# Patient Record
Sex: MALE | Race: WHITE | NOT HISPANIC OR LATINO | Employment: OTHER | URBAN - METROPOLITAN AREA
[De-identification: names, ages, dates, MRNs, and addresses within clinical notes are randomized per-mention and may not be internally consistent; named-entity substitution may affect disease eponyms.]

---

## 2019-09-11 ENCOUNTER — TRANSFERRED RECORDS (OUTPATIENT)
Dept: HEALTH INFORMATION MANAGEMENT | Facility: CLINIC | Age: 64
End: 2019-09-11

## 2019-09-17 ENCOUNTER — OFFICE VISIT (OUTPATIENT)
Dept: FAMILY MEDICINE | Facility: CLINIC | Age: 64
End: 2019-09-17
Payer: COMMERCIAL

## 2019-09-17 VITALS
WEIGHT: 219.4 LBS | TEMPERATURE: 97.8 F | RESPIRATION RATE: 14 BRPM | OXYGEN SATURATION: 96 % | HEIGHT: 73 IN | DIASTOLIC BLOOD PRESSURE: 98 MMHG | SYSTOLIC BLOOD PRESSURE: 144 MMHG | BODY MASS INDEX: 29.08 KG/M2 | HEART RATE: 70 BPM

## 2019-09-17 DIAGNOSIS — R25.1 TREMOR OF LEFT HAND: ICD-10-CM

## 2019-09-17 DIAGNOSIS — E78.2 MIXED HYPERLIPIDEMIA: ICD-10-CM

## 2019-09-17 DIAGNOSIS — Z00.00 ENCOUNTER FOR MEDICARE ANNUAL WELLNESS EXAM: Primary | ICD-10-CM

## 2019-09-17 DIAGNOSIS — Z28.21 REFUSED PNEUMOCOCCAL VACCINATION: ICD-10-CM

## 2019-09-17 DIAGNOSIS — I10 UNCONTROLLED HYPERTENSION: ICD-10-CM

## 2019-09-17 DIAGNOSIS — M10.00 IDIOPATHIC GOUT, UNSPECIFIED CHRONICITY, UNSPECIFIED SITE: ICD-10-CM

## 2019-09-17 DIAGNOSIS — Z12.5 SCREENING FOR PROSTATE CANCER: ICD-10-CM

## 2019-09-17 DIAGNOSIS — Z28.21 REFUSED INFLUENZA VACCINE: ICD-10-CM

## 2019-09-17 PROBLEM — M19.90 INFLAMMATORY ARTHRITIS: Status: ACTIVE | Noted: 2019-09-17

## 2019-09-17 LAB
ANION GAP SERPL CALCULATED.3IONS-SCNC: 4 MMOL/L (ref 3–14)
BUN SERPL-MCNC: 22 MG/DL (ref 7–30)
CALCIUM SERPL-MCNC: 9.6 MG/DL (ref 8.5–10.1)
CHLORIDE SERPL-SCNC: 106 MMOL/L (ref 94–109)
CHOLEST SERPL-MCNC: 209 MG/DL
CO2 SERPL-SCNC: 31 MMOL/L (ref 20–32)
CREAT SERPL-MCNC: 1.08 MG/DL (ref 0.66–1.25)
ERYTHROCYTE [DISTWIDTH] IN BLOOD BY AUTOMATED COUNT: 12.3 % (ref 10–15)
GFR SERPL CREATININE-BSD FRML MDRD: 71 ML/MIN/{1.73_M2}
GLUCOSE SERPL-MCNC: 117 MG/DL (ref 70–99)
HCT VFR BLD AUTO: 46.1 % (ref 40–53)
HDLC SERPL-MCNC: 71 MG/DL
HGB BLD-MCNC: 15.8 G/DL (ref 13.3–17.7)
LDLC SERPL CALC-MCNC: 119 MG/DL
MCH RBC QN AUTO: 34.3 PG (ref 26.5–33)
MCHC RBC AUTO-ENTMCNC: 34.3 G/DL (ref 31.5–36.5)
MCV RBC AUTO: 100 FL (ref 78–100)
NONHDLC SERPL-MCNC: 138 MG/DL
PLATELET # BLD AUTO: 134 10E9/L (ref 150–450)
POTASSIUM SERPL-SCNC: 3.8 MMOL/L (ref 3.4–5.3)
PSA SERPL-ACNC: 6.57 UG/L (ref 0–4)
RBC # BLD AUTO: 4.61 10E12/L (ref 4.4–5.9)
SODIUM SERPL-SCNC: 141 MMOL/L (ref 133–144)
TRIGL SERPL-MCNC: 93 MG/DL
URATE SERPL-MCNC: 6.7 MG/DL (ref 3.5–7.2)
WBC # BLD AUTO: 6 10E9/L (ref 4–11)

## 2019-09-17 PROCEDURE — 99214 OFFICE O/P EST MOD 30 MIN: CPT | Mod: 25 | Performed by: FAMILY MEDICINE

## 2019-09-17 PROCEDURE — G0103 PSA SCREENING: HCPCS | Performed by: FAMILY MEDICINE

## 2019-09-17 PROCEDURE — 36415 COLL VENOUS BLD VENIPUNCTURE: CPT | Performed by: FAMILY MEDICINE

## 2019-09-17 PROCEDURE — 99387 INIT PM E/M NEW PAT 65+ YRS: CPT | Performed by: FAMILY MEDICINE

## 2019-09-17 PROCEDURE — 85027 COMPLETE CBC AUTOMATED: CPT | Performed by: FAMILY MEDICINE

## 2019-09-17 PROCEDURE — 80061 LIPID PANEL: CPT | Performed by: FAMILY MEDICINE

## 2019-09-17 PROCEDURE — 80048 BASIC METABOLIC PNL TOTAL CA: CPT | Performed by: FAMILY MEDICINE

## 2019-09-17 PROCEDURE — 84550 ASSAY OF BLOOD/URIC ACID: CPT | Performed by: FAMILY MEDICINE

## 2019-09-17 RX ORDER — LISINOPRIL AND HYDROCHLOROTHIAZIDE 12.5; 2 MG/1; MG/1
2 TABLET ORAL
COMMUNITY
Start: 2019-01-08 | End: 2020-01-15

## 2019-09-17 RX ORDER — ALLOPURINOL 100 MG/1
TABLET ORAL
Status: ON HOLD | COMMUNITY
Start: 2019-01-14 | End: 2021-04-30

## 2019-09-17 ASSESSMENT — ACTIVITIES OF DAILY LIVING (ADL): CURRENT_FUNCTION: NO ASSISTANCE NEEDED

## 2019-09-17 ASSESSMENT — MIFFLIN-ST. JEOR: SCORE: 1834.07

## 2019-09-17 NOTE — PATIENT INSTRUCTIONS
You declined adding a second medication to control your blood pressure.  Uncontrolled high blood pressure increases your risk for heart attacks, strokes and other complications.  Be more consistent with low salt diet.  Follow up in 2 weeks for blood pressure recheck.    Measure blood pressure at home when you are resting.    Tremor on left hand appears to be essential tremors. But other conditions are possible too.  Propranolol  can be used to control this but you deferred starting.  Possible neurology consult and brain imaging in the near future as well.    Be consistent with low trans fat and saturated fat diet.  Eat food rich in omega-3-fatty acids as you tolerate. (salmon, olive oil)  Eat 5 cups of vegetables, fruits and whole grains per day.  Limit starchy food (white rice, white bread, white pasta, white potatoes) to less than a cup per meal.  Minimize sweets, junk food and fastfood. Limit soda beverages to one serving per day; best to avoid it altogether though.  Exercise: moderate intensity sustained for at least 30 mins per episode, goal of 150 mins per week at least  Combine cardiovascular and resistance exercises.  These exercise recommendations are in addition to your daily activity at work or home.  Work on losing weight if you are above your goal body mass index.    You refused to get a flu and pneumonia vaccines today.  This increases your risk for complications if you do contract the virus or bacteria.  If you change your decision, do get the vaccine as soon as you can.      Patient Education   Personalized Prevention Plan  You are due for the preventive services outlined below.  Your care team is available to assist you in scheduling these services.  If you have already completed any of these items, please share that information with your care team to update in your medical record.  Health Maintenance Due   Topic Date Due     Hepatitis C Screening  1955     Discuss Advance Care Planning   1955     Colonscopy  03/21/1964     HIV Screening  03/21/1969     Diptheria Tetanus Pertussis (DTAP/TDAP/TD) Vaccine (1 - Tdap) 03/21/1979     Zoster (Shingles) Vaccine (1 of 2) 03/21/2004     Cholesterol Lab  12/10/2008     PHQ-2  01/01/2019     Annual Wellness Visit  03/21/2019     FALL RISK ASSESSMENT  03/21/2019     Pneumococcal Vaccine (1 of 2 - PCV13) 03/21/2019     AORTIC ANEURYSM SCREENING (SYSTEM ASSIGNED)  03/21/2019     Flu Vaccine (1) 09/01/2019     Preventive Health Recommendations  See your health care provider every year to    Review health changes.     Discuss preventive care.      Review your medicines if your doctor has prescribed any.    Talk with your health care provider about whether you should have a test to screen for prostate cancer (PSA).    Every 3 years, have a diabetes test (fasting glucose). If you are at risk for diabetes, you should have this test more often.    Every 5 years, have a cholesterol test. Have this test more often if you are at risk for high cholesterol or heart disease.     Every 10 years, have a colonoscopy. Or, have a yearly FIT test (stool test). These exams will check for colon cancer.    Talk to with your health care provider about screening for Abdominal Aortic Aneurysm if you have a family history of AAA or have a history of smoking.    Shots:     Get a flu shot each year.     Get a tetanus shot every 10 years.     Talk to your doctor about your pneumonia vaccines. There are now two you should receive - Pneumovax (PPSV 23) and Prevnar (PCV 13).    Talk to your pharmacist about a shingles vaccine.     Talk to your doctor about the hepatitis B vaccine.    Nutrition:     Eat at least 5 servings of fruits and vegetables each day.     Eat whole-grain bread, whole-wheat pasta and brown rice instead of white grains and rice.     Get adequate Calcium and Vitamin D.     Lifestyle    Exercise for at least 150 minutes a week (30 minutes a day, 5 days a week). This will  help you control your weight and prevent disease.     Limit alcohol to one drink per day.     No smoking.     Wear sunscreen to prevent skin cancer.     See your dentist every six months for an exam and cleaning.     See your eye doctor every 1 to 2 years to screen for conditions such as glaucoma, macular degeneration and cataracts.    Personalized Prevention Plan  You are due for the preventive services outlined below.  Your care team is available to assist you in scheduling these services.  If you have already completed any of these items, please share that information with your care team to update in your medical record.  Health Maintenance Due   Topic Date Due     Hepatitis C Screening  1955     Discuss Advance Care Planning  1955     Colonscopy  03/21/1964     HIV Screening  03/21/1969     Diptheria Tetanus Pertussis (DTAP/TDAP/TD) Vaccine (1 - Tdap) 03/21/1979     Zoster (Shingles) Vaccine (1 of 2) 03/21/2004     Cholesterol Lab  12/10/2008     PHQ-2  01/01/2019     Annual Wellness Visit  03/21/2019     FALL RISK ASSESSMENT  03/21/2019     Pneumococcal Vaccine (1 of 2 - PCV13) 03/21/2019     AORTIC ANEURYSM SCREENING (SYSTEM ASSIGNED)  03/21/2019     Flu Vaccine (1) 09/01/2019       Understanding USDA MyPlate  The USDA (U.S. Department of Agriculture) has guidelines to help you make healthy food choices. These are called MyPlate. MyPlate shows the food groups that make up healthy meals using the image of a place setting. Before you eat, think about the healthiest choices for what to put onto your plate or into your cup or bowl. To learn more about building a healthy plate, visit www.choosemyplate.gov.    The food groups    Fruits. Any fruit or 100% fruit juice counts as part of the Fruit Group. Fruits may be fresh, canned, frozen, or dried, and may be whole, cut-up, or pureed. Make half your plate fruits and vegetables.    Vegetables. Any vegetable or 100% vegetable juice counts as a member of the  Vegetable Group. Vegetables may be fresh, frozen, canned, or dried. They can be served raw or cooked and may be whole, cut-up, or mashed. Make half your plate fruits and vegetables.    Grains. All foods made from grains are part of the Grains Group. These include wheat, rice, oats, cornmeal, and barley such as bread, pasta, oatmeal, cereal, tortillas, and grits. Grains should be no more than a quarter of your plate. At least half of your grains should be whole grains.    Protein. This group includes meat, poultry, seafood, beans and peas, eggs, processed soy products (like tofu), nuts (including nut butters), and seeds. Make protein choices no more than a quarter of your plate. Meat and poultry choices should be lean or low fat.    Dairy. All fluid milk products and foods made from milk that contain calcium, like yogurt and cheese, are part of the Dairy Group. (Foods that have little calcium, such as cream, butter, and cream cheese, are not part of the group.) Most dairy choices should be low-fat or fat-free.    Oils. These are fats that are liquid at room temperature. They include canola, corn, olive, soybean, and sunflower oil. Foods that are mainly oil include mayonnaise, certain salad dressings, and soft margarines. You should have only 5 to 7 teaspoons of oils a day. You probably already get this much from the food you eat.  Date Last Reviewed: 8/1/2017 2000-2018 [employer]. [address] [place] [address]. All rights reserved. This information is not intended as a substitute for professional medical care. Always follow your healthcare professional's instructions.           Patient Education     Low-Salt Diet  This diet removes foods that are high in salt. It also limits the amount of salt you use when cooking. It is most often used for people with high blood pressure, edema (fluid retention), and kidney, liver, or heart disease.  Table salt contains the mineral sodium. Your body  needs sodium to work normally. But too much sodium can make your health problems worse. Your healthcare provider is recommending a low-salt (also called low-sodium) diet for you. Your total daily allowance of salt is 1,500 to 2,300 milligrams (mg). It is less than 1 teaspoon of table salt. This means you can have only about 500 to 700 mg of sodium at each meal. People with certain health problems should limit salt intake to the lower end of the recommended range.    When you cook, don t add much salt. If you can cook without using salt, even better. Don t add salt to your food at the table.  When shopping, read food labels. Salt is often called sodium on the label. Choose foods that are salt-free, low salt, or very low salt. Note that foods with reduced salt may not lower your salt intake enough.    Beans, potatoes, and pasta  Ok: Dry beans, split peas, lentils, potatoes, rice, macaroni, pasta, spaghetti without added salt  Avoid: Potato chips, tortilla chips, and similar products  Breads and cereals  Ok: Low-sodium breads, rolls, cereals, and cakes; low-salt crackers, matzo crackers  Avoid: Salted crackers, pretzels, popcorn, [place] toast, pancakes, muffins  Dairy  Ok: Milk, chocolate milk, hot chocolate mix, low-salt cheeses, and yogurt  Avoid: Processed cheese and cheese spreads; Roquefort, Camembert, and cottage cheese; buttermilk, instant breakfast drink  Desserts  Ok: Ice cream, frozen yogurt, juice bars, gelatin, cookies and pies, sugar, honey, jelly, hard candy  Avoid: Most pies, cakes and cookies prepared or processed with salt; instant pudding  Drinks  Ok: Tea, coffee, fizzy (carbonated) drinks, juices  Avoid: Flavored coffees, electrolyte replacement drinks, sports drinks  Meats  Ok: All fresh meat, fish, poultry, low-salt tuna, eggs, egg substitute  Avoid: Smoked, pickled, brine-cured, or salted meats and fish. This includes smalls, chipped beef, corned beef, hot dogs, deli meats, ham, kosher meats, salt  pork, sausage, canned tuna, salted codfish, smoked salmon, herring, sardines, or anchovies.  Seasonings and spices  Ok: Most seasonings are okay. Good substitutes for salt include: fresh herb blends, hot sauce, lemon, garlic, castillo, vinegar, dry mustard, parsley, cilantro, horseradish, tomato paste, regular margarine, mayonnaise, unsalted butter, cream cheese, vegetable oil, cream, low-salt salad dressing and gravy.  Avoid: Regular ketchup, relishes, pickles, soy sauce, teriyaki sauce, Worcestershire sauce, BBQ sauce, tartar sauce, meat tenderizer, chili sauce, regular gravy, regular salad dressing, salted butter  Soups  Ok: Low-salt soups and broths made with allowed foods  Avoid: Bouillon cubes, soups with smoked or salted meats, regular soup and broth  Vegetables  Ok: Most vegetables are okay; also low-salt tomato and vegetable juices  Avoid: Sauerkraut and other brine-soaked vegetables; pickles and other pickled vegetables; tomato juice, olives  Date Last Reviewed: 8/1/2016 2000-2018 [employer]. [address] [place] [address]. All rights reserved. This information is not intended as a substitute for professional medical care. Always follow your healthcare professional's instructions.           Patient Education     Essential Tremor (ET)  What is essential tremor?  Essential tremor (ET) is a neurological disorder that causes your hands, head, trunk, voice, or legs to shake rhythmically. It is often confused with Parkinson disease.  ET is the most common trembling disorder that people have. Everyone has some ET, but the movements usually cannot be seen or felt. When tremors are noticeable, the condition is classified as ET.  ET is most common among people older than age 65, but it can affect people at any age.  What causes ET?  ET can occur in different people for different reasons:    Familial essential tremor. In most people, the condition seems to be passed down from a parent to a  child. If your parent has ET, there is a 50% chance that you or your children will inherit the gene responsible for the condition.    Essential tremor related to another disorder. Sometimes, a tremor is a symptom of another neurological disorder, such as Parkinson disease or dystonia. Sometimes, ET is mistaken for these other diseases when they are not present. A healthcare provider s careful diagnosis is extremely important.  The cause of ET isn t known. However, one theory suggests that your cerebellum and other parts of your brain are not communicating correctly. The cerebellum is a part of the brain that controls muscle coordination.  What are the symptoms of ET?  If you have ET, you will have shaking and trembling at different times and in different situations, but some characteristics are common to all. Here is what you might typically experience:    Tremors occur when you move and are less noticeable when you rest.    Certain medicines, caffeine, or stress can make your tremors worse.    Tremor may improve with ingestion of a small amount of alcohol (such as wine)    Tremors get worse as you age.    Tremors don t affect both sides of your body in the same way.  Here are different signs of essential tremor:    Tremors that are most obvious in your hands    Difficulty doing tasks with your hands, such as writing or using tools    Shaking or quivering sound in your voice    Uncontrollable head-nodding    In rare instances, tremors in your legs or feet  How is ET diagnosed?  Your rapid, uncontrollable trembling, as well as questions about your medical and family history, can help your healthcare provider determine if you have familial ET. He or she will probably need to rule out other conditions that could cause shaking or trembling. For example, tremors could be symptoms of diseases, such as hyperthyroidism. Your healthcare provider might test you for those, as well.  In some cases, the tremors might be related  to other factors. To find out for certain, your healthcare provider may have you try to:    Abstain from heavy alcohol use; if you re an alcoholic, trembling is a common symptom.    Cut out cigarette smoking.    Avoid caffeine.    Avoid certain medicines  How is ET treated?   Propanolol and primidone are 2 medicines often prescribed to treat ET. Propanolol blocks the stimulating action of neurotransmitters to calm your trembling. Primidone is a common antiseizure medicine that also controls the actions of neurotransmitters.  Gabapentin and topiramate are 2 other antiseizure medicines that are sometimes prescribed. In some cases, tranquilizers like alprazolam or clonazepam might be suggested.  For ET in your hands, botulinum toxin (Botox) injections have shown some promise in easing the trembling. They work by weakening the surrounding muscles around your hands. For severe tremors, a stimulating device (deep brain stimulator) surgically implanted in your brain may help.  Can ET be prevented?   The specific cause of ET is not known, so scientists are not sure how the condition can be prevented.  Living with ET  ET is usually not dangerous, but it can certainly be frustrating if you have to deal with it. Certain factors can make tremors worse, so the following steps may help to decrease tremors:    Avoid alcohol, cigarettes, and caffeine    Avoid stressful situations as much as possible    Use relaxation techniques, such as yoga, deep-breathing exercises, or biofeedback    Check with your healthcare provider to see if any medicines you re taking could be making your tremors worse.  Talk with your healthcare provider about other options, such as surgery, if ET starts to affect your quality of life.  When should I call my healthcare provider?  If you have been diagnosed with ET, talk with your healthcare provider about when you might need to call. He or she will likely advise you to call if your tremors become worse, or  if you develop new neurologic symptoms, such as numbness or weakness.  Key points about essential tremors    ET is a neurological disorder that causes your hands, head, trunk, voice, or legs to shake rhythmically. The cause is not known, but it is often passed down from a parent to a child.    ET is sometimes confused with other types of tremor, so getting the right diagnosis is important.    Tremors tend to be worse during movement than when at rest. The tremors are usually not dangerous, but they can get worse over time.    Avoiding things that might make tremors worse, such as stress, caffeine, and certain medicines, may be helpful.    Medicines can also help control or limit tremors in some people. Severe tremors can sometimes be treated with surgery.    Next steps  Tips to help you get the most from a visit to your healthcare provider:    Know the reason for your visit and what you want to happen.    Before your visit, write down questions you want answered.    Bring someone with you to help you ask questions and remember what your provider tells you.    At the visit, write down the name of a new diagnosis, and any new medicines, treatments, or tests. Also write down any new instructions your provider gives you.    Know why a new medicine or treatment is prescribed, and how it will help you. Also know what the side effects are.    Ask if your condition can be treated in other ways.    Know why a test or procedure is recommended and what the results could mean.    Know what to expect if you do not take the medicine or have the test or procedure.    If you have a follow-up appointment, write down the date, time, and purpose for that visit.    Know how you can contact your provider if you have questions.      [id number] [employer]. [address] [place] [address], [place] [address]. All rights reserved. This information is not intended as a substitute for professional medical care. Always follow your  healthcare professional's instructions.

## 2019-09-17 NOTE — PROGRESS NOTES
"SUBJECTIVE:   Jonathon Santos is a 64 year old male who presents for Preventive Visit.  Are you in the first 12 months of your Medicare coverage?  Yes, pt had eyes checked at eye clinic 6 months ago.    Healthy Habits:     In general, how would you rate your overall health?  Good    Frequency of exercise:: farms, active.    Duration of exercise:: daily.    Do you usually eat at least 4 servings of fruit and vegetables a day, include whole grains    & fiber and avoid regularly eating high fat or \"junk\" foods?  No (2 servings)    Taking medications regularly:  Yes    Barriers to taking medications:  None    Medication side effects:  Other (pt gets congested in the mornings, wondering about allopurinol causing this)    Ability to successfully perform activities of daily living:  No assistance needed    Home Safety:  No safety concerns identified    Hearing Impairment:  No hearing concerns    In the past 6 months, have you been bothered by leaking of urine?  No    In general, how would you rate your overall mental or emotional health?  Good      PHQ-2 Total Score: 0    Additional concerns today:  No    Patient was advised that concern below is separate from preventive exam and may be billed as a separate encounter.  He verbalized understanding and would like to address today.    1) tremor on left upper extremity, worsens if he picks up a cup or when he writes. He is left handed. Present for few years, more frequent now. Waxes and wanes. Patient said his father and grandfather have tremors but patient cannot remember what type of conditions they had. No previous eval or treatment per patient. Patient denies change in gait, difficulty walking  or other neuro symptoms    2) patient aske dfor labs for rheum for his gout: CBC, uric acid and creatinine. Patient takes allopurinol.  Patient reports adequate control.     3) HTN - found to be moderately high today. Denies chest pain, dyspnea, HA, BOV, dizziness or urinary changes. " On Prinzide currently.    Do you feel safe in your environment? Yes    Do you have a Health Care Directive? No: Advance care planning reviewed with patient; information given to patient to review.  Pt had hearing test done in [place] just recently.  Colonoscopy sent to [facility], found in care everywhere.  Fall risk  Fallen 2 or more times in the past year?: No  Any fall with injury in the past year?: No    Cognitive Screening   1) Repeat 3 items (Leader, Season, Table)    2) Clock draw: NORMAL  3) 3 item recall: Recalls 3 objects  Results: 3 items recalled: COGNITIVE IMPAIRMENT LESS LIKELY    Mini-CogTM Copyright S [employer]ac[employer]. Licensed by the author for use in [facility]; reprinted with permission (soob@.[facility]). All rights reserved.      Do you have sleep apnea, excessive snoring or daytime drowsiness?: no    Reviewed and updated as needed this visit by clinical staff  Tobacco  Allergies  Meds  Med Hx  Surg Hx  Fam Hx  Soc Hx        Reviewed and updated as needed this visit by Provider        Social History     Tobacco Use     Smoking status: Never Smoker     Smokeless tobacco: Former User   Substance Use Topics     Alcohol use: Yes     Comment: 1-2 beers daily     If you drink alcohol do you typically have >3 drinks per day or >7 drinks per week? No    Current providers sharing in care for this patient include:   Patient Care Team:  Balbir Oliveira MD as PCP - General (Family Practice)    The following health maintenance items are reviewed in Epic and correct as of today:  Health Maintenance   Topic Date Due     HEPATITIS C SCREENING  1955     HIV SCREENING  03/21/1969     DTAP/TDAP/TD IMMUNIZATION (1 - Tdap) 03/21/1979     ZOSTER IMMUNIZATION (1 of 2) 03/21/2004     LIPID  12/10/2008     MEDICARE ANNUAL WELLNESS VISIT  03/21/2019     FALL RISK ASSESSMENT  03/21/2019     PNEUMOCOCCAL IMMUNIZATION 65+ LOW/MEDIUM RISK (1 of 2 - PCV13) 03/21/2019     AORTIC ANEURYSM SCREENING  "(SYSTEM ASSIGNED)  03/21/2019     INFLUENZA VACCINE (1) 09/01/2019     ADVANCE CARE PLANNING  09/17/2024     COLONOSCOPY  12/06/2026     PHQ-2  Completed     IPV IMMUNIZATION  Aged Out     MENINGITIS IMMUNIZATION  Aged Out     Patient Active Problem List   Diagnosis     Refused influenza vaccine     Refused pneumococcal vaccination     Gout     Inflammatory arthritis     Renal insufficiency syndrome     History reviewed. No pertinent surgical history.    Social History     Tobacco Use     Smoking status: Never Smoker     Smokeless tobacco: Former User   Substance Use Topics     Alcohol use: Yes     Comment: 1-2 beers daily     Family History   Problem Relation Age of Onset     Hypertension Mother      Hypertension Father      Hypertension Maternal Grandmother      Hypertension Maternal Grandfather      Hypertension Brother      Hypertension Sister      Diabetes Cousin          Current Outpatient Medications   Medication Sig Dispense Refill     allopurinol (ZYLOPRIM) 100 MG tablet TAKE 2 TABLETS BY MOUTH ONCE DAILY       lisinopril-hydrochlorothiazide (PRINZIDE/ZESTORETIC) 20-12.5 MG tablet Take 2 tablets by mouth       No Known Allergies  Pneumonia Vaccine: patient declines    Review of Systems  Constitutional, HEENT, cardiovascular, pulmonary, GI, , musculoskeletal, neuro, skin, endocrine and psych systems are negative, except as otherwise noted.    Patient denies BM or urinary changes.  No fam hx of colon cancer..  No fam hx of prostate cancer.    OBJECTIVE:   BP (!) 144/98   Pulse 70   Temp 97.8  F (36.6  C) (Tympanic)   Resp 14   Ht 1.854 m (6' 1\")   Wt 99.5 kg (219 lb 6.4 oz)   SpO2 96%   BMI 28.95 kg/m   Estimated body mass index is 28.95 kg/m  as calculated from the following:    Height as of this encounter: 1.854 m (6' 1\").    Weight as of this encounter: 99.5 kg (219 lb 6.4 oz).  Physical Exam  GENERAL APPEARANCE: overweight, alert and no distress  EYES: pink conj, no icterus, PERRL, EOMI  HENT: " "ear canals and TM's normal, nose and mouth without ulcers or lesions, oropharynx clear and oral mucous membranes moist  NECK: no adenopathy, no asymmetry, masses, or scars and thyroid normal to palpation  RESP: lungs clear to auscultation - no rales, rhonchi or wheezes  CV: regular rates and rhythm, normal S1 S2, no S3 or S4, no murmur, click or rub, no peripheral edema and peripheral pulses strong  ABDOMEN: soft, nontender, no hepatosplenomegaly, no masses and bowel sounds normal  RECTAL: deferred  MS: no musculoskeletal defects are noted and gait is age appropriate without ataxia  SKIN: no suspicious lesions or rashes  NEURO: Normal strength and tone, sensory exam grossly normal, mentation intact and speech normal; coarse left upper extremity tremor that worsens with intention.    Diagnostic Test Results:  none     ASSESSMENT / PLAN:   Jonathon was seen today for physical.    Diagnoses and all orders for this visit:    Encounter for Medicare annual wellness exam  Patient was advised on recommended screening and preventive health recommendations.  He verbalized understanding and agreed to the plans below.    Uncontrolled hypertension  -     Basic metabolic panel  Patient was advised BP uncontrolled today.  Reviewed meds with patient. Already maxed dose of Prinzide  Patient declined to add medication in spite of being advised on complications of high BP.  Patient said his BP \"is okay\" measured at the store - when asked when and what measurements he has, he could not say when last measured and what range.  Reinforced sodium restriction.  Exercise recommendations reviewed with patient.  Patient was strongly advised to recheck BP in 2 weeks in clinic. If still >140/90, start another med (propranolol, amlodipine).    Tremor of left hand  Patient was advised that grossly the tremor is consistent with essential tremor.  However, other etiologies can be considered: Parkinson's disease, cerebellar disease, MS, midbrain vascular " disease among others.  Patient deferred starting propranolol (primarily for htn), that can be used to treat ET.  Patient deferred brain imaging at this time.  Consider neuro consult. Patient defers as well.    Mixed hyperlipidemia  -     Lipid panel reflex to direct LDL Fasting  Reinforced heart healthy lifestyle.    Idiopathic gout, unspecified chronicity, unspecified site  -     Uric acid  -     CBC with platelets  At end of visit, patient asked that labs be ordered per his rheumatologist request.    Screening for prostate cancer  -     Prostate spec antigen screen    Refused influenza vaccine  Offered vaccine.  Discussed benefits of getting it and risk of not getting it. Patient declined.    Refused pneumococcal vaccination  Offered vaccine.  Discussed benefits of getting it and risk of not getting it. Patient declined.    In addition to preventive health visit, spent another 25 minutes in counseling and coordination of care as above.    End of Life Planning:  Patient currently has an advanced directive: No.  I have verified the patient's ablity to prepare an advanced directive/make health care decisions.  Literature was provided to assist patient in preparing an advanced directive.    COUNSELING:  Reviewed preventive health counseling, as reflected in patient instructions       Regular exercise       Healthy diet/nutrition       Vision screening       Hearing screening       Dental care       Bladder control       Fall risk prevention       Immunizations    Declined: Influenza and Pneumococcal due to Conscientious objector               Alcohol Use       Safe sex practices/STD prevention       Colon cancer screening       Prostate cancer screening       Osteoporosis Prevention/Bone Health       The 10-year ASCVD risk score (Cindy WOOTEN Jr., et al., 2013) is: 15.2%    Values used to calculate the score:      Age: 64 years      Sex: Male      Is Non- : No      Diabetic: No      Tobacco smoker:  "No      Systolic Blood Pressure: 144 mmHg      Is BP treated: Yes      HDL Cholesterol: 71 mg/dL      Total Cholesterol: 209 mg/dL    Estimated body mass index is 28.95 kg/m  as calculated from the following:    Height as of this encounter: 1.854 m (6' 1\").    Weight as of this encounter: 99.5 kg (219 lb 6.4 oz).    Weight management plan: Discussed healthy diet and exercise guidelines     reports that he has never smoked. He has quit using smokeless tobacco.      Appropriate preventive services were discussed with this patient, including applicable screening as appropriate for cardiovascular disease, diabetes, osteopenia/osteoporosis, and glaucoma.  As appropriate for age/gender, discussed screening for colorectal cancer, prostate cancer, breast cancer, and cervical cancer. Checklist reviewing preventive services available has been given to the patient.    Reviewed patients plan of care and provided an AVS. The Basic Care Plan (routine screening as documented in Health Maintenance) and Complex Care Plan (for patients with higher acuity and needing more deliberate coordination of services) for Jonathon meets the Care Plan requirement. This Care Plan has been established and reviewed with the Patient.    Counseling Resources:  ATP IV Guidelines  Pooled Cohorts Equation Calculator  Breast Cancer Risk Calculator  FRAX Risk Assessment  ICSI Preventive Guidelines  Dietary Guidelines for Americans, 2010  [employer] MyPlate  ASA Prophylaxis  Lung CA Screening    Balbir Oliveira MD  [facility]    Identified Health Risks:  "

## 2019-09-19 DIAGNOSIS — E78.2 MIXED HYPERLIPIDEMIA: Primary | ICD-10-CM

## 2019-09-19 DIAGNOSIS — R73.01 IMPAIRED FASTING GLUCOSE: ICD-10-CM

## 2019-09-24 PROBLEM — I10 UNCONTROLLED HYPERTENSION: Status: ACTIVE | Noted: 2019-09-24

## 2019-09-24 PROBLEM — E78.2 MIXED HYPERLIPIDEMIA: Status: ACTIVE | Noted: 2019-09-24

## 2019-09-29 ENCOUNTER — HEALTH MAINTENANCE LETTER (OUTPATIENT)
Age: 64
End: 2019-09-29

## 2019-10-04 ENCOUNTER — MYC MEDICAL ADVICE (OUTPATIENT)
Dept: FAMILY MEDICINE | Facility: CLINIC | Age: 64
End: 2019-10-04

## 2019-10-04 NOTE — TELEPHONE ENCOUNTER
Patient had questions about PSA level. ADvised of PSA level and providers recommendations, Also provider resources on PSA test from Epic references.    2620-6040 [employer]. [address] [place] [address], [place] [address]. All rights reserved. This information is not intended as a substitute for professional medical care. Always follow your healthcare professional's instructions.  .

## 2019-10-09 ENCOUNTER — MYC MEDICAL ADVICE (OUTPATIENT)
Dept: FAMILY MEDICINE | Facility: CLINIC | Age: 64
End: 2019-10-09

## 2020-01-15 ENCOUNTER — TELEPHONE (OUTPATIENT)
Dept: FAMILY MEDICINE | Facility: CLINIC | Age: 65
End: 2020-01-15

## 2020-01-15 DIAGNOSIS — I10 UNCONTROLLED HYPERTENSION: Primary | ICD-10-CM

## 2020-01-15 NOTE — TELEPHONE ENCOUNTER
Requested Prescriptions   Pending Prescriptions Disp Refills     lisinopril-hydrochlorothiazide (PRINZIDE/ZESTORETIC) 20-12.5 MG tablet       Sig: Take 2 tablets by mouth       There is no refill protocol information for this order        Last Written Prescription Date:  09/17/19  Last Fill Quantity: ,  # refills:     Last office visit: 9/17/2019 with prescribing provider:  deepti Vazquez Office Visit:

## 2020-01-16 RX ORDER — LISINOPRIL AND HYDROCHLOROTHIAZIDE 12.5; 2 MG/1; MG/1
2 TABLET ORAL DAILY
Qty: 30 TABLET | Refills: 0 | Status: SHIPPED | OUTPATIENT
Start: 2020-01-16 | End: 2022-05-19

## 2020-01-16 NOTE — TELEPHONE ENCOUNTER
Routing refill request to provider for review/approval because:  Medication is reported/historical    Lela Ross RN

## 2020-01-16 NOTE — TELEPHONE ENCOUNTER
Refilled #30.  Please call patient to schedule repeat BP recheck with RN.  He  Was instructed to do so after last visit but he never scheduled.

## 2020-02-04 DIAGNOSIS — I10 UNCONTROLLED HYPERTENSION: ICD-10-CM

## 2020-02-04 NOTE — TELEPHONE ENCOUNTER
"Requested Prescriptions   Pending Prescriptions Disp Refills     lisinopril-hydrochlorothiazide (PRINZIDE/ZESTORETIC) 20-12.5 MG tablet [Pharmacy Med Name: LISINOPRIL-HCTZ 20-12.5MG TABLET] 30 tablet 0     Sig: TAKE 2 TABLETS BY MOUTH  DAILY       Diuretics (Including Combos) Protocol Failed - 2/4/2020  2:02 PM        Failed - Blood pressure under 140/90 in past 12 months     BP Readings from Last 3 Encounters:   09/17/19 (!) 144/98                 Passed - Recent (12 mo) or future (30 days) visit within the authorizing provider's specialty     Patient has had an office visit with the authorizing provider or a provider within the authorizing providers department within the previous 12 mos or has a future within next 30 days. See \"Patient Info\" tab in inbasket, or \"Choose Columns\" in Meds & Orders section of the refill encounter.              Passed - Medication is active on med list        Passed - Patient is age 18 or older        Passed - Normal serum creatinine on file in past 12 months     Recent Labs   Lab Test 09/17/19  1108   CR 1.08              Passed - Normal serum potassium on file in past 12 months     Recent Labs   Lab Test 09/17/19  1108   POTASSIUM 3.8                    Passed - Normal serum sodium on file in past 12 months     Recent Labs   Lab Test 09/17/19  1108                 Last Written Prescription Date:  1/16/20  Last Fill Quantity: 30,  # refills: 0   Last office visit: 9/17/2019 with prescribing provider:  Roxanne   Future Office Visit:      "

## 2020-02-06 RX ORDER — LISINOPRIL AND HYDROCHLOROTHIAZIDE 12.5; 2 MG/1; MG/1
2 TABLET ORAL DAILY
Qty: 30 TABLET | Refills: 0 | OUTPATIENT
Start: 2020-02-06

## 2020-02-06 NOTE — TELEPHONE ENCOUNTER
Pt has est care with Tam[facility] provider- 02-04-20 OV.  Request pharm forward to provider.  DARYL Odom RN

## 2020-10-13 ENCOUNTER — TELEPHONE (OUTPATIENT)
Dept: UROLOGY | Facility: CLINIC | Age: 65
End: 2020-10-13

## 2020-10-13 NOTE — TELEPHONE ENCOUNTER
Reason for Call:  Other     Detailed comments: Pt needs to be seen for high PSA - referred by Dr. Cristina at [facility]. (wants to be seen at the [place] location) - Please contact pt    Phone Number Patient can be reached at: Cell number on file:    Telephone Information:   Mobile [phone]       Best Time: Any    Can we leave a detailed message on this number? YES    Call taken on 10/13/2020 at 12:02 PM by Denise Behrendt

## 2020-10-14 NOTE — TELEPHONE ENCOUNTER
Pt calling to check status of getting an appt w/ Dr. Caban.     Please call    [place] CSS

## 2020-10-15 ENCOUNTER — VIRTUAL VISIT (OUTPATIENT)
Dept: UROLOGY | Facility: CLINIC | Age: 65
End: 2020-10-15
Payer: COMMERCIAL

## 2020-10-15 DIAGNOSIS — R97.20 ELEVATED PROSTATE SPECIFIC ANTIGEN (PSA): Primary | ICD-10-CM

## 2020-10-15 PROCEDURE — 99202 OFFICE O/P NEW SF 15 MIN: CPT | Mod: 95 | Performed by: UROLOGY

## 2020-10-15 NOTE — PROGRESS NOTES
"Jonathon Santos is a 65 year old male who is being evaluated via a billable telephone visit.      The patient has been notified of following:     \"This telephone visit will be conducted via a call between you and your physician/provider. We have found that certain health care needs can be provided without the need for a physical exam.  This service lets us provide the care you need with a short phone conversation.  If a prescription is necessary we can send it directly to your pharmacy.  If lab work is needed we can place an order for that and you can then stop by our lab to have the test done at a later time.    Telephone visits are billed at different rates depending on your insurance coverage. During this emergency period, for some insurers they may be billed the same as an in-person visit.  Please reach out to your insurance provider with any questions.    If during the course of the call the physician/provider feels a telephone visit is not appropriate, you will not be charged for this service.\"    Patient has given verbal consent for Telephone visit?  Yes    What phone number would you like to be contacted at? [phone]  rocael cee LPN    64 yo male with history of elevated PSA.  PSA 8.2 ng/mL 9/22/20, and 7.25 ng/mL on 2/4/20.  Has nocturia 3x/night, no push or strain, feels complete emptying, no incontinence.    PMhx: Htn, gout  PSHx: No abd surgery  MEDS: allopurinol, lisin/HCTZ  NKDA  FHx: father with prostate cancer at 55 yo treated with surgery  ROS: neg f/c/s, n/v    ASSESSMENT AND PLAN:   Over half of today's 25-minute visit was spent counseling the patient regarding his elevated PSA.  I counseled the patient that given his rising PSA, family history and relatively few voiding symptoms, I suggest moving forward with a prostate MRI.  We will then determine need for biopsy.  Patient is in agreement with the plan.  Phone call duration: 25 minutes        "

## 2020-11-17 ENCOUNTER — ANCILLARY PROCEDURE (OUTPATIENT)
Dept: MRI IMAGING | Facility: CLINIC | Age: 65
End: 2020-11-17
Attending: UROLOGY
Payer: COMMERCIAL

## 2020-11-17 DIAGNOSIS — R97.20 ELEVATED PROSTATE SPECIFIC ANTIGEN (PSA): ICD-10-CM

## 2020-11-17 PROCEDURE — A9585 GADOBUTROL INJECTION: HCPCS | Performed by: RADIOLOGY

## 2020-11-17 PROCEDURE — 72197 MRI PELVIS W/O & W/DYE: CPT | Performed by: RADIOLOGY

## 2020-11-17 PROCEDURE — 76377 3D RENDER W/INTRP POSTPROCES: CPT | Performed by: RADIOLOGY

## 2020-11-17 RX ORDER — GADOBUTROL 604.72 MG/ML
10 INJECTION INTRAVENOUS ONCE
Status: COMPLETED | OUTPATIENT
Start: 2020-11-17 | End: 2020-11-17

## 2020-11-17 RX ADMIN — GADOBUTROL 10 ML: 604.72 INJECTION INTRAVENOUS at 15:40

## 2020-11-17 NOTE — DISCHARGE INSTRUCTIONS
MRI Contrast Discharge Instructions    The IV contrast you received today will pass out of your body in your  urine. This will happen in the next 24 hours. You will not feel this process.  Your urine will not change color.    Drink at least 4 extra glasses of water or juice today (unless your doctor  has restricted your fluids). This reduces the stress on your kidneys.  You may take your regular medicines.    If you are on dialysis: It is best to have dialysis today.    If you have a reaction: Most reactions happen right away. If you have  any new symptoms after leaving the hospital (such as hives or swelling),  call your hospital at the correct number below. Or call your family doctor.  If you have breathing distress or wheezing, call 911.    Special instructions: ***    I have read and understand the above information.    Signature:______________________________________ Date:___________    Staff:__________________________________________ Date:___________     Time:__________    [facility] Departments:    ___[place]: [phone]  ___[facility]: [phone]  ___[place]: [phone] ___[facility]: [phone]  ___[facility]: [phone]  ___[facility]: [phone]  ___[phone]  ___[facility]: [phone]  ___[phone]

## 2020-11-20 ENCOUNTER — TELEPHONE (OUTPATIENT)
Dept: UROLOGY | Facility: CLINIC | Age: 65
End: 2020-11-20

## 2020-11-20 NOTE — TELEPHONE ENCOUNTER
[facility] Call Center    Phone Message    May a detailed message be left on voicemail: yes     Reason for Call: Requesting Results   Name/type of test: MRI  Date of test: 11/17/2020  Was test done at a location other than [facility] (Please fill in the location if not [facility])?: No      Action Taken: Message routed to:  [facility] URO    Travel Screening: Not Applicable

## 2020-11-23 NOTE — TELEPHONE ENCOUNTER
Per Dr Caban, patient has been notified he will be scheduled for a fusion biopsy.  rocael cee LPN

## 2020-11-30 ENCOUNTER — TELEPHONE (OUTPATIENT)
Dept: UROLOGY | Facility: CLINIC | Age: 65
End: 2020-11-30

## 2020-11-30 NOTE — TELEPHONE ENCOUNTER
Spoke to patient, explained procedure will be done at [address] [place] [address], [facility] and [facility].  rocael cee LPN

## 2020-11-30 NOTE — TELEPHONE ENCOUNTER
Reason for Call:  Other questios    Detailed comments: Pt wants to speak to Aniyah about his upcoming biopsy, where will it be and will he need a ? Please call    Phone Number Patient can be reached at: Home number on file [phone] (home)    Best Time: today    Can we leave a detailed message on this number? YES    Call taken on 11/30/2020 at 12:41 PM by Sarah Perez

## 2020-12-02 DIAGNOSIS — R97.20 ELEVATED PROSTATE SPECIFIC ANTIGEN (PSA): Primary | ICD-10-CM

## 2020-12-02 RX ORDER — CIPROFLOXACIN 500 MG/1
500 TABLET, FILM COATED ORAL 2 TIMES DAILY
Qty: 6 TABLET | Refills: 0 | Status: SHIPPED | OUTPATIENT
Start: 2020-12-02 | End: 2021-01-18

## 2020-12-07 ENCOUNTER — TELEPHONE (OUTPATIENT)
Dept: UROLOGY | Facility: CLINIC | Age: 65
End: 2020-12-07

## 2020-12-07 NOTE — TELEPHONE ENCOUNTER
[facility] Call Center    Phone Message    May a detailed message be left on voicemail: yes     Reason for Call: Other: Pt wondering if  will be covered by his insurance plan in 2021. Please call back to discuss.      Action Taken: Message routed to:  [facility] uro    Travel Screening: Not Applicable

## 2020-12-08 ENCOUNTER — PRE VISIT (OUTPATIENT)
Dept: UROLOGY | Facility: CLINIC | Age: 65
End: 2020-12-08

## 2020-12-08 DIAGNOSIS — R97.20 ELEVATED PROSTATE SPECIFIC ANTIGEN (PSA): Primary | ICD-10-CM

## 2020-12-08 RX ORDER — CIPROFLOXACIN 500 MG/1
500 TABLET, FILM COATED ORAL 2 TIMES DAILY
Qty: 6 TABLET | Refills: 0 | Status: SHIPPED | OUTPATIENT
Start: 2020-12-08 | End: 2021-01-18

## 2020-12-08 NOTE — TELEPHONE ENCOUNTER
Visit Type : MRI follow up     Records/Orders: MRI in [facility]     Pt Contacted: no    At Rooming: video

## 2020-12-14 ENCOUNTER — PRE VISIT (OUTPATIENT)
Dept: UROLOGY | Facility: CLINIC | Age: 65
End: 2020-12-14

## 2020-12-14 ENCOUNTER — MYC MEDICAL ADVICE (OUTPATIENT)
Dept: UROLOGY | Facility: CLINIC | Age: 65
End: 2020-12-14

## 2020-12-21 ENCOUNTER — OFFICE VISIT (OUTPATIENT)
Dept: UROLOGY | Facility: CLINIC | Age: 65
End: 2020-12-21
Payer: COMMERCIAL

## 2020-12-21 VITALS
DIASTOLIC BLOOD PRESSURE: 101 MMHG | WEIGHT: 215 LBS | HEIGHT: 73 IN | BODY MASS INDEX: 28.49 KG/M2 | SYSTOLIC BLOOD PRESSURE: 144 MMHG | HEART RATE: 85 BPM

## 2020-12-21 DIAGNOSIS — R97.20 ELEVATED PROSTATE SPECIFIC ANTIGEN (PSA): Primary | ICD-10-CM

## 2020-12-21 PROCEDURE — 55700 PR US GUIDE FOR NEEDLE PLACEMENT: CPT | Performed by: UROLOGY

## 2020-12-21 PROCEDURE — 76872 US TRANSRECTAL: CPT | Performed by: UROLOGY

## 2020-12-21 PROCEDURE — 88305 TISSUE EXAM BY PATHOLOGIST: CPT | Performed by: PATHOLOGY

## 2020-12-21 PROCEDURE — 76999 ECHO EXAMINATION PROCEDURE: CPT | Performed by: UROLOGY

## 2020-12-21 RX ORDER — LIDOCAINE HYDROCHLORIDE 10 MG/ML
30 INJECTION, SOLUTION EPIDURAL; INFILTRATION; INTRACAUDAL; PERINEURAL ONCE
Status: COMPLETED | OUTPATIENT
Start: 2020-12-21 | End: 2020-12-21

## 2020-12-21 RX ORDER — GENTAMICIN 40 MG/ML
80 INJECTION, SOLUTION INTRAMUSCULAR; INTRAVENOUS ONCE
Status: COMPLETED | OUTPATIENT
Start: 2020-12-21 | End: 2020-12-21

## 2020-12-21 RX ADMIN — GENTAMICIN 80 MG: 40 INJECTION, SOLUTION INTRAMUSCULAR; INTRAVENOUS at 10:14

## 2020-12-21 RX ADMIN — LIDOCAINE HYDROCHLORIDE 30 ML: 10 INJECTION, SOLUTION EPIDURAL; INFILTRATION; INTRACAUDAL; PERINEURAL at 10:14

## 2020-12-21 ASSESSMENT — MIFFLIN-ST. JEOR: SCORE: 1809.11

## 2020-12-21 ASSESSMENT — PAIN SCALES - GENERAL: PAINLEVEL: NO PAIN (0)

## 2020-12-21 NOTE — PROGRESS NOTES
Reason for visit: MRI ultrasound fusion prostate biopsy    Indications: Mr. Santos is a 65-year-old gentleman followed in clinic for elevated PSA. He presents today for  MRI/ultrasound fusion prostate biopsy.    Procedure: After informed consent was obtained and after confirming the patient has been off aspirin or aspirin like products for a week, took his antibiotics and perform his enema, the patient was placed left side down on the procedure table. Digital rectal exam was performed revealing a normal feeling prostate. The ultrasound probe was lubricated and placed into the patient's rectum without difficulty. Inspection of the prostate revealed a few calcifications, but no obvious hypoechoic regions.   Next 5 ccs of lidocaine were injected at the junction of the prostate and the seminal vesicles bilaterally.  Measurement of the prostate were then obtained revealing a volume of 33 ccs.  We then performed an ultrasound sweep of the prostate. These post-processed images were then utilized by the UroNav software to create a 3-D prostate volume.  These images were then overlaid on the patient's MRI from  11/17/20  and MRI/ ultrasound fusion was performed. We then obtained 2 cores from target #1, a lesion in the right mid PZ 10 oclock target #2, a lesion in the left apex 12:30 oclock. We then obtained another 12 cores in the standard sextant distribution with an additional core each from the left and right transition zones for a total of 18 cores obtained. The patient tolerated the procedure without difficulty.    The patient was counseled he could expect to see blood in his urine, semen, and stool for the next several days and should contact us should he develop any fevers chills or sweats. We'll see the patient back in a week to go over the results of his biopsy.

## 2020-12-21 NOTE — LETTER
12/21/2020       RE: Jonathon Santos  [address] [address] [place] [id number]     Dear Colleague,    Thank you for referring your patient, Jonathon Santos, to the [facility] UROLOGY CLINIC [place] at [facility]. Please see a copy of my visit note below.    Reason for visit: MRI ultrasound fusion prostate biopsy    Indications: Mr. Santos is a 65-year-old gentleman followed in clinic for elevated PSA. He presents today for  MRI/ultrasound fusion prostate biopsy.    Procedure: After informed consent was obtained and after confirming the patient has been off aspirin or aspirin like products for a week, took his antibiotics and perform his enema, the patient was placed left side down on the procedure table. Digital rectal exam was performed revealing a normal feeling prostate. The ultrasound probe was lubricated and placed into the patient's rectum without difficulty. Inspection of the prostate revealed a few calcifications, but no obvious hypoechoic regions.   Next 5 ccs of lidocaine were injected at the junction of the prostate and the seminal vesicles bilaterally.  Measurement of the prostate were then obtained revealing a volume of 33 ccs.  We then performed an ultrasound sweep of the prostate. These post-processed images were then utilized by the UroNav software to create a 3-D prostate volume.  These images were then overlaid on the patient's MRI from  11/17/20  and MRI/ ultrasound fusion was performed. We then obtained 2 cores from target #1, a lesion in the right mid PZ 10 oclock target #2, a lesion in the left apex 12:30 oclock. We then obtained another 12 cores in the standard sextant distribution with an additional core each from the left and right transition zones for a total of 18 cores obtained. The patient tolerated the procedure without difficulty.    The patient was counseled he could expect to see blood in his urine, semen, and stool for the next several  days and should contact us should he develop any fevers chills or sweats. We'll see the patient back in a week to go over the results of his biopsy.       Ferdinand Caban MD

## 2020-12-21 NOTE — NURSING NOTE
"No chief complaint on file.      Blood pressure (!) 144/101, pulse 85, height 1.854 m (6' 1\"), weight 97.5 kg (215 lb). Body mass index is 28.37 kg/m .    Patient Active Problem List   Diagnosis     Refused influenza vaccine     Refused pneumococcal vaccination     Gout     Inflammatory arthritis     Renal insufficiency syndrome     Mixed hyperlipidemia     Uncontrolled hypertension       No Known Allergies    Current Outpatient Medications   Medication Sig Dispense Refill     allopurinol (ZYLOPRIM) 100 MG tablet TAKE 2 TABLETS BY MOUTH ONCE DAILY       ciprofloxacin (CIPRO) 500 MG tablet Take 1 tablet (500 mg) by mouth 2 times daily 6 tablet 0     ciprofloxacin (CIPRO) 500 MG tablet Take 1 tablet (500 mg) by mouth 2 times daily 6 tablet 0     lisinopril-hydrochlorothiazide (PRINZIDE/ZESTORETIC) 20-12.5 MG tablet Take 2 tablets by mouth daily 30 tablet 0       Social History     Tobacco Use     Smoking status: Never Smoker     Smokeless tobacco: Former User   Substance Use Topics     Alcohol use: Yes     Comment: 1-2 beers daily     Drug use: Never       Invasive Procedure Safety Checklist:    Procedure: MRI fusion TRUS prostate biopsy    Action: Complete sections and checkboxes as appropriate.    Pre-procedure:  1. Patient ID Verified with 2 identifiers ([place] and  or MRN) : YES    2. Procedure and site verified with patient/designee (when able) : YES    3. Accurate consent documentation in medical record : YES    4. H&P (or appropriate assessment) documented in medical record : N/A  H&P must be up to 30 days prior to procedure an updated within 24 hours of                 Procedure as applicable.     5. Relevant diagnostic and radiology test results appropriately labeled and displayed as applicable : YES    6. Blood products, implants, devices, and/or special equipment available for the procedure as applicable : YES    7. Procedure site(s) marked with provider initials [Exclusions: none] : NO    8. Marking not " required. Reason : Yes  Procedure does not require site marking    Time Out:     Time-Out performed immediately prior to starting procedure, including verbal and active participation of all team members addressing: YES    1. Correct patient identity.  2. Confirmed that the correct side and site are marked.  3. An accurate procedure to be done.  4. Agreement on the procedure to be done.  5. Correct patient position.  6. Relevant images and results are properly labeled and appropriately displayed.  7. The need to administer antibiotics or fluids for irrigation purposes during the procedure as applicable.  8. Safety precautions based on patient history or medication use.    During Procedure: Verification of correct person, site, and procedure occurs any time the responsibility for care of the patient is transferred to another member of the care team.    The following medication was given:     MEDICATION:  Gentamicin  ROUTE: IM  SITE: Ventrogluteal - Right  DOSE: 80 mg  LOT #: [id number]  : [employer]  EXPIRATION DATE: 11/21  NDC#: 72106-409-54   Was there drug waste? No    Prior to injection, verified patient identity using patient's name and date of birth.  Due to injection administration, patient instructed to remain in clinic for 15 minutes  afterwards, and to report any adverse reaction to me immediately.    Drug Amount Wasted:  None.  Vial/Syringe: Single dose vial    The following medication was given:     MEDICATION:  Lidocaine without epinephrine 1%  ROUTE: administered by physician - prostate nerve block   SITE: administered by physician - prostate nerve block    DOSE: 10 mL  LOT #: [id number]  : [employer]  EXPIRATION DATE: 03/24  NDC#: 52486-488-38   Was there drug waste? Yes  Amount of drug waste (mL): 20 mL.  Reason for waste:  Single use vial    Prior to injection, verified patient identity using patient's name and date of birth.  Due to injection administration, patient  instructed to remain in clinic for 15 minutes  afterwards, and to report any adverse reaction to me immediately.    Drug Amount Wasted:  Yes: 20 mL (10 mg/ml )  Vial/Syringe: Single dose vial    KAMILA Denise  12/21/2020  8:27 AM

## 2020-12-22 LAB — COPATH REPORT: NORMAL

## 2021-01-04 ENCOUNTER — VIRTUAL VISIT (OUTPATIENT)
Dept: UROLOGY | Facility: CLINIC | Age: 66
End: 2021-01-04
Payer: COMMERCIAL

## 2021-01-04 DIAGNOSIS — C61 PROSTATE CANCER (H): Primary | ICD-10-CM

## 2021-01-04 PROCEDURE — 99215 OFFICE O/P EST HI 40 MIN: CPT | Mod: 95 | Performed by: UROLOGY

## 2021-01-04 NOTE — PROGRESS NOTES
Left VM x2 for patient to call clinic back or join visit through My Chart.    Jonathon Santos is a 65 year old male who is being evaluated via a billable video visit.      How would you like to obtain your AVS? MyChart  If the video visit is dropped, the invitation should be resent by:   Will anyone else be joining your video visit? No      Video Start Time:6:05pm      Subjective     Jonathon Santos is a 65 year old male who presents to clinic today for the following health issues: prostate biopsy results    HPI   66 yo male with elevated PSA who had a prostate biopsy on 12/21/20.  No problems after the biopsy.    OBJECTIVE: Pathology from 12/21/20 shows Gl 4+5=9 disease.  The patient's MRI from 11/17/20 shows concern for enlarged lymph nodes raising concern for mets    ASSESSMENT AND PLAN:   Over half of today's 50-minute visit was spent counseling the patient regarding his new diagnosis of prostate cancer. I couseled the patient that given his [place] score of 4+5=9, he has high risk disease and there is concern for lymph node involvement based on his MRI.  We will need to complete a staging evaluation with th CT and bone scan.  Depending on what this shows we will determine what treatment options might be.  They likely will include multimodal therapy with some component of systemic treatment. We did briefly discuss surgery and radiation and risks of incontinence, ED and irritative voiding and rectal symptoms.  The patient states he is leaning away from surgery if he can help it.  We will obtain a staging evaluation and also have the patient follow up with radiation oncology and medical oncology as well as myself.  The patient is in agreement with the plan.                         Video-Visit Details    Type of service:  Video Visit    Video End Time:6:59pm    Originating Location (pt. Location): Home    Distant Location (provider location):  [facility] UROLOGY CLINIC [place]     Platform used for Video  Visit: Tara

## 2021-01-04 NOTE — LETTER
1/4/2021       RE: Jonathon Santos  [address] [address] [place] [id number]     Dear Colleague,    Thank you for referring your patient, Jonathon Santos, to the [facility] [place] [facility] Please see a copy of my visit note below.    Left VM x2 for patient to call clinic back or join visit through My Chart.    Jonathon Santos is a 65 year old male who is being evaluated via a billable video visit.      How would you like to obtain your AVS? MyChart  If the video visit is dropped, the invitation should be resent by:   Will anyone else be joining your video visit? No      Video Start Time:6:05pm      Subjective     Jonathon Santos is a 65 year old male who presents to clinic today for the following health issues: prostate biopsy results    HPI   64 yo male with elevated PSA who had a prostate biopsy on 12/21/20.  No problems after the biopsy.    OBJECTIVE: Pathology from 12/21/20 shows Gl 4+5=9 disease.  The patient's MRI from 11/17/20 shows concern for enlarged lymph nodes raising concern for mets    ASSESSMENT AND PLAN:   Over half of today's 50-minute visit was spent counseling the patient regarding his new diagnosis of prostate cancer. I couseled the patient that given his [place] score of 4+5=9, he has high risk disease and there is concern for lymph node involvement based on his MRI.  We will need to complete a staging evaluation with th CT and bone scan.  Depending on what this shows we will determine what treatment options might be.  They likely will include multimodal therapy with some component of systemic treatment. We did briefly discuss surgery and radiation and risks of incontinence, ED and irritative voiding and rectal symptoms.  The patient states he is leaning away from surgery if he can help it.  We will obtain a staging evaluation and also have the patient follow up with radiation oncology and medical oncology as well as myself.  The  patient is in agreement with the plan.    Video-Visit Details    Type of service:  Video Visit    Video End Time:6:59pm    Originating Location (pt. Location): Home    Distant Location (provider location):  [facility] UROLOGY [facility]     Platform used for Video Visit: Svbtle      Again, thank you for allowing me to participate in the care of your patient.      Sincerely,    Ferdinand Caban MD

## 2021-01-05 NOTE — PATIENT INSTRUCTIONS
Schedule a CT and bone scan and then follow up with radiation oncology and medical oncology and then after this all follow up with Dr. Caban.    It was a pleasure meeting with you today.  Thank you for allowing me and my team the privilege of caring for you today.  YOU are the reason we are here, and I truly hope we provided you with the excellent service you deserve.  Please let us know if there is anything else we can do for you so that we can be sure you are leaving completely satisfied with your care experience.

## 2021-01-07 ENCOUNTER — TELEPHONE (OUTPATIENT)
Dept: ONCOLOGY | Facility: CLINIC | Age: 66
End: 2021-01-07

## 2021-01-12 ENCOUNTER — HOSPITAL ENCOUNTER (OUTPATIENT)
Dept: CT IMAGING | Facility: CLINIC | Age: 66
Discharge: HOME | End: 2021-01-12
Attending: UROLOGY | Admitting: UROLOGY
Payer: COMMERCIAL

## 2021-01-12 ENCOUNTER — HOSPITAL ENCOUNTER (OUTPATIENT)
Dept: NUCLEAR MEDICINE | Facility: CLINIC | Age: 66
Setting detail: NUCLEAR MEDICINE
End: 2021-01-12
Attending: UROLOGY
Payer: COMMERCIAL

## 2021-01-12 DIAGNOSIS — C61 PROSTATE CANCER (H): ICD-10-CM

## 2021-01-12 PROCEDURE — A9561 TC99M OXIDRONATE: HCPCS | Performed by: UROLOGY

## 2021-01-12 PROCEDURE — 74177 CT ABD & PELVIS W/CONTRAST: CPT

## 2021-01-12 PROCEDURE — 250N000009 HC RX 250: Performed by: RADIOLOGY

## 2021-01-12 PROCEDURE — 343N000001 HC RX 343: Performed by: UROLOGY

## 2021-01-12 PROCEDURE — 250N000011 HC RX IP 250 OP 636: Performed by: RADIOLOGY

## 2021-01-12 PROCEDURE — 78306 BONE IMAGING WHOLE BODY: CPT

## 2021-01-12 RX ORDER — IOPAMIDOL 755 MG/ML
100 INJECTION, SOLUTION INTRAVASCULAR ONCE
Status: COMPLETED | OUTPATIENT
Start: 2021-01-12 | End: 2021-01-12

## 2021-01-12 RX ADMIN — IOPAMIDOL 100 ML: 755 INJECTION, SOLUTION INTRAVENOUS at 08:21

## 2021-01-12 RX ADMIN — SODIUM CHLORIDE 66 ML: 9 INJECTION, SOLUTION INTRAVENOUS at 08:21

## 2021-01-12 RX ADMIN — Medication 24.6 MILLICURIE: at 07:54

## 2021-01-12 NOTE — TELEPHONE ENCOUNTER
ONCOLOGY INTAKE: Records Information      APPT INFORMATION:  Referring provider:  Ferdinand Caban MD  Referring provider s clinic:  [facility]  Reason for visit/diagnosis:  Prostate cancer  Has patient been notified of appointment date and time?: No    RECORDS INFORMATION:  Were the records received with the referral (via [employer])? No    Has patient been seen for any external appt for this diagnosis? N/a    If yes, where? No    Has patient had any imaging or procedures outside of [place] for this condition? No      If Yes, where? N/a    ADDITIONAL INFORMATION:  None

## 2021-01-14 ENCOUNTER — HEALTH MAINTENANCE LETTER (OUTPATIENT)
Age: 66
End: 2021-01-14

## 2021-01-18 ENCOUNTER — PRE VISIT (OUTPATIENT)
Dept: ONCOLOGY | Facility: CLINIC | Age: 66
End: 2021-01-18

## 2021-01-18 ENCOUNTER — VIRTUAL VISIT (OUTPATIENT)
Dept: ONCOLOGY | Facility: CLINIC | Age: 66
End: 2021-01-18
Attending: UROLOGY
Payer: COMMERCIAL

## 2021-01-18 ENCOUNTER — TELEPHONE (OUTPATIENT)
Dept: PHARMACY | Facility: CLINIC | Age: 66
End: 2021-01-18

## 2021-01-18 DIAGNOSIS — C77.9 REGIONAL LYMPH NODE METASTASIS PRESENT (H): ICD-10-CM

## 2021-01-18 DIAGNOSIS — C61 PROSTATE CANCER (H): Primary | ICD-10-CM

## 2021-01-18 PROCEDURE — 99204 OFFICE O/P NEW MOD 45 MIN: CPT | Mod: 95 | Performed by: INTERNAL MEDICINE

## 2021-01-18 NOTE — LETTER
"    1/18/2021         RE: David Santos  [address] [facility] [address]        Dear Colleague,    Thank you for referring your patient, David Santos, to the [facility] CANCER [facility]INIC. Please see a copy of my visit note below.    david is a 65 year old who is being evaluated via a billable video visit.      How would you like to obtain your AVS? MyChart  If the video visit is dropped, the invitation should be resent by: Text to cell phone: [phone]  Will anyone else be joining your video visit? No      Vitals - Patient Reported  Systolic (Patient Reported): (!) 140  Diastolic (Patient Reported): (!) 90  Weight (Patient Reported): 99.8 kg (220 lb)  Height (Patient Reported): 185.4 cm (6' 1\")  BMI (Based on Pt Reported Ht/Wt): 29.03  Pain Score: No Pain (0)    Shabnam Man  Pt was at home        [facility] Medical Oncology New Patient Consultation Note       Date of visit: January 18, 2021          Ml:    HPI:  64 yo male with elevated PSA who had a prostate biopsy on 12/21/20.   9/17/19 PSA =  6.57     OBJECTIVE: Pathology from 12/21/20 shows Gl 4+5=9 disease.  The patient's MRI from 11/17/20 shows concern for enlarged lymph nodes raising concern for mets    PMH:  HTN      MEDS  Allopurinol  Lisinopril - HCTZ    ROS  10 point ROS unremarkable.      PHYSICAL EXAM  Video visit  Patient appeared well. A +0  Aniceric   Acyanotic  Psych appropriate.       LABS AND IMAGES  11/17/20  Prostate MRI  IMPRESSION:  1. Based on the most suspicious abnormality, this exam is  characterized as PIRADS 5 - Clinically significant cancer is highly  likely to be present.  The most suspicious abnormality is located at  the right mid gland peripheral zone at 9-10:00 position and there is  short segment capsular abutment with low likelihood of minimal  extraprostatic extension.   1a. Additional PIRADS 4 lesion in the left apex peripheral and  transitional zone.  2. Bilateral suspicious obturator " lymph nodes. Additionally, there are  indeterminate external iliac lymph nodes bilaterally.        1/12/21  CT   IMPRESSION:   1.  Single borderline enlarged left retroperitoneal lymph node at the  aortic bifurcation. Several additional small retroperitoneal lymph  nodes are present. Follow-up is recommended.  2.  Diffuse hepatic steatosis.      Bone scan                                                         IMPRESSION:  1. Scattered nonspecific spinal increased activity which may be  degenerative.  2.  No other convincing evidence of osseus metastasis.    IMPRESSION AND PLAN  Locally advanced prostate cancer with high grade [place] 9. No significant comorbidities.     Plan:     1. Two years of ADT plus Abiraterone ---> RT to the nodes and prostate, based on the Stampede study     2. Lupron in [place] and arrange monitoring at that site.     3. Discussed side effects of the    A. HTN   B. LFT abnormalities   C. Low K+ requiring monitoring   D. Edema   E. Hot flashes   F. Fatigue    45 minutes reviewing history, discussing approach and discussing potential adverse events and  Monitoring plan    SIGNED    Andrea Cisneros MD  Professor of Medicine

## 2021-01-18 NOTE — PATIENT INSTRUCTIONS
Locally advanced prostate cancer with high grade [place] 9    Plan     1. Two years of ADT plus Abiraterone ---> RT to the nodes and prostate, based on the Stampede study     2. Lupron in [place] and arrange monitoring at that site.     3. Discussed side effects of the    A. HTN   B. LFT abnormalities   C. Low K+ requiring monitoring   D. Edema   E. Hot flashes   F. Fatigue

## 2021-01-18 NOTE — TELEPHONE ENCOUNTER
PA Initiation    Medication: Abiraterone 250mg PA Pending  Insurance Company: [employer] Part D - Phone [phone] Fax [phone]  Pharmacy Filling the Rx: [facility] - [place], [place] - [facility] [address]  Filling Pharmacy Phone:    Filling Pharmacy Fax:    Start Date: 1/18/2021    Levy Villatoro CPhT  [facility]  Oncology Pharmacy [email or web address]  [phone] (phone  [phone] (fax

## 2021-01-18 NOTE — PROGRESS NOTES
"david is a 65 year old who is being evaluated via a billable video visit.      How would you like to obtain your AVS? MyChart  If the video visit is dropped, the invitation should be resent by: Text to cell phone: [phone]  Will anyone else be joining your video visit? No      Vitals - Patient Reported  Systolic (Patient Reported): (!) 140  Diastolic (Patient Reported): (!) 90  Weight (Patient Reported): 99.8 kg (220 lb)  Height (Patient Reported): 185.4 cm (6' 1\")  BMI (Based on Pt Reported Ht/Wt): 29.03  Pain Score: No Pain (0)    Shabnam Man  Pt was at home        [facility] Medical Oncology New Patient Consultation Note       Date of visit: January 18, 2021          Ml:    HPI:  64 yo male with elevated PSA who had a prostate biopsy on 12/21/20.   9/17/19 PSA =  6.57     OBJECTIVE: Pathology from 12/21/20 shows Gl 4+5=9 disease.  The patient's MRI from 11/17/20 shows concern for enlarged lymph nodes raising concern for mets    PMH:  HTN      MEDS  Allopurinol  Lisinopril - HCTZ    ROS  10 point ROS unremarkable.      PHYSICAL EXAM  Video visit  Patient appeared well. A +0  Aniceric   Acyanotic  Psych appropriate.       LABS AND IMAGES  11/17/20  Prostate MRI  IMPRESSION:  1. Based on the most suspicious abnormality, this exam is  characterized as PIRADS 5 - Clinically significant cancer is highly  likely to be present.  The most suspicious abnormality is located at  the right mid gland peripheral zone at 9-10:00 position and there is  short segment capsular abutment with low likelihood of minimal  extraprostatic extension.   1a. Additional PIRADS 4 lesion in the left apex peripheral and  transitional zone.  2. Bilateral suspicious obturator lymph nodes. Additionally, there are  indeterminate external iliac lymph nodes bilaterally.        1/12/21  CT   IMPRESSION:   1.  Single borderline enlarged left retroperitoneal lymph node at the  aortic bifurcation. Several additional small " retroperitoneal lymph  nodes are present. Follow-up is recommended.  2.  Diffuse hepatic steatosis.      Bone scan                                                         IMPRESSION:  1. Scattered nonspecific spinal increased activity which may be  degenerative.  2.  No other convincing evidence of osseus metastasis.    IMPRESSION AND PLAN  Locally advanced prostate cancer with high grade [place] 9. No significant comorbidities.     Plan:     1. Two years of ADT plus Abiraterone ---> RT to the nodes and prostate, based on the Stampede study     2. Lupron in [place] and arrange monitoring at that site.     3. Discussed side effects of the    A. HTN   B. LFT abnormalities   C. Low K+ requiring monitoring   D. Edema   E. Hot flashes   F. Fatigue    45 minutes reviewing history, discussing approach and discussing potential adverse events and  Monitoring plan    SIGNED    Andrea Cisneros MD  Professor of Medicine

## 2021-01-20 ENCOUNTER — TELEPHONE (OUTPATIENT)
Dept: RADIATION ONCOLOGY | Facility: CLINIC | Age: 66
End: 2021-01-20

## 2021-01-20 DIAGNOSIS — C61 PROSTATE CANCER (H): Primary | ICD-10-CM

## 2021-01-20 DIAGNOSIS — C77.9 REGIONAL LYMPH NODE METASTASIS PRESENT (H): ICD-10-CM

## 2021-01-20 NOTE — TELEPHONE ENCOUNTER
Attempt made by author to contact this patient by phone number listed in chart to schedule a visit with Radiation Oncology at the request of Dr Ferdinand Caban.      Outcome:       Author spoke with patient. Patient would like to be treated at [facility] Radiation Oncology department in [place].  Author will inform [facility] Onc of referral and ask they help facilitate setting up a consultation.

## 2021-01-22 ENCOUNTER — PATIENT OUTREACH (OUTPATIENT)
Dept: ONCOLOGY | Facility: CLINIC | Age: 66
End: 2021-01-22

## 2021-01-22 DIAGNOSIS — C61 PROSTATE CANCER (H): Primary | ICD-10-CM

## 2021-01-22 DIAGNOSIS — Z79.899 ENCOUNTER FOR LONG-TERM (CURRENT) USE OF MEDICATIONS: ICD-10-CM

## 2021-01-22 NOTE — PROGRESS NOTES
TC to pt to review plans for ADT and zytiga. Pt stated he would like to continue care with Dr. Cisneros and get labs/lupron in [place] as this is closer to his home.
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
## 2021-01-28 ENCOUNTER — INFUSION THERAPY VISIT (OUTPATIENT)
Dept: INFUSION THERAPY | Facility: CLINIC | Age: 66
End: 2021-01-28
Attending: INTERNAL MEDICINE
Payer: COMMERCIAL

## 2021-01-28 ENCOUNTER — MYC MEDICAL ADVICE (OUTPATIENT)
Dept: ONCOLOGY | Facility: CLINIC | Age: 66
End: 2021-01-28

## 2021-01-28 VITALS — TEMPERATURE: 98.5 F | DIASTOLIC BLOOD PRESSURE: 105 MMHG | HEART RATE: 84 BPM | SYSTOLIC BLOOD PRESSURE: 159 MMHG

## 2021-01-28 DIAGNOSIS — C61 PROSTATE CANCER (H): Primary | ICD-10-CM

## 2021-01-28 DIAGNOSIS — C61 PROSTATE CANCER (H): ICD-10-CM

## 2021-01-28 DIAGNOSIS — Z79.899 ENCOUNTER FOR LONG-TERM (CURRENT) USE OF MEDICATIONS: ICD-10-CM

## 2021-01-28 DIAGNOSIS — C77.9 REGIONAL LYMPH NODE METASTASIS PRESENT (H): ICD-10-CM

## 2021-01-28 LAB
ALBUMIN SERPL-MCNC: 3.9 G/DL (ref 3.4–5)
ALP SERPL-CCNC: 49 U/L (ref 40–150)
ALT SERPL W P-5'-P-CCNC: 41 U/L (ref 0–70)
ANION GAP SERPL CALCULATED.3IONS-SCNC: 6 MMOL/L (ref 3–14)
AST SERPL W P-5'-P-CCNC: 28 U/L (ref 0–45)
BASOPHILS # BLD AUTO: 0 10E9/L (ref 0–0.2)
BASOPHILS NFR BLD AUTO: 0.4 %
BILIRUB SERPL-MCNC: 1.1 MG/DL (ref 0.2–1.3)
BUN SERPL-MCNC: 19 MG/DL (ref 7–30)
CALCIUM SERPL-MCNC: 9.8 MG/DL (ref 8.5–10.1)
CHLORIDE SERPL-SCNC: 104 MMOL/L (ref 94–109)
CO2 SERPL-SCNC: 27 MMOL/L (ref 20–32)
CREAT SERPL-MCNC: 1.1 MG/DL (ref 0.66–1.25)
DIFFERENTIAL METHOD BLD: ABNORMAL
EOSINOPHIL # BLD AUTO: 0.3 10E9/L (ref 0–0.7)
EOSINOPHIL NFR BLD AUTO: 3.4 %
ERYTHROCYTE [DISTWIDTH] IN BLOOD BY AUTOMATED COUNT: 12.1 % (ref 10–15)
GFR SERPL CREATININE-BSD FRML MDRD: 69 ML/MIN/{1.73_M2}
GLUCOSE SERPL-MCNC: 102 MG/DL (ref 70–99)
HCT VFR BLD AUTO: 45.8 % (ref 40–53)
HGB BLD-MCNC: 15.8 G/DL (ref 13.3–17.7)
LYMPHOCYTES # BLD AUTO: 2.5 10E9/L (ref 0.8–5.3)
LYMPHOCYTES NFR BLD AUTO: 33.1 %
MCH RBC QN AUTO: 33.8 PG (ref 26.5–33)
MCHC RBC AUTO-ENTMCNC: 34.5 G/DL (ref 31.5–36.5)
MCV RBC AUTO: 98 FL (ref 78–100)
MONOCYTES # BLD AUTO: 0.7 10E9/L (ref 0–1.3)
MONOCYTES NFR BLD AUTO: 9.7 %
NEUTROPHILS # BLD AUTO: 4 10E9/L (ref 1.6–8.3)
NEUTROPHILS NFR BLD AUTO: 53.4 %
PLATELET # BLD AUTO: 119 10E9/L (ref 150–450)
POTASSIUM SERPL-SCNC: 3.4 MMOL/L (ref 3.4–5.3)
PROT SERPL-MCNC: 7.6 G/DL (ref 6.8–8.8)
PSA SERPL-MCNC: 8.18 UG/L (ref 0–4)
RBC # BLD AUTO: 4.68 10E12/L (ref 4.4–5.9)
SODIUM SERPL-SCNC: 137 MMOL/L (ref 133–144)
WBC # BLD AUTO: 7.4 10E9/L (ref 4–11)

## 2021-01-28 PROCEDURE — 80053 COMPREHEN METABOLIC PANEL: CPT | Performed by: INTERNAL MEDICINE

## 2021-01-28 PROCEDURE — 84153 ASSAY OF PSA TOTAL: CPT | Performed by: INTERNAL MEDICINE

## 2021-01-28 PROCEDURE — 250N000011 HC RX IP 250 OP 636: Performed by: INTERNAL MEDICINE

## 2021-01-28 PROCEDURE — 36415 COLL VENOUS BLD VENIPUNCTURE: CPT | Performed by: INTERNAL MEDICINE

## 2021-01-28 PROCEDURE — 96402 CHEMO HORMON ANTINEOPL SQ/IM: CPT

## 2021-01-28 PROCEDURE — 85025 COMPLETE CBC W/AUTO DIFF WBC: CPT | Performed by: INTERNAL MEDICINE

## 2021-01-28 RX ADMIN — LEUPROLIDE ACETATE 22.5 MG: KIT at 15:38

## 2021-01-28 NOTE — PROGRESS NOTES
Infusion Nursing Note:  Jonathon Santos presents today for Lupron    Patient seen by provider today: No   present during visit today: Not Applicable.    Note: Went over Lupron [place] injection with pt as well as provided pt with informational handout.      Intravenous Access:  No Intravenous access/labs at this visit.    Treatment Conditions:  Not Applicable.      Post Infusion Assessment:  Patient tolerated Lupron injection IM to the left side of the gluteus maia without incident.       Discharge Plan:   Patient discharged in stable condition accompanied by: self.  Departure Mode: Ambulatory.  Pt to return 4/29/21 at 1:00 pm for next Lupron.    Rolanda Winkler RN

## 2021-01-29 ENCOUNTER — TELEPHONE (OUTPATIENT)
Dept: ONCOLOGY | Facility: CLINIC | Age: 66
End: 2021-01-29

## 2021-01-29 DIAGNOSIS — Z79.899 ENCOUNTER FOR LONG-TERM (CURRENT) USE OF MEDICATIONS: ICD-10-CM

## 2021-01-29 DIAGNOSIS — C61 PROSTATE CANCER (H): Primary | ICD-10-CM

## 2021-01-29 DIAGNOSIS — C77.9 REGIONAL LYMPH NODE METASTASIS PRESENT (H): ICD-10-CM

## 2021-01-29 RX ORDER — PREDNISONE 5 MG/1
5 TABLET ORAL DAILY
Qty: 30 TABLET | Refills: 11 | Status: SHIPPED | OUTPATIENT
Start: 2021-01-29 | End: 2021-02-28

## 2021-01-29 RX ORDER — ABIRATERONE ACETATE 250 MG/1
1000 TABLET ORAL DAILY
Qty: 120 TABLET | Refills: 1 | Status: SHIPPED | OUTPATIENT
Start: 2021-01-29 | End: 2021-02-28

## 2021-01-29 NOTE — TELEPHONE ENCOUNTER
Prior Authorization Approval    Authorization Effective Date:    Authorization Expiration Date: 12/31/2021  Medication: Abiraterone 250mg PA Approved  Approved Dose/Quantity: 120/30  Reference #: [id number]   Insurance Company: [email or web address] Part D - Phone [phone] Fax [phone]  Expected CoPay: 483.73     CoPay Card Available:      Foundation Assistance Needed:    Which Pharmacy is filling the prescription (Not needed for infusion/clinic administered): [facility] - [place] [address]  Pharmacy Notified:    Patient Notified:      Levy Villatoro CPhT  [facility]  Oncology Pharmacy [email or web address]  [phone] (phone  [phone] (fax

## 2021-01-29 NOTE — TELEPHONE ENCOUNTER
"Oral Chemotherapy Monitoring Program    Lab Monitoring Plan  CMP Q2 weeks for the first 3 months, then monthly  Check BP monthly  Labs at [facility]  Subjective/Objective:  Jonathon Santos is a 65 year old male contacted by phone for an initial visit for oral chemotherapy education. Jonathon has hypertension and does monitor his BP at home. He has an upcoming PCP visit to address BP. He will continue to monitor BP throughout Zytiga therapy and is aware this treatment can further increase BP.    ORAL CHEMOTHERAPY 1/29/2021   Diagnosis Code Prostate Cancer   Providers Dr. Cisneros   Clinic Name/Location [facility]   Drug Name Zytiga (Abiraterone)   Dose 1,000 mg   Current Schedule Daily   Cycle Details Continuous       Last PHQ-2 Score on record:   PHQ-2 ( 1999 [employer]) 1/4/2021 12/21/2020   Q1: Little interest or pleasure in doing things 0 0   Q2: Feeling down, depressed or hopeless 0 0   PHQ-2 Score 0 0   Q1: Little interest or pleasure in doing things Not at all -   Q2: Feeling down, depressed or hopeless Not at all -   PHQ-2 Score 0 -       Vitals:  BP:   BP Readings from Last 1 Encounters:   01/28/21 (!) 159/105     Wt Readings from Last 1 Encounters:   12/21/20 97.5 kg (215 lb)     Estimated body surface area is 2.24 meters squared as calculated from the following:    Height as of 12/21/20: 1.854 m (6' 1\").    Weight as of 12/21/20: 97.5 kg (215 lb).    Labs:  _  Result Component Current Result Ref Range   Sodium 137 (1/28/2021) 133 - 144 mmol/L     _  Result Component Current Result Ref Range   Potassium 3.4 (1/28/2021) 3.4 - 5.3 mmol/L     _  Result Component Current Result Ref Range   Calcium 9.8 (1/28/2021) 8.5 - 10.1 mg/dL     No results found for Mag within last 30 days.     No results found for Phos within last 30 days.     _  Result Component Current Result Ref Range   Albumin 3.9 (1/28/2021) 3.4 - 5.0 g/dL     _  Result Component Current Result Ref Range   Urea Nitrogen 19 (1/28/2021) 7 - 30 mg/dL     _  Result " Component Current Result Ref Range   Creatinine 1.10 (1/28/2021) 0.66 - 1.25 mg/dL     _  Result Component Current Result Ref Range   AST 28 (1/28/2021) 0 - 45 U/L     _  Result Component Current Result Ref Range   ALT 41 (1/28/2021) 0 - 70 U/L     _  Result Component Current Result Ref Range   Bilirubin Total 1.1 (1/28/2021) 0.2 - 1.3 mg/dL     _  Result Component Current Result Ref Range   WBC 7.4 (1/28/2021) 4.0 - 11.0 10e9/L     _  Result Component Current Result Ref Range   Hemoglobin 15.8 (1/28/2021) 13.3 - 17.7 g/dL     _  Result Component Current Result Ref Range   Platelet Count 119 (L) (1/28/2021) 150 - 450 10e9/L     _  Result Component Current Result Ref Range   Absolute Neutrophil 4.0 (1/28/2021) 1.6 - 8.3 10e9/L       Assessment:  Patient is appropriate to start therapy.    Plan:  Basic chemotherapy teaching was reviewed with the patient including indication, start date of therapy, dose, administration, adverse effects, missed doses, food and drug interactions, monitoring, side effect management, office contact information, and safe handling. Written materials were provided and all questions answered.    Follow-Up:  2/8/2021: initial assessment     Belkys Hunt, PharmD  Hematology/Oncology Clinical Pharmacist  [facility]  [facility]  [phone]

## 2021-01-29 NOTE — TELEPHONE ENCOUNTER
Levy Villatoro CPhT  [facility]  Oncology Pharmacy Liaison   [email or web address]  [phone] (phone  [phone] (fax

## 2021-02-04 ENCOUNTER — OFFICE VISIT (OUTPATIENT)
Dept: RADIATION THERAPY | Facility: OUTPATIENT CENTER | Age: 66
End: 2021-02-04
Payer: COMMERCIAL

## 2021-02-04 VITALS
BODY MASS INDEX: 29.9 KG/M2 | OXYGEN SATURATION: 96 % | WEIGHT: 226.6 LBS | HEART RATE: 74 BPM | RESPIRATION RATE: 18 BRPM

## 2021-02-04 DIAGNOSIS — C61 PROSTATE CANCER (H): Primary | ICD-10-CM

## 2021-02-04 ASSESSMENT — PAIN SCALES - GENERAL: PAINLEVEL: NO PAIN (0)

## 2021-02-04 NOTE — NURSING NOTE
"REASON FOR APPOINTMENT   Type of Cancer: [place] 4+5=9  Location: prostate and lymph nodes  Date of Symptom Onset: rising PSA    TREATMENT TO-DATE FOR THIS CANCER  Surgery ? Not offered   Chemotherapy ? Dr. Blayne Valenzuela - goal of 2 yrs   Other Treatments for this Cancer ? Discussion for radiation today     PERSONAL HISTORY OF CANCER   Previous Cancer ? no   Prior Radiation ? no   Prior Chemotherapy ? no   Prior Hormonal Therapy ? no     RECENT IMAGING STUDIES  MRI, bone scan, ct - all in EPIC    REFERRALS NEEDED  None at this time    VITALS  Pulse 74   Resp 18   Wt 102.8 kg (226 lb 9.6 oz)   SpO2 96%   BMI 29.90 kg/m      PACEMAKER/IMPLANTED CARDIAC DEVICE no    PAIN  Denies    PSYCHOSOCIAL  Marital Status: single  Patient lives in [place] with self.  Number of children: 2  Working status: retired  Do you feel safe in your home? Yes    REVIEW OF SYSTEMS  Skin: negative  Eyes: negative  Ears/Nose/Throat: negative  Respiratory: Cough, No shortness of breath and No dyspnea on exertion  Cardiovascular: negative  Gastrointestinal: negative  Genitourinary: negative  Musculoskeletal: negative  Neurologic: mild headache  Psychiatric: negative  Hematologic/Lymphatic/Immunologic: negative  Endocrine: negative    Radiation Oncology Patient Teaching    Current Concern: \"how does the lupron and zytiga work. Do I need markers before radiation? How many days of radiation do I need?\"    Person involved with teaching: Patient and sister - Cristina  Patient asked Questions: Yes  Patient was cooperative: Yes  Patient was receptive (willing to accept information given): Yes    Education Assessment  Comprehension ability: Medium  Knowledge level: Medium  Factors affecting teaching: stress of new diagnosis, new medications, etc.    Education Materials Given  Radiation Therapy and You  Radiation to the pelvis    Educational Topics Discussed  Side effects, Medications, Activity, Nutrition, Adjustment to illness and When to call " MD/RN    Response To Teaching  More review necessary    Do you have an advanced directive or living will? no  Are you DNR/DNI? no
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
## 2021-02-11 DIAGNOSIS — C61 PROSTATE CANCER (H): ICD-10-CM

## 2021-02-11 DIAGNOSIS — Z79.899 ENCOUNTER FOR LONG-TERM (CURRENT) USE OF MEDICATIONS: ICD-10-CM

## 2021-02-11 LAB
ALBUMIN SERPL-MCNC: 4.1 G/DL (ref 3.4–5)
ALP SERPL-CCNC: 57 U/L (ref 40–150)
ALT SERPL W P-5'-P-CCNC: 26 U/L (ref 0–70)
ANION GAP SERPL CALCULATED.3IONS-SCNC: 4 MMOL/L (ref 3–14)
AST SERPL W P-5'-P-CCNC: 18 U/L (ref 0–45)
BASOPHILS # BLD AUTO: 0.1 10E9/L (ref 0–0.2)
BASOPHILS NFR BLD AUTO: 0.8 %
BILIRUB SERPL-MCNC: 1.8 MG/DL (ref 0.2–1.3)
BUN SERPL-MCNC: 22 MG/DL (ref 7–30)
CALCIUM SERPL-MCNC: 9.7 MG/DL (ref 8.5–10.1)
CHLORIDE SERPL-SCNC: 101 MMOL/L (ref 94–109)
CO2 SERPL-SCNC: 32 MMOL/L (ref 20–32)
CREAT SERPL-MCNC: 1.02 MG/DL (ref 0.66–1.25)
DIFFERENTIAL METHOD BLD: ABNORMAL
EOSINOPHIL # BLD AUTO: 0 10E9/L (ref 0–0.7)
EOSINOPHIL NFR BLD AUTO: 0.6 %
ERYTHROCYTE [DISTWIDTH] IN BLOOD BY AUTOMATED COUNT: 12.1 % (ref 10–15)
GFR SERPL CREATININE-BSD FRML MDRD: 76 ML/MIN/{1.73_M2}
GLUCOSE SERPL-MCNC: 119 MG/DL (ref 70–99)
HCT VFR BLD AUTO: 47.3 % (ref 40–53)
HGB BLD-MCNC: 16.3 G/DL (ref 13.3–17.7)
LYMPHOCYTES # BLD AUTO: 1.9 10E9/L (ref 0.8–5.3)
LYMPHOCYTES NFR BLD AUTO: 29.1 %
MCH RBC QN AUTO: 33.7 PG (ref 26.5–33)
MCHC RBC AUTO-ENTMCNC: 34.5 G/DL (ref 31.5–36.5)
MCV RBC AUTO: 98 FL (ref 78–100)
MONOCYTES # BLD AUTO: 0.6 10E9/L (ref 0–1.3)
MONOCYTES NFR BLD AUTO: 8.9 %
NEUTROPHILS # BLD AUTO: 4 10E9/L (ref 1.6–8.3)
NEUTROPHILS NFR BLD AUTO: 60.6 %
PLATELET # BLD AUTO: 136 10E9/L (ref 150–450)
POTASSIUM SERPL-SCNC: 3.5 MMOL/L (ref 3.4–5.3)
PROT SERPL-MCNC: 8.1 G/DL (ref 6.8–8.8)
RBC # BLD AUTO: 4.84 10E12/L (ref 4.4–5.9)
SODIUM SERPL-SCNC: 137 MMOL/L (ref 133–144)
WBC # BLD AUTO: 6.6 10E9/L (ref 4–11)

## 2021-02-11 PROCEDURE — 85025 COMPLETE CBC W/AUTO DIFF WBC: CPT | Performed by: INTERNAL MEDICINE

## 2021-02-11 PROCEDURE — 80053 COMPREHEN METABOLIC PANEL: CPT | Performed by: INTERNAL MEDICINE

## 2021-02-11 PROCEDURE — 36415 COLL VENOUS BLD VENIPUNCTURE: CPT | Performed by: INTERNAL MEDICINE

## 2021-02-12 ENCOUNTER — TELEPHONE (OUTPATIENT)
Dept: ONCOLOGY | Facility: CLINIC | Age: 66
End: 2021-02-12

## 2021-02-12 NOTE — TELEPHONE ENCOUNTER
"Oral Chemotherapy Monitoring Program    Primary Oncologist: Dr Andrea Cisneros  Primary Oncology Clinic: [facility]  Cancer Diagnosis: Prostate cancer    Therapy History:  Zytiga 1000mg ([id number]) po daily    Subjective/Objective:  Jonathon Santos is a 65 year old male contacted by phone for a follow-up visit for oral chemotherapy.  Patient says he's doing well with the Zytiga.  He reports one episode of hot flashes, but denies edema.  He does report his BP remains high (lower yesterday = 150/90).      ORAL CHEMOTHERAPY 1/29/2021 2/8/2021 2/12/2021   Assessment Type New Teach Lab Monitoring;Chart Review;Other Lab Monitoring;Monthly Follow up   Diagnosis Code Prostate Cancer Prostate Cancer Prostate Cancer   Providers Dr. Blayne Cisneros   Clinic Name/Location [facility]   Drug Name Zytiga (Abiraterone) Zytiga (Abiraterone) Zytiga (Abiraterone)   Dose 1,000 mg - 1,000 mg   Current Schedule Daily - Daily   Cycle Details Continuous - Continuous   Start Date of Last Cycle - - 2/1/2021   Doses missed in last 2 weeks - - 0   Adherence Assessment - - Adherent   Home BPs - - any BPs> 160/100   Any new drug interactions? - - No   Pharmacist Intervention? - - Yes   Intervention(s) - - Patient Education       Vitals:  BP:   BP Readings from Last 1 Encounters:   01/28/21 (!) 159/105     Wt Readings from Last 1 Encounters:   02/04/21 102.8 kg (226 lb 9.6 oz)     Estimated body surface area is 2.3 meters squared as calculated from the following:    Height as of 12/21/20: 1.854 m (6' 1\").    Weight as of 2/4/21: 102.8 kg (226 lb 9.6 oz).    Labs:  _  Result Component Current Result Ref Range   Sodium 137 (2/11/2021) 133 - 144 mmol/L     _  Result Component Current Result Ref Range   Potassium 3.5 (2/11/2021) 3.4 - 5.3 mmol/L     _  Result Component Current Result Ref Range   Calcium 9.7 (2/11/2021) 8.5 - 10.1 mg/dL     No results found for Mag within last 30 days.     No results found for Phos within last " 30 days.     _  Result Component Current Result Ref Range   Albumin 4.1 (2/11/2021) 3.4 - 5.0 g/dL     _  Result Component Current Result Ref Range   Urea Nitrogen 22 (2/11/2021) 7 - 30 mg/dL     _  Result Component Current Result Ref Range   Creatinine 1.02 (2/11/2021) 0.66 - 1.25 mg/dL       _  Result Component Current Result Ref Range   AST 18 (2/11/2021) 0 - 45 U/L     _  Result Component Current Result Ref Range   ALT 26 (2/11/2021) 0 - 70 U/L     _  Result Component Current Result Ref Range   Bilirubin Total 1.8 (H) (2/11/2021) 0.2 - 1.3 mg/dL       _  Result Component Current Result Ref Range   WBC 6.6 (2/11/2021) 4.0 - 11.0 10e9/L     _  Result Component Current Result Ref Range   Hemoglobin 16.3 (2/11/2021) 13.3 - 17.7 g/dL     _  Result Component Current Result Ref Range   Platelet Count 136 (L) (2/11/2021) 150 - 450 10e9/L     _  Result Component Current Result Ref Range   Absolute Neutrophil 4.0 (2/11/2021) 1.6 - 8.3 10e9/L     Assessment:  Patient is tolerating Zytiga well enough.  Patient's bili is higher at 1.8, but Dr Cisneros says that this does imply any changes in the Zytiga treatment.  He plans on seeing his PCP regarding better HTN control.     Plan:  No change at present.       Follow-Up:  Labs in 2 weeks.    Maria Guadalupe Mo, PharmD, BCPS, BCOP  Oncology Clinical Pharmacy Specialist  [facility]  [phone]

## 2021-02-25 DIAGNOSIS — Z79.899 ENCOUNTER FOR LONG-TERM (CURRENT) USE OF MEDICATIONS: ICD-10-CM

## 2021-02-25 DIAGNOSIS — C61 PROSTATE CANCER (H): ICD-10-CM

## 2021-02-25 LAB
ALBUMIN SERPL-MCNC: 3.7 G/DL (ref 3.4–5)
ALP SERPL-CCNC: 53 U/L (ref 40–150)
ALT SERPL W P-5'-P-CCNC: 28 U/L (ref 0–70)
ANION GAP SERPL CALCULATED.3IONS-SCNC: 5 MMOL/L (ref 3–14)
AST SERPL W P-5'-P-CCNC: 26 U/L (ref 0–45)
BASOPHILS # BLD AUTO: 0.1 10E9/L (ref 0–0.2)
BASOPHILS NFR BLD AUTO: 0.6 %
BILIRUB SERPL-MCNC: 1.1 MG/DL (ref 0.2–1.3)
BUN SERPL-MCNC: 27 MG/DL (ref 7–30)
CALCIUM SERPL-MCNC: 9.4 MG/DL (ref 8.5–10.1)
CHLORIDE SERPL-SCNC: 104 MMOL/L (ref 94–109)
CO2 SERPL-SCNC: 30 MMOL/L (ref 20–32)
CREAT SERPL-MCNC: 1.07 MG/DL (ref 0.66–1.25)
DIFFERENTIAL METHOD BLD: ABNORMAL
EOSINOPHIL # BLD AUTO: 0.1 10E9/L (ref 0–0.7)
EOSINOPHIL NFR BLD AUTO: 0.9 %
ERYTHROCYTE [DISTWIDTH] IN BLOOD BY AUTOMATED COUNT: 11.6 % (ref 10–15)
GFR SERPL CREATININE-BSD FRML MDRD: 71 ML/MIN/{1.73_M2}
GLUCOSE SERPL-MCNC: 92 MG/DL (ref 70–99)
HCT VFR BLD AUTO: 44.6 % (ref 40–53)
HGB BLD-MCNC: 15.8 G/DL (ref 13.3–17.7)
IMM GRANULOCYTES # BLD: 0 10E9/L (ref 0–0.4)
IMM GRANULOCYTES NFR BLD: 0.3 %
LYMPHOCYTES # BLD AUTO: 3.8 10E9/L (ref 0.8–5.3)
LYMPHOCYTES NFR BLD AUTO: 48.3 %
MCH RBC QN AUTO: 33.4 PG (ref 26.5–33)
MCHC RBC AUTO-ENTMCNC: 35.4 G/DL (ref 31.5–36.5)
MCV RBC AUTO: 94 FL (ref 78–100)
MONOCYTES # BLD AUTO: 0.5 10E9/L (ref 0–1.3)
MONOCYTES NFR BLD AUTO: 6 %
NEUTROPHILS # BLD AUTO: 3.4 10E9/L (ref 1.6–8.3)
NEUTROPHILS NFR BLD AUTO: 43.9 %
NRBC # BLD AUTO: 0 10*3/UL
NRBC BLD AUTO-RTO: 0 /100
PLATELET # BLD AUTO: 129 10E9/L (ref 150–450)
POTASSIUM SERPL-SCNC: 3.6 MMOL/L (ref 3.4–5.3)
PROT SERPL-MCNC: 7.3 G/DL (ref 6.8–8.8)
RBC # BLD AUTO: 4.73 10E12/L (ref 4.4–5.9)
SODIUM SERPL-SCNC: 139 MMOL/L (ref 133–144)
WBC # BLD AUTO: 7.8 10E9/L (ref 4–11)

## 2021-02-25 PROCEDURE — 85025 COMPLETE CBC W/AUTO DIFF WBC: CPT | Performed by: INTERNAL MEDICINE

## 2021-02-25 PROCEDURE — 80053 COMPREHEN METABOLIC PANEL: CPT | Performed by: INTERNAL MEDICINE

## 2021-02-25 PROCEDURE — 36415 COLL VENOUS BLD VENIPUNCTURE: CPT | Performed by: INTERNAL MEDICINE

## 2021-02-26 ENCOUNTER — DOCUMENTATION ONLY (OUTPATIENT)
Dept: ONCOLOGY | Facility: CLINIC | Age: 66
End: 2021-02-26

## 2021-02-26 NOTE — PROGRESS NOTES
Oral Chemotherapy Monitoring Program  Lab Follow Up    Reviewed lab results from yesterday and sent a Lamsa message to the patient on the results.    ORAL CHEMOTHERAPY 1/29/2021 2/8/2021 2/12/2021   Assessment Type New Teach Lab Monitoring;Chart Review;Other Lab Monitoring;Monthly Follow up   Diagnosis Code Prostate Cancer Prostate Cancer Prostate Cancer   Providers Dr. Blayne Cisneros   [facility]   Drug Name Zytiga (Abiraterone) Zytiga (Abiraterone) Zytiga (Abiraterone)   Dose 1,000 mg - 1,000 mg   Current Schedule Daily - Daily   Cycle Details Continuous - Continuous   Start Date of Last Cycle - - 2/1/2021   Doses missed in last 2 weeks - - 0   Adherence Assessment - - Adherent   Home BPs - - any BPs> 160/100   Any new drug interactions? - - No   Pharmacist Intervention? - - Yes   Intervention(s) - - Patient Education       Labs:  _  Result Component Current Result Ref Range   Sodium 139 (2/25/2021) 133 - 144 mmol/L     _  Result Component Current Result Ref Range   Potassium 3.6 (2/25/2021) 3.4 - 5.3 mmol/L     _  Result Component Current Result Ref Range   Calcium 9.4 (2/25/2021) 8.5 - 10.1 mg/dL     No results found for Mag within last 30 days.     No results found for Phos within last 30 days.     _  Result Component Current Result Ref Range   Albumin 3.7 (2/25/2021) 3.4 - 5.0 g/dL     _  Result Component Current Result Ref Range   Urea Nitrogen 27 (2/25/2021) 7 - 30 mg/dL     _  Result Component Current Result Ref Range   Creatinine 1.07 (2/25/2021) 0.66 - 1.25 mg/dL     _  Result Component Current Result Ref Range   AST 26 (2/25/2021) 0 - 45 U/L     _  Result Component Current Result Ref Range   ALT 28 (2/25/2021) 0 - 70 U/L     _  Result Component Current Result Ref Range   Bilirubin Total 1.1 (2/25/2021) 0.2 - 1.3 mg/dL     _  Result Component Current Result Ref Range   WBC 7.8 (2/25/2021) 4.0 - 11.0 10e9/L     _  Result Component Current Result Ref Range    Hemoglobin 15.8 (2/25/2021) 13.3 - 17.7 g/dL     _  Result Component Current Result Ref Range   Platelet Count 129 (L) (2/25/2021) 150 - 450 10e9/L     _  Result Component Current Result Ref Range   Absolute Neutrophil 3.4 (2/25/2021) 1.6 - 8.3 10e9/L       Assessment & Plan:  No concerning abnormalities. Continue Zytiga therapy as planned.    Follow-Up:  Mid-March for labs and monthly assessment.       Estefania Up, PharmD, BCACP  Oral Chemotherapy Monitoring Program  [facility]  [phone]

## 2021-03-01 ENCOUNTER — PREP FOR PROCEDURE (OUTPATIENT)
Dept: UROLOGY | Facility: CLINIC | Age: 66
End: 2021-03-01

## 2021-03-01 DIAGNOSIS — C61 PROSTATE CANCER (H): Primary | ICD-10-CM

## 2021-03-01 RX ORDER — CEFAZOLIN SODIUM 2 G/50ML
2 SOLUTION INTRAVENOUS
Status: CANCELLED | OUTPATIENT
Start: 2021-03-01

## 2021-03-01 RX ORDER — CEFAZOLIN SODIUM 2 G/50ML
2 SOLUTION INTRAVENOUS SEE ADMIN INSTRUCTIONS
Status: CANCELLED | OUTPATIENT
Start: 2021-03-01

## 2021-03-02 ENCOUNTER — TELEPHONE (OUTPATIENT)
Dept: UROLOGY | Facility: CLINIC | Age: 66
End: 2021-03-02

## 2021-03-02 NOTE — TELEPHONE ENCOUNTER
Patient is scheduled for surgery with Dr. Caban      Left voicemails back and forth with sonya Holt message sent - patient confirmed surgery date in message    Date of Surgery: 4/30/2021    Location: [facility]    Informed patient they will need an adult  yes    Pre-op with surgeon (if applicable): n/a    H&P: Scheduled with PAC on 4/9/2021    Additional imaging/appointments: n/a    Surgery packet: Mailed on 3/2/2021     Additional comments: COVID test scheduled at [facility] on 4/26/2021

## 2021-03-02 NOTE — TELEPHONE ENCOUNTER
Called and left voicemail for patient about scheduling surgery with Dr. Caban. Offered 4/30/2021 as potential surgery date. Gave [phone] as call back number. [email or web address] message sent to patient

## 2021-03-11 ENCOUNTER — TELEPHONE (OUTPATIENT)
Facility: CLINIC | Age: 66
End: 2021-03-11

## 2021-03-11 DIAGNOSIS — Z79.899 ENCOUNTER FOR LONG-TERM (CURRENT) USE OF MEDICATIONS: ICD-10-CM

## 2021-03-11 DIAGNOSIS — C61 PROSTATE CANCER (H): Primary | ICD-10-CM

## 2021-03-11 DIAGNOSIS — C61 PROSTATE CANCER (H): ICD-10-CM

## 2021-03-11 DIAGNOSIS — C77.9 REGIONAL LYMPH NODE METASTASIS PRESENT (H): ICD-10-CM

## 2021-03-11 LAB
ALBUMIN SERPL-MCNC: 3.6 G/DL (ref 3.4–5)
ALP SERPL-CCNC: 63 U/L (ref 40–150)
ALT SERPL W P-5'-P-CCNC: 34 U/L (ref 0–70)
ANION GAP SERPL CALCULATED.3IONS-SCNC: 5 MMOL/L (ref 3–14)
AST SERPL W P-5'-P-CCNC: 30 U/L (ref 0–45)
BASOPHILS # BLD AUTO: 0.1 10E9/L (ref 0–0.2)
BASOPHILS NFR BLD AUTO: 0.6 %
BILIRUB SERPL-MCNC: 1.1 MG/DL (ref 0.2–1.3)
BUN SERPL-MCNC: 25 MG/DL (ref 7–30)
CALCIUM SERPL-MCNC: 9 MG/DL (ref 8.5–10.1)
CHLORIDE SERPL-SCNC: 101 MMOL/L (ref 94–109)
CO2 SERPL-SCNC: 32 MMOL/L (ref 20–32)
CREAT SERPL-MCNC: 1.14 MG/DL (ref 0.66–1.25)
DIFFERENTIAL METHOD BLD: ABNORMAL
EOSINOPHIL # BLD AUTO: 0.1 10E9/L (ref 0–0.7)
EOSINOPHIL NFR BLD AUTO: 1.1 %
ERYTHROCYTE [DISTWIDTH] IN BLOOD BY AUTOMATED COUNT: 11.9 % (ref 10–15)
GFR SERPL CREATININE-BSD FRML MDRD: 66 ML/MIN/{1.73_M2}
GLUCOSE SERPL-MCNC: 94 MG/DL (ref 70–99)
HCT VFR BLD AUTO: 44.3 % (ref 40–53)
HGB BLD-MCNC: 15.4 G/DL (ref 13.3–17.7)
LYMPHOCYTES # BLD AUTO: 3.9 10E9/L (ref 0.8–5.3)
LYMPHOCYTES NFR BLD AUTO: 47.5 %
MCH RBC QN AUTO: 33.3 PG (ref 26.5–33)
MCHC RBC AUTO-ENTMCNC: 34.8 G/DL (ref 31.5–36.5)
MCV RBC AUTO: 96 FL (ref 78–100)
MONOCYTES # BLD AUTO: 1 10E9/L (ref 0–1.3)
MONOCYTES NFR BLD AUTO: 11.8 %
NEUTROPHILS # BLD AUTO: 3.2 10E9/L (ref 1.6–8.3)
NEUTROPHILS NFR BLD AUTO: 39 %
PLATELET # BLD AUTO: 123 10E9/L (ref 150–450)
POTASSIUM SERPL-SCNC: 3.6 MMOL/L (ref 3.4–5.3)
PROT SERPL-MCNC: 7.1 G/DL (ref 6.8–8.8)
PSA SERPL-MCNC: 0.17 UG/L (ref 0–4)
RBC # BLD AUTO: 4.62 10E12/L (ref 4.4–5.9)
SODIUM SERPL-SCNC: 138 MMOL/L (ref 133–144)
WBC # BLD AUTO: 8.1 10E9/L (ref 4–11)

## 2021-03-11 PROCEDURE — 85025 COMPLETE CBC W/AUTO DIFF WBC: CPT | Performed by: INTERNAL MEDICINE

## 2021-03-11 PROCEDURE — 80053 COMPREHEN METABOLIC PANEL: CPT | Performed by: INTERNAL MEDICINE

## 2021-03-11 PROCEDURE — 36415 COLL VENOUS BLD VENIPUNCTURE: CPT | Performed by: INTERNAL MEDICINE

## 2021-03-11 PROCEDURE — 84153 ASSAY OF PSA TOTAL: CPT | Performed by: INTERNAL MEDICINE

## 2021-03-11 NOTE — TELEPHONE ENCOUNTER
Patient was in to [facility] and requested a PSA test be done. If you would like to orer this test, we renetta the proper tube for it to be done.

## 2021-03-11 NOTE — TELEPHONE ENCOUNTER
Patient was in to [facility] and requested a PSA test be done. If you would like to orer this test, we renetta the proper tube for it to be done.

## 2021-03-12 ENCOUNTER — TELEPHONE (OUTPATIENT)
Dept: ONCOLOGY | Facility: CLINIC | Age: 66
End: 2021-03-12

## 2021-03-12 NOTE — TELEPHONE ENCOUNTER
Oral Chemotherapy Monitoring Program    Subjective/Objective:  Jonathon Santos is a 65 year old male contacted by phone for a follow-up visit for oral chemotherapy.  Pt confirms taking the appropriate dose of Zytiga 1000mg daily on an empty stomach and prednisone 5mg daily. Denies new or worsening side effects, missed doses, and recent hospital or ED visits. Pt recently started metoprolol to control his BP.  He already taking lisinopril/hydrochlorothiazide, but his BP jumped to 150s/100.  Now that he is on metoprolol, his numbers have decreased to 120-140/80-90 for the most part.  These meds are managed by Dr. Cristina.    ORAL CHEMOTHERAPY 1/29/2021 2/8/2021 2/12/2021 2/26/2021 3/12/2021 3/12/2021   Assessment Type New Teach Lab Monitoring;Chart Review;Other Lab Monitoring;Monthly Follow up Lab Monitoring Lab Monitoring;Other Monthly Follow up   Diagnosis Code Prostate Cancer Prostate Cancer Prostate Cancer Prostate Cancer Prostate Cancer Prostate Cancer   Providers Dr. Blayne Cisneros   [facility]   Drug Name Zytiga (Abiraterone) Zytiga (Abiraterone) Zytiga (Abiraterone) Zytiga (Abiraterone) Zytiga (Abiraterone) Zytiga (Abiraterone)   Dose 1,000 mg - 1,000 mg 1,000 mg 1,000 mg 1,000 mg   Current Schedule Daily - Daily Daily Daily Daily   Cycle Details Continuous - Continuous Continuous Continuous Continuous   Start Date of Last Cycle - - 2/1/2021 - - -   Doses missed in last 2 weeks - - 0 - - 0   Adherence Assessment - - Adherent - - Adherent   Adverse Effects - - - - - No AE identified during assessment   Home BPs - - any BPs> 160/100 - - -   Any new drug interactions? - - No - - No   Pharmacist Intervention? - - Yes - - -   Intervention(s) - - Patient Education - - -   Is the dose as ordered appropriate for the patient? - - - - - Yes   Since the last time we talked, have you been hospitalized or used the emergency room? - - -  "- - No       Last PHQ-2 Score on record:   PHQ-2 ( 1999 Pfizer) 1/4/2021 12/21/2020   Q1: Little interest or pleasure in doing things 0 0   Q2: Feeling down, depressed or hopeless 0 0   PHQ-2 Score 0 0   Q1: Little interest or pleasure in doing things Not at all -   Q2: Feeling down, depressed or hopeless Not at all -   PHQ-2 Score 0 -       Vitals:  BP:   BP Readings from Last 1 Encounters:   01/28/21 (!) 159/105     Wt Readings from Last 1 Encounters:   02/04/21 102.8 kg (226 lb 9.6 oz)     Estimated body surface area is 2.3 meters squared as calculated from the following:    Height as of 12/21/20: 1.854 m (6' 1\").    Weight as of 2/4/21: 102.8 kg (226 lb 9.6 oz).    Labs:  _  Result Component Current Result Ref Range   Sodium 138 (3/11/2021) 133 - 144 mmol/L     _  Result Component Current Result Ref Range   Potassium 3.6 (3/11/2021) 3.4 - 5.3 mmol/L     _  Result Component Current Result Ref Range   Calcium 9.0 (3/11/2021) 8.5 - 10.1 mg/dL     No results found for Mag within last 30 days.     No results found for Phos within last 30 days.     _  Result Component Current Result Ref Range   Albumin 3.6 (3/11/2021) 3.4 - 5.0 g/dL     _  Result Component Current Result Ref Range   Urea Nitrogen 25 (3/11/2021) 7 - 30 mg/dL     _  Result Component Current Result Ref Range   Creatinine 1.14 (3/11/2021) 0.66 - 1.25 mg/dL     _  Result Component Current Result Ref Range   AST 30 (3/11/2021) 0 - 45 U/L     _  Result Component Current Result Ref Range   ALT 34 (3/11/2021) 0 - 70 U/L     _  Result Component Current Result Ref Range   Bilirubin Total 1.1 (3/11/2021) 0.2 - 1.3 mg/dL     _  Result Component Current Result Ref Range   WBC 8.1 (3/11/2021) 4.0 - 11.0 10e9/L     _  Result Component Current Result Ref Range   Hemoglobin 15.4 (3/11/2021) 13.3 - 17.7 g/dL     _  Result Component Current Result Ref Range   Platelet Count 123 (L) (3/11/2021) 150 - 450 10e9/L     _  Result Component Current Result Ref Range   Absolute " Neutrophil 3.2 (3/11/2021) 1.6 - 8.3 10e9/L     Assessment/Plan:  Pt continues to tolerate Zytiga well, with some increase in BP that is closely monitored by his PCP.  Pt also monitors his BP at home. Continue current therapy as planned, with continued BP monitoring.    Follow-Up:  03/26: look for q2wk labs    Grace Myhre, AdriD  Hematology/Oncology Pharmacist  [facility]  [phone]

## 2021-03-12 NOTE — TELEPHONE ENCOUNTER
Oral Chemotherapy Monitoring Program  Lab Follow Up    Reviewed CBC/CMP/PSA lab results from 03/11.    ORAL CHEMOTHERAPY 1/29/2021 2/8/2021 2/12/2021 2/26/2021 3/12/2021   Assessment Type New Teach Lab Monitoring;Chart Review;Other Lab Monitoring;Monthly Follow up Lab Monitoring Lab Monitoring;Other   Diagnosis Code Prostate Cancer Prostate Cancer Prostate Cancer Prostate Cancer Prostate Cancer   Providers Dr. Blayne Cisneros   [facility]   Drug Name Zytiga (Abiraterone) Zytiga (Abiraterone) Zytiga (Abiraterone) Zytiga (Abiraterone) Zytiga (Abiraterone)   Dose 1,000 mg - 1,000 mg 1,000 mg 1,000 mg   Current Schedule Daily - Daily Daily Daily   Cycle Details Continuous - Continuous Continuous Continuous   Start Date of Last Cycle - - 2/1/2021 - -   Doses missed in last 2 weeks - - 0 - -   Adherence Assessment - - Adherent - -   Home BPs - - any BPs> 160/100 - -   Any new drug interactions? - - No - -   Pharmacist Intervention? - - Yes - -   Intervention(s) - - Patient Education - -       Labs:  _  Result Component Current Result Ref Range   Sodium 138 (3/11/2021) 133 - 144 mmol/L     _  Result Component Current Result Ref Range   Potassium 3.6 (3/11/2021) 3.4 - 5.3 mmol/L     _  Result Component Current Result Ref Range   Calcium 9.0 (3/11/2021) 8.5 - 10.1 mg/dL     No results found for Mag within last 30 days.     No results found for Phos within last 30 days.     _  Result Component Current Result Ref Range   Albumin 3.6 (3/11/2021) 3.4 - 5.0 g/dL     _  Result Component Current Result Ref Range   Urea Nitrogen 25 (3/11/2021) 7 - 30 mg/dL     _  Result Component Current Result Ref Range   Creatinine 1.14 (3/11/2021) 0.66 - 1.25 mg/dL     _  Result Component Current Result Ref Range   AST 30 (3/11/2021) 0 - 45 U/L     _  Result Component Current Result Ref Range   ALT 34 (3/11/2021) 0 - 70 U/L     _  Result Component Current Result Ref Range    Bilirubin Total 1.1 (3/11/2021) 0.2 - 1.3 mg/dL     _  Result Component Current Result Ref Range   WBC 8.1 (3/11/2021) 4.0 - 11.0 10e9/L     _  Result Component Current Result Ref Range   Hemoglobin 15.4 (3/11/2021) 13.3 - 17.7 g/dL     _  Result Component Current Result Ref Range   Platelet Count 123 (L) (3/11/2021) 150 - 450 10e9/L     _  Result Component Current Result Ref Range   Absolute Neutrophil 3.2 (3/11/2021) 1.6 - 8.3 10e9/L       Assessment & Plan:  No concerning abnormalities.    Left  for patient for assessment, and sent [employer] message to patient with lab results.    Follow-Up:  03/16: monthly assessment    Grace Myhre, PharmD  Hematology/Oncology Pharmacist  [facility]  [phone]

## 2021-03-17 DIAGNOSIS — C77.9 REGIONAL LYMPH NODE METASTASIS PRESENT (H): ICD-10-CM

## 2021-03-17 DIAGNOSIS — C61 PROSTATE CANCER (H): Primary | ICD-10-CM

## 2021-03-19 DIAGNOSIS — M10.9 GOUT: Primary | ICD-10-CM

## 2021-03-23 ENCOUNTER — PATIENT OUTREACH (OUTPATIENT)
Dept: ONCOLOGY | Facility: CLINIC | Age: 66
End: 2021-03-23

## 2021-03-23 NOTE — PROGRESS NOTES
TC to pt to discuss plans for upcoming appts. Pt is due for an AGATHA visit but also has plans for scan/labs/other provider appts that need to be coordinated with. LMTC writer. Await response.

## 2021-03-25 ENCOUNTER — MYC MEDICAL ADVICE (OUTPATIENT)
Dept: ONCOLOGY | Facility: CLINIC | Age: 66
End: 2021-03-25

## 2021-03-25 DIAGNOSIS — C61 PROSTATE CANCER (H): ICD-10-CM

## 2021-03-25 DIAGNOSIS — C77.9 REGIONAL LYMPH NODE METASTASIS PRESENT (H): ICD-10-CM

## 2021-03-25 DIAGNOSIS — M10.9 GOUT: ICD-10-CM

## 2021-03-25 DIAGNOSIS — Z79.899 ENCOUNTER FOR LONG-TERM (CURRENT) USE OF MEDICATIONS: ICD-10-CM

## 2021-03-25 LAB
ALBUMIN SERPL-MCNC: 3.6 G/DL (ref 3.4–5)
ALP SERPL-CCNC: 75 U/L (ref 40–150)
ALT SERPL W P-5'-P-CCNC: 49 U/L (ref 0–70)
ANION GAP SERPL CALCULATED.3IONS-SCNC: 9 MMOL/L (ref 3–14)
AST SERPL W P-5'-P-CCNC: 49 U/L (ref 0–45)
BASOPHILS # BLD AUTO: 0 10E9/L (ref 0–0.2)
BASOPHILS NFR BLD AUTO: 0 %
BILIRUB SERPL-MCNC: 1.5 MG/DL (ref 0.2–1.3)
BUN SERPL-MCNC: 20 MG/DL (ref 7–30)
CALCIUM SERPL-MCNC: 9.1 MG/DL (ref 8.5–10.1)
CHLORIDE SERPL-SCNC: 95 MMOL/L (ref 94–109)
CO2 SERPL-SCNC: 27 MMOL/L (ref 20–32)
CREAT SERPL-MCNC: 1.33 MG/DL (ref 0.66–1.25)
DIFFERENTIAL METHOD BLD: ABNORMAL
EOSINOPHIL # BLD AUTO: 0.1 10E9/L (ref 0–0.7)
EOSINOPHIL NFR BLD AUTO: 1 %
ERYTHROCYTE [DISTWIDTH] IN BLOOD BY AUTOMATED COUNT: 12.2 % (ref 10–15)
GFR SERPL CREATININE-BSD FRML MDRD: 55 ML/MIN/{1.73_M2}
GLUCOSE SERPL-MCNC: 120 MG/DL (ref 70–99)
HCT VFR BLD AUTO: 48.6 % (ref 40–53)
HGB BLD-MCNC: 17.3 G/DL (ref 13.3–17.7)
LYMPHOCYTES # BLD AUTO: 5 10E9/L (ref 0.8–5.3)
LYMPHOCYTES NFR BLD AUTO: 39 %
MCH RBC QN AUTO: 33 PG (ref 26.5–33)
MCHC RBC AUTO-ENTMCNC: 35.6 G/DL (ref 31.5–36.5)
MCV RBC AUTO: 93 FL (ref 78–100)
MONOCYTES # BLD AUTO: 0.6 10E9/L (ref 0–1.3)
MONOCYTES NFR BLD AUTO: 5 %
NEUTROPHILS # BLD AUTO: 7.1 10E9/L (ref 1.6–8.3)
NEUTROPHILS NFR BLD AUTO: 55 %
PLATELET # BLD AUTO: 145 10E9/L (ref 150–450)
PLATELET # BLD EST: ABNORMAL 10*3/UL
POTASSIUM SERPL-SCNC: 3.2 MMOL/L (ref 3.4–5.3)
PROT SERPL-MCNC: 7.5 G/DL (ref 6.8–8.8)
RBC # BLD AUTO: 5.25 10E12/L (ref 4.4–5.9)
RBC MORPH BLD: NORMAL
SODIUM SERPL-SCNC: 131 MMOL/L (ref 133–144)
URATE SERPL-MCNC: 5.7 MG/DL (ref 3.5–7.2)
WBC # BLD AUTO: 12.9 10E9/L (ref 4–11)

## 2021-03-25 PROCEDURE — 84550 ASSAY OF BLOOD/URIC ACID: CPT | Performed by: INTERNAL MEDICINE

## 2021-03-25 PROCEDURE — 80053 COMPREHEN METABOLIC PANEL: CPT | Performed by: INTERNAL MEDICINE

## 2021-03-25 PROCEDURE — 85025 COMPLETE CBC W/AUTO DIFF WBC: CPT | Performed by: INTERNAL MEDICINE

## 2021-03-25 PROCEDURE — 36415 COLL VENOUS BLD VENIPUNCTURE: CPT | Performed by: INTERNAL MEDICINE

## 2021-03-26 DIAGNOSIS — C77.9 REGIONAL LYMPH NODE METASTASIS PRESENT (H): ICD-10-CM

## 2021-03-26 DIAGNOSIS — C61 PROSTATE CANCER (H): Primary | ICD-10-CM

## 2021-03-26 RX ORDER — ABIRATERONE ACETATE 250 MG/1
1000 TABLET ORAL DAILY
Qty: 120 TABLET | Refills: 1 | Status: SHIPPED | OUTPATIENT
Start: 2021-03-26 | End: 2021-04-25

## 2021-03-29 ENCOUNTER — PATIENT OUTREACH (OUTPATIENT)
Dept: ONCOLOGY | Facility: CLINIC | Age: 66
End: 2021-03-29

## 2021-03-29 DIAGNOSIS — C61 PROSTATE CANCER (H): Primary | ICD-10-CM

## 2021-03-29 NOTE — PROGRESS NOTES
TC to pt to discuss plans for upcoming appts. Pt needs a provider visit and may see either Dr. Cisneros or Esperanza Floyd on 04/14 following labs and a CT on 04/13. Pt stated understanding of all and agreed to call clinic with any questions or concerns. Message sent to scheduling to make appts. Will discuss with Dr. Cisneros to see if pt should have an NP or MD visit.

## 2021-04-09 ENCOUNTER — OFFICE VISIT (OUTPATIENT)
Dept: SURGERY | Facility: CLINIC | Age: 66
End: 2021-04-09
Payer: COMMERCIAL

## 2021-04-09 ENCOUNTER — ANESTHESIA EVENT (OUTPATIENT)
Dept: SURGERY | Facility: CLINIC | Age: 66
End: 2021-04-09

## 2021-04-09 ENCOUNTER — PRE VISIT (OUTPATIENT)
Dept: SURGERY | Facility: CLINIC | Age: 66
End: 2021-04-09

## 2021-04-09 VITALS
SYSTOLIC BLOOD PRESSURE: 132 MMHG | OXYGEN SATURATION: 97 % | HEART RATE: 85 BPM | DIASTOLIC BLOOD PRESSURE: 88 MMHG | RESPIRATION RATE: 16 BRPM | BODY MASS INDEX: 28.1 KG/M2 | WEIGHT: 212 LBS | TEMPERATURE: 98.2 F | HEIGHT: 73 IN

## 2021-04-09 DIAGNOSIS — I10 ESSENTIAL HYPERTENSION: ICD-10-CM

## 2021-04-09 DIAGNOSIS — Z01.818 PREOP EXAMINATION: ICD-10-CM

## 2021-04-09 DIAGNOSIS — Z01.818 PREOP EXAMINATION: Primary | ICD-10-CM

## 2021-04-09 DIAGNOSIS — C61 PROSTATE CANCER (H): ICD-10-CM

## 2021-04-09 DIAGNOSIS — R07.9 CHEST PAIN, UNSPECIFIED TYPE: ICD-10-CM

## 2021-04-09 LAB
ALBUMIN UR-MCNC: NEGATIVE MG/DL
APPEARANCE UR: CLEAR
BILIRUB UR QL STRIP: NEGATIVE
COLOR UR AUTO: ABNORMAL
GLUCOSE UR STRIP-MCNC: NEGATIVE MG/DL
HGB UR QL STRIP: NEGATIVE
HYALINE CASTS #/AREA URNS LPF: 7 /LPF (ref 0–2)
KETONES UR STRIP-MCNC: NEGATIVE MG/DL
LEUKOCYTE ESTERASE UR QL STRIP: NEGATIVE
MUCOUS THREADS #/AREA URNS LPF: PRESENT /LPF
NITRATE UR QL: NEGATIVE
PH UR STRIP: 5 PH (ref 5–7)
RBC #/AREA URNS AUTO: <1 /HPF (ref 0–2)
SOURCE: ABNORMAL
SP GR UR STRIP: 1.02 (ref 1–1.03)
UROBILINOGEN UR STRIP-MCNC: 0 MG/DL (ref 0–2)
WBC #/AREA URNS AUTO: 1 /HPF (ref 0–5)

## 2021-04-09 PROCEDURE — 99203 OFFICE O/P NEW LOW 30 MIN: CPT | Performed by: NURSE PRACTITIONER

## 2021-04-09 PROCEDURE — 81001 URINALYSIS AUTO W/SCOPE: CPT | Performed by: PATHOLOGY

## 2021-04-09 RX ORDER — METOPROLOL SUCCINATE 25 MG/1
TABLET, EXTENDED RELEASE ORAL
COMMUNITY
Start: 2021-02-18 | End: 2022-05-19

## 2021-04-09 RX ORDER — PREDNISONE 5 MG/1
TABLET ORAL EVERY MORNING
COMMUNITY
Start: 2021-03-26 | End: 2021-09-15

## 2021-04-09 SDOH — ECONOMIC STABILITY: FOOD INSECURITY: WITHIN THE PAST 12 MONTHS, THE FOOD YOU BOUGHT JUST DIDN'T LAST AND YOU DIDN'T HAVE MONEY TO GET MORE.: NOT ASKED

## 2021-04-09 SDOH — ECONOMIC STABILITY: FOOD INSECURITY: WITHIN THE PAST 12 MONTHS, YOU WORRIED THAT YOUR FOOD WOULD RUN OUT BEFORE YOU GOT MONEY TO BUY MORE.: NOT ASKED

## 2021-04-09 SDOH — ECONOMIC STABILITY: INCOME INSECURITY: HOW HARD IS IT FOR YOU TO PAY FOR THE VERY BASICS LIKE FOOD, HOUSING, MEDICAL CARE, AND HEATING?: NOT ASKED

## 2021-04-09 SDOH — ECONOMIC STABILITY: TRANSPORTATION INSECURITY
IN THE PAST 12 MONTHS, HAS LACK OF TRANSPORTATION KEPT YOU FROM MEETINGS, WORK, OR FROM GETTING THINGS NEEDED FOR DAILY LIVING?: NOT ASKED

## 2021-04-09 SDOH — HEALTH STABILITY: MENTAL HEALTH: HOW MANY STANDARD DRINKS CONTAINING ALCOHOL DO YOU HAVE ON A TYPICAL DAY?: NOT ASKED

## 2021-04-09 SDOH — HEALTH STABILITY: MENTAL HEALTH: HOW OFTEN DO YOU HAVE A DRINK CONTAINING ALCOHOL?: NOT ASKED

## 2021-04-09 SDOH — ECONOMIC STABILITY: TRANSPORTATION INSECURITY
IN THE PAST 12 MONTHS, HAS THE LACK OF TRANSPORTATION KEPT YOU FROM MEDICAL APPOINTMENTS OR FROM GETTING MEDICATIONS?: NOT ASKED

## 2021-04-09 SDOH — HEALTH STABILITY: MENTAL HEALTH: HOW OFTEN DO YOU HAVE 6 OR MORE DRINKS ON ONE OCCASION?: NOT ASKED

## 2021-04-09 ASSESSMENT — MIFFLIN-ST. JEOR: SCORE: 1790.51

## 2021-04-09 ASSESSMENT — PAIN SCALES - GENERAL: PAINLEVEL: NO PAIN (0)

## 2021-04-09 NOTE — PATIENT INSTRUCTIONS
Preparing for Your Surgery      Name:  Jonathon Santos   MRN:  [id number]   :  1955   Today's Date:  2021       Arriving for surgery:  Surgery date:  21  Arrival time:  5:45 am    Restrictions due to COVID 19:  One consistent visitor per patient is allowed.  The visitor will be allowed in the pre-op area.  Visitors are asked to leave the building during the surgery.  No ill visitors.  All visitors must wear face mask.    [employer] parking is available for anyone with mobility limitations or disabilities.  ([employer]  24 hours/ 7 days a week; [employer]  7 am- 3:30 pm, Mon- Fri)    Please come to:     [facility] [address]  [facility] [facility] [place] [address]    - Enter through the [facility].    -[employer] parking is available in front of [facility] from 5:15AM to 8:00PM. If you prefer, park your car in the Green Lot.    -Proceed to the 3rd floor, check in at the Adult Surgery Waiting Lounge. [phone]    If an escort is needed stop at the Information Desk in the lobby. Inform the information person that you are here for surgery. An escort to the Adult Surgery Waiting Lounge will be provided.        What can I eat or drink?  -  You may eat and drink normally for up to 8 hours before your surgery. (Until 11:45 pm)  -  You may have clear liquids until 2 hours before surgery. (Until 5:45 am)    Examples of clear liquids:  Water  Clear broth  Juices (apple, white grape, white cranberry  and cider) without pulp  Noncarbonated, powder based beverages  (lemonade and Jared-Aid)  Sodas (Sprite, 7-Up, ginger ale and seltzer)  Coffee or tea (without milk or cream)  Gatorade    -  No Alcohol for at least 24 hours before surgery     Which medicines can I take?    Hold Aspirin for 7 days before surgery.   Hold Multivitamins for 7 days before surgery.  Hold Supplements for 7 days before surgery.  Hold Ibuprofen (Advil, Motrin)  for 1 day before surgery--unless otherwise directed by surgeon.  Hold Naproxen (Aleve) for 4 days before surgery.    -  DO NOT take these medications the day of surgery:  Lisinopril-hydrochlorothiazide (Prinzide/Zestoretic)      -  PLEASE TAKE these medications the day of surgery:  Abiraterone (Zytiga)  Metoprolol (Toprol)  Prednisone (Deltasone)  Allopurinol (Zyloprim)    How do I prepare myself?  - Please take 2 showers before surgery using Scrubcare or Hibiclens soap.    Use this soap only from the neck to your toes.     Leave the soap on your skin for one minute--then rinse thoroughly.      You may use your own shampoo and conditioner; no other hair products.   - Please remove all jewelry and body piercings.  - No lotions, deodorants or fragrance.  - No makeup or fingernail polish.   - Bring your ID and insurance card.    - All patients are required to have a Covid-19 test within 4 days of surgery/procedure.      -Patients will be contacted by the [facility] scheduling team within 1 week of surgery to make an appointment.      - Patients may call the Scheduling team at [phone] if they have not been scheduled within 4 days of  surgery.      ALL PATIENTS GOING HOME THE SAME DAY OF SURGERY ARE REQUIRED TO HAVE A RESPONSIBLE ADULT TO DRIVE AND BE IN ATTENDANCE WITH THEM FOR 24 HOURS FOLLOWING SURGERY.    IF THE RESPONSIBLE ADULT IS REQUIRED FOR POST OP TEACHING THE POST OP RN WILL ASK THEM TO COME BACK TO THE RECOVERY AREA.    Questions or Concerns:    - For any questions regarding the day of surgery or your hospital stay, please contact the Pre Admission Nursing Office at [phone].       - If you have health changes between today and your surgery please call your surgeon.       For questions after surgery please call your surgeons office.

## 2021-04-09 NOTE — H&P
Pre-Operative H & P     CC:  Preoperative exam to assess for increased cardiopulmonary risk while undergoing surgery and anesthesia.    Date of Encounter: 4/9/2021  Primary Care Physician:  Aron Cristina  Associated diagnosis: prostate cancer    HPI  Jonathon Santos is a 66 year old male who presents for pre-operative H & P in preparation for Transrectal ultrasound guided, placement of fiducials and SpaceOar on 4/30/21 by Dr. Contreras at [facility].     Jonathon Santos 66 year old male with hypertension, gout and inflammatory arthritis that has prostate cancer.  He was referred to Dr. Caban for evaluation of elevated PSA that had been gradually increasing since 2019.  On 11/17/20 he had a prostate MRI for evaluation and was found to have a PI-RADS 5 lesion at the right mid gland peripheral zone at 9-10:00 position and a PI-RADS 4 lesion in the left apex peripheral and transitional zones. There were bilateral suspicious obturator nodes and indeterminate external iliac nodes. He subsequently underwent prostate biopsy on 12/21/20, which returned positive for prostatic adenocarcinoma. He completed staging evaluation on 1/12/21 with bone scan, which found no evidence of osseous diseases, and a CT A/P, which revealed a single, borderline enlarged left retroperitoneal node at the aortic bifurcation, as well as several additional pelvic nodes, some of which were noted in the previous MRI. Given the findings, Dr. Caban of Urology referred to patient to Medical Oncology and Radiation Oncology.  The above listed procedure has now been recommended to initiate treatment.      History is obtained from the patient and the medical record.     Past Medical History  Past Medical History:   Diagnosis Date     Benign essential hypertension      Gout      Prostate cancer (H)        Past Surgical History  Past Surgical History:   Procedure Laterality Date     COLONOSCOPY       FOOT  SURGERY       PROSTATE BIOPSY, NEEDLE, SATURATION SAMPLING       SPINE SURGERY      unspecified low back surgery       Hx of Blood transfusions/reactions: none    Hx of abnormal bleeding or anti-platelet use: none      Steroid use in the last year: none    Personal or FH with difficulty with Anesthesia:  None    Prior to Admission Medications  Current Outpatient Medications   Medication Sig Dispense Refill     abiraterone (ZYTIGA) 250 MG tablet Take 4 tablets (1,000 mg) by mouth daily for 30 doses Take on empty stomach. (Patient taking differently: Take 1,000 mg by mouth every morning Take on empty stomach.) 120 tablet 1     lisinopril-hydrochlorothiazide (PRINZIDE/ZESTORETIC) 20-12.5 MG tablet Take 2 tablets by mouth daily (Patient taking differently: Take 2 tablets by mouth every morning ) 30 tablet 0     metoprolol succinate ER (TOPROL-XL) 25 MG 24 hr tablet daily (with lunch)        predniSONE (DELTASONE) 5 MG tablet every morning        allopurinol (ZYLOPRIM) 100 MG tablet          Allergies  No Known Allergies    Social History  Social History     Socioeconomic History     Marital status: Single     Spouse name: Not on file     Number of children: 2     Years of education: Not on file     Highest education level: Not on file   Occupational History     Occupation: retired    Social Needs     Financial resource strain: Not on file     Food insecurity     Worry: Not on file     Inability: Not on file     Transportation needs     Medical: Not on file     Non-medical: Not on file   Tobacco Use     Smoking status: Never Smoker     Smokeless tobacco: Former User   Substance and Sexual Activity     Alcohol use: Yes     Alcohol/week: 7.0 - 14.0 standard drinks     Types: 7 - 14 Cans of beer per week     Comment: 1-2 beers daily     Drug use: Never     Sexual activity: Not Currently   Lifestyle     Physical activity     Days per week: Not on file     Minutes per session: Not on file     Stress: Not on  file   Relationships     Social connections     Talks on phone: Not on file     Gets together: Not on file     Attends [employer] service: Not on file     Active member of club or organization: Not on file     Attends meetings of clubs or organizations: Not on file     Relationship status: Not on file     Intimate partner violence     Fear of current or ex partner: Not on file     Emotionally abused: Not on file     Physically abused: Not on file     Forced sexual activity: Not on file   Other Topics Concern     Not on file   Social History Narrative     Not on file       Family History  Family History   Problem Relation Age of Onset     Hypertension Mother      Gout Mother      Hypertension Father      Diabetes Father      Hypertension Maternal Grandmother      Hypertension Maternal Grandfather      Hypertension Brother      Hypertension Sister      Diabetes Cousin      Deep Vein Thrombosis No family hx of      Anesthesia Reaction No family hx of            ROS/MED HISTORY OF    The complete review of systems is negative other than noted in the HPI or here.   ENT/Pulmonary:     (+) HECTOR risk factors, snores loudly,  (-) recent URI   Neurologic:  - neg neurologic ROS     Cardiovascular:     (+) hypertension-----No previous cardiac testing     METS/Exercise Tolerance: 1 - Eating, dressing    Hematologic:  - neg hematologic  ROS  (-) history of blood clots and history of blood transfusion   Musculoskeletal: Comment: Inflammatory arthritis, gout (no flares for 3 years)  (+) arthritis,     GI/Hepatic:  - neg GI/hepatic ROS     Renal/Genitourinary: Comment: Prostate cancer      Endo:     (+) Chronic steroid usage for Other. Date most recently used: daily.     Psychiatric/Substance Use:  - neg psychiatric ROS  (-) psychiatric history   Infectious Disease:  - neg infectious disease ROS  (-) Recent Fever   Malignancy:   (+) Malignancy, History of Prostate.Prostate CA Active status post.        Other:  - neg other ROS     "        Temp: 98.2  F (36.8  C) Temp src: Oral BP: 132/88 Pulse: 85   Resp: 16 SpO2: 97 %         212 lbs 0 oz  6' 1\"[pt reported[   Body mass index is 27.97 kg/m .       Physical Exam  Constitutional: Awake, alert, cooperative, no apparent distress, and appears stated age.  Eyes: Pupils equal, round and reactive to light, extra ocular muscles intact, sclera clear, conjunctiva normal.  HENT: Normocephalic, oral pharynx with moist mucus membranes, good dentition. No goiter appreciated.   Respiratory: Clear to auscultation bilaterally, no crackles or wheezing.  Cardiovascular: Regular rate and rhythm, normal S1 and S2, and no murmur noted.  Carotids +2, no bruits. No edema. Palpable pulses to radial  DP and PT arteries.   GI: Normal bowel sounds, soft, non-distended, non-tender, no masses palpated, no hepatosplenomegaly.    Lymph/Hematologic: No cervical lymphadenopathy and no supraclavicular lymphadenopathy.  Genitourinary:  deferred  Skin: Warm and dry.    Musculoskeletal: Full ROM of neck. There is no redness, warmth, or swelling of the joints. Gross motor strength is normal.    Neurologic: Awake, alert, oriented to name, place and time. Cranial nerves II-XII are grossly intact. Gait is normal.   Neuropsychiatric: Calm, cooperative. Normal affect.     Labs: (personally reviewed)  Component      Latest Ref Rng & Units 4/13/2021   WBC      4.0 - 11.0 10e9/L 9.8   RBC Count      4.4 - 5.9 10e12/L 4.58   Hemoglobin      13.3 - 17.7 g/dL 15.1   Hematocrit      40.0 - 53.0 % 43.8   MCV      78 - 100 fl 96   MCH      26.5 - 33.0 pg 33.0   MCHC      31.5 - 36.5 g/dL 34.5   RDW      10.0 - 15.0 % 12.8   Platelet Count      150 - 450 10e9/L 135 (L)   % Neutrophils      % 46.3   % Lymphocytes      % 40.8   % Monocytes      % 11.6   % Eosinophils      % 0.8   % Basophils      % 0.5   Absolute Neutrophil      1.6 - 8.3 10e9/L 4.5   Absolute Lymphocytes      0.8 - 5.3 10e9/L 4.0   Absolute Monocytes      0.0 - 1.3 10e9/L 1.1 "   Absolute Eosinophils      0.0 - 0.7 10e9/L 0.1   Absolute Basophils      0.0 - 0.2 10e9/L 0.1   Diff Method       Automated Method   Sodium      133 - 144 mmol/L 135   Potassium      3.4 - 5.3 mmol/L 3.5   Chloride      94 - 109 mmol/L 98   Carbon Dioxide      20 - 32 mmol/L 30   Anion Gap      3 - 14 mmol/L 7   Glucose      70 - 99 mg/dL 117 (H)   Urea Nitrogen      7 - 30 mg/dL 19   Creatinine      0.66 - 1.25 mg/dL 1.21   GFR Estimate      >60 mL/min/1.73:m2 62   GFR Estimate If Black      >60 mL/min/1.73:m2 71   Calcium      8.5 - 10.1 mg/dL 9.1   Bilirubin Total      0.2 - 1.3 mg/dL 1.5 (H)   Albumin      3.4 - 5.0 g/dL 3.4   Protein Total      6.8 - 8.8 g/dL 7.1   Alkaline Phosphatase      40 - 150 U/L 66   ALT      0 - 70 U/L 41   AST      0 - 45 U/L 31   N-Terminal Pro Bnp      0 - 125 pg/mL 449 (H)         Stress test:  4/19/21  Conclusion          The nuclear stress test is negative for inducible myocardial ischemia or infarction.     The left ventricular ejection fraction at rest is greater than 70%.     There is no prior study for comparison.              Outside records reviewed from:  Care Everywhere    ASSESSMENT and PLAN  Jonathon Santos 66 year old male scheduled for Transrectal ultrasound guided, placement of fiducials and SpaceOar on 4/30/21 by Dr. Contreras in treatment of prostate cancer.  PAC referral for risk assessment and optimization for anesthesia with comorbid conditions of: hypertension, gout and inflammatory arthritis.      Pre-operative considerations:  1.  Cardiac:  Functional status- METS >4.  He reports he hasn't been exercising lately due to fatigue from his Zytiga, but up until 2 months ago he was hiking regularly for exercise.  He notes that he has been having left-sided chest pain about once weekly (typically when at rest) that lasts for 1 hour and reports that he forgot to mention this to his primary care provider with his physical.  I doesn't occur with any other coinciding  symptoms.  He isn't aware of anything that makes the pain stop as it just goes away on his own.  No chest pain currently. Denies heartburn/GERD. Stress test and BNP ordered for eval.  Hypertension is managed with lisinopril/hctz; hold DOS.  Intermediate risk surgery with 0.4% risk of major adverse cardiac event.   2.  Pulm:  Airway feasible.  HECTOR risk: intermediate.  3.  GI:  Risk of PONV score = 2.  If > 2, anti-emetic intervention recommended.  4. Musculoskeletal:  He has inflammatory arthritis and gout.  He was managing both with allopurinol, but recently stopped the allopurinol due to noting new side effects once he started Zytiga.  He reports his rheumatologist is aware.    5. Endo:  On 5mg prednisone daily to take in conjunction with his Zytiga.  Stress dose steroids as per anesthesia DOS.  6. ENT:  He is hard of hearing.      VTE risk: 3%    Patient is not yet optimized or an acceptable candidate for the proposed procedure.  Labs and stress test pending.        **Addendum (4/22/21): BNP slightly elevated at 449, buy patient completed his stress test and it was negative for ischemia and showed an EF of >70%.  Okay to proceed with surgery as planned.**      Prep time: 10 minutes  Visit time: 15 minutes  Documentation time: 15 minutes  -------------------------------------------  Total time spent DOS = 40 minutes        Layla Skelton DNP, RN, APRN  Preoperative Assessment Center  [facility]  Phone: [phone]  Fax: [phone]

## 2021-04-09 NOTE — ANESTHESIA PREPROCEDURE EVALUATION
Anesthesia Pre-Procedure Evaluation    Patient: Jonathon Santos   MRN: [id number] : 1955        Preoperative Diagnosis: * No surgery found *   Procedure :      Past Medical History:   Diagnosis Date     Benign essential hypertension      Gout      Prostate cancer (H)       No past surgical history on file.   No Known Allergies   Social History     Tobacco Use     Smoking status: Never Smoker     Smokeless tobacco: Former User   Substance Use Topics     Alcohol use: Yes     Comment: 1-2 beers daily      Wt Readings from Last 1 Encounters:   21 96.2 kg (212 lb)        Anesthesia Evaluation   Pt has had prior anesthetic. Type: General, MAC and Regional.    No history of anesthetic complications       ROS/MED HX  ENT/Pulmonary:     (+) HECTOR risk factors, snores loudly,  (-) recent URI   Neurologic:  - neg neurologic ROS     Cardiovascular:     (+) hypertension-----No previous cardiac testing     METS/Exercise Tolerance: 1 - Eating, dressing    Hematologic:  - neg hematologic  ROS  (-) history of blood clots and history of blood transfusion   Musculoskeletal: Comment: Inflammatory arthritis, gout (no flares for 3 years)  (+) arthritis,     GI/Hepatic:  - neg GI/hepatic ROS     Renal/Genitourinary: Comment: Prostate cancer      Endo:     (+) Chronic steroid usage for Other. Date most recently used: daily.     Psychiatric/Substance Use:  - neg psychiatric ROS  (-) psychiatric history   Infectious Disease:  - neg infectious disease ROS  (-) Recent Fever   Malignancy:   (+) Malignancy, History of Prostate.Prostate CA Active status post.        Other:  - neg other ROS          Physical Exam    Airway  airway exam normal           Respiratory Devices and Support         Dental       (+) chipped      Cardiovascular   cardiovascular exam normal          Pulmonary   pulmonary exam normal            Other findings: Chipped #9    OUTSIDE LABS:  CBC:   Lab Results   Component Value Date    WBC 12.9 (H) 2021    WBC  8.1 03/11/2021    HGB 17.3 03/25/2021    HGB 15.4 03/11/2021    HCT 48.6 03/25/2021    HCT 44.3 03/11/2021     (L) 03/25/2021     (L) 03/11/2021     BMP:   Lab Results   Component Value Date     (L) 03/25/2021     03/11/2021    POTASSIUM 3.2 (L) 03/25/2021    POTASSIUM 3.6 03/11/2021    CHLORIDE 95 03/25/2021    CHLORIDE 101 03/11/2021    CO2 27 03/25/2021    CO2 32 03/11/2021    BUN 20 03/25/2021    BUN 25 03/11/2021    CR 1.33 (H) 03/25/2021    CR 1.14 03/11/2021     (H) 03/25/2021    GLC 94 03/11/2021     COAGS: No results found for: PTT, INR, FIBR  POC: No results found for: BGM, HCG, HCGS  HEPATIC:   Lab Results   Component Value Date    ALBUMIN 3.6 03/25/2021    PROTTOTAL 7.5 03/25/2021    ALT 49 03/25/2021    AST 49 (H) 03/25/2021    ALKPHOS 75 03/25/2021    BILITOTAL 1.5 (H) 03/25/2021     OTHER:   Lab Results   Component Value Date    MISTY 9.1 03/25/2021             PAC Discussion and Assessment    ASA Classification: 3  Case is suitable for: [facility]  Anesthetic techniques and relevant risks discussed: GA                  PAC Resident/NP Anesthesia Assessment: Jonathon Santos 66 year old male scheduled for Transrectal ultrasound guided, placement of fiducials and SpaceOar on 4/30/21 by Dr. Contreras in treatment of prostate cancer.  PAC referral for risk assessment and optimization for anesthesia with comorbid conditions of: hypertension, gout and inflammatory arthritis.      Pre-operative considerations:  1.  Cardiac:  Functional status- METS >4.  He reports he hasn't been exercising lately due to fatigue from his Zytiga, but up until 2 months ago he was hiking regularly for exercise.  He notes that he has been having left-sided chest pain about once weekly (typically when at rest) that lasts for 1 hour and reports that he forgot to mention this to his primary care provider with his physical.  I doesn't occur with any other coinciding symptoms.  He isn't aware of anything  that makes the pain stop as it just goes away on his own.  No chest pain currently. Denies heartburn/GERD. Stress test and BNP ordered for eval.  Hypertension is managed with lisinopril/hctz; hold DOS.  Intermediate risk surgery with 0.4% risk of major adverse cardiac event.   2.  Pulm:  Airway feasible.  HCETOR risk: intermediate.  3.  GI:  Risk of PONV score = 2.  If > 2, anti-emetic intervention recommended.  4. Musculoskeletal:  He has inflammatory arthritis and gout.  He was managing both with allopurinol, but recently stopped the allopurinol due to noting new side effects once he started Zytiga.  He reports his rheumatologist is aware.    5. Endo:  On 5mg prednisone daily to take in conjunction with his Zytiga.  Stress dose steroids as per anesthesia DOS.  6. ENT:  He is hard of hearing.      VTE risk: 3%    Patient is not yet optimized or an acceptable candidate for the proposed procedure.  Labs and stress test pending.      **For further details of assessment, testing, and physical exam please see H and P completed on same date.      **Addendum (4/22/21): BNP slightly elevated at 449, buy patient completed his stress test and it was negative for ischemia and showed an EF of >70%.  Okay to proceed with surgery as planned.**            Layla Skelton DNP, RN, APRN      Reviewed and Signed by PAC Mid-Level Provider/Resident  Mid-Level Provider/Resident: Layla Skelton DNP, RN, APRN  Date: 4/9/21  Time: 1200                               RADHA Cortez CNP

## 2021-04-10 NOTE — RESULT ENCOUNTER NOTE
Shena Holt,    Your test results are attached.  Your urine test is good and shows no evidence of infections.          Layla Skelton DNP, RN, ANP-C

## 2021-04-13 ENCOUNTER — HOSPITAL ENCOUNTER (OUTPATIENT)
Dept: CT IMAGING | Facility: CLINIC | Age: 66
Discharge: HOME | End: 2021-04-13
Attending: RADIOLOGY | Admitting: RADIOLOGY
Payer: COMMERCIAL

## 2021-04-13 DIAGNOSIS — M47.819 SPONDYLOARTHROPATHY: ICD-10-CM

## 2021-04-13 DIAGNOSIS — M10.9 GOUT: ICD-10-CM

## 2021-04-13 DIAGNOSIS — C61 PROSTATE CANCER (H): ICD-10-CM

## 2021-04-13 DIAGNOSIS — Z79.899 ENCOUNTER FOR LONG-TERM (CURRENT) USE OF MEDICATIONS: ICD-10-CM

## 2021-04-13 DIAGNOSIS — C77.9 REGIONAL LYMPH NODE METASTASIS PRESENT (H): ICD-10-CM

## 2021-04-13 DIAGNOSIS — Z01.818 PREOP EXAMINATION: ICD-10-CM

## 2021-04-13 LAB
ALBUMIN SERPL-MCNC: 3.4 G/DL (ref 3.4–5)
ALP SERPL-CCNC: 66 U/L (ref 40–150)
ALT SERPL W P-5'-P-CCNC: 41 U/L (ref 0–70)
ANION GAP SERPL CALCULATED.3IONS-SCNC: 7 MMOL/L (ref 3–14)
AST SERPL W P-5'-P-CCNC: 31 U/L (ref 0–45)
BASOPHILS # BLD AUTO: 0.1 10E9/L (ref 0–0.2)
BASOPHILS NFR BLD AUTO: 0.5 %
BILIRUB SERPL-MCNC: 1.5 MG/DL (ref 0.2–1.3)
BUN SERPL-MCNC: 19 MG/DL (ref 7–30)
CALCIUM SERPL-MCNC: 9.1 MG/DL (ref 8.5–10.1)
CHLORIDE SERPL-SCNC: 98 MMOL/L (ref 94–109)
CO2 SERPL-SCNC: 30 MMOL/L (ref 20–32)
CREAT SERPL-MCNC: 1.21 MG/DL (ref 0.66–1.25)
DIFFERENTIAL METHOD BLD: ABNORMAL
EOSINOPHIL # BLD AUTO: 0.1 10E9/L (ref 0–0.7)
EOSINOPHIL NFR BLD AUTO: 0.8 %
ERYTHROCYTE [DISTWIDTH] IN BLOOD BY AUTOMATED COUNT: 12.8 % (ref 10–15)
GFR SERPL CREATININE-BSD FRML MDRD: 62 ML/MIN/{1.73_M2}
GLUCOSE SERPL-MCNC: 117 MG/DL (ref 70–99)
HCT VFR BLD AUTO: 43.8 % (ref 40–53)
HGB BLD-MCNC: 15.1 G/DL (ref 13.3–17.7)
LYMPHOCYTES # BLD AUTO: 4 10E9/L (ref 0.8–5.3)
LYMPHOCYTES NFR BLD AUTO: 40.8 %
MCH RBC QN AUTO: 33 PG (ref 26.5–33)
MCHC RBC AUTO-ENTMCNC: 34.5 G/DL (ref 31.5–36.5)
MCV RBC AUTO: 96 FL (ref 78–100)
MONOCYTES # BLD AUTO: 1.1 10E9/L (ref 0–1.3)
MONOCYTES NFR BLD AUTO: 11.6 %
NEUTROPHILS # BLD AUTO: 4.5 10E9/L (ref 1.6–8.3)
NEUTROPHILS NFR BLD AUTO: 46.3 %
NT-PROBNP SERPL-MCNC: 449 PG/ML (ref 0–125)
PLATELET # BLD AUTO: 135 10E9/L (ref 150–450)
POTASSIUM SERPL-SCNC: 3.5 MMOL/L (ref 3.4–5.3)
PROT SERPL-MCNC: 7.1 G/DL (ref 6.8–8.8)
PSA SERPL-MCNC: 0.04 UG/L (ref 0–4)
RBC # BLD AUTO: 4.58 10E12/L (ref 4.4–5.9)
SODIUM SERPL-SCNC: 135 MMOL/L (ref 133–144)
WBC # BLD AUTO: 9.8 10E9/L (ref 4–11)

## 2021-04-13 PROCEDURE — 80053 COMPREHEN METABOLIC PANEL: CPT | Performed by: INTERNAL MEDICINE

## 2021-04-13 PROCEDURE — 250N000009 HC RX 250: Performed by: RADIOLOGY

## 2021-04-13 PROCEDURE — 84550 ASSAY OF BLOOD/URIC ACID: CPT | Performed by: INTERNAL MEDICINE

## 2021-04-13 PROCEDURE — 250N000011 HC RX IP 250 OP 636: Performed by: RADIOLOGY

## 2021-04-13 PROCEDURE — 83880 ASSAY OF NATRIURETIC PEPTIDE: CPT | Performed by: NURSE PRACTITIONER

## 2021-04-13 PROCEDURE — 85025 COMPLETE CBC W/AUTO DIFF WBC: CPT | Performed by: INTERNAL MEDICINE

## 2021-04-13 PROCEDURE — 84153 ASSAY OF PSA TOTAL: CPT | Performed by: INTERNAL MEDICINE

## 2021-04-13 PROCEDURE — 74177 CT ABD & PELVIS W/CONTRAST: CPT

## 2021-04-13 PROCEDURE — 36415 COLL VENOUS BLD VENIPUNCTURE: CPT | Performed by: INTERNAL MEDICINE

## 2021-04-13 RX ORDER — IOPAMIDOL 755 MG/ML
100 INJECTION, SOLUTION INTRAVASCULAR ONCE
Status: COMPLETED | OUTPATIENT
Start: 2021-04-13 | End: 2021-04-13

## 2021-04-13 RX ADMIN — IOPAMIDOL 100 ML: 755 INJECTION, SOLUTION INTRAVENOUS at 12:45

## 2021-04-13 RX ADMIN — SODIUM CHLORIDE 66 ML: 9 INJECTION, SOLUTION INTRAVENOUS at 12:46

## 2021-04-13 NOTE — PROGRESS NOTES
"Jonathon is a 66 year old who is being evaluated via a billable video visit.      How would you like to obtain your AVS? MyChart  If the video visit is dropped, the invitation should be resent by: Text to cell phone: [phone]  Will anyone else be joining your video visit? No     Vitals - Patient Reported  Weight (Patient Reported): 95.3 kg (210 lb)  Height (Patient Reported): 185.4 cm (6' 1\")  BMI (Based on Pt Reported Ht/Wt): 27.71  Pain Score: No Pain (0)    Darling Bee CMA April 14, 2021  12:14 PM           Video-Visit Details    Type of service:  Video Visit    Video Start Time: 12:22 PM    Video End Time:12:46 PM    Originating Location (pt. Location): Home    Distant Location (provider location):  [facility]     Platform used for Video Visit: [facility] Medical Oncology New Patient Consultation Note       Date of visit: April 14, 2021      HPI:  65 yo male with elevated PSA who had a prostate biopsy on 12/21/20.   9/17/19 PSA =  6.57    Pathology from 12/21/20 shows Gl 4+5=9 disease.  The patient's MRI from 11/17/20 shows concern for enlarged lymph nodes raising concern for mets. Plans to meet with Dr. Francois tomorrow to discuss starting radiation. Has placement of fiducials and SpaceOar on 4/30 with Dr. Caban.     INTERVAL HX:  Overall doing well. He was on allopurinol was having sinus drainage and upset stomach but that has now resolved since being off allopurinol. He follows with Dr. Wang. Hot flashes come and go, multiple per day but they mild and very tolerable. Has noticed some fatigue but not sure if that also was from the allopurinol. Takes the dog for a walk, tires with exertion. 4/30 has urology procedure. Getting a stress test prior. No new pains.     ROS  10 point ROS otherwise unremarkable.    PMH:  HTN  Gout    MEDS  Lisinopril - HCTZ    PHYSICAL EXAM-video  General: Patient appears well in no acute distress.   Skin: No visualized rash or " lesions on visualized skin  Eyes: EOMI, no erythema, sclera icterus or discharge noted  Resp: Appears to be breathing comfortably without accessory muscle usage, speaking in full sentences, no cough  MSK: Appears to have normal range of motion based on visualized movements  Neurologic: No apparent tremors, facial movements symmetric  Psych: affect good, alert and oriented    The rest of a comprehensive physical examination is deferred due to PHE (public health emergency) video restrictions    LABS AND IMAGES   4/13/2021 12:00   Sodium 135   Potassium 3.5   Chloride 98   Carbon Dioxide 30   Urea Nitrogen 19   Creatinine 1.21   GFR Estimate 62   GFR Estimate If Black 71   Calcium 9.1   Anion Gap 7   Albumin 3.4   Protein Total 7.1   Bilirubin Total 1.5 (H)   Alkaline Phosphatase 66   ALT 41   AST 31   PSA 0.04   Uric Acid 7.9 (H)   Glucose 117 (H)   WBC 9.8   Hemoglobin 15.1   Hematocrit 43.8   Platelet Count 135 (L)   RBC Count 4.58   MCV 96   MCH 33.0   MCHC 34.5   RDW 12.8   Diff Method Automated Method   % Neutrophils 46.3   % Lymphocytes 40.8   % Monocytes 11.6   % Eosinophils 0.8   % Basophils 0.5   Absolute Neutrophil 4.5   Absolute Lymphocytes 4.0   Absolute Monocytes 1.1   Absolute Eosinophils 0.1   Absolute Basophils 0.1     1/12/21  CT   IMPRESSION:   1.  Single borderline enlarged left retroperitoneal lymph node at the  aortic bifurcation. Several additional small retroperitoneal lymph  nodes are present. Follow-up is recommended.  2.  Diffuse hepatic steatosis.    Bone scan                                                         IMPRESSION:  1. Scattered nonspecific spinal increased activity which may be  degenerative.  2.  No other convincing evidence of osseus metastasis.    IMPRESSION AND PLAN  Locally advanced prostate cancer with high grade [place] 9. No significant comorbidities.     Plan:   1. Two years of ADT plus Abiraterone. Tolerating with well with mild hot flashes and some fatigue, both  tolerable and not impacting QOL. BP is wel controlled and labs okay. No edema.     2. RT to the nodes and prostate, based on the [place] study. Meeting with Dr. Francois tomorrow and plans to start in coming weeks.     3. Lupron in [place], due end of month. Labs monthly in [place] as well     4. Mgmt of his gout per his rheumatologist Dr. Felipe XIE in 3 mo with Dr. Blayne Floyd, CNP on 4/14/2021 at 12:50 PM

## 2021-04-14 ENCOUNTER — VIRTUAL VISIT (OUTPATIENT)
Dept: ONCOLOGY | Facility: CLINIC | Age: 66
End: 2021-04-14
Attending: NURSE PRACTITIONER
Payer: COMMERCIAL

## 2021-04-14 ENCOUNTER — PATIENT OUTREACH (OUTPATIENT)
Dept: ONCOLOGY | Facility: CLINIC | Age: 66
End: 2021-04-14

## 2021-04-14 DIAGNOSIS — M10.9 GOUT: ICD-10-CM

## 2021-04-14 DIAGNOSIS — C61 PROSTATE CANCER (H): Primary | ICD-10-CM

## 2021-04-14 DIAGNOSIS — C77.9 REGIONAL LYMPH NODE METASTASIS PRESENT (H): ICD-10-CM

## 2021-04-14 DIAGNOSIS — M47.819 SPONDYLOARTHROPATHY: Primary | ICD-10-CM

## 2021-04-14 LAB — URATE SERPL-MCNC: 7.9 MG/DL (ref 3.5–7.2)

## 2021-04-14 PROCEDURE — 99213 OFFICE O/P EST LOW 20 MIN: CPT | Mod: 95 | Performed by: NURSE PRACTITIONER

## 2021-04-14 PROCEDURE — 999N001193 HC VIDEO/TELEPHONE VISIT; NO CHARGE

## 2021-04-14 NOTE — LETTER
"    4/14/2021         RE: Jonathon Santos  [address] [address] [place] [id number]        Dear Colleague,    Thank you for referring your patient, Jonathon Santos, to the [facility]. Please see a copy of my visit note below.    Jonathon is a 66 year old who is being evaluated via a billable video visit.      How would you like to obtain your AVS? MyChart  If the video visit is dropped, the invitation should be resent by: Text to cell phone: [phone]  Will anyone else be joining your video visit? No     Vitals - Patient Reported  Weight (Patient Reported): 95.3 kg (210 lb)  Height (Patient Reported): 185.4 cm (6' 1\")  BMI (Based on Pt Reported Ht/Wt): 27.71  Pain Score: No Pain (0)    Darling Bee CMA April 14, 2021  12:14 PM           Video-Visit Details    Type of service:  Video Visit    Video Start Time: 12:22 PM    Video End Time:12:46 PM    Originating Location (pt. Location): Home    Distant Location (provider location):  [facility]     Platform used for Video Visit: [facility] Medical Oncology New Patient Consultation Note       Date of visit: April 14, 2021      HPI:  67 yo male with elevated PSA who had a prostate biopsy on 12/21/20.   9/17/19 PSA =  6.57    Pathology from 12/21/20 shows Gl 4+5=9 disease.  The patient's MRI from 11/17/20 shows concern for enlarged lymph nodes raising concern for mets. Plans to meet with Dr. Francois tomorrow to discuss starting radiation. Has placement of fiducials and SpaceOar on 4/30 with Dr. Caban.     INTERVAL HX:  Overall doing well. He was on allopurinol was having sinus drainage and upset stomach but that has now resolved since being off allopurinol. He follows with Dr. Wang. Hot flashes come and go, multiple per day but they mild and very tolerable. Has noticed some fatigue but not sure if that also was from the allopurinol. Takes the dog for a walk, tires with exertion. 4/30 has urology " procedure. Getting a stress test prior. No new pains.     ROS  10 point ROS otherwise unremarkable.    PMH:  HTN  Gout    MEDS  Lisinopril - HCTZ    PHYSICAL EXAM-video  General: Patient appears well in no acute distress.   Skin: No visualized rash or lesions on visualized skin  Eyes: EOMI, no erythema, sclera icterus or discharge noted  Resp: Appears to be breathing comfortably without accessory muscle usage, speaking in full sentences, no cough  MSK: Appears to have normal range of motion based on visualized movements  Neurologic: No apparent tremors, facial movements symmetric  Psych: affect good, alert and oriented    The rest of a comprehensive physical examination is deferred due to PHE (public health emergency) video restrictions    LABS AND IMAGES   4/13/2021 12:00   Sodium 135   Potassium 3.5   Chloride 98   Carbon Dioxide 30   Urea Nitrogen 19   Creatinine 1.21   GFR Estimate 62   GFR Estimate If Black 71   Calcium 9.1   Anion Gap 7   Albumin 3.4   Protein Total 7.1   Bilirubin Total 1.5 (H)   Alkaline Phosphatase 66   ALT 41   AST 31   PSA 0.04   Uric Acid 7.9 (H)   Glucose 117 (H)   WBC 9.8   Hemoglobin 15.1   Hematocrit 43.8   Platelet Count 135 (L)   RBC Count 4.58   MCV 96   MCH 33.0   MCHC 34.5   RDW 12.8   Diff Method Automated Method   % Neutrophils 46.3   % Lymphocytes 40.8   % Monocytes 11.6   % Eosinophils 0.8   % Basophils 0.5   Absolute Neutrophil 4.5   Absolute Lymphocytes 4.0   Absolute Monocytes 1.1   Absolute Eosinophils 0.1   Absolute Basophils 0.1     1/12/21  CT   IMPRESSION:   1.  Single borderline enlarged left retroperitoneal lymph node at the  aortic bifurcation. Several additional small retroperitoneal lymph  nodes are present. Follow-up is recommended.  2.  Diffuse hepatic steatosis.    Bone scan                                                         IMPRESSION:  1. Scattered nonspecific spinal increased activity which may be  degenerative.  2.  No other convincing evidence of  osseus metastasis.    IMPRESSION AND PLAN  Locally advanced prostate cancer with high grade Keith 9. No significant comorbidities.     Plan:   1. Two years of ADT plus Abiraterone. Tolerating with well with mild hot flashes and some fatigue, both tolerable and not impacting QOL. BP is wel controlled and labs okay. No edema.     2. RT to the nodes and prostate, based on the [place]amp[place] study. Meeting with Dr. Francois tomorrow and plans to start in coming weeks.     3. Lupron in [place], due end of month. Labs monthly in [place] as well     4. Mgmt of his gout per his rheumatologist Dr. Wang    RT in 3 mo with Dr. Blayne Floyd CNP on 4/14/2021 at 12:50 PM                Again, thank you for allowing me to participate in the care of your patient.        Sincerely,        Esperanza Floyd, CNP

## 2021-04-15 ENCOUNTER — OFFICE VISIT (OUTPATIENT)
Dept: RADIATION THERAPY | Facility: OUTPATIENT CENTER | Age: 66
End: 2021-04-15
Payer: COMMERCIAL

## 2021-04-15 VITALS — WEIGHT: 210.4 LBS | BODY MASS INDEX: 27.76 KG/M2

## 2021-04-15 DIAGNOSIS — C61 PROSTATE CANCER (H): Primary | ICD-10-CM

## 2021-04-15 NOTE — PROGRESS NOTES
Radiation Oncology Note    HPI: Mr. Santos is a 66 year old male with newly diagnosed prostate cancer, pre-treatment PSA 8.18, highest GS was 4+5=9. Staging evaluation noted suspicious pelvic and Jazzy nodes concerning for regional vs M1 disease spread. In light of this, he was started on 2 years of ADT plus Abiraterone.     In the interval, the patient underwent  Re-staging CTAP to evaluate the suspicious nodes on prior CTAP. Imaging demonstrated reduction in size of the ? para-aortic nodes, demonstrating possible involvement with cancer.     PSA on 4/13/21 was 0.04, down from pre ADT level of 8.18.     Plan:  1. We recommend proceeding with RT + LT-ADT at this time. I discussed that the para-aortic LN remain suspicious for harboring disease, and as such will raise our pelvic field border to encompass these suspicious nodes.     2. Given large field size with plan to boost nodes (depending upon near by organs at risk), will proceed with plan for conventional fractionation. Will tentatively plan to treat to 79.2 Gy in 44 fractions.     3. The patient will undergo Space OAR + fiducial placement by Dr. Caban on 4/30/21. Will have the patient RTC soon thereafter for CT simulation.  Consent obtained.     Davion Francois M.D.  Department of Radiation Oncology  [facility]

## 2021-04-15 NOTE — LETTER
4/15/2021         RE: Jonathon Santos  [address] [address] [place] [id number]        Dear Colleague,    Thank you for referring your patient, Jonathon Santos, to the [facility]. Please see a copy of my visit note below.    Radiation Oncology Note    HPI: Mr. Santos is a 66 year old male with newly diagnosed prostate cancer, pre-treatment PSA 8.18, highest GS was 4+5=9. Staging evaluation noted suspicious pelvic and Jazzy nodes concerning for regional vs M1 disease spread. In light of this, he was started on 2 years of ADT plus Abiraterone.     In the interval, the patient underwent  Re-staging CTAP to evaluate the suspicious nodes on prior CTAP. Imaging demonstrated reduction in size of the ? para-aortic nodes, demonstrating possible involvement with cancer.     PSA on 4/13/21 was 0.04, down from pre ADT level of 8.18.     Plan:  1. We recommend proceeding with RT + LT-ADT at this time. I discussed that the para-aortic LN remain suspicious for harboring disease, and as such will raise our pelvic field border to encompass these suspicious nodes.     2. Given large field size with plan to boost nodes (depending upon near by organs at risk), will proceed with plan for conventional fractionation. Will tentatively plan to treat to 79.2 Gy in 44 fractions.     3. The patient will undergo Space OAR + fiducial placement by Dr. Caban on 4/30/21. Will have the patient RTC soon thereafter for CT simulation.  Consent obtained.     Davion Francois M.D.  Department of Radiation Oncology  [facility]

## 2021-04-15 NOTE — NURSING NOTE
Follow up to review CT scan  Discuss next steps for prostate cancer treatment  Pt initiated Lupron 1/28/21, next scheduled 4/29/21. Dr. Cisneros ordering and following pt - he also has started zytiga and prednisone.  Disucsion today - radiation treatment dose/side effects/sign consent.    Plan for spaceOAR/fiducial with DR. Caban 4/3/21  Radiation simulation + MRI for treatment planning 5/6/21

## 2021-04-16 NOTE — RESULT ENCOUNTER NOTE
Shena Holt,    Your test results are attached.  Your BNP (heart blood test) is slightly elevated.  I will compare this with your echocardiogram results once that is complete next week and give you a call then to let you know my thoughts/plans.         Layla Skelton DNP, RN, ANP-C

## 2021-04-19 ENCOUNTER — HOSPITAL ENCOUNTER (OUTPATIENT)
Dept: NUCLEAR MEDICINE | Facility: CLINIC | Age: 66
Setting detail: NUCLEAR MEDICINE
End: 2021-04-19
Attending: NURSE PRACTITIONER
Payer: COMMERCIAL

## 2021-04-19 ENCOUNTER — HOSPITAL ENCOUNTER (OUTPATIENT)
Dept: CARDIOLOGY | Facility: CLINIC | Age: 66
End: 2021-04-19
Attending: NURSE PRACTITIONER
Payer: COMMERCIAL

## 2021-04-19 VITALS — HEIGHT: 73 IN | BODY MASS INDEX: 28.1 KG/M2 | WEIGHT: 212 LBS

## 2021-04-19 DIAGNOSIS — Z01.818 PREOP EXAMINATION: ICD-10-CM

## 2021-04-19 DIAGNOSIS — R07.9 CHEST PAIN, UNSPECIFIED TYPE: ICD-10-CM

## 2021-04-19 DIAGNOSIS — C61 PROSTATE CANCER (H): Primary | ICD-10-CM

## 2021-04-19 DIAGNOSIS — C61 PROSTATE CANCER (H): ICD-10-CM

## 2021-04-19 DIAGNOSIS — I10 ESSENTIAL HYPERTENSION: ICD-10-CM

## 2021-04-19 DIAGNOSIS — Z11.59 ENCOUNTER FOR SCREENING FOR OTHER VIRAL DISEASES: ICD-10-CM

## 2021-04-19 LAB
CV STRESS MAX HR HE: 99
RATE PRESSURE PRODUCT: NORMAL
STRESS ECHO BASELINE DIASTOLIC HE: 80
STRESS ECHO BASELINE HR: 85
STRESS ECHO BASELINE SYSTOLIC BP: 140
STRESS ECHO CALCULATED PERCENT HR: 65 %
STRESS ECHO LAST STRESS DIASTOLIC BP: 82
STRESS ECHO LAST STRESS SYSTOLIC BP: 142
STRESS ECHO TARGET HR: 153
STRESS/REST PERFUSION RATIO: 1.09

## 2021-04-19 PROCEDURE — 78452 HT MUSCLE IMAGE SPECT MULT: CPT | Mod: 26 | Performed by: INTERNAL MEDICINE

## 2021-04-19 PROCEDURE — A9502 TC99M TETROFOSMIN: HCPCS | Performed by: NURSE PRACTITIONER

## 2021-04-19 PROCEDURE — 93016 CV STRESS TEST SUPVJ ONLY: CPT | Performed by: INTERNAL MEDICINE

## 2021-04-19 PROCEDURE — 250N000011 HC RX IP 250 OP 636: Performed by: NURSE PRACTITIONER

## 2021-04-19 PROCEDURE — 343N000001 HC RX 343: Performed by: NURSE PRACTITIONER

## 2021-04-19 PROCEDURE — 93018 CV STRESS TEST I&R ONLY: CPT | Performed by: INTERNAL MEDICINE

## 2021-04-19 PROCEDURE — 78452 HT MUSCLE IMAGE SPECT MULT: CPT

## 2021-04-19 PROCEDURE — 93017 CV STRESS TEST TRACING ONLY: CPT

## 2021-04-19 RX ORDER — REGADENOSON 0.08 MG/ML
0.4 INJECTION, SOLUTION INTRAVENOUS ONCE
Status: COMPLETED | OUTPATIENT
Start: 2021-04-19 | End: 2021-04-19

## 2021-04-19 RX ADMIN — TETROFOSMIN 10.3 MCI.: 1.38 INJECTION, POWDER, LYOPHILIZED, FOR SOLUTION INTRAVENOUS at 12:30

## 2021-04-19 RX ADMIN — TETROFOSMIN 31.3 MCI.: 1.38 INJECTION, POWDER, LYOPHILIZED, FOR SOLUTION INTRAVENOUS at 14:14

## 2021-04-19 RX ADMIN — REGADENOSON 0.4 MG: 0.08 INJECTION, SOLUTION INTRAVENOUS at 14:11

## 2021-04-19 ASSESSMENT — MIFFLIN-ST. JEOR: SCORE: 1790.51

## 2021-04-20 ENCOUNTER — TELEPHONE (OUTPATIENT)
Dept: ONCOLOGY | Facility: CLINIC | Age: 66
End: 2021-04-20

## 2021-04-20 ENCOUNTER — PATIENT OUTREACH (OUTPATIENT)
Dept: UROLOGY | Facility: CLINIC | Age: 66
End: 2021-04-20

## 2021-04-21 ENCOUNTER — RECORDS - HEALTHEAST (OUTPATIENT)
Dept: ADMINISTRATIVE | Facility: OTHER | Age: 66
End: 2021-04-21

## 2021-04-23 ENCOUNTER — AMBULATORY - HEALTHEAST (OUTPATIENT)
Dept: ADMINISTRATIVE | Facility: CLINIC | Age: 66
End: 2021-04-23

## 2021-04-23 DIAGNOSIS — M10.9 GOUT, UNSPECIFIED: ICD-10-CM

## 2021-04-26 ENCOUNTER — COMMUNICATION - HEALTHEAST (OUTPATIENT)
Dept: RHEUMATOLOGY | Facility: CLINIC | Age: 66
End: 2021-04-26

## 2021-04-26 ENCOUNTER — OFFICE VISIT - HEALTHEAST (OUTPATIENT)
Dept: RHEUMATOLOGY | Facility: CLINIC | Age: 66
End: 2021-04-26

## 2021-04-26 DIAGNOSIS — C61: ICD-10-CM

## 2021-04-26 DIAGNOSIS — M1A.09X1 IDIOPATHIC CHRONIC GOUT, MULTIPLE SITES, WITH TOPHUS (TOPHI): ICD-10-CM

## 2021-04-27 NOTE — RESULT ENCOUNTER NOTE
Shena Holt,    Your test results are attached. As we just discussed over the phone, your stress test showed no concerning findings and is good for your upcoming surgery.          Layla Skelton DNP, RN, ANP-C

## 2021-04-28 DIAGNOSIS — Z11.59 ENCOUNTER FOR SCREENING FOR OTHER VIRAL DISEASES: ICD-10-CM

## 2021-04-28 LAB
LABORATORY COMMENT REPORT: NORMAL
SARS-COV-2 RNA RESP QL NAA+PROBE: NEGATIVE
SARS-COV-2 RNA RESP QL NAA+PROBE: NORMAL
SPECIMEN SOURCE: NORMAL
SPECIMEN SOURCE: NORMAL

## 2021-04-28 PROCEDURE — U0005 INFEC AGEN DETEC AMPLI PROBE: HCPCS | Performed by: UROLOGY

## 2021-04-28 PROCEDURE — U0003 INFECTIOUS AGENT DETECTION BY NUCLEIC ACID (DNA OR RNA); SEVERE ACUTE RESPIRATORY SYNDROME CORONAVIRUS 2 (SARS-COV-2) (CORONAVIRUS DISEASE [COVID-19]), AMPLIFIED PROBE TECHNIQUE, MAKING USE OF HIGH THROUGHPUT TECHNOLOGIES AS DESCRIBED BY CMS-2020-01-R: HCPCS | Performed by: UROLOGY

## 2021-04-29 ENCOUNTER — ANESTHESIA EVENT (OUTPATIENT)
Dept: SURGERY | Facility: CLINIC | Age: 66
End: 2021-04-29
Payer: COMMERCIAL

## 2021-04-29 ENCOUNTER — INFUSION THERAPY VISIT (OUTPATIENT)
Dept: INFUSION THERAPY | Facility: CLINIC | Age: 66
End: 2021-04-29
Attending: INTERNAL MEDICINE
Payer: COMMERCIAL

## 2021-04-29 DIAGNOSIS — C61 PROSTATE CANCER (H): Primary | ICD-10-CM

## 2021-04-29 DIAGNOSIS — C77.9 REGIONAL LYMPH NODE METASTASIS PRESENT (H): ICD-10-CM

## 2021-04-29 PROCEDURE — 96402 CHEMO HORMON ANTINEOPL SQ/IM: CPT

## 2021-04-29 PROCEDURE — 250N000011 HC RX IP 250 OP 636: Performed by: NURSE PRACTITIONER

## 2021-04-29 RX ADMIN — LEUPROLIDE ACETATE 22.5 MG: KIT at 13:19

## 2021-04-29 NOTE — PROGRESS NOTES
Nursing Note:  Jonathon Santos presents today for Lupron.    Patient seen by provider today: No   present during visit today: Not Applicable.    Note: N/A.    Intravenous Access:  No Intravenous access/labs at this visit.    Discharge Plan:   Patient next appointment in 3 months.    Melisa Hogan RN

## 2021-04-30 ENCOUNTER — ANESTHESIA (OUTPATIENT)
Dept: SURGERY | Facility: CLINIC | Age: 66
End: 2021-04-30
Payer: COMMERCIAL

## 2021-04-30 ENCOUNTER — HOSPITAL ENCOUNTER (OUTPATIENT)
Facility: CLINIC | Age: 66
Discharge: HOME | End: 2021-04-30
Attending: UROLOGY | Admitting: UROLOGY
Payer: COMMERCIAL

## 2021-04-30 VITALS
HEIGHT: 73 IN | WEIGHT: 211.42 LBS | SYSTOLIC BLOOD PRESSURE: 150 MMHG | OXYGEN SATURATION: 97 % | DIASTOLIC BLOOD PRESSURE: 105 MMHG | TEMPERATURE: 97.9 F | BODY MASS INDEX: 28.02 KG/M2 | HEART RATE: 66 BPM | RESPIRATION RATE: 20 BRPM

## 2021-04-30 DIAGNOSIS — C61 PROSTATE CANCER (H): ICD-10-CM

## 2021-04-30 LAB
GLUCOSE BLDC GLUCOMTR-MCNC: 126 MG/DL (ref 70–99)
POTASSIUM SERPL-SCNC: 3.4 MMOL/L (ref 3.4–5.3)

## 2021-04-30 PROCEDURE — 36415 COLL VENOUS BLD VENIPUNCTURE: CPT | Performed by: ANESTHESIOLOGY

## 2021-04-30 PROCEDURE — 250N000011 HC RX IP 250 OP 636: Performed by: UROLOGY

## 2021-04-30 PROCEDURE — 710N000012 HC RECOVERY PHASE 2, PER MINUTE: Performed by: UROLOGY

## 2021-04-30 PROCEDURE — 84132 ASSAY OF SERUM POTASSIUM: CPT | Performed by: ANESTHESIOLOGY

## 2021-04-30 PROCEDURE — 250N000009 HC RX 250

## 2021-04-30 PROCEDURE — 999N000141 HC STATISTIC PRE-PROCEDURE NURSING ASSESSMENT: Performed by: UROLOGY

## 2021-04-30 PROCEDURE — 82962 GLUCOSE BLOOD TEST: CPT

## 2021-04-30 PROCEDURE — 258N000003 HC RX IP 258 OP 636: Performed by: ANESTHESIOLOGY

## 2021-04-30 PROCEDURE — 250N000011 HC RX IP 250 OP 636

## 2021-04-30 PROCEDURE — 278N000051 HC OR IMPLANT GENERAL: Performed by: UROLOGY

## 2021-04-30 PROCEDURE — 272N000051 HC BRUSH BRONCH CYTOLOGY: Performed by: UROLOGY

## 2021-04-30 PROCEDURE — 272N000001 HC OR GENERAL SUPPLY STERILE: Performed by: UROLOGY

## 2021-04-30 PROCEDURE — 370N000017 HC ANESTHESIA TECHNICAL FEE, PER MIN: Performed by: UROLOGY

## 2021-04-30 PROCEDURE — 360N000075 HC SURGERY LEVEL 2, PER MIN: Performed by: UROLOGY

## 2021-04-30 DEVICE — IMPLANTED MARKER IN NEEDLE
Type: IMPLANTABLE DEVICE | Site: PERIANAL | Status: FUNCTIONAL
Brand: POLYMARK™

## 2021-04-30 RX ORDER — FENTANYL CITRATE 50 UG/ML
INJECTION, SOLUTION INTRAMUSCULAR; INTRAVENOUS PRN
Status: DISCONTINUED | OUTPATIENT
Start: 2021-04-30 | End: 2021-04-30

## 2021-04-30 RX ORDER — CEFAZOLIN SODIUM 2 G/100ML
2 INJECTION, SOLUTION INTRAVENOUS SEE ADMIN INSTRUCTIONS
Status: DISCONTINUED | OUTPATIENT
Start: 2021-04-30 | End: 2021-04-30 | Stop reason: HOSPADM

## 2021-04-30 RX ORDER — ONDANSETRON 4 MG/1
4 TABLET, ORALLY DISINTEGRATING ORAL EVERY 30 MIN PRN
Status: DISCONTINUED | OUTPATIENT
Start: 2021-04-30 | End: 2021-04-30 | Stop reason: HOSPADM

## 2021-04-30 RX ORDER — FENTANYL CITRATE 50 UG/ML
25-50 INJECTION, SOLUTION INTRAMUSCULAR; INTRAVENOUS
Status: DISCONTINUED | OUTPATIENT
Start: 2021-04-30 | End: 2021-04-30 | Stop reason: HOSPADM

## 2021-04-30 RX ORDER — LIDOCAINE HYDROCHLORIDE 20 MG/ML
INJECTION, SOLUTION INFILTRATION; PERINEURAL PRN
Status: DISCONTINUED | OUTPATIENT
Start: 2021-04-30 | End: 2021-04-30

## 2021-04-30 RX ORDER — NALOXONE HYDROCHLORIDE 0.4 MG/ML
0.4 INJECTION, SOLUTION INTRAMUSCULAR; INTRAVENOUS; SUBCUTANEOUS
Status: DISCONTINUED | OUTPATIENT
Start: 2021-04-30 | End: 2021-04-30 | Stop reason: HOSPADM

## 2021-04-30 RX ORDER — ONDANSETRON 2 MG/ML
4 INJECTION INTRAMUSCULAR; INTRAVENOUS EVERY 30 MIN PRN
Status: DISCONTINUED | OUTPATIENT
Start: 2021-04-30 | End: 2021-04-30 | Stop reason: HOSPADM

## 2021-04-30 RX ORDER — CEFAZOLIN SODIUM 2 G/100ML
2 INJECTION, SOLUTION INTRAVENOUS
Status: COMPLETED | OUTPATIENT
Start: 2021-04-30 | End: 2021-04-30

## 2021-04-30 RX ORDER — PROPOFOL 10 MG/ML
INJECTION, EMULSION INTRAVENOUS CONTINUOUS PRN
Status: DISCONTINUED | OUTPATIENT
Start: 2021-04-30 | End: 2021-04-30

## 2021-04-30 RX ORDER — ABIRATERONE ACETATE 250 MG/1
1000 TABLET ORAL DAILY
COMMUNITY
End: 2021-05-21

## 2021-04-30 RX ORDER — MEPERIDINE HYDROCHLORIDE 25 MG/ML
12.5 INJECTION INTRAMUSCULAR; INTRAVENOUS; SUBCUTANEOUS
Status: DISCONTINUED | OUTPATIENT
Start: 2021-04-30 | End: 2021-04-30 | Stop reason: HOSPADM

## 2021-04-30 RX ORDER — PROPOFOL 10 MG/ML
INJECTION, EMULSION INTRAVENOUS PRN
Status: DISCONTINUED | OUTPATIENT
Start: 2021-04-30 | End: 2021-04-30

## 2021-04-30 RX ORDER — NALOXONE HYDROCHLORIDE 0.4 MG/ML
0.2 INJECTION, SOLUTION INTRAMUSCULAR; INTRAVENOUS; SUBCUTANEOUS
Status: DISCONTINUED | OUTPATIENT
Start: 2021-04-30 | End: 2021-04-30 | Stop reason: HOSPADM

## 2021-04-30 RX ORDER — SODIUM CHLORIDE, SODIUM LACTATE, POTASSIUM CHLORIDE, CALCIUM CHLORIDE 600; 310; 30; 20 MG/100ML; MG/100ML; MG/100ML; MG/100ML
INJECTION, SOLUTION INTRAVENOUS CONTINUOUS
Status: DISCONTINUED | OUTPATIENT
Start: 2021-04-30 | End: 2021-04-30 | Stop reason: HOSPADM

## 2021-04-30 RX ORDER — LABETALOL HYDROCHLORIDE 5 MG/ML
10 INJECTION, SOLUTION INTRAVENOUS
Status: DISCONTINUED | OUTPATIENT
Start: 2021-04-30 | End: 2021-04-30 | Stop reason: HOSPADM

## 2021-04-30 RX ADMIN — LIDOCAINE HYDROCHLORIDE 40 MG: 20 INJECTION, SOLUTION INFILTRATION; PERINEURAL at 07:55

## 2021-04-30 RX ADMIN — PROPOFOL 20 MG: 10 INJECTION, EMULSION INTRAVENOUS at 08:08

## 2021-04-30 RX ADMIN — PROPOFOL 120 MCG/KG/MIN: 10 INJECTION, EMULSION INTRAVENOUS at 07:55

## 2021-04-30 RX ADMIN — PROPOFOL 40 MG: 10 INJECTION, EMULSION INTRAVENOUS at 08:43

## 2021-04-30 RX ADMIN — PROPOFOL 10 MG: 10 INJECTION, EMULSION INTRAVENOUS at 08:04

## 2021-04-30 RX ADMIN — PROPOFOL 50 MG: 10 INJECTION, EMULSION INTRAVENOUS at 07:55

## 2021-04-30 RX ADMIN — PROPOFOL 20 MG: 10 INJECTION, EMULSION INTRAVENOUS at 08:00

## 2021-04-30 RX ADMIN — SODIUM CHLORIDE, POTASSIUM CHLORIDE, SODIUM LACTATE AND CALCIUM CHLORIDE: 600; 310; 30; 20 INJECTION, SOLUTION INTRAVENOUS at 07:51

## 2021-04-30 RX ADMIN — FENTANYL CITRATE 75 MCG: 50 INJECTION, SOLUTION INTRAMUSCULAR; INTRAVENOUS at 08:00

## 2021-04-30 RX ADMIN — FENTANYL CITRATE 25 MCG: 50 INJECTION, SOLUTION INTRAMUSCULAR; INTRAVENOUS at 08:05

## 2021-04-30 RX ADMIN — ONDANSETRON 4 MG: 2 INJECTION INTRAMUSCULAR; INTRAVENOUS at 09:02

## 2021-04-30 RX ADMIN — CEFAZOLIN 2 G: 10 INJECTION, POWDER, FOR SOLUTION INTRAVENOUS at 08:11

## 2021-04-30 RX ADMIN — PROPOFOL 20 MG: 10 INJECTION, EMULSION INTRAVENOUS at 07:58

## 2021-04-30 ASSESSMENT — MIFFLIN-ST. JEOR: SCORE: 1787.75

## 2021-04-30 NOTE — OP NOTE
Procedure Date: 2021    PREOPERATIVE DIAGNOSIS:  Prostate cancer.    POSTOPERATIVE DIAGNOSIS:  Prostate cancer.    SURGEON:  Ferdinand Caban MD    ASSISTANT:  Armida Hogan MD    PROCEDURE:  Transrectal ultrasound-guided placement of fiducials and SpaceOAR.    INDICATIONS FOR PROCEDURE:  Mr. Santos is a 66-year-old gentleman with history of prostate cancer.  He has chosen radiation therapy as his treatment of choice.  He presents today for placement of fiducials and SpaceOAR.    DESCRIPTION OF PROCEDURE:  After informed consent was obtained, the patient was brought to the operating room where he was administered MAC anesthesia.  After a suitable level of anesthesia was obtained, he was placed in lithotomy position with all pressure points padded.  He was administered preoperative antibiotics.  He was prepped and draped in standard fashion.  The transrectal ultrasound was then placed in the patient's rectum, allowing good visualization of the prostate.  Then, under transrectal ultrasound guidance, we placed two fiducial markers on the right side of the prostate, one on the left side of the prostate.  Then, under transrectal ultrasound guidance, we used a finder needle to infiltrate the space between the prostate and the rectum.  After confirmatory injection of saline 10 mL, the spacer was injected between the prostate and the rectum.  Ultrasound confirmed excellent placement of the spacer.  The ultrasound probe was removed.  Bacitracin and gauze dressing was placed over the needle puncture sites and dressed with a Tegaderm.  The patient was then awakened and brought to recovery room in stable condition.    ESTIMATED BLOOD LOSS:  1 mL.     COMPLICATIONS:  There were no immediate complications.    Ferdinand Caban MD        D: 2021   T: 2021   MT: SUGAR    Name:     JINA SANTOS  MRN:      [id number]        Account:        [id number]   :      1955           Procedure  Date: 04/30/2021     Document: [id number]

## 2021-04-30 NOTE — ANESTHESIA POSTPROCEDURE EVALUATION
Patient: Jonathon Santos    Procedure(s):  Transrectal ultrasound guided placement of fiducials and SpaceOar    Diagnosis:Prostate cancer (H) [C61]  Diagnosis Additional Information: No value filed.    Anesthesia Type:  General    Note:  Disposition: Outpatient   Postop Pain Control: Uneventful            Sign Out: Well controlled pain   PONV: No   Neuro/Psych: Uneventful            Sign Out: Acceptable/Baseline neuro status   Airway/Respiratory: Uneventful            Sign Out: Acceptable/Baseline resp. status   CV/Hemodynamics: Uneventful            Sign Out: Acceptable CV status; No obvious hypovolemia; No obvious fluid overload   Other NRE: NONE   DID A NON-ROUTINE EVENT OCCUR? No           Last vitals:  Vitals:    04/30/21 0935 04/30/21 0945 04/30/21 1015   BP: (!) 158/93 (!) 163/105 (!) 150/105   Pulse: 62 66    Resp: 20 20 20   Temp:   36.6  C (97.9  F)   SpO2: 96% 98% 97%       Last vitals prior to Anesthesia Care Transfer:  CRNA VITALS  4/30/2021 0829 - 4/30/2021 0929      4/30/2021             Pulse:  74    SpO2:  100 %          Electronically Signed By: Maria Guadalupe Sloan MD  April 30, 2021  11:53 AM

## 2021-04-30 NOTE — BRIEF OP NOTE
[facility]    Brief Operative Note    Pre-operative diagnosis: Prostate cancer (H) [C61]  Post-operative diagnosis Same as pre-operative diagnosis    Procedure: Procedure(s):  Transrectal ultrasound guided placement of fiducials and SpaceOar  Surgeon: Surgeon(s) and Role:     * Ferdinand Caban MD - Primary  Anesthesia: General   Estimated blood loss: 1 cc  Drains: None  Specimens: * No specimens in log *  Findings:   See operative report.  Complications: None.  Implants:   Implant Name Type Inv. Item Serial No.  Lot No. LRB No. Used Action   polymark marker kit     [id number] N/A 1 Implanted   polymark marker kit     [id number] N/A 1 Implanted   polymark marker kit     [id number] N/A 1 Implanted       Plan  Discharge when meeting PACU criteria  Follow up with radiation oncology for simulation

## 2021-04-30 NOTE — ANESTHESIA CARE TRANSFER NOTE
Patient: Jonathon Santos    Procedure(s):  Transrectal ultrasound guided placement of fiducials and SpaceOar    Diagnosis: Prostate cancer (H) [C61]  Diagnosis Additional Information: No value filed.    Anesthesia Type:   General     Note:    Oropharynx: oropharynx clear of all foreign objects  Level of Consciousness: awake  Oxygen Supplementation: room air    Independent Airway: airway patency satisfactory and stable  Dentition: dentition unchanged  Vital Signs Stable: post-procedure vital signs reviewed and stable  Report to RN Given: handoff report given  Patient transferred to: Phase II    Handoff Report: Identifed the Patient, Identified the Reponsible Provider, Reviewed the pertinent medical history, Discussed the surgical course, Reviewed Intra-OP anesthesia mangement and issues during anesthesia, Set expectations for post-procedure period and Allowed opportunity for questions and acknowledgement of understanding      Vitals: (Last set prior to Anesthesia Care Transfer)  CRNA VITALS  4/30/2021 0829 - 4/30/2021 0906      4/30/2021             Pulse:  74    SpO2:  100 %        Electronically Signed By: Blayne Fuller MD  April 30, 2021  9:06 AM

## 2021-04-30 NOTE — DISCHARGE INSTRUCTIONS
[facility]   Adult Discharge Orders & Instructions     For 24 hours after surgery    1. Get plenty of rest.  A responsible adult must stay with you for at least 24 hours after you leave the hospital.   2. Do not drive or use heavy equipment.  If you have weakness or tingling, don't drive or use heavy equipment until this feeling goes away.  3. Do not drink alcohol.  4. Avoid strenuous or risky activities.  Ask for help when climbing stairs.   5. You may feel lightheaded.  IF so, sit for a few minutes before standing.  Have someone help you get up.   6. If you have nausea (feel sick to your stomach): Drink only clear liquids such as apple juice, ginger ale, broth or 7-Up.  Rest may also help.  Be sure to drink enough fluids.  Move to a regular diet as you feel able.  7. You may have a slight fever. Call the doctor if your fever is over 100 F (37.7 C) (taken under the tongue) or lasts longer than 24 hours.  8. You may have a dry mouth, a sore throat, muscle aches or trouble sleeping.  These should go away after 24 hours.  9. Do not make important or legal decisions.   Call your doctor for any of the followin.  Signs of infection (fever, growing tenderness at the surgery site, a large amount of drainage or bleeding, severe pain, foul-smelling drainage, redness, swelling).    2. It has been over 8 to 10 hours since surgery and you are still not able to urinate (pass water).    3.  Headache for over 24 hours.      To contact a doctor, call ________________________________________

## 2021-04-30 NOTE — ANESTHESIA PREPROCEDURE EVALUATION
Anesthesia Pre-Procedure Evaluation    Patient: Jonathon Santos   MRN: [id number] : 1955        Preoperative Diagnosis: Prostate cancer (H) [C61]   Procedure : Procedure(s):  Transrectal ultrasound guided placement of fiducials and SpaceOar     Past Medical History:   Diagnosis Date     Benign essential hypertension      Gout      Prostate cancer (H)       Past Surgical History:   Procedure Laterality Date     COLONOSCOPY       FOOT SURGERY       PROSTATE BIOPSY, NEEDLE, SATURATION SAMPLING       SPINE SURGERY      unspecified low back surgery      No Known Allergies   Social History     Tobacco Use     Smoking status: Never Smoker     Smokeless tobacco: Former User   Substance Use Topics     Alcohol use: Yes     Alcohol/week: 7.0 - 14.0 standard drinks     Types: 7 - 14 Cans of beer per week     Comment: 1-2 beers daily      Wt Readings from Last 1 Encounters:   21 95.9 kg (211 lb 6.7 oz)        Anesthesia Evaluation            ROS/MED HX  ENT/Pulmonary:  - neg pulmonary ROS     Neurologic:  - neg neurologic ROS     Cardiovascular:     (+) Dyslipidemia hypertension-----    METS/Exercise Tolerance:     Hematologic:  - neg hematologic  ROS     Musculoskeletal:   (+) arthritis,     GI/Hepatic:  - neg GI/hepatic ROS     Renal/Genitourinary: Comment: Prostate cancer    (+) renal disease,     Endo:  - neg endo ROS     Psychiatric/Substance Use:  - neg psychiatric ROS     Infectious Disease:  - neg infectious disease ROS     Malignancy: Comment: Regional lymph node metastasis      Other:            Physical Exam    Airway        Mallampati: I   TM distance: > 3 FB   Neck ROM: full   Mouth opening: > 3 cm    Respiratory Devices and Support         Dental  no notable dental history     (+) chipped    B=Bridge, C=Chipped, L=Loose, M=Missing    Cardiovascular          Rhythm and rate: regular and normal     Pulmonary           breath sounds clear to auscultation           OUTSIDE LABS:  CBC:   Lab Results    Component Value Date    WBC 9.8 04/13/2021    WBC 12.9 (H) 03/25/2021    HGB 15.1 04/13/2021    HGB 17.3 03/25/2021    HCT 43.8 04/13/2021    HCT 48.6 03/25/2021     (L) 04/13/2021     (L) 03/25/2021     BMP:   Lab Results   Component Value Date     04/13/2021     (L) 03/25/2021    POTASSIUM 3.5 04/13/2021    POTASSIUM 3.2 (L) 03/25/2021    CHLORIDE 98 04/13/2021    CHLORIDE 95 03/25/2021    CO2 30 04/13/2021    CO2 27 03/25/2021    BUN 19 04/13/2021    BUN 20 03/25/2021    CR 1.21 04/13/2021    CR 1.33 (H) 03/25/2021     (H) 04/13/2021     (H) 03/25/2021     COAGS: No results found for: PTT, INR, FIBR  POC: No results found for: BGM, HCG, HCGS  HEPATIC:   Lab Results   Component Value Date    ALBUMIN 3.4 04/13/2021    PROTTOTAL 7.1 04/13/2021    ALT 41 04/13/2021    AST 31 04/13/2021    ALKPHOS 66 04/13/2021    BILITOTAL 1.5 (H) 04/13/2021     OTHER:   Lab Results   Component Value Date    MISTY 9.1 04/13/2021       Anesthesia Plan    ASA Status:  3   NPO Status:  NPO Appropriate    Anesthesia Type: MAC.     - Reason for MAC: straight local not clinically adequate   Induction: Intravenous.   Maintenance: TIVA.        Consents    Anesthesia Plan(s) and associated risks, benefits, and realistic alternatives discussed. Questions answered and patient/representative(s) expressed understanding.     - Discussed with:  Patient      - Extended Intubation/Ventilatory Support Discussed: No.      - Patient is DNR/DNI Status: No    Use of blood products discussed: No .     Postoperative Care    Pain management: IV analgesics.   PONV prophylaxis: Ondansetron (or other 5HT-3), Aprepitant     Comments:    After discussion with surgeon, MAC anesthesia is appropriate. Discussed with patient, pt. Agrees.            Blayne Fuller MD

## 2021-05-01 ENCOUNTER — TELEPHONE (OUTPATIENT)
Dept: ONCOLOGY | Facility: CLINIC | Age: 66
End: 2021-05-01

## 2021-05-06 ENCOUNTER — OFFICE VISIT (OUTPATIENT)
Dept: RADIATION THERAPY | Facility: OUTPATIENT CENTER | Age: 66
End: 2021-05-06
Payer: COMMERCIAL

## 2021-05-06 ENCOUNTER — HOSPITAL ENCOUNTER (OUTPATIENT)
Dept: MRI IMAGING | Facility: CLINIC | Age: 66
Discharge: HOME | End: 2021-05-06
Attending: RADIOLOGY | Admitting: RADIOLOGY
Payer: COMMERCIAL

## 2021-05-06 DIAGNOSIS — C61 PROSTATE CANCER (H): Primary | ICD-10-CM

## 2021-05-06 DIAGNOSIS — C61 PROSTATE CANCER (H): ICD-10-CM

## 2021-05-06 PROCEDURE — 72195 MRI PELVIS W/O DYE: CPT | Mod: TC,52

## 2021-05-06 NOTE — PROGRESS NOTES
Patient underwent CT simulation.     Davion Francois M.D.  Department of Radiation Oncology  [facility]

## 2021-05-07 DIAGNOSIS — Z79.899 ENCOUNTER FOR LONG-TERM (CURRENT) USE OF MEDICATIONS: ICD-10-CM

## 2021-05-07 DIAGNOSIS — C61 PROSTATE CANCER (H): ICD-10-CM

## 2021-05-07 LAB
ALBUMIN SERPL-MCNC: 3.6 G/DL (ref 3.4–5)
ALP SERPL-CCNC: 62 U/L (ref 40–150)
ALT SERPL W P-5'-P-CCNC: 47 U/L (ref 0–70)
ANION GAP SERPL CALCULATED.3IONS-SCNC: 11 MMOL/L (ref 3–14)
AST SERPL W P-5'-P-CCNC: 52 U/L (ref 0–45)
BASOPHILS # BLD AUTO: 0.1 10E9/L (ref 0–0.2)
BASOPHILS NFR BLD AUTO: 0.7 %
BILIRUB SERPL-MCNC: 2.6 MG/DL (ref 0.2–1.3)
BUN SERPL-MCNC: 21 MG/DL (ref 7–30)
CALCIUM SERPL-MCNC: 8.5 MG/DL (ref 8.5–10.1)
CHLORIDE SERPL-SCNC: 96 MMOL/L (ref 94–109)
CO2 SERPL-SCNC: 25 MMOL/L (ref 20–32)
CREAT SERPL-MCNC: 1.26 MG/DL (ref 0.66–1.25)
DIFFERENTIAL METHOD BLD: ABNORMAL
EOSINOPHIL # BLD AUTO: 0.1 10E9/L (ref 0–0.7)
EOSINOPHIL NFR BLD AUTO: 0.7 %
ERYTHROCYTE [DISTWIDTH] IN BLOOD BY AUTOMATED COUNT: 13.5 % (ref 10–15)
GFR SERPL CREATININE-BSD FRML MDRD: 59 ML/MIN/{1.73_M2}
GLUCOSE SERPL-MCNC: 118 MG/DL (ref 70–99)
HCT VFR BLD AUTO: 43.2 % (ref 40–53)
HGB BLD-MCNC: 15 G/DL (ref 13.3–17.7)
LYMPHOCYTES # BLD AUTO: 3.9 10E9/L (ref 0.8–5.3)
LYMPHOCYTES NFR BLD AUTO: 37.3 %
MCH RBC QN AUTO: 33.9 PG (ref 26.5–33)
MCHC RBC AUTO-ENTMCNC: 34.7 G/DL (ref 31.5–36.5)
MCV RBC AUTO: 98 FL (ref 78–100)
MONOCYTES # BLD AUTO: 1.5 10E9/L (ref 0–1.3)
MONOCYTES NFR BLD AUTO: 14.1 %
NEUTROPHILS # BLD AUTO: 4.9 10E9/L (ref 1.6–8.3)
NEUTROPHILS NFR BLD AUTO: 47.2 %
PLATELET # BLD AUTO: 154 10E9/L (ref 150–450)
POTASSIUM SERPL-SCNC: 3.6 MMOL/L (ref 3.4–5.3)
PROT SERPL-MCNC: 7.3 G/DL (ref 6.8–8.8)
PSA SERPL-MCNC: 0.01 UG/L (ref 0–4)
RBC # BLD AUTO: 4.42 10E12/L (ref 4.4–5.9)
SODIUM SERPL-SCNC: 132 MMOL/L (ref 133–144)
WBC # BLD AUTO: 10.3 10E9/L (ref 4–11)

## 2021-05-07 PROCEDURE — 85025 COMPLETE CBC W/AUTO DIFF WBC: CPT | Performed by: INTERNAL MEDICINE

## 2021-05-07 PROCEDURE — 84153 ASSAY OF PSA TOTAL: CPT | Performed by: INTERNAL MEDICINE

## 2021-05-07 PROCEDURE — 80053 COMPREHEN METABOLIC PANEL: CPT | Performed by: INTERNAL MEDICINE

## 2021-05-07 PROCEDURE — 36415 COLL VENOUS BLD VENIPUNCTURE: CPT | Performed by: INTERNAL MEDICINE

## 2021-05-15 ENCOUNTER — TELEPHONE (OUTPATIENT)
Dept: PHARMACY | Facility: CLINIC | Age: 66
End: 2021-05-15

## 2021-05-15 NOTE — ORAL ONC MGMT
Oral Chemotherapy Monitoring Program  Assessment/Plan:  Reviewed Jonathon's labs from 05/07. Bilirubin was elevated to 2.6 on 05/07 from 1.5 previously on 04/13. No dose adjustments necessary at this time, however Dr. Cisneros and RNJOS Cortes were notified of the results. Creatinine was slightly elevated as wel, and AST was slightly higher than ULN. No other concerning abnormalities at this time. Next lab appointment is scheduled for 06/14. Will continue to monitor changes of bilirubin.     Result Component Current Result Ref Range   Sodium 132 (L) (5/7/2021) 133 - 144 mmol/L     Result Component Current Result Ref Range   Potassium 3.6 (5/7/2021) 3.4 - 5.3 mmol/L     Result Component Current Result Ref Range   Calcium 8.5 (5/7/2021) 8.5 - 10.1 mg/dL     Result Component Current Result Ref Range   Albumin 3.6 (5/7/2021) 3.4 - 5.0 g/dL     Result Component Current Result Ref Range   Urea Nitrogen 21 (5/7/2021) 7 - 30 mg/dL     Result Component Current Result Ref Range   Creatinine 1.26 (H) (5/7/2021) 0.66 - 1.25 mg/dL     Result Component Current Result Ref Range   AST 52 (H) (5/7/2021) 0 - 45 U/L     Result Component Current Result Ref Range   ALT 47 (5/7/2021) 0 - 70 U/L     Result Component Current Result Ref Range   Bilirubin Total 2.6 (H) (5/7/2021) 0.2 - 1.3 mg/dL     Result Component Current Result Ref Range   WBC 10.3 (5/7/2021) 4.0 - 11.0 10e9/L     Result Component Current Result Ref Range   Hemoglobin 15.0 (5/7/2021) 13.3 - 17.7 g/dL     Result Component Current Result Ref Range   Platelet Count 154 (5/7/2021) 150 - 450 10e9/L     Result Component Current Result Ref Range   Absolute Neutrophil 4.9 (5/7/2021) 1.6 - 8.3 10e9/L       ORAL CHEMOTHERAPY 3/12/2021 3/12/2021 3/25/2021 3/26/2021 4/20/2021 5/1/2021 5/15/2021   Assessment Type Lab Monitoring;Other Monthly Follow up Lab Monitoring Refill Chart Review Chart Review Lab Monitoring   Diagnosis Code Prostate Cancer Prostate Cancer Prostate Cancer Prostate Cancer  Prostate Cancer Prostate Cancer Prostate Cancer   Providers Dr. Blayne Cisneros   [facility]   Drug Name Zytiga (Abiraterone) Zytiga (Abiraterone) - Zytiga (abiraterone) Zytiga (abiraterone) Zytiga (abiraterone) Zytiga (abiraterone)   Dose 1,000 mg 1,000 mg 1,000 mg 1,000 mg 1,000 mg 1,000 mg 1,000 mg   Current Schedule Daily Daily Daily Daily Daily Daily Daily   Cycle Details Continuous Continuous Continuous Continuous Continuous Continuous Continuous   Start Date of Last Cycle - - - - - - -   Doses missed in last 2 weeks - 0 - - - - -   Adherence Assessment - Adherent - - - - -   Adverse Effects - No AE identified during assessment - - - - Increased AST/ALT/T BILI   Pharmacist Intervention(increased ast/alt/t bili) - - - - - - Yes   Intervention(s) - - - - - - Referral to oncology provider   Home BPs - some BPs 140//100 - - - - -   Any new drug interactions? - No - - - - -   Pharmacist Intervention? - - - - - - -   Intervention(s) - - - - - - -   Is the dose as ordered appropriate for the patient? - Yes - - - - -   Since the last time we talked, have you been hospitalized or used the emergency room? - No - - - - -     Lisa Nguyen  Pharmacy Intern  [facility]  [phone]

## 2021-05-18 ENCOUNTER — TELEPHONE (OUTPATIENT)
Dept: PHARMACY | Facility: CLINIC | Age: 66
End: 2021-05-18

## 2021-05-18 NOTE — TELEPHONE ENCOUNTER
Oral Chemotherapy Monitoring Program    Placed call to patient in follow up of abiraterone therapy.    Left message to please call back in follow up of therapy. No patient or drug names were mentioned.    Angelo Barahona  Pharmacy Intern  Oral Chemotherapy Monitoring Program  [facility]   [phone]

## 2021-05-19 ENCOUNTER — APPOINTMENT (OUTPATIENT)
Dept: RADIATION THERAPY | Facility: OUTPATIENT CENTER | Age: 66
End: 2021-05-19
Payer: COMMERCIAL

## 2021-05-19 ENCOUNTER — OFFICE VISIT (OUTPATIENT)
Dept: RADIATION THERAPY | Facility: OUTPATIENT CENTER | Age: 66
End: 2021-05-19
Payer: COMMERCIAL

## 2021-05-19 ENCOUNTER — TELEPHONE (OUTPATIENT)
Dept: PHARMACY | Facility: CLINIC | Age: 66
End: 2021-05-19

## 2021-05-19 VITALS
DIASTOLIC BLOOD PRESSURE: 94 MMHG | OXYGEN SATURATION: 98 % | RESPIRATION RATE: 18 BRPM | HEART RATE: 63 BPM | SYSTOLIC BLOOD PRESSURE: 174 MMHG

## 2021-05-19 DIAGNOSIS — C61 PROSTATE CANCER (H): Primary | ICD-10-CM

## 2021-05-19 NOTE — LETTER
2021         RE: Jonathon Santos  [address] [address] [address] [address]        Dear Colleague,    Thank you for referring your patient, Jonathon Santos, to the [facility]. Please see a copy of my visit note below.    [facility]  SPECIALIZING IN BREAKTHROUGHS  Radiation Oncology    On Treatment Visit Note      Jonathon Santos      Date: 2021   MRN: [id number]   : 1955  Diagnosis: Prostate cancer      Reason for Visit:  On Radiation Treatment Visit     Treatment Summary to Date  Treatment Site: pelvis Current Dose: 200/7920 cGy Fractions:       Chemotherapy  Chemo concurrent with radx?: No    Subjective:   Doing well. No acute trouble. No GI bother. No  bother. Energy adequate.     Nursing ROS:   Nutrition Alteration  Diet Type: Patient's Preference           Cardiovascular  Respiratory effort: 1 - Normal - without distress     Genitourinary  Urinary Status: 0 - Normal  Dysuria: 0 - None     Pain Assessment  0-10 Pain Scale: 0      Objective:   BP (!) 174/94   Pulse 63   Resp 18   SpO2 98%   NAD    Labs:  CBC RESULTS:   Recent Labs   Lab Test 21  1116   WBC 10.3   RBC 4.42   HGB 15.0   HCT 43.2   MCV 98   MCH 33.9*   MCHC 34.7   RDW 13.5        ELECTROLYTES:  Recent Labs   Lab Test 21  1116   *   POTASSIUM 3.6   CHLORIDE 96   MISTY 8.5   CO2 25   BUN 21   CR 1.26*   *       Assessment:   Tom is a 66 year old male with newly diagnosed prostate cancer, pre-treatment PSA 8.18, highest GS was 4+5=9. Staging evaluation noted suspicious pelvic and Jazzy nodes concerning for regional vs M1 disease spread. He is undergoing definitive RT + ADT plus Abiraterone.     Tolerating radiation therapy well.  All questions and concerns addressed.    Plan:   1. Continue current therapy.   2. GI bother. Imodium PRN.   3.  bother. Ibuprofen PRN.       Mosaiq chart and setup information reviewed  Ports checked         Educational Topic Discussed  Education  Instructions: reivewed potential SE to expect during radiation      Davion Francois MD

## 2021-05-19 NOTE — PROGRESS NOTES
[facility]  SPECIALIZING IN BREAKTHROUGHS  Radiation Oncology    On Treatment Visit Note      Jonathon Santos      Date: 2021   MRN: [id number]   : 1955  Diagnosis: Prostate cancer      Reason for Visit:  On Radiation Treatment Visit     Treatment Summary to Date  Treatment Site: pelvis Current Dose: 200/7920 cGy Fractions:       Chemotherapy  Chemo concurrent with radx?: No    Subjective:   Doing well. No acute trouble. No GI bother. No  bother. Energy adequate.     Nursing ROS:   Nutrition Alteration  Diet Type: Patient's Preference           Cardiovascular  Respiratory effort: 1 - Normal - without distress     Genitourinary  Urinary Status: 0 - Normal  Dysuria: 0 - None     Pain Assessment  0-10 Pain Scale: 0      Objective:   BP (!) 174/94   Pulse 63   Resp 18   SpO2 98%   NAD    Labs:  CBC RESULTS:   Recent Labs   Lab Test 21  1116   WBC 10.3   RBC 4.42   HGB 15.0   HCT 43.2   MCV 98   MCH 33.9*   MCHC 34.7   RDW 13.5        ELECTROLYTES:  Recent Labs   Lab Test 21  1116   *   POTASSIUM 3.6   CHLORIDE 96   MISTY 8.5   CO2 25   BUN 21   CR 1.26*   *       Assessment:   Tom is a 66 year old male with newly diagnosed prostate cancer, pre-treatment PSA 8.18, highest GS was 4+5=9. Staging evaluation noted suspicious pelvic and Jazzy nodes concerning for regional vs M1 disease spread. He is undergoing definitive RT + ADT plus Abiraterone.     Tolerating radiation therapy well.  All questions and concerns addressed.    Plan:   1. Continue current therapy.   2. GI bother. Imodium PRN.   3.  bother. Ibuprofen PRN.       Mosaiq chart and setup information reviewed  Ports checked         Educational Topic Discussed  Education Instructions: reivewed potential SE to expect during radiation      Davion Francois MD

## 2021-05-19 NOTE — TELEPHONE ENCOUNTER
Oral Chemotherapy Monitoring Program    Subjective/Objective:  Jonathon Santos is a 66 year old male contacted by phone for a follow-up visit for oral chemotherapy. Jonathon continues to adhere to his abiraterone therapy of 4 tabs (1000mg) PO every day and reports missing no doses during the past 2 weeks. Jonathon reports experiencing stomach discomfort, vomiting, and malaise recently, but he attributes these adverse effects to interactions between his gout therapy with allopurinol and his chemotherapy. The patient switched to febuxostat for gout 1 week ago and reports that his symptoms of vomiting and stomach discomfort have markedly improved since then. The patient no longer considers these symptoms to by adversely affecting his quality-of-life. Jonathon reports occasional hot flashes and fatigue occurring after periods of exertion. The patient reports that he is eating well and trying to drink plenty of water.     ORAL CHEMOTHERAPY 3/12/2021 3/25/2021 3/26/2021 4/20/2021 5/1/2021 5/15/2021 5/19/2021   Assessment Type Monthly Follow up Lab Monitoring Refill Chart Review Chart Review Lab Monitoring Monthly Follow up   Diagnosis Code Prostate Cancer Prostate Cancer Prostate Cancer Prostate Cancer Prostate Cancer Prostate Cancer Prostate Cancer   Providers Dr. Blayne Cisneros   [facility]   Drug Name Zytiga (Abiraterone) - Zytiga (abiraterone) Zytiga (abiraterone) Zytiga (abiraterone) Zytiga (abiraterone) Zytiga (abiraterone)   Dose 1,000 mg 1,000 mg 1,000 mg 1,000 mg 1,000 mg 1,000 mg 1,000 mg   Current Schedule Daily Daily Daily Daily Daily Daily Daily   Cycle Details Continuous Continuous Continuous Continuous Continuous Continuous Continuous   Start Date of Last Cycle - - - - - - -   Doses missed in last 2 weeks 0 - - - - - 0   Adherence Assessment Adherent - - - - - Adherent   Adverse Effects No AE identified during  "assessment - - - - Increased AST/ALT/T BILI Nausea   Nausea - - - - - - Grade 1   Pharmacist Intervention(nausea) - - - - - - No   Pharmacist Intervention(increased ast/alt/t bili) - - - - - Yes -   Intervention(s) - - - - - Referral to oncology provider -   Home BPs some BPs 140//100 - - - - - -   Any new drug interactions? No - - - - - No   Pharmacist Intervention? - - - - - - -   Intervention(s) - - - - - - -   Is the dose as ordered appropriate for the patient? Yes - - - - - Yes   Since the last time we talked, have you been hospitalized or used the emergency room? No - - - - - -       Last PHQ-2 Score on record:   PHQ-2 ( 1999 [employer]) 4/9/2021 1/4/2021   Q1: Little interest or pleasure in doing things 0 0   Q2: Feeling down, depressed or hopeless 0 0   PHQ-2 Score 0 0   Q1: Little interest or pleasure in doing things - Not at all   Q2: Feeling down, depressed or hopeless - Not at all   PHQ-2 Score - 0       Vitals:  BP:   BP Readings from Last 1 Encounters:   04/30/21 (!) 150/105     Wt Readings from Last 1 Encounters:   04/30/21 95.9 kg (211 lb 6.7 oz)     Estimated body surface area is 2.22 meters squared as calculated from the following:    Height as of 4/30/21: 1.854 m (6' 0.99\").    Weight as of 4/30/21: 95.9 kg (211 lb 6.7 oz).    Labs:  _  Result Component Current Result Ref Range   Sodium 132 (L) (5/7/2021) 133 - 144 mmol/L     _  Result Component Current Result Ref Range   Potassium 3.6 (5/7/2021) 3.4 - 5.3 mmol/L     _  Result Component Current Result Ref Range   Calcium 8.5 (5/7/2021) 8.5 - 10.1 mg/dL     No results found for Mag within last 30 days.     No results found for Phos within last 30 days.     _  Result Component Current Result Ref Range   Albumin 3.6 (5/7/2021) 3.4 - 5.0 g/dL     _  Result Component Current Result Ref Range   Urea Nitrogen 21 (5/7/2021) 7 - 30 mg/dL     _  Result Component Current Result Ref Range   Creatinine 1.26 (H) (5/7/2021) 0.66 - 1.25 mg/dL     _  Result " Component Current Result Ref Range   AST 52 (H) (5/7/2021) 0 - 45 U/L     _  Result Component Current Result Ref Range   ALT 47 (5/7/2021) 0 - 70 U/L     _  Result Component Current Result Ref Range   Bilirubin Total 2.6 (H) (5/7/2021) 0.2 - 1.3 mg/dL     _  Result Component Current Result Ref Range   WBC 10.3 (5/7/2021) 4.0 - 11.0 10e9/L     _  Result Component Current Result Ref Range   Hemoglobin 15.0 (5/7/2021) 13.3 - 17.7 g/dL     _  Result Component Current Result Ref Range   Platelet Count 154 (5/7/2021) 150 - 450 10e9/L     _  Result Component Current Result Ref Range   Absolute Neutrophil 4.9 (5/7/2021) 1.6 - 8.3 10e9/L         Assessment/Plan:  -The pt appears to be tolerating his abiraterone therapy well with manageable side effects. Abiraterone therapy should be continued as planned.   -Recommended pt continue drinking water, eating well, and attempting to exercise regularly    Follow-Up:  14-Jun labs    Refill Due:  21-May-21    [facility]  Pharmacy Intern  Oral Chemotherapy Monitoring Program  [facility]   [phone]

## 2021-05-20 ENCOUNTER — APPOINTMENT (OUTPATIENT)
Dept: RADIATION THERAPY | Facility: OUTPATIENT CENTER | Age: 66
End: 2021-05-20
Payer: COMMERCIAL

## 2021-05-21 ENCOUNTER — APPOINTMENT (OUTPATIENT)
Dept: RADIATION THERAPY | Facility: OUTPATIENT CENTER | Age: 66
End: 2021-05-21
Payer: COMMERCIAL

## 2021-05-21 DIAGNOSIS — C77.9 REGIONAL LYMPH NODE METASTASIS PRESENT (H): ICD-10-CM

## 2021-05-21 DIAGNOSIS — C61 PROSTATE CANCER (H): Primary | ICD-10-CM

## 2021-05-21 RX ORDER — ABIRATERONE ACETATE 250 MG/1
1000 TABLET ORAL DAILY
Qty: 120 TABLET | Refills: 1 | Status: SHIPPED | OUTPATIENT
Start: 2021-05-21 | End: 2021-06-20

## 2021-05-24 ENCOUNTER — APPOINTMENT (OUTPATIENT)
Dept: RADIATION THERAPY | Facility: OUTPATIENT CENTER | Age: 66
End: 2021-05-24
Payer: COMMERCIAL

## 2021-05-25 ENCOUNTER — APPOINTMENT (OUTPATIENT)
Dept: RADIATION THERAPY | Facility: OUTPATIENT CENTER | Age: 66
End: 2021-05-25
Payer: COMMERCIAL

## 2021-05-26 ENCOUNTER — APPOINTMENT (OUTPATIENT)
Dept: RADIATION THERAPY | Facility: OUTPATIENT CENTER | Age: 66
End: 2021-05-26
Payer: COMMERCIAL

## 2021-05-26 ENCOUNTER — OFFICE VISIT (OUTPATIENT)
Dept: RADIATION THERAPY | Facility: OUTPATIENT CENTER | Age: 66
End: 2021-05-26
Payer: COMMERCIAL

## 2021-05-26 VITALS
SYSTOLIC BLOOD PRESSURE: 137 MMHG | BODY MASS INDEX: 27.32 KG/M2 | WEIGHT: 207 LBS | HEART RATE: 71 BPM | DIASTOLIC BLOOD PRESSURE: 89 MMHG

## 2021-05-26 DIAGNOSIS — C61 PROSTATE CANCER (H): Primary | ICD-10-CM

## 2021-05-26 NOTE — PROGRESS NOTES
[facility]  SPECIALIZING IN BREAKTHROUGHS  Radiation Oncology    On Treatment Visit Note      Jonathon Santos      Date: 2021   MRN: [id number]   : 1955  Diagnosis: Prostate cancer      Reason for Visit:  On Radiation Treatment Visit     Treatment Summary to Date  Treatment Site: pelvis Current Dose: 1200/7920 cGy Fractions:       Chemotherapy  Chemo concurrent with radx?: No    Subjective:   Doing well. No acute trouble. No GI bother. No  bother. Energy adequate.     Nursing ROS:   Nutrition Alteration  Diet Type: Patient's Preference           Cardiovascular  Respiratory effort: 1 - Normal - without distress     Genitourinary  Urinary Status: 0 - Normal  Dysuria: 0 - None     Pain Assessment  0-10 Pain Scale: 0      Objective:   BP (!) 174/94   Pulse 63   Resp 18   SpO2 98%   NAD    Labs:  CBC RESULTS:   Recent Labs   Lab Test 21  1116   WBC 10.3   RBC 4.42   HGB 15.0   HCT 43.2   MCV 98   MCH 33.9*   MCHC 34.7   RDW 13.5        ELECTROLYTES:  Recent Labs   Lab Test 21  1116   *   POTASSIUM 3.6   CHLORIDE 96   MISTY 8.5   CO2 25   BUN 21   CR 1.26*   *       Assessment:   Tom is a 66 year old male with newly diagnosed prostate cancer, pre-treatment PSA 8.18, highest GS was 4+5=9. Staging evaluation noted suspicious pelvic and Jazzy nodes concerning for regional vs M1 disease spread. He is undergoing definitive RT + ADT plus Abiraterone.     Tolerating radiation therapy well.  All questions and concerns addressed.    Plan:   1. Continue current therapy.   2. GI bother. Imodium PRN.   3.  bother. Ibuprofen PRN.       Mosaiq chart and setup information reviewed  Ports checked         Educational Topic Discussed  Education Instructions: reivewed potential SE to expect during radiation      Davion Francois MD

## 2021-05-26 NOTE — LETTER
2021         RE: Jonathon Santos  [address] [address] [place] [id number]        Dear Colleague,    Thank you for referring your patient, Jonathon Santos, to the [facility]. Please see a copy of my visit note below.    [facility]  SPECIALIZING IN BREAKTHROUGHS  Radiation Oncology    On Treatment Visit Note      Jonathon Santos      Date: 2021   MRN: [id number]   : 1955  Diagnosis: Prostate cancer      Reason for Visit:  On Radiation Treatment Visit     Treatment Summary to Date  Treatment Site: pelvis Current Dose: 1200/7920 cGy Fractions:       Chemotherapy  Chemo concurrent with radx?: No    Subjective:   Doing well. No acute trouble. No GI bother. No  bother. Energy adequate.     Nursing ROS:   Nutrition Alteration  Diet Type: Patient's Preference           Cardiovascular  Respiratory effort: 1 - Normal - without distress     Genitourinary  Urinary Status: 0 - Normal  Dysuria: 0 - None     Pain Assessment  0-10 Pain Scale: 0      Objective:   BP (!) 174/94   Pulse 63   Resp 18   SpO2 98%   NAD    Labs:  CBC RESULTS:   Recent Labs   Lab Test 21  1116   WBC 10.3   RBC 4.42   HGB 15.0   HCT 43.2   MCV 98   MCH 33.9*   MCHC 34.7   RDW 13.5        ELECTROLYTES:  Recent Labs   Lab Test 21  1116   *   POTASSIUM 3.6   CHLORIDE 96   MISTY 8.5   CO2 25   BUN 21   CR 1.26*   *       Assessment:   Tom is a 66 year old male with newly diagnosed prostate cancer, pre-treatment PSA 8.18, highest GS was 4+5=9. Staging evaluation noted suspicious pelvic and Jazzy nodes concerning for regional vs M1 disease spread. He is undergoing definitive RT + ADT plus Abiraterone.     Tolerating radiation therapy well.  All questions and concerns addressed.    Plan:   1. Continue current therapy.   2. GI bother. Imodium PRN.   3.  bother. Ibuprofen PRN.       Mosaiq chart and setup information reviewed  Ports checked         Educational Topic Discussed  Education  Instructions: reivewed potential SE to expect during radiation      Davion Francois MD

## 2021-05-27 ENCOUNTER — APPOINTMENT (OUTPATIENT)
Dept: RADIATION THERAPY | Facility: OUTPATIENT CENTER | Age: 66
End: 2021-05-27
Payer: COMMERCIAL

## 2021-05-28 ENCOUNTER — APPOINTMENT (OUTPATIENT)
Dept: RADIATION THERAPY | Facility: OUTPATIENT CENTER | Age: 66
End: 2021-05-28
Payer: COMMERCIAL

## 2021-06-01 ENCOUNTER — APPOINTMENT (OUTPATIENT)
Dept: RADIATION THERAPY | Facility: OUTPATIENT CENTER | Age: 66
End: 2021-06-01
Payer: COMMERCIAL

## 2021-06-02 ENCOUNTER — APPOINTMENT (OUTPATIENT)
Dept: RADIATION THERAPY | Facility: OUTPATIENT CENTER | Age: 66
End: 2021-06-02
Payer: COMMERCIAL

## 2021-06-02 ENCOUNTER — OFFICE VISIT (OUTPATIENT)
Dept: RADIATION THERAPY | Facility: OUTPATIENT CENTER | Age: 66
End: 2021-06-02
Payer: COMMERCIAL

## 2021-06-02 VITALS — WEIGHT: 207.8 LBS | BODY MASS INDEX: 27.42 KG/M2

## 2021-06-02 DIAGNOSIS — C61 PROSTATE CANCER (H): Primary | ICD-10-CM

## 2021-06-02 NOTE — PROGRESS NOTES
[facility]  SPECIALIZING IN BREAKTHROUGHS  Radiation Oncology    On Treatment Visit Note      Jonathon Santos      Date: 2021   MRN: [id number]   : 1955  Diagnosis: Prostate cancer      Reason for Visit:  On Radiation Treatment Visit     Treatment Summary to Date  Treatment Site: pelvis Current Dose: 2000/7920 cGy Fractions: 10/41      Chemotherapy  Chemo concurrent with radx?: No    Subjective:   Doing well. No acute trouble. Very mild GI bother. Mild  bother. Energy adequate.     Nursing ROS:   Nutrition Alteration  Diet Type: Patient's Preference           Cardiovascular  Respiratory effort: 1 - Normal - without distress     Genitourinary  Urinary Status: 0 - Normal  Dysuria: 0 - None     Pain Assessment  0-10 Pain Scale: 0      Objective:   BP (!) 174/94   Pulse 63   Resp 18   SpO2 98%   NAD    Labs:  CBC RESULTS:   Recent Labs   Lab Test 21  1116   WBC 10.3   RBC 4.42   HGB 15.0   HCT 43.2   MCV 98   MCH 33.9*   MCHC 34.7   RDW 13.5        ELECTROLYTES:  Recent Labs   Lab Test 21  1116   *   POTASSIUM 3.6   CHLORIDE 96   MISTY 8.5   CO2 25   BUN 21   CR 1.26*   *       Assessment:   Tom is a 66 year old male with newly diagnosed prostate cancer, pre-treatment PSA 8.18, highest GS was 4+5=9. Staging evaluation noted suspicious pelvic and Jazzy nodes concerning for regional vs M1 disease spread. He is undergoing definitive RT + ADT plus Abiraterone.     Tolerating radiation therapy well.  All questions and concerns addressed.    Plan:   1. Continue current therapy.   2. GI bother. Imodium PRN.   3.  bother. Ibuprofen PRN.       Mosaiq chart and setup information reviewed  Ports checked         Educational Topic Discussed  Education Instructions: reivewed potential SE to expect during radiation      Davion Francois MD

## 2021-06-02 NOTE — LETTER
2021         RE: Jonathon Santos  [address] [place] [id number]        Dear Colleague,    Thank you for referring your patient, Jonathon Santos, to the [facility]. Please see a copy of my visit note below.    [facility]  SPECIALIZING IN BREAKTHROUGHS  Radiation Oncology    On Treatment Visit Note      Jonathon Santos      Date: 2021   MRN: [id number]   : 1955  Diagnosis: Prostate cancer      Reason for Visit:  On Radiation Treatment Visit     Treatment Summary to Date  Treatment Site: pelvis Current Dose:  cGy Fractions: 10/41      Chemotherapy  Chemo concurrent with radx?: No    Subjective:   Doing well. No acute trouble. Very mild GI bother. Mild  bother. Energy adequate.     Nursing ROS:   Nutrition Alteration  Diet Type: Patient's Preference           Cardiovascular  Respiratory effort: 1 - Normal - without distress     Genitourinary  Urinary Status: 0 - Normal  Dysuria: 0 - None     Pain Assessment  0-10 Pain Scale: 0      Objective:   BP (!) 174/94   Pulse 63   Resp 18   SpO2 98%   NAD    Labs:  CBC RESULTS:   Recent Labs   Lab Test 21  1116   WBC 10.3   RBC 4.42   HGB 15.0   HCT 43.2   MCV 98   MCH 33.9*   MCHC 34.7   RDW 13.5        ELECTROLYTES:  Recent Labs   Lab Test 21  1116   *   POTASSIUM 3.6   CHLORIDE 96   MISTY 8.5   CO2 25   BUN 21   CR 1.26*   *       Assessment:   Tom is a 66 year old male with newly diagnosed prostate cancer, pre-treatment PSA 8.18, highest GS was 4+5=9. Staging evaluation noted suspicious pelvic and Jazzy nodes concerning for regional vs M1 disease spread. He is undergoing definitive RT + ADT plus Abiraterone.     Tolerating radiation therapy well.  All questions and concerns addressed.    Plan:   1. Continue current therapy.   2. GI bother. Imodium PRN.   3.  bother. Ibuprofen PRN.       Mosaiq chart and setup information reviewed  Ports checked         Educational Topic  Discussed  Education Instructions: reivewed potential SE to expect during radiation      Davion Francois MD

## 2021-06-03 ENCOUNTER — APPOINTMENT (OUTPATIENT)
Dept: RADIATION THERAPY | Facility: OUTPATIENT CENTER | Age: 66
End: 2021-06-03
Payer: COMMERCIAL

## 2021-06-04 ENCOUNTER — APPOINTMENT (OUTPATIENT)
Dept: RADIATION THERAPY | Facility: OUTPATIENT CENTER | Age: 66
End: 2021-06-04
Payer: COMMERCIAL

## 2021-06-07 ENCOUNTER — APPOINTMENT (OUTPATIENT)
Dept: RADIATION THERAPY | Facility: OUTPATIENT CENTER | Age: 66
End: 2021-06-07
Payer: COMMERCIAL

## 2021-06-08 ENCOUNTER — APPOINTMENT (OUTPATIENT)
Dept: RADIATION THERAPY | Facility: OUTPATIENT CENTER | Age: 66
End: 2021-06-08
Payer: COMMERCIAL

## 2021-06-09 ENCOUNTER — OFFICE VISIT (OUTPATIENT)
Dept: RADIATION THERAPY | Facility: OUTPATIENT CENTER | Age: 66
End: 2021-06-09
Payer: COMMERCIAL

## 2021-06-09 ENCOUNTER — APPOINTMENT (OUTPATIENT)
Dept: RADIATION THERAPY | Facility: OUTPATIENT CENTER | Age: 66
End: 2021-06-09
Payer: COMMERCIAL

## 2021-06-09 VITALS
SYSTOLIC BLOOD PRESSURE: 159 MMHG | BODY MASS INDEX: 27.42 KG/M2 | DIASTOLIC BLOOD PRESSURE: 90 MMHG | HEART RATE: 74 BPM | WEIGHT: 207.8 LBS

## 2021-06-09 DIAGNOSIS — C61 PROSTATE CANCER (H): Primary | ICD-10-CM

## 2021-06-09 NOTE — PROGRESS NOTES
[facility]  SPECIALIZING IN BREAKTHROUGHS  Radiation Oncology    On Treatment Visit Note      Jonathon Santos      Date: 2021   MRN: [id number]   : 1955  Diagnosis: Prostate cancer      Reason for Visit:  On Radiation Treatment Visit     Treatment Summary to Date  Treatment Site: pelvis Current Dose: 3000/7920 cGy Fractions: 15/41      Chemotherapy  Chemo concurrent with radx?: No    Subjective:   Doing okay. No acute trouble. Very mild GI bother. Some anal irritation from frequency of bowel movement. Mild  bother. Energy adequate.     Nursing ROS:   Nutrition Alteration  Diet Type: Patient's Preference           Cardiovascular  Respiratory effort: 1 - Normal - without distress     Genitourinary  Urinary Status: 0 - Normal  Dysuria: 0 - None     Pain Assessment  0-10 Pain Scale: 0      Objective:   BP (!) 159/90   Pulse 74   Wt 94.3 kg (207 lb 12.8 oz)   BMI 27.42 kg/m     NAD    Labs:  CBC RESULTS:   Recent Labs   Lab Test 21  1116   WBC 10.3   RBC 4.42   HGB 15.0   HCT 43.2   MCV 98   MCH 33.9*   MCHC 34.7   RDW 13.5        ELECTROLYTES:  Recent Labs   Lab Test 21  1116   *   POTASSIUM 3.6   CHLORIDE 96   MISTY 8.5   CO2 25   BUN 21   CR 1.26*   *       Assessment:   Tom is a 66 year old male with newly diagnosed prostate cancer, pre-treatment PSA 8.18, highest GS was 4+5=9. Staging evaluation noted suspicious pelvic and Jazzy nodes concerning for regional vs M1 disease spread. He is undergoing definitive RT + ADT plus Abiraterone.     Tolerating radiation therapy well.  All questions and concerns addressed.    Plan:   1. Continue current therapy.   2. GI bother. Imodium PRN.   3.  bother. Ibuprofen PRN.   4. Anal irritation. Baby wipes, Sitz baths.      Mosaiq chart and setup information reviewed  Ports checked         Educational Topic Discussed  Education Instructions: reivewed potential SE to expect during radiation      Davion Francois MD

## 2021-06-09 NOTE — LETTER
2021         RE: Jonathon Santos  [address] [address] [place] [phone]        Dear Colleague,    Thank you for referring your patient, Jonathon Santos, to the [facility]. Please see a copy of my visit note below.    [facility]  SPECIALIZING IN BREAKTHROUGHS  Radiation Oncology    On Treatment Visit Note      Jonathon Santos      Date: 2021   MRN: [id number]   : 1955  Diagnosis: Prostate cancer      Reason for Visit:  On Radiation Treatment Visit     Treatment Summary to Date  Treatment Site: pelvis Current Dose: 3000/7920 cGy Fractions: 15/41      Chemotherapy  Chemo concurrent with radx?: No    Subjective:   Doing okay. No acute trouble. Very mild GI bother. Some anal irritation from frequency of bowel movement. Mild  bother. Energy adequate.     Nursing ROS:   Nutrition Alteration  Diet Type: Patient's Preference           Cardiovascular  Respiratory effort: 1 - Normal - without distress     Genitourinary  Urinary Status: 0 - Normal  Dysuria: 0 - None     Pain Assessment  0-10 Pain Scale: 0      Objective:   BP (!) 159/90   Pulse 74   Wt 94.3 kg (207 lb 12.8 oz)   BMI 27.42 kg/m     NAD    Labs:  CBC RESULTS:   Recent Labs   Lab Test 21  1116   WBC 10.3   RBC 4.42   HGB 15.0   HCT 43.2   MCV 98   MCH 33.9*   MCHC 34.7   RDW 13.5        ELECTROLYTES:  Recent Labs   Lab Test 21  1116   *   POTASSIUM 3.6   CHLORIDE 96   MISTY 8.5   CO2 25   BUN 21   CR 1.26*   *       Assessment:   Tom is a 66 year old male with newly diagnosed prostate cancer, pre-treatment PSA 8.18, highest GS was 4+5=9. Staging evaluation noted suspicious pelvic and Jazzy nodes concerning for regional vs M1 disease spread. He is undergoing definitive RT + ADT plus Abiraterone.     Tolerating radiation therapy well.  All questions and concerns addressed.    Plan:   1. Continue current therapy.   2. GI bother. Imodium PRN.   3.  bother. Ibuprofen PRN.   4. Anal irritation.  Baby wipes, Sitz baths.      Mosaiq chart and setup information reviewed  Ports checked         Educational Topic Discussed  Education Instructions: reivewed potential SE to expect during radiation      Davion Francois MD

## 2021-06-10 ENCOUNTER — APPOINTMENT (OUTPATIENT)
Dept: RADIATION THERAPY | Facility: OUTPATIENT CENTER | Age: 66
End: 2021-06-10
Payer: COMMERCIAL

## 2021-06-11 ENCOUNTER — APPOINTMENT (OUTPATIENT)
Dept: RADIATION THERAPY | Facility: OUTPATIENT CENTER | Age: 66
End: 2021-06-11
Payer: COMMERCIAL

## 2021-06-14 ENCOUNTER — TELEPHONE (OUTPATIENT)
Dept: NURSING | Facility: CLINIC | Age: 66
End: 2021-06-14

## 2021-06-14 ENCOUNTER — DOCUMENTATION ONLY (OUTPATIENT)
Dept: LAB | Facility: CLINIC | Age: 66
End: 2021-06-14

## 2021-06-14 ENCOUNTER — APPOINTMENT (OUTPATIENT)
Dept: RADIATION THERAPY | Facility: OUTPATIENT CENTER | Age: 66
End: 2021-06-14
Payer: COMMERCIAL

## 2021-06-14 ENCOUNTER — DOCUMENTATION ONLY (OUTPATIENT)
Dept: ONCOLOGY | Facility: CLINIC | Age: 66
End: 2021-06-14

## 2021-06-14 DIAGNOSIS — Z79.899 ENCOUNTER FOR LONG-TERM (CURRENT) USE OF MEDICATIONS: ICD-10-CM

## 2021-06-14 DIAGNOSIS — C61 PROSTATE CANCER (H): ICD-10-CM

## 2021-06-14 LAB
ALBUMIN SERPL-MCNC: 3.7 G/DL (ref 3.4–5)
ALP SERPL-CCNC: 60 U/L (ref 40–150)
ALT SERPL W P-5'-P-CCNC: 27 U/L (ref 0–70)
ANION GAP SERPL CALCULATED.3IONS-SCNC: 7 MMOL/L (ref 3–14)
AST SERPL W P-5'-P-CCNC: 25 U/L (ref 0–45)
BASOPHILS # BLD AUTO: 0 10E9/L (ref 0–0.2)
BASOPHILS NFR BLD AUTO: 0.6 %
BILIRUB SERPL-MCNC: 1 MG/DL (ref 0.2–1.3)
BUN SERPL-MCNC: 17 MG/DL (ref 7–30)
CALCIUM SERPL-MCNC: 9 MG/DL (ref 8.5–10.1)
CHLORIDE SERPL-SCNC: 102 MMOL/L (ref 94–109)
CO2 SERPL-SCNC: 28 MMOL/L (ref 20–32)
CREAT SERPL-MCNC: 1.13 MG/DL (ref 0.66–1.25)
DIFFERENTIAL METHOD BLD: ABNORMAL
EOSINOPHIL # BLD AUTO: 0.1 10E9/L (ref 0–0.7)
EOSINOPHIL NFR BLD AUTO: 1.6 %
ERYTHROCYTE [DISTWIDTH] IN BLOOD BY AUTOMATED COUNT: 13.2 % (ref 10–15)
GFR SERPL CREATININE-BSD FRML MDRD: 67 ML/MIN/{1.73_M2}
GLUCOSE SERPL-MCNC: 117 MG/DL (ref 70–99)
HCT VFR BLD AUTO: 33 % (ref 40–53)
HGB BLD-MCNC: 11.4 G/DL (ref 13.3–17.7)
IMM GRANULOCYTES # BLD: 0 10E9/L (ref 0–0.4)
IMM GRANULOCYTES NFR BLD: 0.9 %
LYMPHOCYTES # BLD AUTO: 0.6 10E9/L (ref 0.8–5.3)
LYMPHOCYTES NFR BLD AUTO: 17.9 %
MCH RBC QN AUTO: 34.2 PG (ref 26.5–33)
MCHC RBC AUTO-ENTMCNC: 34.5 G/DL (ref 31.5–36.5)
MCV RBC AUTO: 99 FL (ref 78–100)
MONOCYTES # BLD AUTO: 0.3 10E9/L (ref 0–1.3)
MONOCYTES NFR BLD AUTO: 9.7 %
NEUTROPHILS # BLD AUTO: 2.2 10E9/L (ref 1.6–8.3)
NEUTROPHILS NFR BLD AUTO: 69.3 %
NRBC # BLD AUTO: 0 10*3/UL
NRBC BLD AUTO-RTO: 0 /100
PLATELET # BLD AUTO: 83 10E9/L (ref 150–450)
POTASSIUM SERPL-SCNC: 3.3 MMOL/L (ref 3.4–5.3)
PROT SERPL-MCNC: 7.1 G/DL (ref 6.8–8.8)
PSA SERPL-MCNC: <0.01 UG/L (ref 0–4)
RBC # BLD AUTO: 3.33 10E12/L (ref 4.4–5.9)
SODIUM SERPL-SCNC: 137 MMOL/L (ref 133–144)
WBC # BLD AUTO: 3.2 10E9/L (ref 4–11)

## 2021-06-14 PROCEDURE — 36415 COLL VENOUS BLD VENIPUNCTURE: CPT | Performed by: INTERNAL MEDICINE

## 2021-06-14 PROCEDURE — 84460 ALANINE AMINO (ALT) (SGPT): CPT | Performed by: INTERNAL MEDICINE

## 2021-06-14 PROCEDURE — 82565 ASSAY OF CREATININE: CPT | Performed by: INTERNAL MEDICINE

## 2021-06-14 PROCEDURE — 84550 ASSAY OF BLOOD/URIC ACID: CPT | Performed by: INTERNAL MEDICINE

## 2021-06-14 PROCEDURE — 84153 ASSAY OF PSA TOTAL: CPT | Performed by: INTERNAL MEDICINE

## 2021-06-14 PROCEDURE — 80053 COMPREHEN METABOLIC PANEL: CPT | Performed by: INTERNAL MEDICINE

## 2021-06-14 PROCEDURE — 85025 COMPLETE CBC W/AUTO DIFF WBC: CPT | Performed by: INTERNAL MEDICINE

## 2021-06-14 NOTE — TELEPHONE ENCOUNTER
Please run uric acid order that is on the [facility] chart. Patient brought in order today.  It does not look like it has been run on either side.  Questions call Jonathon at [phone].

## 2021-06-14 NOTE — PROGRESS NOTES
Good morning, this patient came in for labs today with his after visit summary stating needs URIC ACID LAB. Please place future orders, blood has been drawn. Thanks.

## 2021-06-15 ENCOUNTER — APPOINTMENT (OUTPATIENT)
Dept: RADIATION THERAPY | Facility: OUTPATIENT CENTER | Age: 66
End: 2021-06-15
Payer: COMMERCIAL

## 2021-06-15 LAB
ALT SERPL W P-5'-P-CCNC: 27 U/L (ref 0–70)
CREAT SERPL-MCNC: 1.16 MG/DL (ref 0.66–1.25)
GFR SERPL CREATININE-BSD FRML MDRD: 65 ML/MIN/{1.73_M2}
URATE SERPL-MCNC: 6.3 MG/DL (ref 3.5–7.2)

## 2021-06-16 ENCOUNTER — OFFICE VISIT (OUTPATIENT)
Dept: RADIATION THERAPY | Facility: OUTPATIENT CENTER | Age: 66
End: 2021-06-16
Payer: COMMERCIAL

## 2021-06-16 ENCOUNTER — APPOINTMENT (OUTPATIENT)
Dept: RADIATION THERAPY | Facility: OUTPATIENT CENTER | Age: 66
End: 2021-06-16
Payer: COMMERCIAL

## 2021-06-16 VITALS — BODY MASS INDEX: 26.63 KG/M2 | WEIGHT: 201.8 LBS

## 2021-06-16 DIAGNOSIS — C61 PROSTATE CANCER (H): Primary | ICD-10-CM

## 2021-06-16 NOTE — LETTER
2021         RE: Jonathon Santos  [address] [address] [place] [phone]        Dear Colleague,    Thank you for referring your patient, Jonathon Santos, to the [facility]. Please see a copy of my visit note below.    [facility]  SPECIALIZING IN BREAKTHROUGHS  Radiation Oncology    On Treatment Visit Note      Jonathon Santos      Date: 2021   MRN: [id number]   : 1955  Diagnosis: Prostate cancer      Reason for Visit:  On Radiation Treatment Visit     Treatment Summary to Date  Treatment Site: pelvis Current Dose: 4000/7920 cGy Fractions:       Chemotherapy  Chemo concurrent with radx?: No    Subjective:   Doing okay. No acute trouble. Very mild GI bother. Some anal irritation from frequency of bowel movement. Mild  bother. Energy adequate.     Nursing ROS:   Nutrition Alteration  Diet Type: Patient's Preference           Cardiovascular  Respiratory effort: 1 - Normal - without distress     Genitourinary  Urinary Status: 0 - Normal  Dysuria: 0 - None     Pain Assessment  0-10 Pain Scale: 0      Objective:   Wt 91.5 kg (201 lb 12.8 oz)   BMI 26.63 kg/m     NAD    Labs:  CBC RESULTS:   Recent Labs   Lab Test 21  1116   WBC 10.3   RBC 4.42   HGB 15.0   HCT 43.2   MCV 98   MCH 33.9*   MCHC 34.7   RDW 13.5        ELECTROLYTES:  Recent Labs   Lab Test 21  1116   *   POTASSIUM 3.6   CHLORIDE 96   MISTY 8.5   CO2 25   BUN 21   CR 1.26*   *       Assessment:   Tom is a 66 year old male with newly diagnosed prostate cancer, pre-treatment PSA 8.18, highest GS was 4+5=9. Staging evaluation noted suspicious pelvic and Jazzy nodes concerning for regional vs M1 disease spread. He is undergoing definitive RT + ADT plus Abiraterone.     Tolerating radiation therapy well.  All questions and concerns addressed.    Plan:   1. Continue current therapy.   2. GI bother. Imodium PRN.   3.  bother. Ibuprofen PRN.   4. Anal irritation. Baby wipes, Sitz  baths.      Mosaiq chart and setup information reviewed  Ports checked         Educational Topic Discussed  Education Instructions: reivewed potential SE to expect during radiation      Davion Francois MD

## 2021-06-16 NOTE — PROGRESS NOTES
[facility]  SPECIALIZING IN BREAKTHROUGHS  Radiation Oncology    On Treatment Visit Note      Jonathon Santos      Date: 2021   MRN: [id number]   : 1955  Diagnosis: Prostate cancer      Reason for Visit:  On Radiation Treatment Visit     Treatment Summary to Date  Treatment Site: pelvis Current Dose: 4000/7920 cGy Fractions:       Chemotherapy  Chemo concurrent with radx?: No    Subjective:   Doing okay. No acute trouble. Very mild GI bother. Some anal irritation from frequency of bowel movement. Mild  bother. Energy adequate.     Nursing ROS:   Nutrition Alteration  Diet Type: Patient's Preference           Cardiovascular  Respiratory effort: 1 - Normal - without distress     Genitourinary  Urinary Status: 0 - Normal  Dysuria: 0 - None     Pain Assessment  0-10 Pain Scale: 0      Objective:   Wt 91.5 kg (201 lb 12.8 oz)   BMI 26.63 kg/m     NAD    Labs:  CBC RESULTS:   Recent Labs   Lab Test 21  1116   WBC 10.3   RBC 4.42   HGB 15.0   HCT 43.2   MCV 98   MCH 33.9*   MCHC 34.7   RDW 13.5        ELECTROLYTES:  Recent Labs   Lab Test 21  1116   *   POTASSIUM 3.6   CHLORIDE 96   MISTY 8.5   CO2 25   BUN 21   CR 1.26*   *       Assessment:   Tom is a 66 year old male with newly diagnosed prostate cancer, pre-treatment PSA 8.18, highest GS was 4+5=9. Staging evaluation noted suspicious pelvic and Jazzy nodes concerning for regional vs M1 disease spread. He is undergoing definitive RT + ADT plus Abiraterone.     Tolerating radiation therapy well.  All questions and concerns addressed.    Plan:   1. Continue current therapy.   2. GI bother. Imodium PRN.   3.  bother. Ibuprofen PRN.   4. Anal irritation. Baby wipes, Sitz baths.      Mosaiq chart and setup information reviewed  Ports checked         Educational Topic Discussed  Education Instructions: reivewed potential SE to expect during radiation      Davion Francois MD

## 2021-06-17 ENCOUNTER — APPOINTMENT (OUTPATIENT)
Dept: RADIATION THERAPY | Facility: OUTPATIENT CENTER | Age: 66
End: 2021-06-17
Payer: COMMERCIAL

## 2021-06-17 NOTE — TELEPHONE ENCOUNTER
Pt notified that lab orders were faxed to [facility] and that Uloric was sent to [facility] yesterday. Pt also informed we do have his lab results from 4/13/21

## 2021-06-17 NOTE — TELEPHONE ENCOUNTER
Pt is unable to get labs this week but Pt scheduled for lab at the [facility] lab for 5/7/21 and 7/13/21 for labs. Pt states he had uric acid level check on 4/13/21 and was not sure if Dr. Mcpherson saw this?    Please fax over Dr. Mcpherson's lab orders to [facility] Fax: [phone]    Pt is scheduled for a 3 month F/U video visit on 7/26/21 with Dr. Mcpherson.

## 2021-06-17 NOTE — TELEPHONE ENCOUNTER
LMTCB patient needs labs this week and follow up video visit in 3 months with labs 1 week prior with Dr Mcpherson

## 2021-06-17 NOTE — PROGRESS NOTES
Jonathon Santos is a 66 y.o. male who is being evaluated via a billable video visit.      How would you like to obtain your AVS? Mail a copy.  If dropped from the video visit, the video invitation should be resent by: Send to e-mail at: [email or web address]  Will anyone else be joining your video visit? No      Video Start Time: 10:18am  Video-Visit Details    Type of service:  Video Visit    Video End Time (time video stopped): 10:49 AM  Originating Location (pt. Location): Home    Distant Location (provider location):  [facility]     Platform used for Video Visit: [employer]     This document was created using a software with less than 100% fidelity, at times resulting in unintended, even erroneous syntax and grammar.  The reader is advised to keep this under consideration while reviewing, interpreting this note.    ASSESSMENT AND PLAN:  Jonathon Santos 66 y.o. 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  We will check labs, we discussed potential for abnormal liver function studies, he has had abnormal ALT in the past, the recent concern around increased coronary events and then further studies suggesting that not been the case were reviewed.  Follow-up in 3 months with labs today and just prior to his next visit.      .Diagnoses and all orders for this visit:    Idiopathic chronic gout, multiple sites, with tophus (tophi)  -     febuxostat (ULORIC) 40 mg tablet; Take 1 tablet (40 mg total) by mouth daily.  Dispense: 90 tablet; Refill: 0  -     Uric Acid; Standing  -     HM2(CBC w/o Differential); Standing  -     Creatinine; Standing  -     ALT (SGPT); Standing    Primary prostate malignancy (H)      HISTORY OF PRESENTING ILLNESS:  Jonathon Santos, 66 y.o., male is here for establishment of care for  his crystal proven tophaceous gout.  Other reasons as he perceives is that of communication with his prior rheumatology team.  With regards to the diagnosis of gout symptoms, this is longstanding.  This is gone back to 2012.  Initially there was a question if he may have rheumatoid arthritis.  However later on it was concluded that it was gout and not REM.  He was started on allopurinol that he tolerated well.  Then he developed prostate cancer.  He was started on chemotherapy.  With that he thought the allopurinol would not cause more side effects such as GI, sneezing, sinus congestion, after considerable back-and-forth discussion as he puts it he was asked to take Uloric.  Evidently the there was a coverage issue.  In the end he decided not to continue to follow-up with the rheumatologist.  Now he reports that he has not had a flareup of gout going back almost several months now.  His most recent serum urate he remembers had gone up since discontinuation of his urate lowering treatment.  He reports no kidney stones..  He has noted no history of psoriasis ulcerative colitis Crohn's disease himself or the family.  Currently besides chemotherapy for prostate cancer including Lupron he is on low-dose prednisone at 5 mg daily.    ALLERGIES:Patient has no known allergies.    PAST MEDICAL/ACTIVE PROBLEMS/MEDICATION/ FAMILY HISTORY/SOCIAL DATA:  The patient has a family history of  No past medical history on file.  Social History     Tobacco Use   Smoking Status Not on file     Patient Active Problem List   Diagnosis     Hyperlipidemia     Gout     Hypertension     Rheumatoid arthritis (H)     Polyarthritis Of The Hand     Current Outpatient Medications   Medication Sig Dispense Refill     lisinopriL-hydrochlorothiazide (PRINZIDE,ZESTORETIC) 20-12.5 mg per tablet Take 2 tablets by mouth daily.       metoprolol succinate (TOPROL-XL) 25 MG Take 1 tablet by mouth daily.       predniSONE (DELTASONE) 5 MG tablet Take 1 tablet  by mouth daily.       UNABLE TO FIND Med Name: abirterone- take 1000mg daily       No current facility-administered medications for this visit.        COMPREHENSIVE EXAMINATION:  There were no vitals filed for this visit.  A well appearing alert oriented male. Well appearing alert oriented.   Examination of the eyes revealed no redness, obvious scleromalacia.  ENT examination shows no nasal deformity such as of saddle type, external ear without signs of inflamm/deformity ation.  Cardiopulmonary examination without obvious signs of dyspnea, no wheezing is audible, no cyanosis.  There is no finger clubbing.  Skin examination performed for heliotrope, malar area eruption periungual erythema, lupus pernio.  Neurological examination shows the speech is fluent, no facial asymmetry, muscle power in the upper extremities proximally appears to be normal.  In the psychiatric examination the memory, orientation, attention, affect were observable and normal.  Joint examination of the DIPs, PIPs, MCPs, IP joints of the thumbs, wrists, elbows, for swelling, range of motion, for shoulders range of motion evaluated.  He has nodules on his olecranon area, predominantly the left but to lesser extent right middle finger PIP shows deformity, swelling.  He does not have dactylitis of the digits.    LAB / IMAGING DATA:  ALT   Date Value Ref Range Status   08/26/2013 45 12 - 78 U/L Final     Comment:     New ALT test method with new reference range as of 6/4/12     Albumin   Date Value Ref Range Status   08/26/2013 4.0 3.4 - 5.0 g/dL Final     Creatinine   Date Value Ref Range Status   03/31/2014 1.06 0.70 - 1.30 mg/dL Final   08/26/2013 1.3 0.60 - 1.30 mg/dL Final   05/20/2013 1.11 0.70 - 1.30 mg/dL Final       WBC   Date Value Ref Range Status   03/31/2014 5.7 4.0 - 11.0 thou/uL Final   05/20/2013 6.4 4.0 - 11.0 thou/uL Final     Hemoglobin   Date Value Ref Range Status   03/31/2014 14.5 14.0 - 18.0 g/dL Final   05/20/2013 15.1 14.0 -  18.0 g/dL Final     Platelets   Date Value Ref Range Status   03/31/2014 164 140 - 440 thou/uL Final   05/20/2013 181 140 - 440 thou/uL Final       Lab Results   Component Value Date    RF <15 05/20/2013    SEDRATE 10 08/26/2013

## 2021-06-18 ENCOUNTER — TELEPHONE (OUTPATIENT)
Dept: ONCOLOGY | Facility: CLINIC | Age: 66
End: 2021-06-18

## 2021-06-18 ENCOUNTER — APPOINTMENT (OUTPATIENT)
Dept: RADIATION THERAPY | Facility: OUTPATIENT CENTER | Age: 66
End: 2021-06-18
Payer: COMMERCIAL

## 2021-06-18 NOTE — ORAL ONC MGMT
Oral Chemotherapy Monitoring Program    Subjective/Objective:  Jonathon Santos is a 66 year old male contacted by phone for a follow-up visit for oral chemotherapy.  Jonathon continues to take abiraterone 1000 mg daily along with prednisone. He is tolerating this well overall, but having mild fatigue. He states he is still able to perform his daily activities in general, and we discussed staying active as feasible. Recent medication changes include starting metoprolol and loperamide. Loperamide does not interact with abiraterone. Abiraterone may increase metoprolol concentrations - this is unlikely to be a significant interaction since he just started the metoprolol and is on the lowest dose. We also discussed labs from 6/14, which showed lower Hgb, WBC, and platelets than last month. Dr. Cisneros aware, and we will update Jonathon if he needs labs rechecked any sooner than next month.    ORAL CHEMOTHERAPY 3/26/2021 4/20/2021 5/1/2021 5/15/2021 5/19/2021 5/21/2021 6/18/2021   Assessment Type Refill Chart Review Chart Review Lab Monitoring Monthly Follow up Refill Lab Monitoring   Diagnosis Code Prostate Cancer Prostate Cancer Prostate Cancer Prostate Cancer Prostate Cancer Prostate Cancer Prostate Cancer   Providers Dr. Blayne Cisneros   [facility]   Drug Name Zytiga (abiraterone) Zytiga (abiraterone) Zytiga (abiraterone) Zytiga (abiraterone) Zytiga (abiraterone) Zytiga (abiraterone) Zytiga (abiraterone)   Dose 1,000 mg 1,000 mg 1,000 mg 1,000 mg 1,000 mg 1,000 mg 1,000 mg   Current Schedule Daily Daily Daily Daily Daily Daily Daily   Cycle Details Continuous Continuous Continuous Continuous Continuous Continuous Continuous   Start Date of Last Cycle - - - - - - -   Doses missed in last 2 weeks - - - - 0 - -   Adherence Assessment - - - - Adherent - -   Adverse Effects - - - Increased AST/ALT/T BILI Nausea - -   Nausea -  "- - - Grade 1 - -   Pharmacist Intervention(nausea) - - - - No - -   Pharmacist Intervention(increased ast/alt/t bili) - - - Yes - - -   Intervention(s) - - - Referral to oncology provider - - -   Home BPs - - - - - - -   Any new drug interactions? - - - - No - -   Pharmacist Intervention? - - - - - - -   Intervention(s) - - - - - - -   Is the dose as ordered appropriate for the patient? - - - - Yes - -   Since the last time we talked, have you been hospitalized or used the emergency room? - - - - - - -       Last PHQ-2 Score on record:   PHQ-2 ( 1999 [employer]) 4/9/2021 1/4/2021   Q1: Little interest or pleasure in doing things 0 0   Q2: Feeling down, depressed or hopeless 0 0   PHQ-2 Score 0 0   Q1: Little interest or pleasure in doing things - Not at all   Q2: Feeling down, depressed or hopeless - Not at all   PHQ-2 Score - 0       Vitals:  BP:   BP Readings from Last 1 Encounters:   06/09/21 (!) 159/90     Wt Readings from Last 1 Encounters:   06/16/21 91.5 kg (201 lb 12.8 oz)     Estimated body surface area is 2.17 meters squared as calculated from the following:    Height as of 4/30/21: 1.854 m (6' 0.99\").    Weight as of 6/16/21: 91.5 kg (201 lb 12.8 oz).    Labs:  _  Result Component Current Result Ref Range   Sodium 137 (6/14/2021) 133 - 144 mmol/L     _  Result Component Current Result Ref Range   Potassium 3.3 (L) (6/14/2021) 3.4 - 5.3 mmol/L     _  Result Component Current Result Ref Range   Calcium 9.0 (6/14/2021) 8.5 - 10.1 mg/dL     No results found for Mag within last 30 days.     No results found for Phos within last 30 days.     _  Result Component Current Result Ref Range   Albumin 3.7 (6/14/2021) 3.4 - 5.0 g/dL     _  Result Component Current Result Ref Range   Urea Nitrogen 17 (6/14/2021) 7 - 30 mg/dL     _  Result Component Current Result Ref Range   Creatinine 1.16 (6/14/2021) 0.66 - 1.25 mg/dL    1.13 (6/14/2021) 0.66 - 1.25 mg/dL     _  Result Component Current Result Ref Range   AST 25 " (6/14/2021) 0 - 45 U/L     _  Result Component Current Result Ref Range   ALT 27 (6/14/2021) 0 - 70 U/L    27 (6/14/2021) 0 - 70 U/L     _  Result Component Current Result Ref Range   Bilirubin Total 1.0 (6/14/2021) 0.2 - 1.3 mg/dL     _  Result Component Current Result Ref Range   WBC 3.2 (L) (6/14/2021) 4.0 - 11.0 10e9/L     _  Result Component Current Result Ref Range   Hemoglobin 11.4 (L) (6/14/2021) 13.3 - 17.7 g/dL     _  Result Component Current Result Ref Range   Platelet Count 83 (L) (6/14/2021) 150 - 450 10e9/L     _  Result Component Current Result Ref Range   Absolute Neutrophil 2.2 (6/14/2021) 1.6 - 8.3 10e9/L         Assessment/Plan:  Jonathon is tolerating abiraterone well overall, with minor fatigue. WBC, Hgb, and platelets lower this month - Dr. Cisneros made aware     Follow-Up:  7/12: [facility] appt  7/13: monthly labs    Refill Due:  [facility] will deliver abiraterone and prednisone 6/22    Gaby García, PharmD, BCOP  Hematology/Oncology Clinical Pharmacist  [facility]  [facility]  [phone]

## 2021-06-21 ENCOUNTER — APPOINTMENT (OUTPATIENT)
Dept: RADIATION THERAPY | Facility: OUTPATIENT CENTER | Age: 66
End: 2021-06-21
Payer: COMMERCIAL

## 2021-06-22 ENCOUNTER — APPOINTMENT (OUTPATIENT)
Dept: RADIATION THERAPY | Facility: OUTPATIENT CENTER | Age: 66
End: 2021-06-22
Payer: COMMERCIAL

## 2021-06-23 ENCOUNTER — OFFICE VISIT (OUTPATIENT)
Dept: RADIATION THERAPY | Facility: OUTPATIENT CENTER | Age: 66
End: 2021-06-23
Payer: COMMERCIAL

## 2021-06-23 ENCOUNTER — APPOINTMENT (OUTPATIENT)
Dept: RADIATION THERAPY | Facility: OUTPATIENT CENTER | Age: 66
End: 2021-06-23
Payer: COMMERCIAL

## 2021-06-23 VITALS
DIASTOLIC BLOOD PRESSURE: 85 MMHG | HEART RATE: 82 BPM | WEIGHT: 202 LBS | OXYGEN SATURATION: 99 % | RESPIRATION RATE: 18 BRPM | SYSTOLIC BLOOD PRESSURE: 143 MMHG | BODY MASS INDEX: 26.66 KG/M2

## 2021-06-23 DIAGNOSIS — C61 PROSTATE CANCER (H): Primary | ICD-10-CM

## 2021-06-23 ASSESSMENT — PAIN SCALES - GENERAL: PAINLEVEL: NO PAIN (0)

## 2021-06-23 NOTE — PROGRESS NOTES
[facility]  SPECIALIZING IN BREAKTHROUGHS  Radiation Oncology    On Treatment Visit Note      Jonathon Santos      Date: 2021   MRN: [id number]   : 1955  Diagnosis: Prostate cancer      Reason for Visit:  On Radiation Treatment Visit     Treatment Summary to Date  Treatment Site: pelvis Current Dose: 5000/7920 cGy Fractions:       Chemotherapy  Chemo concurrent with radx?: No    Subjective:   Doing okay. No acute trouble. Very mild GI bother. Using imodium 1 tablet every other day. Some anal irritation from frequency of bowel movement. Mild  bother. Energy adequate.     WBC 3.2, Hgb 11.4, Platelet count 83 on 21. Following with Dr. Cisneros's team.    Nursing ROS:   Nutrition Alteration  Diet Type: Patient's Preference           Cardiovascular  Respiratory effort: 1 - Normal - without distress     Genitourinary  Urinary Status: 0 - Normal  Dysuria: 0 - None     Pain Assessment  0-10 Pain Scale: 0      Objective:   BP (!) 143/85   Pulse 82   Resp 18   Wt 91.6 kg (202 lb)   SpO2 99%   BMI 26.66 kg/m     NAD    Labs:  CBC RESULTS:   Recent Labs   Lab Test 21  1116   WBC 10.3   RBC 4.42   HGB 15.0   HCT 43.2   MCV 98   MCH 33.9*   MCHC 34.7   RDW 13.5        ELECTROLYTES:  Recent Labs   Lab Test 21  1116   *   POTASSIUM 3.6   CHLORIDE 96   MISTY 8.5   CO2 25   BUN 21   CR 1.26*   *       Assessment:   Tom is a 66 year old male with newly diagnosed prostate cancer, pre-treatment PSA 8.18, highest GS was 4+5=9. Staging evaluation noted suspicious pelvic and Jazzy nodes concerning for regional vs M1 disease spread. He is undergoing definitive RT + ADT plus Abiraterone.     Tolerating radiation therapy well.  All questions and concerns addressed.    Plan:   1. Continue current therapy.   2. GI bother. Imodium PRN.   3.  bother. No ibuprofen given low platelet count. Consider flomax if needed.    4. Anal irritation. Baby wipes, Sitz baths.      Mosaiq chart  and setup information reviewed  Ports checked         Educational Topic Discussed  Education Instructions: reivewed potential SE to expect during radiation      Davion Francois MD

## 2021-06-23 NOTE — LETTER
2021         RE: Jonathon Santos  [address] [place] [id number]        Dear Colleague,    Thank you for referring your patient, Jonathon Santos, to the [facility]. Please see a copy of my visit note below.    [facility]  SPECIALIZING IN BREAKTHROUGHS  Radiation Oncology    On Treatment Visit Note      Jonathon Santos      Date: 2021   MRN: [id number]   : 1955  Diagnosis: Prostate cancer      Reason for Visit:  On Radiation Treatment Visit     Treatment Summary to Date  Treatment Site: pelvis Current Dose: 5000/7920 cGy Fractions:       Chemotherapy  Chemo concurrent with radx?: No    Subjective:   Doing okay. No acute trouble. Very mild GI bother. Using imodium 1 tablet every other day. Some anal irritation from frequency of bowel movement. Mild  bother. Energy adequate.     WBC 3.2, Hgb 11.4, Platelet count 83 on 21. Following with Dr. Cisneros's team.    Nursing ROS:   Nutrition Alteration  Diet Type: Patient's Preference           Cardiovascular  Respiratory effort: 1 - Normal - without distress     Genitourinary  Urinary Status: 0 - Normal  Dysuria: 0 - None     Pain Assessment  0-10 Pain Scale: 0      Objective:   BP (!) 143/85   Pulse 82   Resp 18   Wt 91.6 kg (202 lb)   SpO2 99%   BMI 26.66 kg/m     NAD    Labs:  CBC RESULTS:   Recent Labs   Lab Test 21  1116   WBC 10.3   RBC 4.42   HGB 15.0   HCT 43.2   MCV 98   MCH 33.9*   MCHC 34.7   RDW 13.5        ELECTROLYTES:  Recent Labs   Lab Test 21  1116   *   POTASSIUM 3.6   CHLORIDE 96   MISTY 8.5   CO2 25   BUN 21   CR 1.26*   *       Assessment:   Tom is a 66 year old male with newly diagnosed prostate cancer, pre-treatment PSA 8.18, highest GS was 4+5=9. Staging evaluation noted suspicious pelvic and Jazzy nodes concerning for regional vs M1 disease spread. He is undergoing definitive RT + ADT plus Abiraterone.     Tolerating radiation therapy well.  All questions and  concerns addressed.    Plan:   1. Continue current therapy.   2. GI bother. Imodium PRN.   3.  bother. No ibuprofen given low platelet count. Consider flomax if needed.    4. Anal irritation. Baby wipes, Sitz baths.      Mosaiq chart and setup information reviewed  Ports checked         Educational Topic Discussed  Education Instructions: reivewed potential SE to expect during radiation      Davion Francois MD

## 2021-06-24 ENCOUNTER — APPOINTMENT (OUTPATIENT)
Dept: RADIATION THERAPY | Facility: OUTPATIENT CENTER | Age: 66
End: 2021-06-24
Payer: COMMERCIAL

## 2021-06-25 ENCOUNTER — APPOINTMENT (OUTPATIENT)
Dept: RADIATION THERAPY | Facility: OUTPATIENT CENTER | Age: 66
End: 2021-06-25
Payer: COMMERCIAL

## 2021-06-28 ENCOUNTER — APPOINTMENT (OUTPATIENT)
Dept: RADIATION THERAPY | Facility: OUTPATIENT CENTER | Age: 66
End: 2021-06-28
Payer: COMMERCIAL

## 2021-06-29 ENCOUNTER — APPOINTMENT (OUTPATIENT)
Dept: RADIATION THERAPY | Facility: OUTPATIENT CENTER | Age: 66
End: 2021-06-29
Payer: COMMERCIAL

## 2021-06-30 ENCOUNTER — OFFICE VISIT (OUTPATIENT)
Dept: RADIATION THERAPY | Facility: OUTPATIENT CENTER | Age: 66
End: 2021-06-30
Payer: COMMERCIAL

## 2021-06-30 ENCOUNTER — APPOINTMENT (OUTPATIENT)
Dept: RADIATION THERAPY | Facility: OUTPATIENT CENTER | Age: 66
End: 2021-06-30
Payer: COMMERCIAL

## 2021-06-30 VITALS
OXYGEN SATURATION: 98 % | DIASTOLIC BLOOD PRESSURE: 88 MMHG | BODY MASS INDEX: 25.36 KG/M2 | WEIGHT: 192.2 LBS | SYSTOLIC BLOOD PRESSURE: 149 MMHG | RESPIRATION RATE: 18 BRPM | HEART RATE: 71 BPM

## 2021-06-30 DIAGNOSIS — C61 PROSTATE CANCER (H): Primary | ICD-10-CM

## 2021-06-30 RX ORDER — TAMSULOSIN HYDROCHLORIDE 0.4 MG/1
0.4 CAPSULE ORAL DAILY
Qty: 30 CAPSULE | Refills: 3 | Status: SHIPPED | OUTPATIENT
Start: 2021-06-30 | End: 2021-07-12

## 2021-06-30 ASSESSMENT — PAIN SCALES - GENERAL: PAINLEVEL: NO PAIN (0)

## 2021-06-30 NOTE — LETTER
2021         RE: Jonathon Santos  [address] [address] [facility] [address]        Dear Colleague,    Thank you for referring your patient, Jonathon Santos, to the [facility]. Please see a copy of my visit note below.    [facility]  SPECIALIZING IN BREAKTHROUGHS  Radiation Oncology    On Treatment Visit Note      Jonathon Santos      Date: 2021   MRN: [id number]   : 1955  Diagnosis: Prostate cancer      Reason for Visit:  On Radiation Treatment Visit     Treatment Summary to Date  Treatment Site: pelvis Current Dose: 5940/7920 cGy Fractions:      Chemotherapy  Chemo concurrent with radx?: No    Subjective:   Doing okay. No acute trouble. Very mild GI bother. Using imodium 1-1.5 tablet every other day. Some anal irritation from frequency of bowel movement. Mild  bother. Slightly reduced urinary flow. Energy adequate.     WBC 3.2, Hgb 11.4, Platelet count 83 on 21. Following with Dr. Cisneros's team.    Nursing ROS:   Nutrition Alteration  Diet Type: Patient's Preference           Cardiovascular  Respiratory effort: 1 - Normal - without distress     Genitourinary  Urinary Status: 0 - Normal  Dysuria: 0 - None     Pain Assessment  0-10 Pain Scale: 0      Objective:   BP (!) 149/88   Pulse 71   Resp 18   Wt 87.2 kg (192 lb 3.2 oz)   SpO2 98%   BMI 25.36 kg/m     NAD    Labs:  CBC RESULTS:   Recent Labs   Lab Test 21  1116   WBC 10.3   RBC 4.42   HGB 15.0   HCT 43.2   MCV 98   MCH 33.9*   MCHC 34.7   RDW 13.5        ELECTROLYTES:  Recent Labs   Lab Test 21  1116   *   POTASSIUM 3.6   CHLORIDE 96   MISTY 8.5   CO2 25   BUN 21   CR 1.26*   *       Assessment:   Tom is a 66 year old male with newly diagnosed prostate cancer, pre-treatment PSA 8.18, highest GS was 4+5=9. Staging evaluation noted suspicious pelvic and Jazzy nodes concerning for regional vs M1 disease spread. He is undergoing definitive RT + ADT plus Abiraterone.     Tolerating  radiation therapy well.  All questions and concerns addressed.    Plan:   1. Continue current therapy.   2. GI bother. Imodium PRN.   3.  bother. No ibuprofen given low platelet count. Trial of flomax daily.    4. Anal irritation. Baby wipes, Sitz baths.      Mosaiq chart and setup information reviewed  Ports checked         Educational Topic Discussed  Education Instructions: reivewed potential SE to expect during radiation      Davion Francois MD

## 2021-06-30 NOTE — PROGRESS NOTES
[facility]  SPECIALIZING IN BREAKTHROUGHS  Radiation Oncology    On Treatment Visit Note      Jonathon Santos      Date: 2021   MRN: [id number]   : 1955  Diagnosis: Prostate cancer      Reason for Visit:  On Radiation Treatment Visit     Treatment Summary to Date  Treatment Site: pelvis Current Dose: 5940/7920 cGy Fractions:      Chemotherapy  Chemo concurrent with radx?: No    Subjective:   Doing okay. No acute trouble. Very mild GI bother. Using imodium 1-1.5 tablet every other day. Some anal irritation from frequency of bowel movement. Mild  bother. Slightly reduced urinary flow. Energy adequate.     WBC 3.2, Hgb 11.4, Platelet count 83 on 21. Following with Dr. Cisneros's team.    Nursing ROS:   Nutrition Alteration  Diet Type: Patient's Preference           Cardiovascular  Respiratory effort: 1 - Normal - without distress     Genitourinary  Urinary Status: 0 - Normal  Dysuria: 0 - None     Pain Assessment  0-10 Pain Scale: 0      Objective:   BP (!) 149/88   Pulse 71   Resp 18   Wt 87.2 kg (192 lb 3.2 oz)   SpO2 98%   BMI 25.36 kg/m     NAD    Labs:  CBC RESULTS:   Recent Labs   Lab Test 21  1116   WBC 10.3   RBC 4.42   HGB 15.0   HCT 43.2   MCV 98   MCH 33.9*   MCHC 34.7   RDW 13.5        ELECTROLYTES:  Recent Labs   Lab Test 21  1116   *   POTASSIUM 3.6   CHLORIDE 96   MISTY 8.5   CO2 25   BUN 21   CR 1.26*   *       Assessment:   Tom is a 66 year old male with newly diagnosed prostate cancer, pre-treatment PSA 8.18, highest GS was 4+5=9. Staging evaluation noted suspicious pelvic and Jazzy nodes concerning for regional vs M1 disease spread. He is undergoing definitive RT + ADT plus Abiraterone.     Tolerating radiation therapy well.  All questions and concerns addressed.    Plan:   1. Continue current therapy.   2. GI bother. Imodium PRN.   3.  bother. No ibuprofen given low platelet count. Trial of flomax daily.    4. Anal irritation. Baby  wipes, Sitz baths.      Mosaiq chart and setup information reviewed  Ports checked         Educational Topic Discussed  Education Instructions: reivewed potential SE to expect during radiation      Davion Francois MD

## 2021-07-01 ENCOUNTER — APPOINTMENT (OUTPATIENT)
Dept: RADIATION THERAPY | Facility: OUTPATIENT CENTER | Age: 66
End: 2021-07-01
Payer: COMMERCIAL

## 2021-07-02 ENCOUNTER — APPOINTMENT (OUTPATIENT)
Dept: RADIATION THERAPY | Facility: OUTPATIENT CENTER | Age: 66
End: 2021-07-02
Payer: COMMERCIAL

## 2021-07-05 ENCOUNTER — APPOINTMENT (OUTPATIENT)
Dept: RADIATION THERAPY | Facility: OUTPATIENT CENTER | Age: 66
End: 2021-07-05
Payer: COMMERCIAL

## 2021-07-06 ENCOUNTER — APPOINTMENT (OUTPATIENT)
Dept: RADIATION THERAPY | Facility: OUTPATIENT CENTER | Age: 66
End: 2021-07-06
Payer: COMMERCIAL

## 2021-07-07 ENCOUNTER — APPOINTMENT (OUTPATIENT)
Dept: RADIATION THERAPY | Facility: OUTPATIENT CENTER | Age: 66
End: 2021-07-07
Payer: COMMERCIAL

## 2021-07-07 ENCOUNTER — OFFICE VISIT (OUTPATIENT)
Dept: RADIATION THERAPY | Facility: OUTPATIENT CENTER | Age: 66
End: 2021-07-07
Payer: COMMERCIAL

## 2021-07-07 VITALS
BODY MASS INDEX: 26.97 KG/M2 | WEIGHT: 204.4 LBS | SYSTOLIC BLOOD PRESSURE: 153 MMHG | HEART RATE: 75 BPM | DIASTOLIC BLOOD PRESSURE: 90 MMHG

## 2021-07-07 DIAGNOSIS — C61 PROSTATE CANCER (H): Primary | ICD-10-CM

## 2021-07-07 NOTE — LETTER
2021         RE: Jonathon Santos  [address] [address] [place] [id number]        Dear Colleague,    Thank you for referring your patient, Jonathon Santos, to the [facility]. Please see a copy of my visit note below.    [facility]  SPECIALIZING IN BREAKTHROUGHS  Radiation Oncology    On Treatment Visit Note      Jonathon Santos      Date: 2021   MRN: [id number]   : 1955  Diagnosis: Prostate cancer      Reason for Visit:  On Radiation Treatment Visit     Treatment Summary to Date  Treatment Site: pelvis Current Dose: 6840/7920 cGy Fractions: 35/41     Chemotherapy  Chemo concurrent with radx?: No    Subjective:   Doing okay. No acute trouble. Very mild GI bother. Using imodium 1-1.5 tablet every other day. Some anal irritation from frequency of bowel movement. Mild  bother. Slightly reduced urinary flow. Energy adequate.     WBC 3.2, Hgb 11.4, Platelet count 83 on 21. Following with Dr. Cisneros's team.    Nursing ROS:   Nutrition Alteration  Diet Type: Patient's Preference           Cardiovascular  Respiratory effort: 1 - Normal - without distress     Genitourinary  Urinary Status: 0 - Normal  Dysuria: 0 - None     Pain Assessment  0-10 Pain Scale: 0      Objective:   BP (!) 153/90   Pulse 75   Wt 92.7 kg (204 lb 6.4 oz)   BMI 26.97 kg/m     NAD    Labs:  CBC RESULTS:   Recent Labs   Lab Test 21  1116   WBC 10.3   RBC 4.42   HGB 15.0   HCT 43.2   MCV 98   MCH 33.9*   MCHC 34.7   RDW 13.5        ELECTROLYTES:  Recent Labs   Lab Test 21  1116   *   POTASSIUM 3.6   CHLORIDE 96   MISTY 8.5   CO2 25   BUN 21   CR 1.26*   *       Assessment:   Tom is a 66 year old male with newly diagnosed prostate cancer, pre-treatment PSA 8.18, highest GS was 4+5=9. Staging evaluation noted suspicious pelvic and Jazzy nodes concerning for regional vs M1 disease spread. He is undergoing definitive RT + ADT plus Abiraterone.     Tolerating radiation therapy well.   All questions and concerns addressed.    Plan:   1. Continue current therapy.   2. GI bother. Imodium PRN.   3.  bother. No ibuprofen given low platelet count. Flomax sent to pharmacy, patient has not tried yet.    4. Anal irritation. Baby wipes, Sitz baths.       Mosaiq chart and setup information reviewed  Ports checked         Educational Topic Discussed  Education Instructions: reivewed potential SE to expect during radiation      Davion Francois MD

## 2021-07-07 NOTE — PROGRESS NOTES
[facility]  SPECIALIZING IN BREAKTHROUGHS  Radiation Oncology    On Treatment Visit Note      Jonathon Santos      Date: 2021   MRN: [id number]   : 1955  Diagnosis: Prostate cancer      Reason for Visit:  On Radiation Treatment Visit     Treatment Summary to Date  Treatment Site: pelvis Current Dose: 6840/7920 cGy Fractions: 35/41     Chemotherapy  Chemo concurrent with radx?: No    Subjective:   Doing okay. No acute trouble. Very mild GI bother. Using imodium 1-1.5 tablet every other day. Some anal irritation from frequency of bowel movement. Mild  bother. Slightly reduced urinary flow. Energy adequate.     WBC 3.2, Hgb 11.4, Platelet count 83 on 21. Following with Dr. Cisneros's team.    Nursing ROS:   Nutrition Alteration  Diet Type: Patient's Preference           Cardiovascular  Respiratory effort: 1 - Normal - without distress     Genitourinary  Urinary Status: 0 - Normal  Dysuria: 0 - None     Pain Assessment  0-10 Pain Scale: 0      Objective:   BP (!) 153/90   Pulse 75   Wt 92.7 kg (204 lb 6.4 oz)   BMI 26.97 kg/m     NAD    Labs:  CBC RESULTS:   Recent Labs   Lab Test 21  1116   WBC 10.3   RBC 4.42   HGB 15.0   HCT 43.2   MCV 98   MCH 33.9*   MCHC 34.7   RDW 13.5        ELECTROLYTES:  Recent Labs   Lab Test 21  1116   *   POTASSIUM 3.6   CHLORIDE 96   MISTY 8.5   CO2 25   BUN 21   CR 1.26*   *       Assessment:   Tom is a 66 year old male with newly diagnosed prostate cancer, pre-treatment PSA 8.18, highest GS was 4+5=9. Staging evaluation noted suspicious pelvic and Jazzy nodes concerning for regional vs M1 disease spread. He is undergoing definitive RT + ADT plus Abiraterone.     Tolerating radiation therapy well.  All questions and concerns addressed.    Plan:   1. Continue current therapy.   2. GI bother. Imodium PRN.   3.  bother. No ibuprofen given low platelet count. Flomax sent to pharmacy, patient has not tried yet.    4. Anal irritation.  Baby wipes, Sitz baths.       Mosaiq chart and setup information reviewed  Ports checked         Educational Topic Discussed  Education Instructions: reivewed potential SE to expect during radiation      Davion Francois MD

## 2021-07-08 ENCOUNTER — APPOINTMENT (OUTPATIENT)
Dept: RADIATION THERAPY | Facility: OUTPATIENT CENTER | Age: 66
End: 2021-07-08
Payer: COMMERCIAL

## 2021-07-09 ENCOUNTER — APPOINTMENT (OUTPATIENT)
Dept: RADIATION THERAPY | Facility: OUTPATIENT CENTER | Age: 66
End: 2021-07-09
Payer: COMMERCIAL

## 2021-07-12 ENCOUNTER — APPOINTMENT (OUTPATIENT)
Dept: RADIATION THERAPY | Facility: OUTPATIENT CENTER | Age: 66
End: 2021-07-12
Payer: COMMERCIAL

## 2021-07-12 ENCOUNTER — VIRTUAL VISIT (OUTPATIENT)
Dept: ONCOLOGY | Facility: CLINIC | Age: 66
End: 2021-07-12
Attending: INTERNAL MEDICINE
Payer: COMMERCIAL

## 2021-07-12 DIAGNOSIS — C61 PROSTATE CA (H): Primary | ICD-10-CM

## 2021-07-12 PROCEDURE — 999N001193 HC VIDEO/TELEPHONE VISIT; NO CHARGE

## 2021-07-12 PROCEDURE — 99214 OFFICE O/P EST MOD 30 MIN: CPT | Mod: 95 | Performed by: INTERNAL MEDICINE

## 2021-07-12 RX ORDER — FEBUXOSTAT 40 MG/1
40 TABLET, FILM COATED ORAL
COMMUNITY
Start: 2021-04-20 | End: 2021-07-25

## 2021-07-12 NOTE — LETTER
7/12/2021         RE: Jonathon Santos  [address] [address] [facility] [address]        Dear Colleague,    Thank you for referring your patient, Jonathon Santos, to the [facility]. Please see a copy of my visit note below.    Jonathon is a 66 year old who is being evaluated via a billable video visit.      How would you like to obtain your AVS? MyChart     If the video visit is dropped, the invitation should be resent by: Send to e-mail at: [email or web address]     Will anyone else be joining your video visit? No          Milad Doan LPN    Video Start Time: 8:25  Video-Visit Details    Type of service:  Video Visit    Video End Time:8:39    Originating Location (pt. Location): Home    Distant Location (provider location):  [facility]     Platform used for Video Visit: [facility] Medical Oncology New Patient Consultation Note       Date of visit: 7/12/21      HPI:  67 yo male with elevated PSA who had a prostate biopsy on 12/21/20.   9/17/19 PSA =  6.57    Pathology from 12/21/20 shows Gl 4+5=9 disease.  The patient's MRI from 11/17/20 shows concern for enlarged lymph nodes raising concern for mets. Plans to meet with Dr. Francois tomorrow to discuss starting radiation. Has placement of fiducials and [employer] on 4/30 with Dr. Caban. Started prednisone abiraterone 1/25/21 with plan for 2 years.      INTERVAL HX:  Overall doing well. Sinus symptoms have subsequently resolved.  He is still having low energy, hot flashes and sweats easily with any exertion.  He is getting some exercise.  Lost about 12 pounds since last visit due to lack of appetite.  No pain.  No issues getting medications.  We reviewed how he is taking meds, which he is taking correctly.  No nausea, vomiting.  No bleeding or bruising.  He is worried about declining blood counts with RT.  RT will be done this week.     ROS  10 point ROS otherwise  unremarkable.    PMH:  HTN  Gout    MEDS  Lisinopril - HCTZ    PHYSICAL EXAM-video  General: Patient appears well in no acute distress.   Skin: No visualized rash or lesions on visualized skin  Eyes: EOMI, no erythema, sclera icterus or discharge noted  Resp: Appears to be breathing comfortably without accessory muscle usage, speaking in full sentences, no cough  MSK: Appears to have normal range of motion based on visualized movements  Neurologic: No apparent tremors, facial movements symmetric  Psych: affect good, alert and oriented    The rest of a comprehensive physical examination is deferred due to PHE (public health emergency) video restrictions    LABS AND IMAGES  CBC 6/14/21  WBC 3.2  Hgb 11.4  Plt 83  ANC 2.2    K 3.3  Cr 1.13    PSA   Date Value Ref Range Status   06/14/2021 <0.01 0 - 4 ug/L Final     Comment:     Assay Method:  Chemiluminescence using Siemens Vista analyzer     No new imaging studies.      IMPRESSION AND PLAN  Locally advanced prostate cancer with high grade [place] 9. No significant comorbidities.     Plan:   1. Two years of ADT plus Abiraterone. Tolerating with well with mild hot flashes and some fatigue, both tolerable and not impacting QOL in a way that precludes continuation of therapy. No edema.     2. RT to the nodes and prostate, based on the Stampede study. Meeting with Dr. Francois tomorrow and plans to start in coming weeks.     3. Lupron in [place], due end of month. Labs monthly in [place] as well     4. Mgmt of his gout per his rheumatologist Dr. Wang    5. Pancytopenias, most likely secondary to RT, which should resolve with completion of RT.  No bleeding or bruising with low plts.  We discussed he has safe levels of plts today, that he is not bleeding and this is likely secondary to radiation of bone marrow.  He will get follow-up labs and we will keep close tabs on this in the interval and he will contact us if there are any changes or bleeding episodes.      RTC in 3 mo with   Blayne    Patient seen and discussed with Dr. Cisneros.    Omer Howard MD, PhD  Hematology/Oncology Fellow  Pager: [phone]                  Attestation signed by Andrea Cisneros MD at 7/12/2021  9:53 PM:  Physician Attestation   I, Andrea Cisneros MD, saw this patient and agree with the findings and plan of care as documented in the note.      Items personally reviewed/procedural attestation: vitals, labs, and plan.    Andrea Cisneros MD       Again, thank you for allowing me to participate in the care of your patient.        Sincerely,        Andrea Cisneros MD

## 2021-07-12 NOTE — PROGRESS NOTES
Jonathon is a 66 year old who is being evaluated via a billable video visit.      How would you like to obtain your AVS? MyChart     If the video visit is dropped, the invitation should be resent by: Send to e-mail at: [email or web address]     Will anyone else be joining your video visit? No          Milad Doan LPN    Video Start Time: 8:25  Video-Visit Details    Type of service:  Video Visit    Video End Time:8:39    Originating Location (pt. Location): Home    Distant Location (provider location):  [facility] CANCER [facility]     Platform used for Video Visit: [facility] Medical Oncology New Patient Consultation Note       Date of visit: 7/12/21      HPI:  67 yo male with elevated PSA who had a prostate biopsy on 12/21/20.   9/17/19 PSA =  6.57    Pathology from 12/21/20 shows Gl 4+5=9 disease.  The patient's MRI from 11/17/20 shows concern for enlarged lymph nodes raising concern for mets. Plans to meet with Dr. Francois tomorrow to discuss starting radiation. Has placement of fiducials and [employer] on 4/30 with Dr. Caban. Started prednisone abiraterone 1/25/21 with plan for 2 years.      INTERVAL HX:  Overall doing well. Sinus symptoms have subsequently resolved.  He is still having low energy, hot flashes and sweats easily with any exertion.  He is getting some exercise.  Lost about 12 pounds since last visit due to lack of appetite.  No pain.  No issues getting medications.  We reviewed how he is taking meds, which he is taking correctly.  No nausea, vomiting.  No bleeding or bruising.  He is worried about declining blood counts with RT.  RT will be done this week.     ROS  10 point ROS otherwise unremarkable.    PMH:  HTN  Gout    MEDS  Lisinopril - HCTZ    PHYSICAL EXAM-video  General: Patient appears well in no acute distress.   Skin: No visualized rash or lesions on visualized skin  Eyes: EOMI, no erythema, sclera icterus or discharge noted  Resp: Appears to be breathing  comfortably without accessory muscle usage, speaking in full sentences, no cough  MSK: Appears to have normal range of motion based on visualized movements  Neurologic: No apparent tremors, facial movements symmetric  Psych: affect good, alert and oriented    The rest of a comprehensive physical examination is deferred due to PHE (public health emergency) video restrictions    LABS AND IMAGES  CBC 6/14/21  WBC 3.2  Hgb 11.4  Plt 83  ANC 2.2    K 3.3  Cr 1.13    PSA   Date Value Ref Range Status   06/14/2021 <0.01 0 - 4 ug/L Final     Comment:     Assay Method:  Chemiluminescence using Siemens Vista analyzer     No new imaging studies.      IMPRESSION AND PLAN  Locally advanced prostate cancer with high grade [place] 9. No significant comorbidities.     Plan:   1. Two years of ADT plus Abiraterone. Tolerating with well with mild hot flashes and some fatigue, both tolerable and not impacting QOL in a way that precludes continuation of therapy. No edema.     2. RT to the nodes and prostate, based on the Stampede study. Meeting with Dr. Francois tomorrow and plans to start in coming weeks.     3. Lupron in [place], due end of month. Labs monthly in [place] as well     4. Mgmt of his gout per his rheumatologist Dr. Wang    5. Pancytopenias, most likely secondary to RT, which should resolve with completion of RT.  No bleeding or bruising with low plts.  We discussed he has safe levels of plts today, that he is not bleeding and this is likely secondary to radiation of bone marrow.  He will get follow-up labs and we will keep close tabs on this in the interval and he will contact us if there are any changes or bleeding episodes.      RTC in 3 mo with Dr. Cisneros    Patient seen and discussed with Dr. Cisneros.    Omer Howard MD, PhD  Hematology/Oncology Fellow  Pager: [phone]

## 2021-07-13 ENCOUNTER — LAB (OUTPATIENT)
Dept: LAB | Facility: CLINIC | Age: 66
End: 2021-07-13
Payer: COMMERCIAL

## 2021-07-13 ENCOUNTER — APPOINTMENT (OUTPATIENT)
Dept: RADIATION THERAPY | Facility: OUTPATIENT CENTER | Age: 66
End: 2021-07-13
Payer: COMMERCIAL

## 2021-07-13 DIAGNOSIS — Z79.899 ENCOUNTER FOR LONG-TERM (CURRENT) USE OF MEDICATIONS: ICD-10-CM

## 2021-07-13 DIAGNOSIS — M1A.09X1 IDIOPATHIC CHRONIC GOUT, MULTIPLE SITES, WITH TOPHUS (TOPHI): ICD-10-CM

## 2021-07-13 DIAGNOSIS — C77.9 REGIONAL LYMPH NODE METASTASIS PRESENT (H): ICD-10-CM

## 2021-07-13 DIAGNOSIS — C61 PROSTATE CANCER (H): ICD-10-CM

## 2021-07-13 LAB
ALBUMIN SERPL-MCNC: 3.8 G/DL (ref 3.4–5)
ALP SERPL-CCNC: 55 U/L (ref 40–150)
ALT SERPL W P-5'-P-CCNC: 20 U/L (ref 0–70)
ANION GAP SERPL CALCULATED.3IONS-SCNC: 5 MMOL/L (ref 3–14)
AST SERPL W P-5'-P-CCNC: 18 U/L (ref 0–45)
BASOPHILS # BLD AUTO: 0 10E3/UL (ref 0–0.2)
BASOPHILS NFR BLD AUTO: 1 %
BILIRUB SERPL-MCNC: 1.2 MG/DL (ref 0.2–1.3)
BUN SERPL-MCNC: 15 MG/DL (ref 7–30)
CALCIUM SERPL-MCNC: 9.2 MG/DL (ref 8.5–10.1)
CHLORIDE BLD-SCNC: 102 MMOL/L (ref 94–109)
CO2 SERPL-SCNC: 30 MMOL/L (ref 20–32)
CREAT SERPL-MCNC: 1 MG/DL (ref 0.66–1.25)
EOSINOPHIL # BLD AUTO: 0 10E3/UL (ref 0–0.7)
EOSINOPHIL NFR BLD AUTO: 1 %
ERYTHROCYTE [DISTWIDTH] IN BLOOD BY AUTOMATED COUNT: 13.4 % (ref 10–15)
GFR SERPL CREATININE-BSD FRML MDRD: 78 ML/MIN/1.73M2
GLUCOSE BLD-MCNC: 107 MG/DL (ref 70–99)
HCT VFR BLD AUTO: 32.2 % (ref 40–53)
HGB BLD-MCNC: 11.4 G/DL (ref 13.3–17.7)
IMM GRANULOCYTES # BLD: 0 10E3/UL
IMM GRANULOCYTES NFR BLD: 0 %
LYMPHOCYTES # BLD AUTO: 0.5 10E3/UL (ref 0.8–5.3)
LYMPHOCYTES NFR BLD AUTO: 14 %
MCH RBC QN AUTO: 35.5 PG (ref 26.5–33)
MCHC RBC AUTO-ENTMCNC: 35.4 G/DL (ref 31.5–36.5)
MCV RBC AUTO: 100 FL (ref 78–100)
MONOCYTES # BLD AUTO: 0.3 10E3/UL (ref 0–1.3)
MONOCYTES NFR BLD AUTO: 9 %
NEUTROPHILS # BLD AUTO: 2.5 10E3/UL (ref 1.6–8.3)
NEUTROPHILS NFR BLD AUTO: 75 %
NRBC # BLD AUTO: 0 10E3/UL
NRBC BLD AUTO-RTO: 0 /100
PLATELET # BLD AUTO: 96 10E3/UL (ref 150–450)
POTASSIUM BLD-SCNC: 3.5 MMOL/L (ref 3.4–5.3)
PROT SERPL-MCNC: 7 G/DL (ref 6.8–8.8)
PSA SERPL-MCNC: <0.01 UG/L (ref 0–4)
RBC # BLD AUTO: 3.21 10E6/UL (ref 4.4–5.9)
SODIUM SERPL-SCNC: 137 MMOL/L (ref 133–144)
URATE SERPL-MCNC: 4.9 MG/DL (ref 3.5–7.2)
WBC # BLD AUTO: 3.4 10E3/UL (ref 4–11)

## 2021-07-13 PROCEDURE — 84550 ASSAY OF BLOOD/URIC ACID: CPT

## 2021-07-13 PROCEDURE — 36415 COLL VENOUS BLD VENIPUNCTURE: CPT

## 2021-07-13 PROCEDURE — 80053 COMPREHEN METABOLIC PANEL: CPT

## 2021-07-13 PROCEDURE — 85025 COMPLETE CBC W/AUTO DIFF WBC: CPT

## 2021-07-13 PROCEDURE — 84153 ASSAY OF PSA TOTAL: CPT

## 2021-07-14 ENCOUNTER — OFFICE VISIT (OUTPATIENT)
Dept: RADIATION THERAPY | Facility: OUTPATIENT CENTER | Age: 66
End: 2021-07-14
Payer: COMMERCIAL

## 2021-07-14 ENCOUNTER — APPOINTMENT (OUTPATIENT)
Dept: RADIATION THERAPY | Facility: OUTPATIENT CENTER | Age: 66
End: 2021-07-14
Payer: COMMERCIAL

## 2021-07-14 VITALS
RESPIRATION RATE: 18 BRPM | WEIGHT: 199 LBS | BODY MASS INDEX: 26.26 KG/M2 | DIASTOLIC BLOOD PRESSURE: 94 MMHG | HEART RATE: 78 BPM | SYSTOLIC BLOOD PRESSURE: 147 MMHG | OXYGEN SATURATION: 97 %

## 2021-07-14 DIAGNOSIS — C61 PROSTATE CANCER (H): Primary | ICD-10-CM

## 2021-07-14 ASSESSMENT — PAIN SCALES - GENERAL: PAINLEVEL: NO PAIN (0)

## 2021-07-14 NOTE — PROGRESS NOTES
[facility]  SPECIALIZING IN BREAKTHROUGHS  Radiation Oncology    On Treatment Visit Note      Jonathon Santos      Date: 2021   MRN: [id number]   : 1955  Diagnosis: Prostate cancer      Reason for Visit:  On Radiation Treatment Visit     Treatment Summary to Date  Treatment Site: pelvis Current Dose: 7740/7920 cGy Fractions: 40/41     Chemotherapy  Chemo concurrent with radx?: No    Subjective:   Doing okay. No acute trouble. Very mild GI bother. Using imodium 1-1.5 tablet every other day. Some anal irritation from frequency of bowel movement. Mild  bother. Slightly reduced urinary flow. Energy adequate.       Nursing ROS:   Nutrition Alteration  Diet Type: Patient's Preference           Cardiovascular  Respiratory effort: 1 - Normal - without distress     Genitourinary  Urinary Status: 0 - Normal  Dysuria: 0 - None     Pain Assessment  0-10 Pain Scale: 0      Objective:   BP (!) 147/94   Pulse 78   Resp 18   Wt 90.3 kg (199 lb)   SpO2 97%   BMI 26.26 kg/m     NAD    Labs:  CBC RESULTS:   Recent Labs   Lab Test 21  1116   WBC 10.3   RBC 4.42   HGB 15.0   HCT 43.2   MCV 98   MCH 33.9*   MCHC 34.7   RDW 13.5        ELECTROLYTES:  Recent Labs   Lab Test 21  1116   *   POTASSIUM 3.6   CHLORIDE 96   MISTY 8.5   CO2 25   BUN 21   CR 1.26*   *       Assessment:   Tom is a 66 year old male with newly diagnosed prostate cancer, pre-treatment PSA 8.18, highest GS was 4+5=9. Staging evaluation noted suspicious pelvic and Jazzy nodes concerning for regional vs M1 disease spread. He is undergoing definitive RT + ADT plus Abiraterone.     Tolerating radiation therapy well.  All questions and concerns addressed.    Plan:   1. Continue current therapy. EOT tomorrow. RTC in 1 month.  2. GI bother. Imodium PRN.   3.  bother. No ibuprofen given low platelet count. Flomax sent to pharmacy, patient has not tried yet.    4. Anal irritation. Baby wipes, Sitz baths.       Mosaiq  chart and setup information reviewed  Ports checked         Educational Topic Discussed  Education Instructions: reivewed potential SE to expect during radiation      Davion Francois MD

## 2021-07-15 ENCOUNTER — DOCUMENTATION ONLY (OUTPATIENT)
Dept: RADIATION ONCOLOGY | Facility: CLINIC | Age: 66
End: 2021-07-15

## 2021-07-15 ENCOUNTER — APPOINTMENT (OUTPATIENT)
Dept: RADIATION THERAPY | Facility: OUTPATIENT CENTER | Age: 66
End: 2021-07-15
Payer: COMMERCIAL

## 2021-07-15 NOTE — PROGRESS NOTES
Radiotherapy Treatment Summary              PATIENT: Jonathon Santos  MEDICAL RECORD NO: [id number]   : 1955    DIAGNOSIS: Prostate cancer  INTENT OF RADIOTHERAPY: Definitive   PATHOLOGY:    Adenocarcinoma,  [place] score of 4+5=9                            STAGE: Node positive vs M1 (indeterminate PA nodes)  CONCURRENT SYSTEMIC THERAPY:   ADT+ Zytiga      ONCOLOGIC HISTORY:  Mr. Santos is a 66 year old male with newly diagnosed prostate cancer, pre-treatment PSA 8.18, Hardwick score of 4+5=9. Staging evaluation noted suspicious pelvic and Jazzy nodes concerning for regional vs M1 disease spread. He is undergoing definitive RT + ADT plus Abiraterone.         SITE OF TREATMENT: Prostate/SV + Pelvic/ Para-aortic monik chain    DATES  OF TREATMENT: 21 to 7/15/21    TOTAL DOSE OF TREATMENT: 7920 cGy in 41 fractions (Prostate/LN to 5400 cGy in 27 fractions followed by sequential boost to gross pelvic nodes to 6480 cGy followed by prostate/SV boost to 7920 cGy)    DOSE PER FRACTION OF TREATMENT: 200 cGy/ 180 cGy       COMMENT/TOXICITY:            Grade 1   Grade 2 GI  Grade 1 Heme          PAIN MANAGEMENT:                           None    FOLLOW UP PLAN:  1. RTC in 1 month.   CC  Patient Care Team:  Aron Cristina MD as PCP - General (Family Medicine)  Ferdinand Caban MD as Assigned Surgical Provider  Andrea Cisneros MD as MD (Medical Oncology)  Davion Francois MD as Assigned Cancer Care Provider  Davion Francois MD as MD (Radiation Oncology)  Daivon Francois MD as MD (Radiation Oncology)  Stacy Chu, LIZETH as Registered Nurse (Urology)  Layla Skelton APRN CNP as Assigned PCP       Davion Francois M.D.  Department of Radiation Oncology  [facility]

## 2021-07-16 ENCOUNTER — TELEPHONE (OUTPATIENT)
Dept: ONCOLOGY | Facility: CLINIC | Age: 66
End: 2021-07-16

## 2021-07-16 NOTE — PROGRESS NOTES
Oral Chemotherapy Monitoring Program  Lab Follow Up    Reviewed lab results from 7/13.    ORAL CHEMOTHERAPY 4/20/2021 5/1/2021 5/15/2021 5/19/2021 5/21/2021 6/18/2021 7/16/2021   Assessment Type Chart Review Chart Review Lab Monitoring Monthly Follow up Refill Lab Monitoring Lab Monitoring   Diagnosis Code Prostate Cancer Prostate Cancer Prostate Cancer Prostate Cancer Prostate Cancer Prostate Cancer Prostate Cancer   Providers Dr. Blayne Cisneros   [facility]   Drug Name Zytiga (abiraterone) Zytiga (abiraterone) Zytiga (abiraterone) Zytiga (abiraterone) Zytiga (abiraterone) Zytiga (abiraterone) Zytiga (abiraterone)   Dose 1,000 mg 1,000 mg 1,000 mg 1,000 mg 1,000 mg 1,000 mg 1,000 mg   Current Schedule Daily Daily Daily Daily Daily Daily Daily   Cycle Details Continuous Continuous Continuous Continuous Continuous Continuous Continuous   Start Date of Last Cycle - - - - - - -   Doses missed in last 2 weeks - - - 0 - - -   Adherence Assessment - - - Adherent - - -   Adverse Effects - - Increased AST/ALT/T BILI Nausea - - -   Nausea - - - Grade 1 - - -   Pharmacist Intervention(nausea) - - - No - - -   Pharmacist Intervention(increased ast/alt/t bili) - - Yes - - - -   Intervention(s) - - Referral to oncology provider - - - -   Home BPs - - - - - - -   Any new drug interactions? - - - No - - -   Pharmacist Intervention? - - - - - - -   Intervention(s) - - - - - - -   Is the dose as ordered appropriate for the patient? - - - Yes - - -   Since the last time we talked, have you been hospitalized or used the emergency room? - - - - - - -       Labs:  _  Result Component Current Result Ref Range   Sodium 137 (7/13/2021) 133 - 144 mmol/L     _  Result Component Current Result Ref Range   Potassium 3.5 (7/13/2021) 3.4 - 5.3 mmol/L     _  Result Component Current Result Ref Range   Calcium 9.2 (7/13/2021) 8.5 - 10.1  mg/dL     No results found for Mag within last 30 days.     No results found for Phos within last 30 days.     _  Result Component Current Result Ref Range   Albumin 3.8 (7/13/2021) 3.4 - 5.0 g/dL     _  Result Component Current Result Ref Range   Urea Nitrogen 15 (7/13/2021) 7 - 30 mg/dL     _  Result Component Current Result Ref Range   Creatinine 1.00 (7/13/2021) 0.66 - 1.25 mg/dL     _  Result Component Current Result Ref Range   AST 18 (7/13/2021) 0 - 45 U/L     _  Result Component Current Result Ref Range   ALT 20 (7/13/2021) 0 - 70 U/L     _  Result Component Current Result Ref Range   Bilirubin Total 1.2 (7/13/2021) 0.2 - 1.3 mg/dL     _  Result Component Current Result Ref Range   WBC Count 3.4 (L) (7/13/2021) 4.0 - 11.0 10e3/uL     _  Result Component Current Result Ref Range   Hemoglobin 11.4 (L) (7/13/2021) 13.3 - 17.7 g/dL     _  Result Component Current Result Ref Range   Platelet Count 96 (L) (7/13/2021) 150 - 450 10e3/uL     No results found for ANC within last 30 days.       Assessment & Plan:  No concerning abnormalities. WBC. Hgb, platelets still a little low due to radiation, but stable from last month    MyChart sent to patient with results.    Follow-Up:  7/23: monthly assessment    Grace Myhre, PharmD  Hematology/Oncology Pharmacist  [facility]  [facility]  [phone]

## 2021-07-22 ENCOUNTER — TELEPHONE (OUTPATIENT)
Dept: ONCOLOGY | Facility: CLINIC | Age: 66
End: 2021-07-22

## 2021-07-22 DIAGNOSIS — C61 PROSTATE CANCER (H): Primary | ICD-10-CM

## 2021-07-22 DIAGNOSIS — C77.9 REGIONAL LYMPH NODE METASTASIS PRESENT (H): ICD-10-CM

## 2021-07-22 RX ORDER — ABIRATERONE ACETATE 250 MG/1
1000 TABLET ORAL DAILY
Qty: 120 TABLET | Refills: 1 | Status: SHIPPED | OUTPATIENT
Start: 2021-07-22 | End: 2021-08-21

## 2021-07-22 NOTE — PROGRESS NOTES
Oral Chemotherapy Monitoring Program     Placed call to patient in follow up of oral chemotherapy. Left message requesting call back. No drug names were mentioned. Will update when response received.     Belkys Hunt, PharmD  Hematology/Oncology Clinical Pharmacist  [facility]  [facility]

## 2021-07-29 ENCOUNTER — INFUSION THERAPY VISIT (OUTPATIENT)
Dept: INFUSION THERAPY | Facility: CLINIC | Age: 66
End: 2021-07-29
Attending: INTERNAL MEDICINE
Payer: COMMERCIAL

## 2021-07-29 VITALS — DIASTOLIC BLOOD PRESSURE: 87 MMHG | HEART RATE: 68 BPM | TEMPERATURE: 98.2 F | SYSTOLIC BLOOD PRESSURE: 151 MMHG

## 2021-07-29 DIAGNOSIS — C61 PROSTATE CANCER (H): Primary | ICD-10-CM

## 2021-07-29 PROCEDURE — 250N000011 HC RX IP 250 OP 636: Performed by: INTERNAL MEDICINE

## 2021-07-29 PROCEDURE — 96402 CHEMO HORMON ANTINEOPL SQ/IM: CPT

## 2021-07-29 RX ADMIN — LEUPROLIDE ACETATE 22.5 MG: KIT at 13:46

## 2021-07-29 NOTE — PROGRESS NOTES
Infusion Nursing Note:  Jonathon Santos presents today for Lupron.    Patient seen by provider today: No   present during visit today: Not Applicable.    Note: Pt denied any new problems.  Did not want to participate in assessment as he was upset about the wait for his injection today.  Explained process of fufilling medication orders in infusion clinic. Did note while administering injection that he had large areas of bruising on buttocks and lower back, pt stated he fell last week and denied any further injuries or pain.      Intravenous Access:  No Intravenous access/labs at this visit.    Treatment Conditions:  Not Applicable.      Post Infusion Assessment:  Patient tolerated injection without incident.       Discharge Plan:   Patient discharged in stable condition accompanied by: self.      Elda Kemp RN

## 2021-08-06 PROBLEM — C61 PROSTATE CANCER (H): Status: ACTIVE | Noted: 2020-12-21

## 2021-08-18 ENCOUNTER — LAB (OUTPATIENT)
Dept: LAB | Facility: CLINIC | Age: 66
End: 2021-08-18
Payer: COMMERCIAL

## 2021-08-18 ENCOUNTER — TELEPHONE (OUTPATIENT)
Dept: ONCOLOGY | Facility: CLINIC | Age: 66
End: 2021-08-18

## 2021-08-18 DIAGNOSIS — C61 PROSTATE CANCER (H): ICD-10-CM

## 2021-08-18 DIAGNOSIS — M1A.09X1 IDIOPATHIC CHRONIC GOUT, MULTIPLE SITES, WITH TOPHUS (TOPHI): ICD-10-CM

## 2021-08-18 DIAGNOSIS — Z79.899 ENCOUNTER FOR LONG-TERM (CURRENT) USE OF MEDICATIONS: ICD-10-CM

## 2021-08-18 LAB
ALBUMIN SERPL-MCNC: 3.9 G/DL (ref 3.4–5)
ALP SERPL-CCNC: 61 U/L (ref 40–150)
ALT SERPL W P-5'-P-CCNC: 36 U/L (ref 0–70)
ANION GAP SERPL CALCULATED.3IONS-SCNC: 12 MMOL/L (ref 3–14)
AST SERPL W P-5'-P-CCNC: 58 U/L (ref 0–45)
BASOPHILS # BLD AUTO: 0 10E3/UL (ref 0–0.2)
BASOPHILS NFR BLD AUTO: 1 %
BILIRUB SERPL-MCNC: 1.6 MG/DL (ref 0.2–1.3)
BUN SERPL-MCNC: 23 MG/DL (ref 7–30)
CALCIUM SERPL-MCNC: 9.3 MG/DL (ref 8.5–10.1)
CHLORIDE BLD-SCNC: 100 MMOL/L (ref 94–109)
CO2 SERPL-SCNC: 24 MMOL/L (ref 20–32)
CREAT SERPL-MCNC: 1.16 MG/DL (ref 0.66–1.25)
EOSINOPHIL # BLD AUTO: 0 10E3/UL (ref 0–0.7)
EOSINOPHIL NFR BLD AUTO: 1 %
ERYTHROCYTE [DISTWIDTH] IN BLOOD BY AUTOMATED COUNT: 12.3 % (ref 10–15)
GFR SERPL CREATININE-BSD FRML MDRD: 65 ML/MIN/1.73M2
GLUCOSE BLD-MCNC: 157 MG/DL (ref 70–99)
HCT VFR BLD AUTO: 37.7 % (ref 40–53)
HGB BLD-MCNC: 13 G/DL (ref 13.3–17.7)
LYMPHOCYTES # BLD AUTO: 0.9 10E3/UL (ref 0.8–5.3)
LYMPHOCYTES NFR BLD AUTO: 17 %
MCH RBC QN AUTO: 35.8 PG (ref 26.5–33)
MCHC RBC AUTO-ENTMCNC: 34.5 G/DL (ref 31.5–36.5)
MCV RBC AUTO: 104 FL (ref 78–100)
MONOCYTES # BLD AUTO: 0.6 10E3/UL (ref 0–1.3)
MONOCYTES NFR BLD AUTO: 11 %
NEUTROPHILS # BLD AUTO: 3.7 10E3/UL (ref 1.6–8.3)
NEUTROPHILS NFR BLD AUTO: 71 %
PLATELET # BLD AUTO: 116 10E3/UL (ref 150–450)
POTASSIUM BLD-SCNC: 3.3 MMOL/L (ref 3.4–5.3)
PROT SERPL-MCNC: 7.3 G/DL (ref 6.8–8.8)
PSA SERPL-MCNC: <0.01 UG/L (ref 0–4)
RBC # BLD AUTO: 3.63 10E6/UL (ref 4.4–5.9)
SODIUM SERPL-SCNC: 136 MMOL/L (ref 133–144)
URATE SERPL-MCNC: 8 MG/DL (ref 3.5–7.2)
WBC # BLD AUTO: 5.3 10E3/UL (ref 4–11)

## 2021-08-18 PROCEDURE — 84550 ASSAY OF BLOOD/URIC ACID: CPT

## 2021-08-18 PROCEDURE — 84153 ASSAY OF PSA TOTAL: CPT

## 2021-08-18 PROCEDURE — 85025 COMPLETE CBC W/AUTO DIFF WBC: CPT

## 2021-08-18 PROCEDURE — 80053 COMPREHEN METABOLIC PANEL: CPT

## 2021-08-18 PROCEDURE — 36415 COLL VENOUS BLD VENIPUNCTURE: CPT

## 2021-08-18 NOTE — TELEPHONE ENCOUNTER
Oral Chemotherapy Monitoring Program    Subjective/Objective:  Jonathon Santos is a 66 year old male contacted by phone for a follow-up visit for oral chemotherapy.  Pt confirms taking the appropriate dose of Zytiga 1000mg daily ([id number]) on an empty stomach, and prednisone 5mg daily with food. Denies missed doses, and recent hospital or ED visits. Patient re-started his allopurinol today after seeing his elevated uric acid in labs from today.    Pt states he is still experiencing a postnasal drip that leads to some stomach upset, despite stopping his allopurinol for this.  Pt has also noticed a decreased appetite recently.    Pt's glucose today is elevated.  Labs were fasting, 16 hours after his dinner at 7PM last night.    Reviewed labs from today. Glucose=157, T.Bili=1.6, AST=58    ORAL CHEMOTHERAPY 5/19/2021 5/21/2021 6/18/2021 7/16/2021 7/22/2021 7/22/2021 8/18/2021   Assessment Type Monthly Follow up Refill Lab Monitoring Lab Monitoring Refill Other Other   Diagnosis Code Prostate Cancer Prostate Cancer Prostate Cancer Prostate Cancer Prostate Cancer Prostate Cancer Prostate Cancer   Providers Dr. Blayne Cisneros   [facility]   Drug Name Zytiga (abiraterone) Zytiga (abiraterone) Zytiga (abiraterone) Zytiga (abiraterone) Zytiga (abiraterone) Zytiga (abiraterone) Zytiga (abiraterone)   Dose 1,000 mg 1,000 mg 1,000 mg 1,000 mg 1,000 mg 1,000 mg 1,000 mg   Current Schedule Daily Daily Daily Daily Daily Daily Daily   Cycle Details Continuous Continuous Continuous Continuous Continuous Continuous Continuous   Start Date of Last Cycle - - - - - - -   Doses missed in last 2 weeks 0 - - - - - -   Adherence Assessment Adherent - - - - - -   Adverse Effects Nausea - - - - - -   Nausea Grade 1 - - - - - -   Pharmacist Intervention(nausea) No - - - - - -   Pharmacist Intervention(increased ast/alt/t bili) - - - - - -  "-   Intervention(s) - - - - - - -   Home BPs - - - - - - -   Any new drug interactions? No - - - - - -   Pharmacist Intervention? - - - - - - -   Intervention(s) - - - - - - -   Is the dose as ordered appropriate for the patient? Yes - - - - - -   Since the last time we talked, have you been hospitalized or used the emergency room? - - - - - - -       Last PHQ-2 Score on record:   PHQ-2 ( 1999 Pfizer) 4/9/2021 1/4/2021   Q1: Little interest or pleasure in doing things 0 0   Q2: Feeling down, depressed or hopeless 0 0   PHQ-2 Score 0 0   Q1: Little interest or pleasure in doing things - Not at all   Q2: Feeling down, depressed or hopeless - Not at all   PHQ-2 Score - 0       Vitals:  BP:   BP Readings from Last 1 Encounters:   07/29/21 (!) 151/87     Wt Readings from Last 1 Encounters:   07/14/21 90.3 kg (199 lb)     Estimated body surface area is 2.16 meters squared as calculated from the following:    Height as of 4/30/21: 1.854 m (6' 0.99\").    Weight as of 7/14/21: 90.3 kg (199 lb).    Labs:  _  Result Component Current Result Ref Range   Sodium 136 (8/18/2021) 133 - 144 mmol/L     _  Result Component Current Result Ref Range   Potassium 3.3 (L) (8/18/2021) 3.4 - 5.3 mmol/L     _  Result Component Current Result Ref Range   Calcium 9.3 (8/18/2021) 8.5 - 10.1 mg/dL     No results found for Mag within last 30 days.     No results found for Phos within last 30 days.     _  Result Component Current Result Ref Range   Albumin 3.9 (8/18/2021) 3.4 - 5.0 g/dL     _  Result Component Current Result Ref Range   Urea Nitrogen 23 (8/18/2021) 7 - 30 mg/dL     _  Result Component Current Result Ref Range   Creatinine 1.16 (8/18/2021) 0.66 - 1.25 mg/dL     _  Result Component Current Result Ref Range   AST 58 (H) (8/18/2021) 0 - 45 U/L     _  Result Component Current Result Ref Range   ALT 36 (8/18/2021) 0 - 70 U/L     _  Result Component Current Result Ref Range   Bilirubin Total 1.6 (H) (8/18/2021) 0.2 - 1.3 mg/dL "     _  Result Component Current Result Ref Range   WBC Count 5.3 (8/18/2021) 4.0 - 11.0 10e3/uL     _  Result Component Current Result Ref Range   Hemoglobin 13.0 (L) (8/18/2021) 13.3 - 17.7 g/dL     _  Result Component Current Result Ref Range   Platelet Count 116 (L) (8/18/2021) 150 - 450 10e3/uL     No results found for ANC within last 30 days.     Assessment/Plan:  Pt continues to tolerate Zytiga reasonable well.  Pt's labs show some minor LFT elevations, consistent with previous fluctuations.  No changes needed at this time.    Sent message to Dr. Cisneros informing him of the elevated glucose level, elevated LFTs, decreased appetite, and persistent postnasal dri, per patient request.    Follow-Up:  9/15: monthly labs    Refill Due:  [facility] to deliver Zytiga 8/25 for ~8/28 cycle start    Grace Myhre, PharmD  Hematology/Oncology Pharmacist  [facility]  [facility]  [phone]

## 2021-08-18 NOTE — TELEPHONE ENCOUNTER
Oral Chemotherapy Monitoring Program     Placed call to patient in follow up of oral chemotherapy and labs. Left message requesting call back. No drug names were mentioned. Will update when response received.     Grace Myhre, PharmD  Hematology/Oncology Clinical Pharmacist  [facility]  [facility]  [phone]

## 2021-08-19 ENCOUNTER — OFFICE VISIT (OUTPATIENT)
Dept: RADIATION THERAPY | Facility: OUTPATIENT CENTER | Age: 66
End: 2021-08-19
Payer: COMMERCIAL

## 2021-08-19 VITALS
HEART RATE: 80 BPM | BODY MASS INDEX: 25.79 KG/M2 | WEIGHT: 195.4 LBS | DIASTOLIC BLOOD PRESSURE: 91 MMHG | SYSTOLIC BLOOD PRESSURE: 141 MMHG | RESPIRATION RATE: 16 BRPM

## 2021-08-19 DIAGNOSIS — C61 PROSTATE CANCER (H): Primary | ICD-10-CM

## 2021-08-19 NOTE — PROGRESS NOTES
Department of Radiation Oncology  Radiation Therapy Center  [facility] [place] [address]  [phone]       Radiation Oncology Follow-up Visit  2021      Jonathon Santos  MRN: [id number]   : 1955     DIAGNOSIS: Prostate cancer  INTENT OF RADIOTHERAPY: Definitive   PATHOLOGY:    Adenocarcinoma,  [place] score of 4+5=9                            STAGE: Node positive vs M1 (indeterminate PA nodes)  CONCURRENT SYSTEMIC THERAPY:   ADT+ Zytiga       ONCOLOGIC HISTORY:  Mr. Santos is a 66 year old male with newly diagnosed prostate cancer, pre-treatment PSA 8.18, Keith score of 4+5=9. Staging evaluation noted suspicious pelvic and Jazzy nodes concerning for regional vs M1 disease spread. He underwent definitive RT + ADT plus Abiraterone.          SITE OF TREATMENT: Prostate/SV + Pelvic/ Para-aortic monik chain     DATES  OF TREATMENT: 21 to 7/15/21     TOTAL DOSE OF TREATMENT: 7920 cGy in 41 fractions (Prostate/LN to 5400 cGy in 27 fractions followed by sequential boost to gross pelvic nodes to 6480 cGy followed by prostate/SV boost to 7920 cGy)     DOSE PER FRACTION OF TREATMENT: 200 cGy/ 180 cGy    INTERVAL SINCE COMPLETION OF RADIATION THERAPY:   1 month    SUBJECTIVE:   Mr. Santos returns for follow up.     PSA on 21 was undetectable.     Remains on systemic treatment with ADT+ Zytiga under the care of Dr. Cisneros.      bother and GI bother normalizing. Platelet counts improving.     PHYSICAL EXAM:  BP (!) 141/91 (BP Location: Left arm, Cuff Size: Adult Large)   Pulse 80   Resp 16   Wt 88.6 kg (195 lb 6.4 oz)   BMI 25.79 kg/m    Gen: Alert, in NAD  Eyes: PERRL, EOMI, sclera anicteric  Neck: Supple, full ROM, no LAD  Pulm: No wheezing, stridor or respiratory distress  CV: Well-perfused, no cyanosis, no pedal edema  Abdominal: Soft, nontender, nondistended, no hepatomegaly  Back: No step-offs or pain to palpation along the thoracolumbar spine, no CVA  tenderness  Musculoskeletal: Normal bulk and tone   Skin: Normal color and turgor  Neurologic: A/Ox3, CN II-XII intact  Psychiatric: Appropriate mood and affect    LABS AND IMAGING:  PSA   Date Value Ref Range Status   06/14/2021 <0.01 0 - 4 ug/L Final     Comment:     Assay Method:  Chemiluminescence using Siemens Vista analyzer   05/07/2021 0.01 0 - 4 ug/L Final     Comment:     Assay Method:  Chemiluminescence using Siemens Vista analyzer   04/13/2021 0.04 0 - 4 ug/L Final     Comment:     Assay Method:  Chemiluminescence using Siemens Vista analyzer   03/11/2021 0.17 0 - 4 ug/L Final     Comment:     Assay Method:  Chemiluminescence using Siemens Vista analyzer   01/28/2021 8.18 (H) 0 - 4 ug/L Final     Comment:     Assay Method:  Chemiluminescence using Siemens Vista analyzer     PSA Tumor Marker   Date Value Ref Range Status   08/18/2021 <0.01 0.00 - 4.00 ug/L Final   07/13/2021 <0.01 0.00 - 4.00 ug/L Final       IMPRESSION:   Mr. Santos is a 66 year old male with newly diagnosed prostate cancer, pre-treatment PSA 8.18, Keith score of 4+5=9. Staging evaluation noted suspicious pelvic and Jazzy nodes concerning for regional vs M1 disease spread. He underwent definitive RT  (7920 cGy in 41 fractions, completed on 7/15/21) + LT-ADT + Abiraterone.       PLAN:   1. PSA remains in biochemical control.   2. GI bother is normalizing. Can consider Imodium as needed. Discussed consideration of probiotic if needed in future.  bother also improving.   3. Continue systemic treatment and oncologic surveillance with Dr. Cisneros.   4. RTC in 6 months.        Davion Francois M.D.  Department of Radiation Oncology  [facility]

## 2021-09-15 ENCOUNTER — LAB (OUTPATIENT)
Dept: LAB | Facility: CLINIC | Age: 66
End: 2021-09-15
Payer: COMMERCIAL

## 2021-09-15 DIAGNOSIS — Z79.899 ENCOUNTER FOR LONG-TERM (CURRENT) USE OF MEDICATIONS: ICD-10-CM

## 2021-09-15 DIAGNOSIS — C77.9 REGIONAL LYMPH NODE METASTASIS PRESENT (H): Primary | ICD-10-CM

## 2021-09-15 DIAGNOSIS — C61 PROSTATE CANCER (H): ICD-10-CM

## 2021-09-15 DIAGNOSIS — M1A.09X1 IDIOPATHIC CHRONIC GOUT, MULTIPLE SITES, WITH TOPHUS (TOPHI): ICD-10-CM

## 2021-09-15 LAB
ALBUMIN SERPL-MCNC: 3.6 G/DL (ref 3.4–5)
ALP SERPL-CCNC: 62 U/L (ref 40–150)
ALT SERPL W P-5'-P-CCNC: 32 U/L (ref 0–70)
ANION GAP SERPL CALCULATED.3IONS-SCNC: 7 MMOL/L (ref 3–14)
AST SERPL W P-5'-P-CCNC: 24 U/L (ref 0–45)
BASOPHILS # BLD AUTO: 0 10E3/UL (ref 0–0.2)
BASOPHILS NFR BLD AUTO: 0 %
BILIRUB SERPL-MCNC: 1.4 MG/DL (ref 0.2–1.3)
BUN SERPL-MCNC: 16 MG/DL (ref 7–30)
CALCIUM SERPL-MCNC: 8.9 MG/DL (ref 8.5–10.1)
CHLORIDE BLD-SCNC: 103 MMOL/L (ref 94–109)
CO2 SERPL-SCNC: 27 MMOL/L (ref 20–32)
CREAT SERPL-MCNC: 1.03 MG/DL (ref 0.66–1.25)
EOSINOPHIL # BLD AUTO: 0 10E3/UL (ref 0–0.7)
EOSINOPHIL NFR BLD AUTO: 1 %
ERYTHROCYTE [DISTWIDTH] IN BLOOD BY AUTOMATED COUNT: 11.6 % (ref 10–15)
GFR SERPL CREATININE-BSD FRML MDRD: 75 ML/MIN/1.73M2
GLUCOSE BLD-MCNC: 130 MG/DL (ref 70–99)
HCT VFR BLD AUTO: 36.4 % (ref 40–53)
HGB BLD-MCNC: 12.6 G/DL (ref 13.3–17.7)
LYMPHOCYTES # BLD AUTO: 0.7 10E3/UL (ref 0.8–5.3)
LYMPHOCYTES NFR BLD AUTO: 18 %
MCH RBC QN AUTO: 35.5 PG (ref 26.5–33)
MCHC RBC AUTO-ENTMCNC: 34.6 G/DL (ref 31.5–36.5)
MCV RBC AUTO: 103 FL (ref 78–100)
MONOCYTES # BLD AUTO: 0.5 10E3/UL (ref 0–1.3)
MONOCYTES NFR BLD AUTO: 11 %
NEUTROPHILS # BLD AUTO: 2.8 10E3/UL (ref 1.6–8.3)
NEUTROPHILS NFR BLD AUTO: 70 %
PLATELET # BLD AUTO: 91 10E3/UL (ref 150–450)
POTASSIUM BLD-SCNC: 3.1 MMOL/L (ref 3.4–5.3)
PROT SERPL-MCNC: 7 G/DL (ref 6.8–8.8)
PSA SERPL-MCNC: <0.01 UG/L (ref 0–4)
RBC # BLD AUTO: 3.55 10E6/UL (ref 4.4–5.9)
SODIUM SERPL-SCNC: 137 MMOL/L (ref 133–144)
URATE SERPL-MCNC: 5.1 MG/DL (ref 3.5–7.2)
WBC # BLD AUTO: 4 10E3/UL (ref 4–11)

## 2021-09-15 PROCEDURE — 84550 ASSAY OF BLOOD/URIC ACID: CPT

## 2021-09-15 PROCEDURE — 80053 COMPREHEN METABOLIC PANEL: CPT

## 2021-09-15 PROCEDURE — 84153 ASSAY OF PSA TOTAL: CPT

## 2021-09-15 PROCEDURE — 36415 COLL VENOUS BLD VENIPUNCTURE: CPT

## 2021-09-15 PROCEDURE — 85025 COMPLETE CBC W/AUTO DIFF WBC: CPT

## 2021-09-15 RX ORDER — PREDNISONE 5 MG/1
5 TABLET ORAL DAILY
Qty: 30 TABLET | Refills: 11 | Status: SHIPPED | OUTPATIENT
Start: 2021-09-15 | End: 2021-10-15

## 2021-09-15 RX ORDER — ABIRATERONE ACETATE 250 MG/1
1000 TABLET ORAL DAILY
Qty: 120 TABLET | Refills: 1 | Status: SHIPPED | OUTPATIENT
Start: 2021-09-15 | End: 2021-10-15

## 2021-09-15 NOTE — ORAL ONC MGMT
Oral Chemotherapy Monitoring Program  Lab Follow Up    Reviewed lab results from 9/15/21.    Labs:  _  Result Component Current Result Ref Range   Sodium 137 (9/15/2021) 133 - 144 mmol/L     _  Result Component Current Result Ref Range   Potassium 3.1 (L) (9/15/2021) 3.4 - 5.3 mmol/L     _  Result Component Current Result Ref Range   Calcium 8.9 (9/15/2021) 8.5 - 10.1 mg/dL     No results found for Mag within last 30 days.     No results found for Phos within last 30 days.     _  Result Component Current Result Ref Range   Albumin 3.6 (9/15/2021) 3.4 - 5.0 g/dL     _  Result Component Current Result Ref Range   Urea Nitrogen 16 (9/15/2021) 7 - 30 mg/dL     _  Result Component Current Result Ref Range   Creatinine 1.03 (9/15/2021) 0.66 - 1.25 mg/dL       _  Result Component Current Result Ref Range   AST 24 (9/15/2021) 0 - 45 U/L     _  Result Component Current Result Ref Range   ALT 32 (9/15/2021) 0 - 70 U/L     _  Result Component Current Result Ref Range   Bilirubin Total 1.4 (H) (9/15/2021) 0.2 - 1.3 mg/dL       _  Result Component Current Result Ref Range   WBC Count 4.0 (9/15/2021) 4.0 - 11.0 10e3/uL     _  Result Component Current Result Ref Range   Hemoglobin 12.6 (L) (9/15/2021) 13.3 - 17.7 g/dL     _  Result Component Current Result Ref Range   Platelet Count 91 (L) (9/15/2021) 150 - 450 10e3/uL     Assessment & Plan:  Following AST and total bilirubin elevation 8/18/21, AST and total bilirubin improved today. Continue to monitor. Continue plan of care.     Follow-Up:  10/15: labs and appointment     Osmar Haynes, PharmD  Hematology/Oncology Clinical Pharmacist  [facility]  [facility]  [phone]

## 2021-09-21 ENCOUNTER — DOCUMENTATION ONLY (OUTPATIENT)
Dept: ONCOLOGY | Facility: CLINIC | Age: 66
End: 2021-09-21

## 2021-09-21 ENCOUNTER — TELEPHONE (OUTPATIENT)
Dept: ONCOLOGY | Facility: CLINIC | Age: 66
End: 2021-09-21

## 2021-09-21 NOTE — TELEPHONE ENCOUNTER
Oral Chemotherapy Monitoring Program    Subjective/Objective:  Jonathon Santos is a 66 year old male contacted by phone for a follow-up visit for oral chemotherapy.  Pt confirms taking the appropriate dose of Zytiga, 1000mg ([id number]) daily on an empty stomach and prednisone 5mg daily with food. Denies new or worsening side effects, missed doses, and recent hospital or ED visits. Patient has not had any recent medication changes.     Pt continues to experience upset stomach and congestion on the Zytiga.  This is unchanged from previous and is manageable for him.    ORAL CHEMOTHERAPY 7/22/2021 7/22/2021 8/18/2021 8/18/2021 9/15/2021 9/21/2021 9/21/2021   Assessment Type Refill Other Other Monthly Follow up Lab Monitoring;Refill Left Voicemail Monthly Follow up   Diagnosis Code Prostate Cancer Prostate Cancer Prostate Cancer Prostate Cancer Prostate Cancer Prostate Cancer Prostate Cancer   Providers Dr. Blayne Cisneros   [facility]   Drug Name Zytiga (abiraterone) Zytiga (abiraterone) Zytiga (abiraterone) Zytiga (abiraterone) Zytiga (abiraterone) Zytiga (abiraterone) Zytiga (abiraterone)   Dose 1,000 mg 1,000 mg 1,000 mg 1,000 mg 1,000 mg 1,000 mg 1,000 mg   Current Schedule Daily Daily Daily Daily Daily Daily Daily   Cycle Details Continuous Continuous Continuous Continuous Continuous Continuous Continuous   Start Date of Last Cycle - - - - - - -   Doses missed in last 2 weeks - - - 0 - - 0   Adherence Assessment - - - Adherent - - Adherent   Adverse Effects - - - Decreased Appetite/Weight Loss - - -   Nausea - - - - - - -   Pharmacist Intervention(nausea) - - - - - - -   Decrease Appetite/Weight Loss - - - Grade 1 - - Grade 1   Pharmacist Intervention(decreased appetite/weight loss) - - - Yes - - No   Intervention(s) - - - Patient education;Referral to oncology provider - - -   Pharmacist Intervention(increased  "ast/alt/t bili) - - - - - - -   Intervention(s) - - - - - - -   Home BPs - - - - - - -   Any new drug interactions? - - - No - - No   Pharmacist Intervention? - - - - - - -   Intervention(s) - - - - - - -   Is the dose as ordered appropriate for the patient? - - - Yes - - Yes   Since the last time we talked, have you been hospitalized or used the emergency room? - - - No - - No       Last PHQ-2 Score on record:   PHQ-2 ( 1999 Pfizer) 4/9/2021 1/4/2021   Q1: Little interest or pleasure in doing things 0 0   Q2: Feeling down, depressed or hopeless 0 0   PHQ-2 Score 0 0   Q1: Little interest or pleasure in doing things - Not at all   Q2: Feeling down, depressed or hopeless - Not at all   PHQ-2 Score - 0       Vitals:  BP:   BP Readings from Last 1 Encounters:   08/19/21 (!) 141/91     Wt Readings from Last 1 Encounters:   08/19/21 88.6 kg (195 lb 6.4 oz)     Estimated body surface area is 2.14 meters squared as calculated from the following:    Height as of 4/30/21: 1.854 m (6' 0.99\").    Weight as of 8/19/21: 88.6 kg (195 lb 6.4 oz).    Labs:  _  Result Component Current Result Ref Range   Sodium 137 (9/15/2021) 133 - 144 mmol/L     _  Result Component Current Result Ref Range   Potassium 3.1 (L) (9/15/2021) 3.4 - 5.3 mmol/L     _  Result Component Current Result Ref Range   Calcium 8.9 (9/15/2021) 8.5 - 10.1 mg/dL     No results found for Mag within last 30 days.     No results found for Phos within last 30 days.     _  Result Component Current Result Ref Range   Albumin 3.6 (9/15/2021) 3.4 - 5.0 g/dL     _  Result Component Current Result Ref Range   Urea Nitrogen 16 (9/15/2021) 7 - 30 mg/dL     _  Result Component Current Result Ref Range   Creatinine 1.03 (9/15/2021) 0.66 - 1.25 mg/dL     _  Result Component Current Result Ref Range   AST 24 (9/15/2021) 0 - 45 U/L     _  Result Component Current Result Ref Range   ALT 32 (9/15/2021) 0 - 70 U/L     _  Result Component Current Result Ref Range   Bilirubin Total 1.4 " (H) (9/15/2021) 0.2 - 1.3 mg/dL     _  Result Component Current Result Ref Range   WBC Count 4.0 (9/15/2021) 4.0 - 11.0 10e3/uL     _  Result Component Current Result Ref Range   Hemoglobin 12.6 (L) (9/15/2021) 13.3 - 17.7 g/dL     _  Result Component Current Result Ref Range   Platelet Count 91 (L) (9/15/2021) 150 - 450 10e3/uL     No results found for ANC within last 30 days.     Assessment/Plan:  Pt continues to tolerate Zytiga. Continue current therapy as planned.    Follow-Up:  10/15: appt with Esperanza and labs    Refill Due:  [facility] to deliver Zytiga and prednisone 9/24.    Grace Myhre, PharmD  Hematology/Oncology Pharmacist  [facility]  [facility]  [phone]

## 2021-10-13 ENCOUNTER — LAB (OUTPATIENT)
Dept: LAB | Facility: CLINIC | Age: 66
End: 2021-10-13
Payer: COMMERCIAL

## 2021-10-13 DIAGNOSIS — M1A.09X1 IDIOPATHIC CHRONIC GOUT, MULTIPLE SITES, WITH TOPHUS (TOPHI): ICD-10-CM

## 2021-10-13 DIAGNOSIS — Z79.899 ENCOUNTER FOR LONG-TERM (CURRENT) USE OF MEDICATIONS: ICD-10-CM

## 2021-10-13 DIAGNOSIS — C61 PROSTATE CANCER (H): ICD-10-CM

## 2021-10-13 LAB
ALBUMIN SERPL-MCNC: 3.2 G/DL (ref 3.4–5)
ALP SERPL-CCNC: 53 U/L (ref 40–150)
ALT SERPL W P-5'-P-CCNC: 36 U/L (ref 0–70)
ANION GAP SERPL CALCULATED.3IONS-SCNC: 5 MMOL/L (ref 3–14)
AST SERPL W P-5'-P-CCNC: 38 U/L (ref 0–45)
BASOPHILS # BLD AUTO: 0 10E3/UL (ref 0–0.2)
BASOPHILS NFR BLD AUTO: 0 %
BILIRUB SERPL-MCNC: 0.8 MG/DL (ref 0.2–1.3)
BUN SERPL-MCNC: 20 MG/DL (ref 7–30)
CALCIUM SERPL-MCNC: 9.1 MG/DL (ref 8.5–10.1)
CHLORIDE BLD-SCNC: 100 MMOL/L (ref 94–109)
CO2 SERPL-SCNC: 31 MMOL/L (ref 20–32)
CREAT SERPL-MCNC: 1.07 MG/DL (ref 0.66–1.25)
EOSINOPHIL # BLD AUTO: 0.1 10E3/UL (ref 0–0.7)
EOSINOPHIL NFR BLD AUTO: 2 %
ERYTHROCYTE [DISTWIDTH] IN BLOOD BY AUTOMATED COUNT: 11.5 % (ref 10–15)
GFR SERPL CREATININE-BSD FRML MDRD: 71 ML/MIN/1.73M2
GLUCOSE BLD-MCNC: 94 MG/DL (ref 70–99)
HCT VFR BLD AUTO: 35.9 % (ref 40–53)
HGB BLD-MCNC: 12.3 G/DL (ref 13.3–17.7)
LYMPHOCYTES # BLD AUTO: 0.6 10E3/UL (ref 0.8–5.3)
LYMPHOCYTES NFR BLD AUTO: 21 %
MCH RBC QN AUTO: 34.7 PG (ref 26.5–33)
MCHC RBC AUTO-ENTMCNC: 34.3 G/DL (ref 31.5–36.5)
MCV RBC AUTO: 101 FL (ref 78–100)
MONOCYTES # BLD AUTO: 0.4 10E3/UL (ref 0–1.3)
MONOCYTES NFR BLD AUTO: 12 %
NEUTROPHILS # BLD AUTO: 2 10E3/UL (ref 1.6–8.3)
NEUTROPHILS NFR BLD AUTO: 66 %
PLATELET # BLD AUTO: 108 10E3/UL (ref 150–450)
POTASSIUM BLD-SCNC: 3.2 MMOL/L (ref 3.4–5.3)
PROT SERPL-MCNC: 6.7 G/DL (ref 6.8–8.8)
PSA SERPL-MCNC: <0.01 UG/L (ref 0–4)
RBC # BLD AUTO: 3.54 10E6/UL (ref 4.4–5.9)
SODIUM SERPL-SCNC: 136 MMOL/L (ref 133–144)
URATE SERPL-MCNC: 6.5 MG/DL (ref 3.5–7.2)
WBC # BLD AUTO: 3 10E3/UL (ref 4–11)

## 2021-10-13 PROCEDURE — 84153 ASSAY OF PSA TOTAL: CPT

## 2021-10-13 PROCEDURE — 84550 ASSAY OF BLOOD/URIC ACID: CPT

## 2021-10-13 PROCEDURE — 85025 COMPLETE CBC W/AUTO DIFF WBC: CPT

## 2021-10-13 PROCEDURE — 80053 COMPREHEN METABOLIC PANEL: CPT

## 2021-10-13 PROCEDURE — 36415 COLL VENOUS BLD VENIPUNCTURE: CPT

## 2021-10-13 NOTE — PROGRESS NOTES
Jonathon is a 66 year old who is being evaluated via a billable video visit.      How would you like to obtain your AVS? MyChart  If the video visit is dropped, the invitation should be resent by: Text to cell phone: [phone]  Will anyone else be joining your video visit? No      Video-Visit Details    Type of service:  Video Visit    Video Start Time: 10:15 aM    Video End Time:10:42 AM    Originating Location (pt. Location): Home    Distant Location (provider location):  [facility]     Platform used for Video Visit: [facility] Medical Oncology Follow Up Consultation Note       Date of visit: 10/15/21      HPI:  67 yo male with elevated PSA who had a prostate biopsy on 12/21/20.   9/17/19 PSA =  6.57    Pathology from 12/21/20 shows Gl 4+5=9 disease.  The patient's MRI from 11/17/20 shows concern for enlarged lymph nodes raising concern for mets.  Has placement of fiducials and [employer] on 4/30 with Dr. Caban.     Started prednisone abiraterone 1/25/21 with plan for 2 years.    Completed radiation 5/19/21 to 7/15/21 with Dr. Francois.      INTERVAL HX:  Overall doing fine. Continues to have sinus pressure that fluctuates which he associates with GI upset (dry heaving). He has noticed these sx since starting zytiga/pred. The symptoms are not daily but every 2-3 days. He has experimented with stopping allopurinol and does not find a link to this medication. He takes the zytiga in the morning and does not notice correlation with when he physically takes the medication.      His energy levels remain stable and he walks his dog. He has hot flashes with little activity but they remain tolerable for him. No bleeding/bruising. Appetite ok. Low muscle mass.     ROS  10 point ROS otherwise unremarkable.    PMH:  HTN  Gout    MEDS  Lisinopril - HCTZ    PHYSICAL EXAM-video  General: Patient appears well in no acute distress.   Skin: No visualized rash or lesions on visualized  skin  Eyes: EOMI, no erythema, sclera icterus or discharge noted  Resp: Appears to be breathing comfortably without accessory muscle usage, speaking in full sentences, no cough  MSK: Appears to have normal range of motion based on visualized movements  Neurologic: No apparent tremors, facial movements symmetric  Psych: affect good, alert and oriented    The rest of a comprehensive physical examination is deferred due to PHE (public health emergency) video restrictions    LABS AND IMAGES  Results for JINA MILLER (MRN [id number]) as of 10/15/2021 10:11   Ref. Range 10/13/2021 10:56   Sodium Latest Ref Range: 133 - 144 mmol/L 136   Potassium Latest Ref Range: 3.4 - 5.3 mmol/L 3.2 (L)   Chloride Latest Ref Range: 94 - 109 mmol/L 100   Carbon Dioxide Latest Ref Range: 20 - 32 mmol/L 31   Urea Nitrogen Latest Ref Range: 7 - 30 mg/dL 20   Creatinine Latest Ref Range: 0.66 - 1.25 mg/dL 1.07   GFR Estimate Latest Ref Range: >60 mL/min/1.73m2 71   Calcium Latest Ref Range: 8.5 - 10.1 mg/dL 9.1   Anion Gap Latest Ref Range: 3 - 14 mmol/L 5   Albumin Latest Ref Range: 3.4 - 5.0 g/dL 3.2 (L)   Protein Total Latest Ref Range: 6.8 - 8.8 g/dL 6.7 (L)   Bilirubin Total Latest Ref Range: 0.2 - 1.3 mg/dL 0.8   Alkaline Phosphatase Latest Ref Range: 40 - 150 U/L 53   ALT Latest Ref Range: 0 - 70 U/L 36   AST Latest Ref Range: 0 - 45 U/L 38   PSA Latest Ref Range: 0.00 - 4.00 ug/L <0.01   Uric Acid Latest Ref Range: 3.5 - 7.2 mg/dL 6.5   Glucose Latest Ref Range: 70 - 99 mg/dL 94   WBC Latest Ref Range: 4.0 - 11.0 10e3/uL 3.0 (L)   Hemoglobin Latest Ref Range: 13.3 - 17.7 g/dL 12.3 (L)   Hematocrit Latest Ref Range: 40.0 - 53.0 % 35.9 (L)   Platelet Count Latest Ref Range: 150 - 450 10e3/uL 108 (L)   RBC Count Latest Ref Range: 4.40 - 5.90 10e6/uL 3.54 (L)   MCV Latest Ref Range: 78 - 100 fL 101 (H)   MCH Latest Ref Range: 26.5 - 33.0 pg 34.7 (H)   MCHC Latest Ref Range: 31.5 - 36.5 g/dL 34.3   RDW Latest Ref Range: 10.0 - 15.0 %  11.5   % Neutrophils Latest Units: % 66   % Lymphocytes Latest Units: % 21   % Monocytes Latest Units: % 12   % Eosinophils Latest Units: % 2   Absolute Basophils Latest Ref Range: 0.0 - 0.2 10e3/uL 0.0   % Basophils Latest Units: % 0   Absolute Eosinophils Latest Ref Range: 0.0 - 0.7 10e3/uL 0.1   Absolute Lymphocytes Latest Ref Range: 0.8 - 5.3 10e3/uL 0.6 (L)   Absolute Monocytes Latest Ref Range: 0.0 - 1.3 10e3/uL 0.4   Absolute Neutrophils Latest Ref Range: 1.6 - 8.3 10e3/uL 2.0       No new imaging studies.      IMPRESSION AND PLAN  Locally advanced prostate cancer with high grade [place] 9. No significant comorbidities.     Plan:   1. Two years of ADT plus Abiraterone--> 01/2023. Tolerating with mild hot flashes and some fatigue, both tolerable and not impacting QOL in a way that precludes continuation of therapy. No edema. Has some GI upset but declines anti-emetics as it feels tolerable.     2. RT to the nodes and prostate completed 3 mo ago with Dr. Francois    3. Lupron in [place] due later this month    4. Mgmt of his gout per his rheumatologist Dr. Wang    5. Pancytopenias, most likely secondary to RT as clearly started with treatment. Stable today and no bleeding issues. If persists or worsens would add smear    6. Nasal congestion--would be unusual to be from [facility]? Offered ENT referral and some general sinus mgmt with antihistamine and flonase he can try. He will let us know about ENT    7. Hypokalemia: discussed increasing potassium rich foods. No need for supplementation yet.     RTC in 3 mo with Dr. Cisneros and labs    50 minutes spent on the date of the encounter doing chart review, review of test results, interpretation of tests, patient visit, documentation and discussion with other provider(s)     Esperanza Floyd, CNP on 10/15/2021 at 10:53 AM

## 2021-10-15 ENCOUNTER — VIRTUAL VISIT (OUTPATIENT)
Dept: ONCOLOGY | Facility: CLINIC | Age: 66
End: 2021-10-15
Attending: NURSE PRACTITIONER
Payer: COMMERCIAL

## 2021-10-15 DIAGNOSIS — C61 PROSTATE CANCER (H): Primary | ICD-10-CM

## 2021-10-15 DIAGNOSIS — C77.9 REGIONAL LYMPH NODE METASTASIS PRESENT (H): ICD-10-CM

## 2021-10-15 PROCEDURE — 99215 OFFICE O/P EST HI 40 MIN: CPT | Mod: 95 | Performed by: NURSE PRACTITIONER

## 2021-10-15 NOTE — LETTER
10/15/2021         RE: Jonathon Santos  [id number] [address] [place] [id number]        Dear Colleague,    Thank you for referring your patient, Jonathon Santos, to the [facility]. Please see a copy of my visit note below.    Jonathon is a 66 year old who is being evaluated via a billable video visit.      How would you like to obtain your AVS? MyChart  If the video visit is dropped, the invitation should be resent by: Text to cell phone: [phone]  Will anyone else be joining your video visit? No      Video-Visit Details    Type of service:  Video Visit    Video Start Time: 10:15 aM    Video End Time:10:42 AM    Originating Location (pt. Location): Home    Distant Location (provider location):  [facility]     Platform used for Video Visit: [facility] Medical Oncology Follow Up Consultation Note       Date of visit: 10/15/21      HPI:  65 yo male with elevated PSA who had a prostate biopsy on 12/21/20.   9/17/19 PSA =  6.57    Pathology from 12/21/20 shows Gl 4+5=9 disease.  The patient's MRI from 11/17/20 shows concern for enlarged lymph nodes raising concern for mets.  Has placement of fiducials and [employer] on 4/30 with Dr. Caban.     Started prednisone abiraterone 1/25/21 with plan for 2 years.    Completed radiation 5/19/21 to 7/15/21 with Dr. Francois.      INTERVAL HX:  Overall doing fine. Continues to have sinus pressure that fluctuates which he associates with GI upset (dry heaving). He has noticed these sx since starting zytiga/pred. The symptoms are not daily but every 2-3 days. He has experimented with stopping allopurinol and does not find a link to this medication. He takes the zytiga in the morning and does not notice correlation with when he physically takes the medication.      His energy levels remain stable and he walks his dog. He has hot flashes with little activity but they remain tolerable for him. No bleeding/bruising.  Appetite ok. Low muscle mass.     ROS  10 point ROS otherwise unremarkable.    PMH:  HTN  Gout    MEDS  Lisinopril - HCTZ    PHYSICAL EXAM-video  General: Patient appears well in no acute distress.   Skin: No visualized rash or lesions on visualized skin  Eyes: EOMI, no erythema, sclera icterus or discharge noted  Resp: Appears to be breathing comfortably without accessory muscle usage, speaking in full sentences, no cough  MSK: Appears to have normal range of motion based on visualized movements  Neurologic: No apparent tremors, facial movements symmetric  Psych: affect good, alert and oriented    The rest of a comprehensive physical examination is deferred due to PHE (public health emergency) video restrictions    LABS AND IMAGES  Results for JINA MILLER (MRN [id number]) as of 10/15/2021 10:11   Ref. Range 10/13/2021 10:56   Sodium Latest Ref Range: 133 - 144 mmol/L 136   Potassium Latest Ref Range: 3.4 - 5.3 mmol/L 3.2 (L)   Chloride Latest Ref Range: 94 - 109 mmol/L 100   Carbon Dioxide Latest Ref Range: 20 - 32 mmol/L 31   Urea Nitrogen Latest Ref Range: 7 - 30 mg/dL 20   Creatinine Latest Ref Range: 0.66 - 1.25 mg/dL 1.07   GFR Estimate Latest Ref Range: >60 mL/min/1.73m2 71   Calcium Latest Ref Range: 8.5 - 10.1 mg/dL 9.1   Anion Gap Latest Ref Range: 3 - 14 mmol/L 5   Albumin Latest Ref Range: 3.4 - 5.0 g/dL 3.2 (L)   Protein Total Latest Ref Range: 6.8 - 8.8 g/dL 6.7 (L)   Bilirubin Total Latest Ref Range: 0.2 - 1.3 mg/dL 0.8   Alkaline Phosphatase Latest Ref Range: 40 - 150 U/L 53   ALT Latest Ref Range: 0 - 70 U/L 36   AST Latest Ref Range: 0 - 45 U/L 38   PSA Latest Ref Range: 0.00 - 4.00 ug/L <0.01   Uric Acid Latest Ref Range: 3.5 - 7.2 mg/dL 6.5   Glucose Latest Ref Range: 70 - 99 mg/dL 94   WBC Latest Ref Range: 4.0 - 11.0 10e3/uL 3.0 (L)   Hemoglobin Latest Ref Range: 13.3 - 17.7 g/dL 12.3 (L)   Hematocrit Latest Ref Range: 40.0 - 53.0 % 35.9 (L)   Platelet Count Latest Ref Range: 150 - 450  10e3/uL 108 (L)   RBC Count Latest Ref Range: 4.40 - 5.90 10e6/uL 3.54 (L)   MCV Latest Ref Range: 78 - 100 fL 101 (H)   MCH Latest Ref Range: 26.5 - 33.0 pg 34.7 (H)   MCHC Latest Ref Range: 31.5 - 36.5 g/dL 34.3   RDW Latest Ref Range: 10.0 - 15.0 % 11.5   % Neutrophils Latest Units: % 66   % Lymphocytes Latest Units: % 21   % Monocytes Latest Units: % 12   % Eosinophils Latest Units: % 2   Absolute Basophils Latest Ref Range: 0.0 - 0.2 10e3/uL 0.0   % Basophils Latest Units: % 0   Absolute Eosinophils Latest Ref Range: 0.0 - 0.7 10e3/uL 0.1   Absolute Lymphocytes Latest Ref Range: 0.8 - 5.3 10e3/uL 0.6 (L)   Absolute Monocytes Latest Ref Range: 0.0 - 1.3 10e3/uL 0.4   Absolute Neutrophils Latest Ref Range: 1.6 - 8.3 10e3/uL 2.0       No new imaging studies.      IMPRESSION AND PLAN  Locally advanced prostate cancer with high grade [place] 9. No significant comorbidities.     Plan:   1. Two years of ADT plus Abiraterone--> 01/2023. Tolerating with mild hot flashes and some fatigue, both tolerable and not impacting QOL in a way that precludes continuation of therapy. No edema. Has some GI upset but declines anti-emetics as it feels tolerable.     2. RT to the nodes and prostate completed 3 mo ago with Dr. Francois    3. Lupron in [place] due later this month    4. Mgmt of his gout per his rheumatologist Dr. Wang    5. Pancytopenias, most likely secondary to RT as clearly started with treatment. Stable today and no bleeding issues. If persists or worsens would add smear    6. Nasal congestion--would be unusual to be from [facility]? Offered ENT referral and some general sinus mgmt with antihistamine and flonase he can try. He will let us know about ENT    7. Hypokalemia: discussed increasing potassium rich foods. No need for supplementation yet.     RTC in 3 mo with Dr. Cisneros and labs    50 minutes spent on the date of the encounter doing chart review, review of test results, interpretation of tests, patient visit,  documentation and discussion with other provider(s)     Esperanza Floyd CNP on 10/15/2021 at 10:53 AM              Again, thank you for allowing me to participate in the care of your patient.        Sincerely,        Esperanza Floyd CNP

## 2021-10-19 ENCOUNTER — TELEPHONE (OUTPATIENT)
Dept: ONCOLOGY | Facility: CLINIC | Age: 66
End: 2021-10-19

## 2021-10-19 NOTE — TELEPHONE ENCOUNTER
Oral Chemotherapy Monitoring Program     Placed call to patient in follow up of oral chemotherapy. Left message requesting call back. No drug names were mentioned. Will update when response received.     Grace Myhre, PharmD  Hematology/Oncology Clinical Pharmacist  [facility]  [facility]  [phone]

## 2021-10-20 ENCOUNTER — MYC MEDICAL ADVICE (OUTPATIENT)
Dept: ONCOLOGY | Facility: CLINIC | Age: 66
End: 2021-10-20

## 2021-10-20 NOTE — TELEPHONE ENCOUNTER
Oral Chemotherapy Monitoring Program    Subjective/Objective:  Jonathon Santos is a 66 year old male contacted by phone for a follow-up visit for oral chemotherapy.  Jonathon confirms taking the appropriate dose of abiraterone and prednisone. Denies new or worsening side effects, missed doses, and recent hospital or ED visits. Patient has not had any recent medication changes.     He continues on lisinopril/hctz and metoprolol for BP control. He checks his BP at home regularly, top number is usually in the upper 130s and bottom number usually mid 90s. His BP is managed by PCP, but he is currently looking for a new PCP. Most recent /95. Encouraged increased exercise - he notes his BP comes down after exercising.     ORAL CHEMOTHERAPY 8/18/2021 9/15/2021 9/21/2021 9/21/2021 10/15/2021 10/19/2021 10/20/2021   Assessment Type Monthly Follow up Lab Monitoring;Refill Left Voicemail Monthly Follow up Chart Review Left Voicemail Monthly Follow up   Diagnosis Code Prostate Cancer Prostate Cancer Prostate Cancer Prostate Cancer Prostate Cancer Prostate Cancer Prostate Cancer   Providers Dr. Blayne Cisneros   [facility]   Drug Name Zytiga (abiraterone) Zytiga (abiraterone) Zytiga (abiraterone) Zytiga (abiraterone) Zytiga (abiraterone) Zytiga (abiraterone) Zytiga (abiraterone)   Dose 1,000 mg 1,000 mg 1,000 mg 1,000 mg 1,000 mg 1,000 mg 1,000 mg   Current Schedule Daily Daily Daily Daily Daily Daily Daily   Cycle Details Continuous Continuous Continuous Continuous Continuous Continuous Continuous   Start Date of Last Cycle - - - - - - -   Planned next cycle start date - - - - - - 10/26/2021   Doses missed in last 2 weeks 0 - - 0 - - 0   Adherence Assessment Adherent - - Adherent - - Adherent   Adverse Effects Decreased Appetite/Weight Loss - - - - - Hypertension   Nausea - - - - - - -   Pharmacist Intervention(nausea) -  "- - - - - -   Decrease Appetite/Weight Loss Grade 1 - - Grade 1 - - -   Pharmacist Intervention(decreased appetite/weight loss) Yes - - No - - -   Intervention(s) Patient education;Referral to oncology provider - - - - - -   Pharmacist Intervention(increased ast/alt/t bili) - - - - - - -   Intervention(s) - - - - - - -   Hypertension - - - - - - Grade 1   Pharmacist intervention(hypertension) - - - - - - Yes   Intervention(s) - - - - - - Patient education;Referral to PCP   Home BPs - - - - - - some BPs 140//100   Any new drug interactions? No - - No - - No   Pharmacist Intervention? - - - - - - -   Intervention(s) - - - - - - -   Is the dose as ordered appropriate for the patient? Yes - - Yes - - Yes   Since the last time we talked, have you been hospitalized or used the emergency room? No - - No - - -       Last PHQ-2 Score on record:   PHQ-2 ( 1999 Pfizer) 4/9/2021 1/4/2021   Q1: Little interest or pleasure in doing things 0 0   Q2: Feeling down, depressed or hopeless 0 0   PHQ-2 Score 0 0   Q1: Little interest or pleasure in doing things - Not at all   Q2: Feeling down, depressed or hopeless - Not at all   PHQ-2 Score - 0       Vitals:  BP:   BP Readings from Last 1 Encounters:   08/19/21 (!) 141/91     Wt Readings from Last 1 Encounters:   08/19/21 88.6 kg (195 lb 6.4 oz)     Estimated body surface area is 2.14 meters squared as calculated from the following:    Height as of 4/30/21: 1.854 m (6' 0.99\").    Weight as of 8/19/21: 88.6 kg (195 lb 6.4 oz).    Labs:  _  Result Component Current Result Ref Range   Sodium 136 (10/13/2021) 133 - 144 mmol/L     _  Result Component Current Result Ref Range   Potassium 3.2 (L) (10/13/2021) 3.4 - 5.3 mmol/L     _  Result Component Current Result Ref Range   Calcium 9.1 (10/13/2021) 8.5 - 10.1 mg/dL     No results found for Mag within last 30 days.     No results found for Phos within last 30 days.     _  Result Component Current Result Ref Range   Albumin 3.2 (L) " (10/13/2021) 3.4 - 5.0 g/dL     _  Result Component Current Result Ref Range   Urea Nitrogen 20 (10/13/2021) 7 - 30 mg/dL     _  Result Component Current Result Ref Range   Creatinine 1.07 (10/13/2021) 0.66 - 1.25 mg/dL     _  Result Component Current Result Ref Range   AST 38 (10/13/2021) 0 - 45 U/L     _  Result Component Current Result Ref Range   ALT 36 (10/13/2021) 0 - 70 U/L     _  Result Component Current Result Ref Range   Bilirubin Total 0.8 (10/13/2021) 0.2 - 1.3 mg/dL     _  Result Component Current Result Ref Range   WBC Count 3.0 (L) (10/13/2021) 4.0 - 11.0 10e3/uL     _  Result Component Current Result Ref Range   Hemoglobin 12.3 (L) (10/13/2021) 13.3 - 17.7 g/dL     _  Result Component Current Result Ref Range   Platelet Count 108 (L) (10/13/2021) 150 - 450 10e3/uL     No results found for ANC within last 30 days.       Assessment/Plan:  Jonathon is tolerating Zytiga well. He may benefit from increasing his metoprolol dose, but was not interested in changing his medications at this time. Advised increased exercise and f/u with PCP.  PDC=1.00, no concerns with adherence.     Follow-Up:  11/12: Labs   1/20/22: Quarterly assessment    Refill Due:  SP will deliver on Monday 10/25.     Belkys Hunt, PharmD  Hematology/Oncology Clinical Pharmacist  [facility]  [facility]  [phone]

## 2021-10-21 NOTE — TELEPHONE ENCOUNTER
Per Esperanza Floyd: Neuropathy is not a SE from lupron or abiraterone, or really of prednisone. I reviewed the note from PCP visit with Maame LEIGH in August. I would recommend he go back to her to consider other work up (neuro/med elimination/PT, etc).     Called patient and relayed above information.

## 2021-10-21 NOTE — TELEPHONE ENCOUNTER
Called patient regarding MyChart message about neuropathy. Reports that numbness started in toes and now covers about half of his foot. Can move toes without pain. Can feel sensation when touching foot with finger but it is decreased. Started around January but got worse 3-4 months ago. Got tested for diabetes and thyroid function, no findings. Nothing makes it better or worse, reports it is constant. No swelling. Has fallen down the stairs in the past and finds it difficult to drive due to the numbness. Able to perform other ADLs without difficulty.    Routed to provider for recommendations.     Separate message sent to scheduling to schedule Lupron shot.

## 2021-10-24 ENCOUNTER — HEALTH MAINTENANCE LETTER (OUTPATIENT)
Age: 66
End: 2021-10-24

## 2021-10-29 ENCOUNTER — INFUSION THERAPY VISIT (OUTPATIENT)
Dept: INFUSION THERAPY | Facility: CLINIC | Age: 66
End: 2021-10-29
Attending: INTERNAL MEDICINE
Payer: COMMERCIAL

## 2021-10-29 VITALS
WEIGHT: 206.2 LBS | TEMPERATURE: 98.1 F | RESPIRATION RATE: 16 BRPM | DIASTOLIC BLOOD PRESSURE: 75 MMHG | BODY MASS INDEX: 27.21 KG/M2 | SYSTOLIC BLOOD PRESSURE: 128 MMHG | HEART RATE: 85 BPM

## 2021-10-29 DIAGNOSIS — C61 PROSTATE CANCER (H): Primary | ICD-10-CM

## 2021-10-29 PROCEDURE — 250N000011 HC RX IP 250 OP 636: Performed by: NURSE PRACTITIONER

## 2021-10-29 PROCEDURE — 96402 CHEMO HORMON ANTINEOPL SQ/IM: CPT

## 2021-10-29 RX ADMIN — LEUPROLIDE ACETATE 22.5 MG: KIT at 15:17

## 2021-10-29 NOTE — PROGRESS NOTES
Infusion Nursing Note:  Jonathon Santos presents today for Lupron.    Patient seen by provider today: No   present during visit today: Not Applicable.    Note: Pt denies any health related concerns today.      Intravenous Access:  N/A    Treatment Conditions:  Not Applicable.      Post Infusion Assessment:  Patient tolerated injection without incident.       Discharge Plan:   Discharge instructions reviewed with: Patient.  Patient and/or family verbalized understanding of discharge instructions and all questions answered.  Patient discharged in stable condition accompanied by: self.  Departure Mode: Ambulatory.      Cassidy Zhang RN

## 2021-11-12 ENCOUNTER — LAB (OUTPATIENT)
Dept: LAB | Facility: CLINIC | Age: 66
End: 2021-11-12
Payer: COMMERCIAL

## 2021-11-12 DIAGNOSIS — C61 PROSTATE CANCER (H): ICD-10-CM

## 2021-11-12 DIAGNOSIS — M1A.09X1 IDIOPATHIC CHRONIC GOUT, MULTIPLE SITES, WITH TOPHUS (TOPHI): ICD-10-CM

## 2021-11-12 DIAGNOSIS — C77.9 REGIONAL LYMPH NODE METASTASIS PRESENT (H): ICD-10-CM

## 2021-11-12 LAB
ALBUMIN SERPL-MCNC: 3.6 G/DL (ref 3.4–5)
ALP SERPL-CCNC: 58 U/L (ref 40–150)
ALT SERPL W P-5'-P-CCNC: 28 U/L (ref 0–70)
ANION GAP SERPL CALCULATED.3IONS-SCNC: 5 MMOL/L (ref 3–14)
AST SERPL W P-5'-P-CCNC: 24 U/L (ref 0–45)
BILIRUB SERPL-MCNC: 1.8 MG/DL (ref 0.2–1.3)
BUN SERPL-MCNC: 21 MG/DL (ref 7–30)
CALCIUM SERPL-MCNC: 9.5 MG/DL (ref 8.5–10.1)
CHLORIDE BLD-SCNC: 106 MMOL/L (ref 94–109)
CO2 SERPL-SCNC: 29 MMOL/L (ref 20–32)
CREAT SERPL-MCNC: 1.08 MG/DL (ref 0.66–1.25)
GFR SERPL CREATININE-BSD FRML MDRD: 71 ML/MIN/1.73M2
GLUCOSE BLD-MCNC: 123 MG/DL (ref 70–99)
POTASSIUM BLD-SCNC: 3.4 MMOL/L (ref 3.4–5.3)
PROT SERPL-MCNC: 7.2 G/DL (ref 6.8–8.8)
PSA SERPL-MCNC: <0.01 UG/L (ref 0–4)
SODIUM SERPL-SCNC: 140 MMOL/L (ref 133–144)

## 2021-11-12 PROCEDURE — 36415 COLL VENOUS BLD VENIPUNCTURE: CPT

## 2021-11-12 PROCEDURE — 80053 COMPREHEN METABOLIC PANEL: CPT

## 2021-11-12 PROCEDURE — 84153 ASSAY OF PSA TOTAL: CPT

## 2021-11-16 DIAGNOSIS — C61 PROSTATE CANCER (H): ICD-10-CM

## 2021-11-16 DIAGNOSIS — C77.9 REGIONAL LYMPH NODE METASTASIS PRESENT (H): Primary | ICD-10-CM

## 2021-11-16 RX ORDER — ABIRATERONE ACETATE 250 MG/1
1000 TABLET ORAL DAILY
Qty: 120 TABLET | Refills: 1 | Status: SHIPPED | OUTPATIENT
Start: 2021-11-16 | End: 2021-12-16

## 2021-11-16 RX ORDER — PREDNISONE 5 MG/1
5 TABLET ORAL DAILY
Qty: 30 TABLET | Refills: 11 | Status: SHIPPED | OUTPATIENT
Start: 2021-11-16 | End: 2021-12-16

## 2021-11-18 LAB — URATE SERPL-MCNC: 6.6 MG/DL (ref 3.5–7.2)

## 2021-11-18 PROCEDURE — 36415 COLL VENOUS BLD VENIPUNCTURE: CPT

## 2021-11-18 PROCEDURE — 84550 ASSAY OF BLOOD/URIC ACID: CPT

## 2021-12-16 ENCOUNTER — MYC MEDICAL ADVICE (OUTPATIENT)
Dept: ONCOLOGY | Facility: CLINIC | Age: 66
End: 2021-12-16

## 2021-12-16 ENCOUNTER — IMMUNIZATION (OUTPATIENT)
Dept: FAMILY MEDICINE | Facility: CLINIC | Age: 66
End: 2021-12-16
Payer: COMMERCIAL

## 2021-12-16 ENCOUNTER — LAB (OUTPATIENT)
Dept: LAB | Facility: CLINIC | Age: 66
End: 2021-12-16

## 2021-12-16 DIAGNOSIS — Z79.899 ENCOUNTER FOR LONG-TERM (CURRENT) USE OF MEDICATIONS: ICD-10-CM

## 2021-12-16 DIAGNOSIS — M1A.09X1 IDIOPATHIC CHRONIC GOUT, MULTIPLE SITES, WITH TOPHUS (TOPHI): ICD-10-CM

## 2021-12-16 DIAGNOSIS — C61 PROSTATE CANCER (H): ICD-10-CM

## 2021-12-16 LAB
ALBUMIN SERPL-MCNC: 3.4 G/DL (ref 3.4–5)
ALP SERPL-CCNC: 62 U/L (ref 40–150)
ALT SERPL W P-5'-P-CCNC: 38 U/L (ref 0–70)
ANION GAP SERPL CALCULATED.3IONS-SCNC: 6 MMOL/L (ref 3–14)
AST SERPL W P-5'-P-CCNC: 33 U/L (ref 0–45)
BASOPHILS # BLD AUTO: 0 10E3/UL (ref 0–0.2)
BASOPHILS NFR BLD AUTO: 1 %
BILIRUB SERPL-MCNC: 1 MG/DL (ref 0.2–1.3)
BUN SERPL-MCNC: 13 MG/DL (ref 7–30)
CALCIUM SERPL-MCNC: 9.2 MG/DL (ref 8.5–10.1)
CHLORIDE BLD-SCNC: 101 MMOL/L (ref 94–109)
CO2 SERPL-SCNC: 30 MMOL/L (ref 20–32)
CREAT SERPL-MCNC: 0.96 MG/DL (ref 0.66–1.25)
EOSINOPHIL # BLD AUTO: 0 10E3/UL (ref 0–0.7)
EOSINOPHIL NFR BLD AUTO: 1 %
ERYTHROCYTE [DISTWIDTH] IN BLOOD BY AUTOMATED COUNT: 12.2 % (ref 10–15)
GFR SERPL CREATININE-BSD FRML MDRD: 81 ML/MIN/1.73M2
GLUCOSE BLD-MCNC: 110 MG/DL (ref 70–99)
HCT VFR BLD AUTO: 38.6 % (ref 40–53)
HGB BLD-MCNC: 13.4 G/DL (ref 13.3–17.7)
IMM GRANULOCYTES # BLD: 0 10E3/UL
IMM GRANULOCYTES NFR BLD: 1 %
LYMPHOCYTES # BLD AUTO: 0.9 10E3/UL (ref 0.8–5.3)
LYMPHOCYTES NFR BLD AUTO: 21 %
MCH RBC QN AUTO: 34.1 PG (ref 26.5–33)
MCHC RBC AUTO-ENTMCNC: 34.7 G/DL (ref 31.5–36.5)
MCV RBC AUTO: 98 FL (ref 78–100)
MONOCYTES # BLD AUTO: 0.4 10E3/UL (ref 0–1.3)
MONOCYTES NFR BLD AUTO: 8 %
NEUTROPHILS # BLD AUTO: 3.1 10E3/UL (ref 1.6–8.3)
NEUTROPHILS NFR BLD AUTO: 68 %
NRBC # BLD AUTO: 0 10E3/UL
NRBC BLD AUTO-RTO: 0 /100
PLATELET # BLD AUTO: 100 10E3/UL (ref 150–450)
POTASSIUM BLD-SCNC: 3.3 MMOL/L (ref 3.4–5.3)
PROT SERPL-MCNC: 7.4 G/DL (ref 6.8–8.8)
PSA SERPL-MCNC: <0.01 UG/L (ref 0–4)
RBC # BLD AUTO: 3.93 10E6/UL (ref 4.4–5.9)
SODIUM SERPL-SCNC: 137 MMOL/L (ref 133–144)
URATE SERPL-MCNC: 6.2 MG/DL (ref 3.5–7.2)
WBC # BLD AUTO: 4.4 10E3/UL (ref 4–11)

## 2021-12-16 PROCEDURE — 85025 COMPLETE CBC W/AUTO DIFF WBC: CPT

## 2021-12-16 PROCEDURE — 91300 PR COVID VAC PFIZER DIL RECON 30 MCG/0.3 ML IM: CPT

## 2021-12-16 PROCEDURE — 0004A PR COVID VAC PFIZER DIL RECON 30 MCG/0.3 ML IM: CPT

## 2021-12-16 PROCEDURE — 36415 COLL VENOUS BLD VENIPUNCTURE: CPT

## 2021-12-16 PROCEDURE — 80053 COMPREHEN METABOLIC PANEL: CPT

## 2021-12-16 PROCEDURE — 84153 ASSAY OF PSA TOTAL: CPT

## 2021-12-16 PROCEDURE — 84550 ASSAY OF BLOOD/URIC ACID: CPT

## 2021-12-16 NOTE — TELEPHONE ENCOUNTER
Oral Chemotherapy Monitoring Program  Lab Follow Up    Reviewed lab results from 12/16/21.    ORAL CHEMOTHERAPY 9/21/2021 9/21/2021 10/15/2021 10/19/2021 10/20/2021 11/16/2021 12/16/2021   Assessment Type Left Voicemail Monthly Follow up Chart Review Left Voicemail Monthly Follow up Lab Monitoring;Refill Lab Monitoring   Diagnosis Code Prostate Cancer Prostate Cancer Prostate Cancer Prostate Cancer Prostate Cancer Prostate Cancer Prostate Cancer   Providers Dr. Blayne Cisneros   [facility]   Drug Name Zytiga (abiraterone) Zytiga (abiraterone) Zytiga (abiraterone) Zytiga (abiraterone) Zytiga (abiraterone) Zytiga (abiraterone) Zytiga (abiraterone)   Dose 1,000 mg 1,000 mg 1,000 mg 1,000 mg 1,000 mg 1,000 mg 1,000 mg   Current Schedule Daily Daily Daily Daily Daily Daily Daily   Cycle Details Continuous Continuous Continuous Continuous Continuous Continuous Continuous   Start Date of Last Cycle - - - - - - -   Planned next cycle start date - - - - 10/26/2021 - -   Doses missed in last 2 weeks - 0 - - 0 - -   Adherence Assessment - Adherent - - Adherent - -   Adverse Effects - - - - Hypertension - -   Nausea - - - - - - -   Pharmacist Intervention(nausea) - - - - - - -   Decrease Appetite/Weight Loss - Grade 1 - - - - -   Pharmacist Intervention(decreased appetite/weight loss) - No - - - - -   Intervention(s) - - - - - - -   Pharmacist Intervention(increased ast/alt/t bili) - - - - - - -   Intervention(s) - - - - - - -   Hypertension - - - - Grade 1 - -   Pharmacist intervention(hypertension) - - - - Yes - -   Intervention(s) - - - - Patient education;Referral to PCP - -   Home BPs - - - - some BPs 140//100 - -   Any new drug interactions? - No - - No - -   Pharmacist Intervention? - - - - - - -   Intervention(s) - - - - - - -   Is the dose as ordered appropriate for the patient? - Yes - - Yes - -   Since  the last time we talked, have you been hospitalized or used the emergency room? - No - - - - -       Labs:  _  Result Component Current Result Ref Range   Sodium 137 (12/16/2021) 133 - 144 mmol/L     _  Result Component Current Result Ref Range   Potassium 3.3 (L) (12/16/2021) 3.4 - 5.3 mmol/L     _  Result Component Current Result Ref Range   Calcium 9.2 (12/16/2021) 8.5 - 10.1 mg/dL     No results found for Mag within last 30 days.     No results found for Phos within last 30 days.     _  Result Component Current Result Ref Range   Albumin 3.4 (12/16/2021) 3.4 - 5.0 g/dL     _  Result Component Current Result Ref Range   Urea Nitrogen 13 (12/16/2021) 7 - 30 mg/dL     _  Result Component Current Result Ref Range   Creatinine 0.96 (12/16/2021) 0.66 - 1.25 mg/dL     _  Result Component Current Result Ref Range   AST 33 (12/16/2021) 0 - 45 U/L     _  Result Component Current Result Ref Range   ALT 38 (12/16/2021) 0 - 70 U/L     _  Result Component Current Result Ref Range   Bilirubin Total 1.0 (12/16/2021) 0.2 - 1.3 mg/dL     _  Result Component Current Result Ref Range   WBC Count 4.4 (12/16/2021) 4.0 - 11.0 10e3/uL     _  Result Component Current Result Ref Range   Hemoglobin 13.4 (12/16/2021) 13.3 - 17.7 g/dL     _  Result Component Current Result Ref Range   Platelet Count 100 (L) (12/16/2021) 150 - 450 10e3/uL     No results found for ANC within last 30 days.       Assessment & Plan:  Results show no concerning abnormalities. Plan to continue Zytiga therapy as planned. [employer] message sent to the patient on the results.     Follow-Up:  1/14: labs  1/17: appt with Dr. Blayne Up, PharmD, BCACP  Hematology/Oncology Clinical Pharmacist  [facility]  [phone]

## 2022-01-08 DIAGNOSIS — C77.9 REGIONAL LYMPH NODE METASTASIS PRESENT (H): Primary | ICD-10-CM

## 2022-01-08 DIAGNOSIS — C61 PROSTATE CANCER (H): ICD-10-CM

## 2022-01-13 ENCOUNTER — TELEPHONE (OUTPATIENT)
Dept: ONCOLOGY | Facility: CLINIC | Age: 67
End: 2022-01-13
Payer: COMMERCIAL

## 2022-01-13 DIAGNOSIS — C77.9 REGIONAL LYMPH NODE METASTASIS PRESENT (H): Primary | ICD-10-CM

## 2022-01-13 DIAGNOSIS — C61 PROSTATE CANCER (H): ICD-10-CM

## 2022-01-13 RX ORDER — ABIRATERONE ACETATE 250 MG/1
1000 TABLET ORAL DAILY
Qty: 120 TABLET | Refills: 1 | Status: SHIPPED | OUTPATIENT
Start: 2022-01-13 | End: 2022-02-12

## 2022-01-13 NOTE — ORAL ONC MGMT
Oral Chemotherapy Monitoring Program    Subjective/Objective:  Jonathon Santos is a 66 year old male contacted by phone for a follow-up visit for oral chemotherapy. Jonathon confirms taking abiraterone 1000 mg daily with prednisone. He continues to tolerate these well, with no new or worsening adverse effects. He denies any recent medication changes or missed doses of abiraterone.     ORAL CHEMOTHERAPY 9/21/2021 10/15/2021 10/19/2021 10/20/2021 11/16/2021 12/16/2021 1/13/2022   Assessment Type Monthly Follow up Chart Review Left Voicemail Monthly Follow up Lab Monitoring;Refill Lab Monitoring Quarterly Follow up   Diagnosis Code Prostate Cancer Prostate Cancer Prostate Cancer Prostate Cancer Prostate Cancer Prostate Cancer Prostate Cancer   Providers Dr. Blayne Cisneros   [facility]   Drug Name Zytiga (abiraterone) Zytiga (abiraterone) Zytiga (abiraterone) Zytiga (abiraterone) Zytiga (abiraterone) Zytiga (abiraterone) Zytiga (abiraterone)   Dose 1,000 mg 1,000 mg 1,000 mg 1,000 mg 1,000 mg 1,000 mg 1,000 mg   Current Schedule Daily Daily Daily Daily Daily Daily Daily   Cycle Details Continuous Continuous Continuous Continuous Continuous Continuous Continuous   Start Date of Last Cycle - - - - - - -   Planned next cycle start date - - - 10/26/2021 - - -   Doses missed in last 2 weeks 0 - - 0 - - 0   Adherence Assessment Adherent - - Adherent - - Adherent   Adverse Effects - - - Hypertension - - No AE identified during assessment   Nausea - - - - - - -   Pharmacist Intervention(nausea) - - - - - - -   Decrease Appetite/Weight Loss Grade 1 - - - - - -   Pharmacist Intervention(decreased appetite/weight loss) No - - - - - -   Intervention(s) - - - - - - -   Pharmacist Intervention(increased ast/alt/t bili) - - - - - - -   Intervention(s) - - - - - - -   Hypertension - - - Grade 1 - - -   Pharmacist  "intervention(hypertension) - - - Yes - - -   Intervention(s) - - - Patient education;Referral to PCP - - -   Home BPs - - - some BPs 140//100 - - -   Any new drug interactions? No - - No - - No   Pharmacist Intervention? - - - - - - -   Intervention(s) - - - - - - -   Is the dose as ordered appropriate for the patient? Yes - - Yes - - Yes   Since the last time we talked, have you been hospitalized or used the emergency room? No - - - - - -       Last PHQ-2 Score on record:   PHQ-2 ( 1999 Pfizer) 4/9/2021 1/4/2021   Q1: Little interest or pleasure in doing things 0 0   Q2: Feeling down, depressed or hopeless 0 0   PHQ-2 Score 0 0   PHQ-2 Total Score (12-17 Years)- Positive if 3 or more points; Administer PHQ-A if positive 0 0   Q1: Little interest or pleasure in doing things - Not at all   Q2: Feeling down, depressed or hopeless - Not at all   PHQ-2 Score - 0       Vitals:  BP:   BP Readings from Last 1 Encounters:   10/29/21 128/75     Wt Readings from Last 1 Encounters:   10/29/21 93.5 kg (206 lb 3.2 oz)     Estimated body surface area is 2.19 meters squared as calculated from the following:    Height as of 4/30/21: 1.854 m (6' 0.99\").    Weight as of 10/29/21: 93.5 kg (206 lb 3.2 oz).    Labs:  _  Result Component Current Result Ref Range   Sodium 137 (12/16/2021) 133 - 144 mmol/L     _  Result Component Current Result Ref Range   Potassium 3.3 (L) (12/16/2021) 3.4 - 5.3 mmol/L     _  Result Component Current Result Ref Range   Calcium 9.2 (12/16/2021) 8.5 - 10.1 mg/dL     No results found for Mag within last 30 days.     No results found for Phos within last 30 days.     _  Result Component Current Result Ref Range   Albumin 3.4 (12/16/2021) 3.4 - 5.0 g/dL     _  Result Component Current Result Ref Range   Urea Nitrogen 13 (12/16/2021) 7 - 30 mg/dL     _  Result Component Current Result Ref Range   Creatinine 0.96 (12/16/2021) 0.66 - 1.25 mg/dL     _  Result Component Current Result Ref Range   AST 33 " (12/16/2021) 0 - 45 U/L     _  Result Component Current Result Ref Range   ALT 38 (12/16/2021) 0 - 70 U/L     _  Result Component Current Result Ref Range   Bilirubin Total 1.0 (12/16/2021) 0.2 - 1.3 mg/dL     _  Result Component Current Result Ref Range   WBC Count 4.4 (12/16/2021) 4.0 - 11.0 10e3/uL     _  Result Component Current Result Ref Range   Hemoglobin 13.4 (12/16/2021) 13.3 - 17.7 g/dL     _  Result Component Current Result Ref Range   Platelet Count 100 (L) (12/16/2021) 150 - 450 10e3/uL     No results found for ANC within last 30 days.         Assessment/Plan:  Jonathon is tolerating abiraterone well. Continue current therapy.     Follow-Up:  1/17: Dr. Cisneros appt    Refill Due:  End of month, refill for 1/20 pending patient's ok-ing his copay    Gaby García, PharmD, BCOP  Hematology/Oncology Clinical Pharmacist  [facility]  [facility]  [phone]

## 2022-01-13 NOTE — TELEPHONE ENCOUNTER
[employer] Approved through Dec 31, 2022    I:  [id number]    B: [id number]  P: AS    Levy Villatoro [facility]  Oncology Pharmacy Liaison   [email or web address]  [phone] (phone  [phone] (fax

## 2022-01-14 ENCOUNTER — VIRTUAL VISIT (OUTPATIENT)
Dept: ONCOLOGY | Facility: CLINIC | Age: 67
End: 2022-01-14
Attending: NURSE PRACTITIONER
Payer: COMMERCIAL

## 2022-01-14 ENCOUNTER — LAB (OUTPATIENT)
Dept: LAB | Facility: CLINIC | Age: 67
End: 2022-01-14
Payer: COMMERCIAL

## 2022-01-14 DIAGNOSIS — M1A.09X1 IDIOPATHIC CHRONIC GOUT, MULTIPLE SITES, WITH TOPHUS (TOPHI): ICD-10-CM

## 2022-01-14 DIAGNOSIS — Z79.899 ENCOUNTER FOR LONG-TERM (CURRENT) USE OF MEDICATIONS: ICD-10-CM

## 2022-01-14 DIAGNOSIS — C61 PROSTATE CANCER (H): Primary | ICD-10-CM

## 2022-01-14 DIAGNOSIS — C61 PROSTATE CANCER (H): ICD-10-CM

## 2022-01-14 LAB
ALBUMIN SERPL-MCNC: 3.7 G/DL (ref 3.4–5)
ALP SERPL-CCNC: 57 U/L (ref 40–150)
ALT SERPL W P-5'-P-CCNC: 38 U/L (ref 0–70)
ANION GAP SERPL CALCULATED.3IONS-SCNC: 7 MMOL/L (ref 3–14)
AST SERPL W P-5'-P-CCNC: 35 U/L (ref 0–45)
BASOPHILS # BLD AUTO: 0 10E3/UL (ref 0–0.2)
BASOPHILS NFR BLD AUTO: 1 %
BILIRUB SERPL-MCNC: 1.4 MG/DL (ref 0.2–1.3)
BUN SERPL-MCNC: 18 MG/DL (ref 7–30)
CALCIUM SERPL-MCNC: 9.1 MG/DL (ref 8.5–10.1)
CHLORIDE BLD-SCNC: 105 MMOL/L (ref 94–109)
CO2 SERPL-SCNC: 27 MMOL/L (ref 20–32)
CREAT SERPL-MCNC: 1.01 MG/DL (ref 0.66–1.25)
EOSINOPHIL # BLD AUTO: 0 10E3/UL (ref 0–0.7)
EOSINOPHIL NFR BLD AUTO: 1 %
ERYTHROCYTE [DISTWIDTH] IN BLOOD BY AUTOMATED COUNT: 12.7 % (ref 10–15)
GFR SERPL CREATININE-BSD FRML MDRD: 82 ML/MIN/1.73M2
GLUCOSE BLD-MCNC: 114 MG/DL (ref 70–99)
HCT VFR BLD AUTO: 37.5 % (ref 40–53)
HGB BLD-MCNC: 12.9 G/DL (ref 13.3–17.7)
IMM GRANULOCYTES # BLD: 0 10E3/UL
IMM GRANULOCYTES NFR BLD: 1 %
LYMPHOCYTES # BLD AUTO: 1 10E3/UL (ref 0.8–5.3)
LYMPHOCYTES NFR BLD AUTO: 25 %
MCH RBC QN AUTO: 34.7 PG (ref 26.5–33)
MCHC RBC AUTO-ENTMCNC: 34.4 G/DL (ref 31.5–36.5)
MCV RBC AUTO: 101 FL (ref 78–100)
MONOCYTES # BLD AUTO: 0.4 10E3/UL (ref 0–1.3)
MONOCYTES NFR BLD AUTO: 9 %
NEUTROPHILS # BLD AUTO: 2.7 10E3/UL (ref 1.6–8.3)
NEUTROPHILS NFR BLD AUTO: 63 %
NRBC # BLD AUTO: 0 10E3/UL
NRBC BLD AUTO-RTO: 0 /100
PLATELET # BLD AUTO: 87 10E3/UL (ref 150–450)
POTASSIUM BLD-SCNC: 3.4 MMOL/L (ref 3.4–5.3)
PROT SERPL-MCNC: 7.2 G/DL (ref 6.8–8.8)
PSA SERPL-MCNC: <0.01 UG/L (ref 0–4)
RBC # BLD AUTO: 3.72 10E6/UL (ref 4.4–5.9)
SODIUM SERPL-SCNC: 139 MMOL/L (ref 133–144)
URATE SERPL-MCNC: 4.5 MG/DL (ref 3.5–7.2)
WBC # BLD AUTO: 4.2 10E3/UL (ref 4–11)

## 2022-01-14 PROCEDURE — 85025 COMPLETE CBC W/AUTO DIFF WBC: CPT

## 2022-01-14 PROCEDURE — 84153 ASSAY OF PSA TOTAL: CPT

## 2022-01-14 PROCEDURE — 84550 ASSAY OF BLOOD/URIC ACID: CPT

## 2022-01-14 PROCEDURE — 99214 OFFICE O/P EST MOD 30 MIN: CPT | Mod: 95 | Performed by: INTERNAL MEDICINE

## 2022-01-14 PROCEDURE — 80053 COMPREHEN METABOLIC PANEL: CPT

## 2022-01-14 PROCEDURE — 36415 COLL VENOUS BLD VENIPUNCTURE: CPT

## 2022-01-14 NOTE — PROGRESS NOTES
"Jonathon is a 66 year old who is being evaluated via a billable video visit.      How would you like to obtain your AVS? MyChart  If the video visit is dropped, the invitation should be resent by: Text to cell phone: [phone]   Will anyone else be joining your video visit? No        Video-Visit Details    Type of service:  Video Visit  25 minute total time reviewing data and discussing with patient.     Distant Location (provider location):  [facility] CANCER [facility] Medical Oncology Follow Up Consultation Note       Date of visit: 1/14/22      HPI:  67 yo male with elevated PSA who had a prostate biopsy on 12/21/20.   9/17/19 PSA =  6.57    Pathology from 12/21/20 shows Gl 4+5=9 disease.  The patient's MRI from 11/17/20 shows concern for enlarged lymph nodes raising concern for mets.  Has placement of fiducials and SpaceOar on 4/30 with Dr. Caban.      Started prednisone abiraterone 1/25/21 with plan for 2 years.    Completed radiation 5/19/21 to 7/15/21 with Dr. Francois  9/15/21 PSA<0.01  12/16/21 PSA<0.01  1/14/22 PSA<0.01.      INTERVAL HX:  Overall doing fine. Continues to have sinus pressure that fluctuates which he associates with GI upset (dry heaving). He has noticed these sx since starting zytiga/pred. The symptoms are not daily but every 2-3 days. He has experimented with stopping allopurinol and does not find a link to this medication. He takes the zytiga in the morning and does not notice correlation with when he physically takes the medication.     His energy levels remain stable and he walks his dog. He has hot flashes with little activity but they remain tolerable for him. No bleeding/bruising. Appetite ok. Low muscle mass.     'slogging through the medication here\"  Dry heaves, congestion, headache, stomachache    ROS  10 point ROS otherwise unremarkable.    PMH:  HTN  Gout    MEDS  Lisinopril - hydrochlorothiazide  Metoprolol  Abiraterone  Prednisone    PHYSICAL " EXAM-video   General: Patient appears well in no acute distress.   Skin: No visualized rash or lesions on visualized skin  Eyes: EOMI, no erythema, sclera icterus or discharge noted  Resp: Appears to be breathing comfortably without accessory muscle usage, speaking in full sentences, no cough  MSK: Appears to have normal range of motion based on visualized movements  Neurologic: No apparent tremors, facial movements symmetric  Psych: affect good, alert and oriented    The rest of a comprehensive physical examination is deferred due to PHE (public health emergency) video restrictions    LABS AND IMAGES    Results for JINA MILLER (MRN [id number]) as of 1/14/2022 14:02   Ref. Range 1/14/2022 11:13   WBC Latest Ref Range: 4.0 - 11.0 10e3/uL 4.2   Hemoglobin Latest Ref Range: 13.3 - 17.7 g/dL 12.9 (L)   Hematocrit Latest Ref Range: 40.0 - 53.0 % 37.5 (L)   Platelet Count Latest Ref Range: 150 - 450 10e3/uL 87 (L)   RBC Count Latest Ref Range: 4.40 - 5.90 10e6/uL 3.72 (L)   MCV Latest Ref Range: 78 - 100 fL 101 (H)   MCH Latest Ref Range: 26.5 - 33.0 pg 34.7 (H)   MCHC Latest Ref Range: 31.5 - 36.5 g/dL 34.4   RDW Latest Ref Range: 10.0 - 15.0 % 12.7   % Neutrophils Latest Units: % 63   % Lymphocytes Latest Units: % 25   % Monocytes Latest Units: % 9   % Eosinophils Latest Units: % 1   % Basophils Latest Units: % 1   Absolute Basophils Latest Ref Range: 0.0 - 0.2 10e3/uL 0.0   Absolute Eosinophils Latest Ref Range: 0.0 - 0.7 10e3/uL 0.0   Absolute Immature Granulocytes Latest Ref Range: <=0.4 10e3/uL 0.0   Absolute Lymphocytes Latest Ref Range: 0.8 - 5.3 10e3/uL 1.0   Absolute Monocytes Latest Ref Range: 0.0 - 1.3 10e3/uL 0.4   % Immature Granulocytes Latest Units: % 1   Absolute Neutrophils Latest Ref Range: 1.6 - 8.3 10e3/uL 2.7   Absolute NRBCs Latest Units: 10e3/uL 0.0   NRBCs per 100 WBC Latest Ref Range: <1 /100 0           No new imaging studies.      IMPRESSION AND PLAN  Locally advanced prostate cancer with  high grade [place] 9. No significant comorbidities.     Plan:   1. Two years of ADT plus Abiraterone--> 01/2023. Tolerating with mild hot flashes and some fatigue, both tolerable and not impacting QOL in a way that precludes continuation of therapy. No edema. Has some GI upset but declines anti-emetics as it feels tolerable.     2. RT to the nodes and prostate completed 3 mo ago with Dr. Francois    3. Lupron in [place] due later this month    4. Mgmt of his gout per his rheumatologist Dr. Wang    5. Pancytopenias, most likely secondary to RT as clearly started with treatment. Stable today and no bleeding issues. If persists or worsens would add smear. Told to avoid     6. Nasal congestion--would be unusual to be from [facility]? Offered ENT referral and some general sinus mgmt with antihistamine and flonase he can try. He will let us know about ENT    7. Hypokalemia: discussed increasing potassium rich foods. No need for supplementation yet.

## 2022-01-14 NOTE — LETTER
1/14/2022         RE: Jonathon Santos  [address] [address] [place] [id number]        Dear Colleague,    Thank you for referring your patient, Jonathon Santos, to the [facility] CANCER [facility]. Please see a copy of my visit note below.    Jonathon is a 66 year old who is being evaluated via a billable video visit.      How would you like to obtain your AVS? MyChart  If the video visit is dropped, the invitation should be resent by: Text to cell phone: [phone]   Will anyone else be joining your video visit? No        Video-Visit Details    Type of service:  Video Visit  25 minute total time reviewing data and discussing with patient.     Distant Location (provider location):  [facility] CANCER [facility] Medical Oncology Follow Up Consultation Note       Date of visit: 1/14/22      HPI:  67 yo male with elevated PSA who had a prostate biopsy on 12/21/20.   9/17/19 PSA =  6.57    Pathology from 12/21/20 shows Gl 4+5=9 disease.  The patient's MRI from 11/17/20 shows concern for enlarged lymph nodes raising concern for mets.  Has placement of fiducials and SpaceOar on 4/30 with Dr. Caban.      Started prednisone abiraterone 1/25/21 with plan for 2 years.    Completed radiation 5/19/21 to 7/15/21 with Dr. Francois  9/15/21 PSA<0.01  12/16/21 PSA<0.01  1/14/22 PSA<0.01.      INTERVAL HX:  Overall doing fine. Continues to have sinus pressure that fluctuates which he associates with GI upset (dry heaving). He has noticed these sx since starting zytiga/pred. The symptoms are not daily but every 2-3 days. He has experimented with stopping allopurinol and does not find a link to this medication. He takes the zytiga in the morning and does not notice correlation with when he physically takes the medication.     His energy levels remain stable and he walks his dog. He has hot flashes with little activity but they remain tolerable for him. No bleeding/bruising. Appetite ok. Low muscle  "mass.     'slogging through the medication here\"  Dry heaves, congestion, headache, stomachache    ROS  10 point ROS otherwise unremarkable.    PMH:  HTN  Gout    MEDS  Lisinopril - hydrochlorothiazide  Metoprolol  Abiraterone  Prednisone    PHYSICAL EXAM-video   General: Patient appears well in no acute distress.   Skin: No visualized rash or lesions on visualized skin  Eyes: EOMI, no erythema, sclera icterus or discharge noted  Resp: Appears to be breathing comfortably without accessory muscle usage, speaking in full sentences, no cough  MSK: Appears to have normal range of motion based on visualized movements  Neurologic: No apparent tremors, facial movements symmetric  Psych: affect good, alert and oriented    The rest of a comprehensive physical examination is deferred due to PHE (public health emergency) video restrictions    LABS AND IMAGES    Results for JINA MILLER (MRN [id number]) as of 1/14/2022 14:02   Ref. Range 1/14/2022 11:13   WBC Latest Ref Range: 4.0 - 11.0 10e3/uL 4.2   Hemoglobin Latest Ref Range: 13.3 - 17.7 g/dL 12.9 (L)   Hematocrit Latest Ref Range: 40.0 - 53.0 % 37.5 (L)   Platelet Count Latest Ref Range: 150 - 450 10e3/uL 87 (L)   RBC Count Latest Ref Range: 4.40 - 5.90 10e6/uL 3.72 (L)   MCV Latest Ref Range: 78 - 100 fL 101 (H)   MCH Latest Ref Range: 26.5 - 33.0 pg 34.7 (H)   MCHC Latest Ref Range: 31.5 - 36.5 g/dL 34.4   RDW Latest Ref Range: 10.0 - 15.0 % 12.7   % Neutrophils Latest Units: % 63   % Lymphocytes Latest Units: % 25   % Monocytes Latest Units: % 9   % Eosinophils Latest Units: % 1   % Basophils Latest Units: % 1   Absolute Basophils Latest Ref Range: 0.0 - 0.2 10e3/uL 0.0   Absolute Eosinophils Latest Ref Range: 0.0 - 0.7 10e3/uL 0.0   Absolute Immature Granulocytes Latest Ref Range: <=0.4 10e3/uL 0.0   Absolute Lymphocytes Latest Ref Range: 0.8 - 5.3 10e3/uL 1.0   Absolute Monocytes Latest Ref Range: 0.0 - 1.3 10e3/uL 0.4   % Immature Granulocytes Latest Units: % 1 "   Absolute Neutrophils Latest Ref Range: 1.6 - 8.3 10e3/uL 2.7   Absolute NRBCs Latest Units: 10e3/uL 0.0   NRBCs per 100 WBC Latest Ref Range: <1 /100 0           No new imaging studies.      IMPRESSION AND PLAN  Locally advanced prostate cancer with high grade Keith 9. No significant comorbidities.     Plan:   1. Two years of ADT plus Abiraterone--> 01/2023. Tolerating with mild hot flashes and some fatigue, both tolerable and not impacting QOL in a way that precludes continuation of therapy. No edema. Has some GI upset but declines anti-emetics as it feels tolerable.     2. RT to the nodes and prostate completed 3 mo ago with Dr. Francois    3. Lupron in [place] due later this month    4. Mgmt of his gout per his rheumatologist Dr. Wang    5. Pancytopenias, most likely secondary to RT as clearly started with treatment. Stable today and no bleeding issues. If persists or worsens would add smear. Told to avoid     6. Nasal congestion--would be unusual to be from [facility]? Offered ENT referral and some general sinus mgmt with antihistamine and flonase he can try. He will let us know about ENT    7. Hypokalemia: discussed increasing potassium rich foods. No need for supplementation yet.             Again, thank you for allowing me to participate in the care of your patient.      Sincerely,    Andrea Cisneros MD

## 2022-01-14 NOTE — NURSING NOTE
Patient verified meds and allergies are correct via patients echeck in.    Darek Hernandez, Virtual Facilitator

## 2022-01-17 ENCOUNTER — TELEPHONE (OUTPATIENT)
Dept: ONCOLOGY | Facility: CLINIC | Age: 67
End: 2022-01-17
Payer: COMMERCIAL

## 2022-01-17 NOTE — TELEPHONE ENCOUNTER
Record of emails from and to Jonathon:    1/13/2022 From [facility]  Depending on household size and income, copay will be $0 or $10     If you are questioning this - you can call The Assistance Fund directly at [phone]     Please let me know if you have any further questions.    1/13/2022 From [facility]  Provide this new co-pay determination compared to last year.  Cite and provide rules for this change and who makes this rule change..     1/13/2022   From [facility] That s not up to me Jonathon.  If you have questions you need to go to the foundation.  I  provided you with their number in my previous email.     1/15/2022 From [facility] This is getting strange.  I talked with the [facility] and they do not establish rules; they  only implement the co-pays by the foundation.   I was told that the pharmacy probably had my application processed.  Everyone is pointing fingers at someone else.   Nevertheless, I will pay the $10 copay.  Seems strange that you can't answer the question.  Plus you were unaware that I  had beenapproved for co-pay assistance.   Don't mean to rag too much.  I appreciate the assistance I am getting.      1/17/2022   From [facility] Good Morning Jonathon,      Earlier this morning I called and talked with The Assistance [employer].  Your copay is based off of the Federal Poverty Level.    Your income is over those levels, according to their soft credit check,  therefore you have the $10 copay. If you would like   to dispute that copay, you can send in your proof of income to them directly.  Further - I never signed you up for this   ramon, and the renewal can only be done by you - so there is no way that I would have access to this information.        In January of 2021, I signed you up for a [employer] Ramon that we used throughout 2021.      In November you will receive notification to renew this ramon with The Assistance Fund.  I will leave it up to you to renew  and forward the information to the pharmacy.      Levy Villatoro,  CPhT  [facility]  Oncology Pharmacy [email or web address]  [phone] (phone  [phone] (fax

## 2022-01-20 ENCOUNTER — DOCUMENTATION ONLY (OUTPATIENT)
Dept: ONCOLOGY | Facility: CLINIC | Age: 67
End: 2022-01-20
Payer: COMMERCIAL

## 2022-01-20 NOTE — PROGRESS NOTES
CLINICAL NUTRITION SERVICES     Reason for Contact: This patient has scored >= 6 on Nutrition question 1 and/or 2 on the Oncology Distress Screening questionaire. Nutritionist Referral recommended. See Onc Distress Screening Flowsheet    Action: RD called patient indicating reason for phone call. Left a VM with a return call back number.     Follow up: Wait for a return phone call.    Tracey Weeks RD, LD  [phone]     Chart reviewed for pre-procedure evaluation and documentation. Areas reviewed include, but are not limited to, patient's current and past H&P, labs, all notes, all media records, all radiology records and medications.

## 2022-01-28 DIAGNOSIS — C61 PROSTATE CANCER (H): Primary | ICD-10-CM

## 2022-02-01 ENCOUNTER — INFUSION THERAPY VISIT (OUTPATIENT)
Dept: INFUSION THERAPY | Facility: CLINIC | Age: 67
End: 2022-02-01
Attending: INTERNAL MEDICINE
Payer: COMMERCIAL

## 2022-02-01 VITALS — DIASTOLIC BLOOD PRESSURE: 86 MMHG | TEMPERATURE: 97.1 F | HEART RATE: 81 BPM | SYSTOLIC BLOOD PRESSURE: 139 MMHG

## 2022-02-01 DIAGNOSIS — C61 PROSTATE CANCER (H): Primary | ICD-10-CM

## 2022-02-01 PROCEDURE — 96402 CHEMO HORMON ANTINEOPL SQ/IM: CPT

## 2022-02-01 PROCEDURE — 250N000011 HC RX IP 250 OP 636: Performed by: INTERNAL MEDICINE

## 2022-02-01 RX ADMIN — LEUPROLIDE ACETATE 22.5 MG: KIT at 12:09

## 2022-02-01 NOTE — PROGRESS NOTES
Infusion Nursing Note:  Henok Tom presents today for Lupron.    Patient seen by provider today: No   present during visit today: Not Applicable.    Note: N/A.    Intravenous Access:  No Intravenous access/labs at this visit.    Treatment Conditions:  Not Applicable.      Post Infusion Assessment:  Patient tolerated injection without incident.  Site patent and intact, free from redness, edema or discomfort.       Discharge Plan:   Patient discharged in stable condition accompanied by: self.  Departure Mode: Ambulatory. Pt will return 5/2/2022.       Remigio Chew RN

## 2022-02-10 ENCOUNTER — DOCUMENTATION ONLY (OUTPATIENT)
Dept: LAB | Facility: CLINIC | Age: 67
End: 2022-02-10
Payer: COMMERCIAL

## 2022-02-10 ENCOUNTER — LAB (OUTPATIENT)
Dept: LAB | Facility: CLINIC | Age: 67
End: 2022-02-10
Payer: COMMERCIAL

## 2022-02-10 ENCOUNTER — TELEPHONE (OUTPATIENT)
Dept: ONCOLOGY | Facility: CLINIC | Age: 67
End: 2022-02-10

## 2022-02-10 DIAGNOSIS — Z79.899 ENCOUNTER FOR LONG-TERM (CURRENT) USE OF MEDICATIONS: ICD-10-CM

## 2022-02-10 DIAGNOSIS — C77.9 REGIONAL LYMPH NODE METASTASIS PRESENT (H): ICD-10-CM

## 2022-02-10 DIAGNOSIS — C61 PROSTATE CANCER (H): ICD-10-CM

## 2022-02-10 DIAGNOSIS — C61 PROSTATE CANCER (H): Primary | ICD-10-CM

## 2022-02-10 DIAGNOSIS — M1A.09X1 IDIOPATHIC CHRONIC GOUT, MULTIPLE SITES, WITH TOPHUS (TOPHI): ICD-10-CM

## 2022-02-10 LAB
ALBUMIN SERPL-MCNC: 3.7 G/DL (ref 3.4–5)
ALP SERPL-CCNC: 59 U/L (ref 40–150)
ALT SERPL W P-5'-P-CCNC: 31 U/L (ref 0–70)
ANION GAP SERPL CALCULATED.3IONS-SCNC: 5 MMOL/L (ref 3–14)
AST SERPL W P-5'-P-CCNC: 26 U/L (ref 0–45)
BILIRUB SERPL-MCNC: 1.7 MG/DL (ref 0.2–1.3)
BUN SERPL-MCNC: 19 MG/DL (ref 7–30)
CALCIUM SERPL-MCNC: 9.4 MG/DL (ref 8.5–10.1)
CHLORIDE BLD-SCNC: 105 MMOL/L (ref 94–109)
CO2 SERPL-SCNC: 29 MMOL/L (ref 20–32)
CREAT SERPL-MCNC: 0.98 MG/DL (ref 0.66–1.25)
ERYTHROCYTE [DISTWIDTH] IN BLOOD BY AUTOMATED COUNT: 12.1 % (ref 10–15)
GFR SERPL CREATININE-BSD FRML MDRD: 85 ML/MIN/1.73M2
GLUCOSE BLD-MCNC: 106 MG/DL (ref 70–99)
HCT VFR BLD AUTO: 35.9 % (ref 40–53)
HGB BLD-MCNC: 12 G/DL (ref 13.3–17.7)
MCH RBC QN AUTO: 34.7 PG (ref 26.5–33)
MCHC RBC AUTO-ENTMCNC: 33.4 G/DL (ref 31.5–36.5)
MCV RBC AUTO: 104 FL (ref 78–100)
PLATELET # BLD AUTO: 84 10E3/UL (ref 150–450)
POTASSIUM BLD-SCNC: 3.2 MMOL/L (ref 3.4–5.3)
PROT SERPL-MCNC: 7 G/DL (ref 6.8–8.8)
PSA SERPL-MCNC: <0.01 UG/L (ref 0–4)
RBC # BLD AUTO: 3.46 10E6/UL (ref 4.4–5.9)
SODIUM SERPL-SCNC: 139 MMOL/L (ref 133–144)
URATE SERPL-MCNC: 6.1 MG/DL (ref 3.5–7.2)
WBC # BLD AUTO: 3.9 10E3/UL (ref 4–11)

## 2022-02-10 PROCEDURE — 84403 ASSAY OF TOTAL TESTOSTERONE: CPT

## 2022-02-10 PROCEDURE — 85027 COMPLETE CBC AUTOMATED: CPT

## 2022-02-10 PROCEDURE — 36415 COLL VENOUS BLD VENIPUNCTURE: CPT

## 2022-02-10 PROCEDURE — 80053 COMPREHEN METABOLIC PANEL: CPT

## 2022-02-10 PROCEDURE — 84550 ASSAY OF BLOOD/URIC ACID: CPT

## 2022-02-10 PROCEDURE — 84153 ASSAY OF PSA TOTAL: CPT

## 2022-02-10 NOTE — PROGRESS NOTES
Patient has lab appointment for PSA today at 1200. Please place order for appointment.   Thank you lab

## 2022-02-10 NOTE — TELEPHONE ENCOUNTER
Oral Chemotherapy Monitoring Program  Lab Follow Up    Reviewed lab results from 2/10/22.    ORAL CHEMOTHERAPY 10/19/2021 10/20/2021 11/16/2021 12/16/2021 1/13/2022 1/21/2022 2/10/2022   Assessment Type Left Voicemail Monthly Follow up Lab Monitoring;Refill Lab Monitoring Quarterly Follow up Chart Review Lab Monitoring   Diagnosis Code Prostate Cancer Prostate Cancer Prostate Cancer Prostate Cancer Prostate Cancer Prostate Cancer Prostate Cancer   Providers Dr. Blayne Cisneros   [facility]   Drug Name Zytiga (abiraterone) Zytiga (abiraterone) Zytiga (abiraterone) Zytiga (abiraterone) Zytiga (abiraterone) Zytiga (abiraterone) Zytiga (abiraterone)   Dose 1,000 mg 1,000 mg 1,000 mg 1,000 mg 1,000 mg 1,000 mg 1,000 mg   Current Schedule Daily Daily Daily Daily Daily Daily Daily   Cycle Details Continuous Continuous Continuous Continuous Continuous Continuous Continuous   Start Date of Last Cycle - - - - - - -   Planned next cycle start date - 10/26/2021 - - - - -   Doses missed in last 2 weeks - 0 - - 0 - -   Adherence Assessment - Adherent - - Adherent - -   Adverse Effects - Hypertension - - No AE identified during assessment - -   Nausea - - - - - - -   Pharmacist Intervention(nausea) - - - - - - -   Decrease Appetite/Weight Loss - - - - - - -   Pharmacist Intervention(decreased appetite/weight loss) - - - - - - -   Intervention(s) - - - - - - -   Pharmacist Intervention(increased ast/alt/t bili) - - - - - - -   Intervention(s) - - - - - - -   Hypertension - Grade 1 - - - - -   Pharmacist intervention(hypertension) - Yes - - - - -   Intervention(s) - Patient education;Referral to PCP - - - - -   Home BPs - some BPs 140//100 - - - - -   Any new drug interactions? - No - - No - -   Pharmacist Intervention? - - - - - - -   Intervention(s) - - - - - - -   Is the dose as ordered appropriate for the patient? -  Yes - - Yes - -   Since the last time we talked, have you been hospitalized or used the emergency room? - - - - - - -       Labs:  _  Result Component Current Result Ref Range   Sodium 139 (2/10/2022) 133 - 144 mmol/L     _  Result Component Current Result Ref Range   Potassium 3.2 (L) (2/10/2022) 3.4 - 5.3 mmol/L     _  Result Component Current Result Ref Range   Calcium 9.4 (2/10/2022) 8.5 - 10.1 mg/dL     No results found for Mag within last 30 days.     No results found for Phos within last 30 days.     _  Result Component Current Result Ref Range   Albumin 3.7 (2/10/2022) 3.4 - 5.0 g/dL     _  Result Component Current Result Ref Range   Urea Nitrogen 19 (2/10/2022) 7 - 30 mg/dL     _  Result Component Current Result Ref Range   Creatinine 0.98 (2/10/2022) 0.66 - 1.25 mg/dL     _  Result Component Current Result Ref Range   AST 26 (2/10/2022) 0 - 45 U/L     _  Result Component Current Result Ref Range   ALT 31 (2/10/2022) 0 - 70 U/L     _  Result Component Current Result Ref Range   Bilirubin Total 1.7 (H) (2/10/2022) 0.2 - 1.3 mg/dL     _  Result Component Current Result Ref Range   WBC Count 3.9 (L) (2/10/2022) 4.0 - 11.0 10e3/uL     _  Result Component Current Result Ref Range   Hemoglobin 12.0 (L) (2/10/2022) 13.3 - 17.7 g/dL     _  Result Component Current Result Ref Range   Platelet Count 84 (L) (2/10/2022) 150 - 450 10e3/uL     No results found for ANC within last 30 days.     _  Result Component Current Result Ref Range   Absolute Neutrophils 2.7 (1/14/2022) 1.6 - 8.3 10e3/uL        Assessment & Plan:  No concerning abnormalities. CBC ordered and managed by rheumatology.  Questions answered to patient's satisfaction.    Follow-Up:  3/10/22: L due for monthly labs  4/14/22: P quarterly assessment    Belkys Hunt, PharmD  Hematology/Oncology Clinical Pharmacist  [facility]  [facility]  [phone]

## 2022-02-13 ENCOUNTER — HEALTH MAINTENANCE LETTER (OUTPATIENT)
Age: 67
End: 2022-02-13

## 2022-02-15 ENCOUNTER — OFFICE VISIT (OUTPATIENT)
Dept: RADIATION THERAPY | Facility: OUTPATIENT CENTER | Age: 67
End: 2022-02-15
Payer: COMMERCIAL

## 2022-02-15 VITALS
SYSTOLIC BLOOD PRESSURE: 161 MMHG | RESPIRATION RATE: 18 BRPM | TEMPERATURE: 99.1 F | BODY MASS INDEX: 27.45 KG/M2 | OXYGEN SATURATION: 98 % | WEIGHT: 208 LBS | HEART RATE: 70 BPM | DIASTOLIC BLOOD PRESSURE: 104 MMHG

## 2022-02-15 DIAGNOSIS — C61 PROSTATE CANCER (H): Primary | ICD-10-CM

## 2022-02-15 LAB — TESTOST SERPL-MCNC: <2 NG/DL (ref 240–950)

## 2022-02-15 ASSESSMENT — PAIN SCALES - GENERAL: PAINLEVEL: NO PAIN (0)

## 2022-02-15 NOTE — NURSING NOTE
FOLLOW-UP VISIT    Patient Name: Jonathon Santos      : 1955     Age: 66 year old        ______________________________________________________________________________     FOLLOW-UP VISIT    Patient Name: Jonathon Santos      : 1955     Age: 66 year old        ______________________________________________________________________________     Chief Complaint   Patient presents with     Radiation Therapy     Follow up visit     BP (!) 161/104   Pulse 70   Temp 99.1  F (37.3  C)   Resp 18   Wt 94.3 kg (208 lb)   SpO2 98%   BMI 27.45 kg/m       /Date Radiation Completed:-    Pain  Denies    Meds  Current Med List Reviewed: Yes  Medication Note:     AUA: AUA Score: 15 (02/15/22 1300)  MERCEDEZ:  2    PSA   Date Value Ref Range Status   2021 <0.01 0 - 4 ug/L Final     Comment:     Assay Method:  Chemiluminescence using Siemens Vista analyzer   2021 0.01 0 - 4 ug/L Final     Comment:     Assay Method:  Chemiluminescence using Siemens Vista analyzer   2021 0.04 0 - 4 ug/L Final     Comment:     Assay Method:  Chemiluminescence using Siemens Vista analyzer   2021 0.17 0 - 4 ug/L Final     Comment:     Assay Method:  Chemiluminescence using Siemens Vista analyzer   2021 8.18 (H) 0 - 4 ug/L Final     Comment:     Assay Method:  Chemiluminescence using Siemens Vista analyzer   2019 6.57 (H) 0 - 4 ug/L Final     Comment:     Assay Method:  Chemiluminescence using Siemens Vista analyzer     PSA Tumor Marker   Date Value Ref Range Status   02/10/2022 <0.01 0.00 - 4.00 ug/L Final   2022 <0.01 0.00 - 4.00 ug/L Final   2021 <0.01 0.00 - 4.00 ug/L Final   2021 <0.01 0.00 - 4.00 ug/L Final   10/13/2021 <0.01 0.00 - 4.00 ug/L Final   09/15/2021 <0.01 0.00 - 4.00 ug/L Final       Bowel: Normal    Bladder: frequency  Nocturia: 3 - Once every 3 hours    Energy Level: low    Appointments:   Urologist:       Other Notes:

## 2022-02-15 NOTE — LETTER
2/15/2022         RE: Jonathon Santos  [address] [address] [place] [id number]        Dear Colleague,    Thank you for referring your patient, Jonathon Santos, to the RAD[facility]ATION [facility]Y [facility]. Please see a copy of my visit note below.       Department of Radiation Oncology  Radiation Therapy Center  [facility]  STEFFANY Kurtz [id number] [phone]       Radiation Oncology Follow-up Visit  February 15, 2022    Jonathon Santos  MRN: [id number]   : 1955     DIAGNOSIS: Prostate cancer  INTENT OF RADIOTHERAPY: Definitive   PATHOLOGY:    Adenocarcinoma,  Keith score of 4+5=9                            STAGE: Node positive vs M1 (indeterminate PA nodes)  CONCURRENT SYSTEMIC THERAPY:   ADT+ Zytiga       ONCOLOGIC HISTORY:  Mr. Santos is a 66 year old male with newly diagnosed prostate cancer, pre-treatment PSA 8.18, Keith score of 4+5=9. Staging evaluation noted suspicious pelvic and Jazzy nodes concerning for regional vs M1 disease spread. He underwent definitive RT + ADT plus Abiraterone.          SITE OF TREATMENT: Prostate/SV + Pelvic/ Para-aortic monik chain     DATES  OF TREATMENT: 21 to 7/15/21     TOTAL DOSE OF TREATMENT: 7920 cGy in 41 fractions (Prostate/LN to 5400 cGy in 27 fractions followed by sequential boost to gross pelvic nodes to 6480 cGy followed by prostate/SV boost to 7920 cGy)     DOSE PER FRACTION OF TREATMENT: 200 cGy/ 180 cGy    INTERVAL SINCE COMPLETION OF RADIATION THERAPY:   7 months    SUBJECTIVE:   Mr. Santos returns for follow up.     PSA on 2/10/22 was undetectable.     Remains on systemic treatment with ADT+ Zytiga under the care of Dr. Cisneros.      bother and GI bother normalized. No blood in urine. No blood in stool.    PHYSICAL EXAM:  BP (!) 161/104   Pulse 70   Temp 99.1  F (37.3  C)   Resp 18   Wt 94.3 kg (208 lb)   SpO2 98%   BMI 27.45 kg/m    Gen: Alert, in NAD  Eyes: PERRL, EOMI, sclera anicteric  Neck: Supple, full ROM, no LAD  Pulm: No  wheezing, stridor or respiratory distress  CV: Well-perfused, no cyanosis, no pedal edema  Abdominal: Soft, nontender, nondistended, no hepatomegaly  Back: No step-offs or pain to palpation along the thoracolumbar spine, no CVA tenderness  Musculoskeletal: Normal bulk and tone   Skin: Normal color and turgor  Neurologic: A/Ox3, CN II-XII intact  Psychiatric: Appropriate mood and affect    LABS AND IMAGING:  PSA   Date Value Ref Range Status   06/14/2021 <0.01 0 - 4 ug/L Final     Comment:     Assay Method:  Chemiluminescence using Siemens Vista analyzer   05/07/2021 0.01 0 - 4 ug/L Final     Comment:     Assay Method:  Chemiluminescence using Siemens Vista analyzer   04/13/2021 0.04 0 - 4 ug/L Final     Comment:     Assay Method:  Chemiluminescence using Siemens Vista analyzer   03/11/2021 0.17 0 - 4 ug/L Final     Comment:     Assay Method:  Chemiluminescence using Siemens Vista analyzer   01/28/2021 8.18 (H) 0 - 4 ug/L Final     Comment:     Assay Method:  Chemiluminescence using Siemens Vista analyzer     PSA Tumor Marker   Date Value Ref Range Status   08/18/2021 <0.01 0.00 - 4.00 ug/L Final   07/13/2021 <0.01 0.00 - 4.00 ug/L Final       IMPRESSION:   Mr. Santos is a 66 year old male with newly diagnosed prostate cancer, pre-treatment PSA 8.18, Keith score of 4+5=9. Staging evaluation noted suspicious pelvic and Jazzy nodes concerning for regional vs M1 disease spread. He underwent definitive RT  (7920 cGy in 41 fractions, completed on 7/15/21) + LT-ADT + Abiraterone.       PLAN:   1. PSA remains in biochemical control.   2. GI/  bother normalized   3. Continue systemic treatment and oncologic surveillance with Dr. Cisneros.   4. RTC in 6 months.      Davion Francois M.D.  Department of Radiation Oncology  [facility]

## 2022-02-15 NOTE — PROGRESS NOTES
Department of Radiation Oncology  Radiation Therapy Center  [facility] [place] [address]  [phone]       Radiation Oncology Follow-up Visit  February 15, 2022    Jonathon Santos  MRN: [id number]   : 1955     DIAGNOSIS: Prostate cancer  INTENT OF RADIOTHERAPY: Definitive   PATHOLOGY:    Adenocarcinoma,  [place] score of 4+5=9                            STAGE: Node positive vs M1 (indeterminate PA nodes)  CONCURRENT SYSTEMIC THERAPY:   ADT+ Zytiga       ONCOLOGIC HISTORY:  Mr. Santos is a 66 year old male with newly diagnosed prostate cancer, pre-treatment PSA 8.18, Keith score of 4+5=9. Staging evaluation noted suspicious pelvic and Jazzy nodes concerning for regional vs M1 disease spread. He underwent definitive RT + ADT plus Abiraterone.          SITE OF TREATMENT: Prostate/SV + Pelvic/ Para-aortic monik chain     DATES  OF TREATMENT: 21 to 7/15/21     TOTAL DOSE OF TREATMENT: 7920 cGy in 41 fractions (Prostate/LN to 5400 cGy in 27 fractions followed by sequential boost to gross pelvic nodes to 6480 cGy followed by prostate/SV boost to 7920 cGy)     DOSE PER FRACTION OF TREATMENT: 200 cGy/ 180 cGy    INTERVAL SINCE COMPLETION OF RADIATION THERAPY:   7 months    SUBJECTIVE:   Mr. Santos returns for follow up.     PSA on 2/10/22 was undetectable.     Remains on systemic treatment with ADT+ Zytiga under the care of Dr. Cisneros.      bother and GI bother normalized. No blood in urine. No blood in stool.    PHYSICAL EXAM:  BP (!) 161/104   Pulse 70   Temp 99.1  F (37.3  C)   Resp 18   Wt 94.3 kg (208 lb)   SpO2 98%   BMI 27.45 kg/m    Gen: Alert, in NAD  Eyes: PERRL, EOMI, sclera anicteric  Neck: Supple, full ROM, no LAD  Pulm: No wheezing, stridor or respiratory distress  CV: Well-perfused, no cyanosis, no pedal edema  Abdominal: Soft, nontender, nondistended, no hepatomegaly  Back: No step-offs or pain to palpation along the thoracolumbar spine, no CVA  tenderness  Musculoskeletal: Normal bulk and tone   Skin: Normal color and turgor  Neurologic: A/Ox3, CN II-XII intact  Psychiatric: Appropriate mood and affect    LABS AND IMAGING:  PSA   Date Value Ref Range Status   06/14/2021 <0.01 0 - 4 ug/L Final     Comment:     Assay Method:  Chemiluminescence using Siemens Vista analyzer   05/07/2021 0.01 0 - 4 ug/L Final     Comment:     Assay Method:  Chemiluminescence using Siemens Vista analyzer   04/13/2021 0.04 0 - 4 ug/L Final     Comment:     Assay Method:  Chemiluminescence using Siemens Vista analyzer   03/11/2021 0.17 0 - 4 ug/L Final     Comment:     Assay Method:  Chemiluminescence using Siemens Vista analyzer   01/28/2021 8.18 (H) 0 - 4 ug/L Final     Comment:     Assay Method:  Chemiluminescence using Siemens Vista analyzer     PSA Tumor Marker   Date Value Ref Range Status   08/18/2021 <0.01 0.00 - 4.00 ug/L Final   07/13/2021 <0.01 0.00 - 4.00 ug/L Final       IMPRESSION:   Mr. Santos is a 66 year old male with newly diagnosed prostate cancer, pre-treatment PSA 8.18, Keith score of 4+5=9. Staging evaluation noted suspicious pelvic and Jazzy nodes concerning for regional vs M1 disease spread. He underwent definitive RT  (7920 cGy in 41 fractions, completed on 7/15/21) + LT-ADT + Abiraterone.       PLAN:   1. PSA remains in biochemical control.   2. GI/  bother normalized   3. Continue systemic treatment and oncologic surveillance with Dr. Cisneros.   4. RTC in 6 months.      Davion Francois M.D.  Department of Radiation Oncology  [facility]

## 2022-03-01 ENCOUNTER — OFFICE VISIT (OUTPATIENT)
Dept: NEUROLOGY | Facility: CLINIC | Age: 67
End: 2022-03-01
Payer: COMMERCIAL

## 2022-03-01 VITALS
WEIGHT: 208.4 LBS | BODY MASS INDEX: 27.62 KG/M2 | HEART RATE: 71 BPM | SYSTOLIC BLOOD PRESSURE: 158 MMHG | DIASTOLIC BLOOD PRESSURE: 107 MMHG | HEIGHT: 73 IN

## 2022-03-01 DIAGNOSIS — Z92.21 STATUS POST CHEMOTHERAPY: ICD-10-CM

## 2022-03-01 DIAGNOSIS — R20.0 NUMBNESS AND TINGLING OF BOTH FEET: Primary | ICD-10-CM

## 2022-03-01 DIAGNOSIS — R20.2 NUMBNESS AND TINGLING OF BOTH FEET: Primary | ICD-10-CM

## 2022-03-01 DIAGNOSIS — Z92.3 HISTORY OF RADIATION THERAPY: ICD-10-CM

## 2022-03-01 PROCEDURE — 99203 OFFICE O/P NEW LOW 30 MIN: CPT | Performed by: PSYCHIATRY & NEUROLOGY

## 2022-03-01 RX ORDER — FEBUXOSTAT 40 MG/1
40 TABLET, FILM COATED ORAL
COMMUNITY
Start: 2021-04-26 | End: 2022-07-27

## 2022-03-01 RX ORDER — PREDNISONE 10 MG/1
10 TABLET ORAL
COMMUNITY
End: 2022-12-12

## 2022-03-01 RX ORDER — ABIRATERONE ACETATE 250 MG/1
TABLET ORAL
COMMUNITY
Start: 2022-02-15 | End: 2022-03-14

## 2022-03-01 NOTE — LETTER
3/1/2022         RE: Jonathon Santos  [address] [address] [place] [id number]        Dear Colleague,    Thank you for referring your patient, Jonathon Santos, to the [facility]UR[facility] [place]. Please see a copy of my visit note below.    INITIAL NEUROLOGY CONSULTATION    DATE OF VISIT: 3/1/2022  MRN: [id number]  PATIENT NAME: Jonathon Santos  YOB: 1955    REFERRING PROVIDER: No ref. provider found    Chief Complaint   Patient presents with     Peripheral neuropathy     New patient- numbness on feet for a year        SUBJECTIVE:                                                      HPI:   Jonathon Santos is a 66 year old male self-referred for peripheral neuropathy symptoms.    The patient has an additional history of prostate cancer, status post radiation/chemotherapy. Additional history of rheumatoid arthritis, hypertension and gout.    Jonathon says he is here for numbness affecting the feet.  It started in the toes about 1 year ago and has since progressed proximally to involve three quarters of each foot.  Symptoms are mainly numbness, not really any tingling.  Occasional pain, though not really any shooting pain.  He has not noticed any weakness but it has affected his balance.  No symptoms in the hands.  He is not diabetic.  He tells me that he had some blood work done when he told his primary that he feels kind of cold all the time and his feet are cold.  No color changes.    Pancytopenia on available labs.  I do not see his TSH or A1c results in the chart.  History of hypokalemia, though the most recent test came back in normal range.    Note that carpal tunnel surgery is listed in his history, with Jonathon says he does not recall ever having any nerve conduction studies done.    Past Medical History:   Diagnosis Date     Benign essential hypertension      Gout      Prostate cancer (H)      Past Surgical History:   Procedure Laterality Date     COLONOSCOPY       FOOT SURGERY       HC  "REVISE MEDIAN N/CARPAL TUNNEL SURG      Description: Neuroplasty Decompression Median Nerve At Carpal Tunnel;  Recorded: 05/20/2013;     INSERT SEED MARKER / MATRIX N/A 4/30/2021    Procedure: Transrectal ultrasound guided placement of fiducials and SpaceOar;  Surgeon: Ferdinand Caban MD;  Location: [facility]     PROSTATE BIOPSY, NEEDLE, SATURATION SAMPLING       SPINE SURGERY      unspecified low back surgery       febuxostat (ULORIC) 40 MG TABS tablet, Take 40 mg by mouth  lisinopril-hydrochlorothiazide (PRINZIDE/ZESTORETIC) 20-12.5 MG tablet, Take 2 tablets by mouth daily (Patient taking differently: Take 2 tablets by mouth every morning )  metoprolol succinate ER (TOPROL-XL) 25 MG 24 hr tablet, daily (with lunch)   abiraterone (ZYTIGA) 250 MG tablet,   predniSONE (DELTASONE) 10 MG tablet, Take 10 mg by mouth    No current facility-administered medications on file prior to visit.    No Known Allergies     Problem (# of Occurrences) Relation (Name,Age of Onset)    Diabetes (2) Father, Cousin    Gout (1) Mother    Hypertension (6) Mother, Father, Maternal Grandmother, Maternal Grandfather, Brother, Sister       Negative family history of: Deep Vein Thrombosis, Anesthesia Reaction        Social History     Tobacco Use     Smoking status: Never Smoker     Smokeless tobacco: Former User   Substance Use Topics     Alcohol use: Yes     Alcohol/week: 7.0 - 14.0 standard drinks     Types: 7 - 14 Cans of beer per week     Comment: 1-2 beers daily     Drug use: Never       REVIEW OF SYSTEMS:                                                      10-point review of systems is negative except as mentioned above in HPI.     EXAM:                                                      Physical Exam:   Vitals: BP (!) 158/107 (BP Location: Right arm, Patient Position: Sitting)   Pulse 71   Ht 1.854 m (6' 1\")   Wt 94.5 kg (208 lb 6.4 oz)   BMI 27.50 kg/m    BMI= Body mass index is 27.5 kg/m .  GENERAL: NAD.  HEENT: NC/AT. "   CV: RRR. S1, S2.   NECK: No bruits.  PULM: Non-labored breathing.   EXTR: Hallux vulgas, Left. Distal feel are cool.  Normal in color.  Neurologic:  MENTAL STATUS: Alert, attentive. Speech is fluent. Normal comprehension. Normal concentration. Adequate fund of knowledge.   CRANIAL NERVES: Discs technically difficult to visualize.  Visual fields intact to confrontation. Pupils equally, round and reactive to light. Facial sensation and movement normal. EOM full.  Eyelids slightly drooped bilaterally.  Hearing intact to conversation. Trapezius strength intact. Palate moves symmetrically. Tongue midline.  MOTOR: 5/5 in proximal and distal muscle groups of upper and lower extremities. Tone and bulk normal.   DTRs: Intact and symmetric in biceps, BR, patellae.  Babinski down-going bilaterally.   SENSATION: Normal light touch and pinprick in hands, diminished pinprick in the feet to just above the ankle. Intact proprioception at great toes. Vibration: Diminished at both ankles.   COORDINATION: Tremor with action (L>R), otherwise normal finger nose finger. Finger tapping normal. Knee heel shin normal.  STATION AND GAIT: Romberg positive.  Good postural reflexes.  He has trouble with toe walk and heel walk.  Casual gait is cautious but otherwise normal.  Tandem unsteady.    Left hand-dominant.    ASSESSMENT and PLAN:                                                      Assessment:     ICD-10-CM    1. Numbness and tingling of both feet  R20.0 EMG    R20.2    2. History of radiation therapy  Z92.3    3. Status post chemotherapy  Z92.21         Mr. Santos is a pleasant 66-year-old man here for consultation regarding peripheral neuropathy symptoms.  He has additional history of prostate cancer, s/p radiation and chemotherapy (hormone).  He also has anemia, which perhaps is drug-induced.  Both of these could be contributors to his current neuropathic symptoms.  Exam is consistent with a sensory neuropathy.  We will do  additional testing with nerve conduction studies.  I also talked to Jonathon about doing additional lab work, depending on what the results show.  We will follow-up after the nerve conduction studies and further evaluation are completed.  Jonathon lives closer to the [facility] so we will try to get him an appointment there for convenience.    Jonathon to follow up with Primary Care provider regarding elevated blood pressure.    Plan:  -- Nerve conduction studies/EMG to look into possible neuropathy further.  We will notify you of the results.  I may recommend doing some additional blood work to look for alternative causes for neuropathy, once we know what the EMG shows.  -- Follow-up with me in [place] after the testing is completed.    Total Time: 30 minutes were spent with the patient and in chart review/documentation (as described above in Assessment and Plan) /coordinating the care on date of service.    Pinky Thompson MD  Neurology    CC: Aron Cristina MD    Dragon software used in the dictation of this note.        Again, thank you for allowing me to participate in the care of your patient.        Sincerely,        Pinky Thompson MD

## 2022-03-01 NOTE — NURSING NOTE
Chief Complaint   Patient presents with     Peripheral neuropathy     New patient- numbness on feet for a year      Ilya Butler CMA@ on 3/1/2022 at 10:16 AM

## 2022-03-01 NOTE — PROGRESS NOTES
INITIAL NEUROLOGY CONSULTATION    DATE OF VISIT: 3/1/2022  MRN: [id number]  PATIENT NAME: Jonathon Santos  YOB: 1955    REFERRING PROVIDER: No ref. provider found    Chief Complaint   Patient presents with     Peripheral neuropathy     New patient- numbness on feet for a year        SUBJECTIVE:                                                      HPI:   Jonathon Santos is a 66 year old male self-referred for peripheral neuropathy symptoms.    The patient has an additional history of prostate cancer, status post radiation/chemotherapy. Additional history of rheumatoid arthritis, hypertension and gout.    Jonathon says he is here for numbness affecting the feet.  It started in the toes about 1 year ago and has since progressed proximally to involve three quarters of each foot.  Symptoms are mainly numbness, not really any tingling.  Occasional pain, though not really any shooting pain.  He has not noticed any weakness but it has affected his balance.  No symptoms in the hands.  He is not diabetic.  He tells me that he had some blood work done when he told his primary that he feels kind of cold all the time and his feet are cold.  No color changes.    Pancytopenia on available labs.  I do not see his TSH or A1c results in the chart.  History of hypokalemia, though the most recent test came back in normal range.    Note that carpal tunnel surgery is listed in his history, with Jonathon says he does not recall ever having any nerve conduction studies done.    Past Medical History:   Diagnosis Date     Benign essential hypertension      Gout      Prostate cancer (H)      Past Surgical History:   Procedure Laterality Date     COLONOSCOPY       FOOT SURGERY       HC REVISE MEDIAN N/CARPAL TUNNEL SURG      Description: Neuroplasty Decompression Median Nerve At Carpal Tunnel;  Recorded: 05/20/2013;     INSERT SEED MARKER / MATRIX N/A 4/30/2021    Procedure: Transrectal ultrasound guided placement of fiducials and  "SpaceOar;  Surgeon: Ferdinand Caban MD;  [facility]     PROSTATE BIOPSY, NEEDLE, SATURATION SAMPLING       SPINE SURGERY      unspecified low back surgery       febuxostat (ULORIC) 40 MG TABS tablet, Take 40 mg by mouth  lisinopril-hydrochlorothiazide (PRINZIDE/ZESTORETIC) 20-12.5 MG tablet, Take 2 tablets by mouth daily (Patient taking differently: Take 2 tablets by mouth every morning )  metoprolol succinate ER (TOPROL-XL) 25 MG 24 hr tablet, daily (with lunch)   abiraterone (ZYTIGA) 250 MG tablet,   predniSONE (DELTASONE) 10 MG tablet, Take 10 mg by mouth    No current facility-administered medications on file prior to visit.    No Known Allergies     Problem (# of Occurrences) Relation (Name,Age of Onset)    Diabetes (2) Father, Cousin    Gout (1) Mother    Hypertension (6) Mother, Father, Maternal Grandmother, Maternal Grandfather, Brother, Sister       Negative family history of: Deep Vein Thrombosis, Anesthesia Reaction        Social History     Tobacco Use     Smoking status: Never Smoker     Smokeless tobacco: Former User   Substance Use Topics     Alcohol use: Yes     Alcohol/week: 7.0 - 14.0 standard drinks     Types: 7 - 14 Cans of beer per week     Comment: 1-2 beers daily     Drug use: Never       REVIEW OF SYSTEMS:                                                      10-point review of systems is negative except as mentioned above in HPI.     EXAM:                                                      Physical Exam:   Vitals: BP (!) 158/107 (BP Location: Right arm, Patient Position: Sitting)   Pulse 71   Ht 1.854 m (6' 1\")   Wt 94.5 kg (208 lb 6.4 oz)   BMI 27.50 kg/m    BMI= Body mass index is 27.5 kg/m .  GENERAL: NAD.  HEENT: NC/AT.   CV: RRR. S1, S2.   NECK: No bruits.  PULM: Non-labored breathing.   EXTR: Hallux vulgas, Left. Distal feel are cool.  Normal in color.  Neurologic:  MENTAL STATUS: Alert, attentive. Speech is fluent. Normal comprehension. Normal concentration. " Adequate fund of knowledge.   CRANIAL NERVES: Discs technically difficult to visualize.  Visual fields intact to confrontation. Pupils equally, round and reactive to light. Facial sensation and movement normal. EOM full.  Eyelids slightly drooped bilaterally.  Hearing intact to conversation. Trapezius strength intact. Palate moves symmetrically. Tongue midline.  MOTOR: 5/5 in proximal and distal muscle groups of upper and lower extremities. Tone and bulk normal.   DTRs: Intact and symmetric in biceps, BR, patellae.  Babinski down-going bilaterally.   SENSATION: Normal light touch and pinprick in hands, diminished pinprick in the feet to just above the ankle. Intact proprioception at great toes. Vibration: Diminished at both ankles.   COORDINATION: Tremor with action (L>R), otherwise normal finger nose finger. Finger tapping normal. Knee heel shin normal.  STATION AND GAIT: Romberg positive.  Good postural reflexes.  He has trouble with toe walk and heel walk.  Casual gait is cautious but otherwise normal.  Tandem unsteady.    Left hand-dominant.    ASSESSMENT and PLAN:                                                      Assessment:     ICD-10-CM    1. Numbness and tingling of both feet  R20.0 EMG    R20.2    2. History of radiation therapy  Z92.3    3. Status post chemotherapy  Z92.21         Mr. Santos is a pleasant 66-year-old man here for consultation regarding peripheral neuropathy symptoms.  He has additional history of prostate cancer, s/p radiation and chemotherapy (hormone).  He also has anemia, which perhaps is drug-induced.  Both of these could be contributors to his current neuropathic symptoms.  Exam is consistent with a sensory neuropathy.  We will do additional testing with nerve conduction studies.  I also talked to Jonathon about doing additional lab work, depending on what the results show.  We will follow-up after the nerve conduction studies and further evaluation are completed.  Jonathon lives closer  to the [place] [facility] so we will try to get him an appointment there for convenience.    Jonathon to follow up with Primary Care provider regarding elevated blood pressure.    Plan:  -- Nerve conduction studies/EMG to look into possible neuropathy further.  We will notify you of the results.  I may recommend doing some additional blood work to look for alternative causes for neuropathy, once we know what the EMG shows.  -- Follow-up with me in [place] after the testing is completed.    Total Time: 30 minutes were spent with the patient and in chart review/documentation (as described above in Assessment and Plan) /coordinating the care on date of service.    Pinky Thompson MD  Neurology    CC: Aron Cristina MD    Dragon software used in the dictation of this note.

## 2022-03-01 NOTE — PATIENT INSTRUCTIONS
Plan:  -- Nerve conduction studies/EMG to look into possible neuropathy further.  We will notify you of the results.  I may recommend doing some additional blood work to look for alternative causes for neuropathy, once we know what the EMG shows.  -- Follow-up with me in [place] after the testing is completed.

## 2022-03-11 DIAGNOSIS — C77.9 REGIONAL LYMPH NODE METASTASIS PRESENT (H): Primary | ICD-10-CM

## 2022-03-11 DIAGNOSIS — C61 PROSTATE CANCER (H): ICD-10-CM

## 2022-03-14 DIAGNOSIS — C61 PROSTATE CANCER (H): ICD-10-CM

## 2022-03-14 DIAGNOSIS — C77.9 REGIONAL LYMPH NODE METASTASIS PRESENT (H): Primary | ICD-10-CM

## 2022-03-14 RX ORDER — ABIRATERONE ACETATE 250 MG/1
1000 TABLET ORAL DAILY
Qty: 120 TABLET | Refills: 1 | Status: SHIPPED | OUTPATIENT
Start: 2022-03-14 | End: 2022-04-13

## 2022-05-02 ENCOUNTER — MYC MEDICAL ADVICE (OUTPATIENT)
Dept: ONCOLOGY | Facility: CLINIC | Age: 67
End: 2022-05-02

## 2022-05-02 ENCOUNTER — APPOINTMENT (OUTPATIENT)
Dept: LAB | Facility: CLINIC | Age: 67
End: 2022-05-02
Payer: COMMERCIAL

## 2022-05-02 ENCOUNTER — INFUSION THERAPY VISIT (OUTPATIENT)
Dept: INFUSION THERAPY | Facility: CLINIC | Age: 67
End: 2022-05-02
Attending: RADIOLOGY
Payer: COMMERCIAL

## 2022-05-02 VITALS
RESPIRATION RATE: 18 BRPM | DIASTOLIC BLOOD PRESSURE: 88 MMHG | HEART RATE: 88 BPM | TEMPERATURE: 98.4 F | SYSTOLIC BLOOD PRESSURE: 141 MMHG

## 2022-05-02 DIAGNOSIS — C61 PROSTATE CANCER (H): Primary | ICD-10-CM

## 2022-05-02 DIAGNOSIS — E87.6 HYPOKALEMIA: Primary | ICD-10-CM

## 2022-05-02 DIAGNOSIS — M1A.09X1 IDIOPATHIC CHRONIC GOUT, MULTIPLE SITES, WITH TOPHUS (TOPHI): ICD-10-CM

## 2022-05-02 DIAGNOSIS — Z79.899 ENCOUNTER FOR LONG-TERM (CURRENT) USE OF MEDICATIONS: ICD-10-CM

## 2022-05-02 LAB
ALBUMIN SERPL-MCNC: 3.8 G/DL (ref 3.4–5)
ALP SERPL-CCNC: 72 U/L (ref 40–150)
ALT SERPL W P-5'-P-CCNC: 33 U/L (ref 0–70)
ANION GAP SERPL CALCULATED.3IONS-SCNC: 9 MMOL/L (ref 3–14)
AST SERPL W P-5'-P-CCNC: 26 U/L (ref 0–45)
BILIRUB SERPL-MCNC: 2.2 MG/DL (ref 0.2–1.3)
BUN SERPL-MCNC: 19 MG/DL (ref 7–30)
CALCIUM SERPL-MCNC: 9.2 MG/DL (ref 8.5–10.1)
CHLORIDE BLD-SCNC: 101 MMOL/L (ref 94–109)
CO2 SERPL-SCNC: 26 MMOL/L (ref 20–32)
CREAT SERPL-MCNC: 1.07 MG/DL (ref 0.66–1.25)
ERYTHROCYTE [DISTWIDTH] IN BLOOD BY AUTOMATED COUNT: 12.3 % (ref 10–15)
GFR SERPL CREATININE-BSD FRML MDRD: 76 ML/MIN/1.73M2
GLUCOSE BLD-MCNC: 115 MG/DL (ref 70–99)
HCT VFR BLD AUTO: 38.1 % (ref 40–53)
HGB BLD-MCNC: 13.4 G/DL (ref 13.3–17.7)
HOLD SPECIMEN: NORMAL
MCH RBC QN AUTO: 33.5 PG (ref 26.5–33)
MCHC RBC AUTO-ENTMCNC: 35.2 G/DL (ref 31.5–36.5)
MCV RBC AUTO: 95 FL (ref 78–100)
PLATELET # BLD AUTO: 108 10E3/UL (ref 150–450)
POTASSIUM BLD-SCNC: 3 MMOL/L (ref 3.4–5.3)
PROT SERPL-MCNC: 7.5 G/DL (ref 6.8–8.8)
PSA SERPL-MCNC: <0.01 UG/L (ref 0–4)
RBC # BLD AUTO: 4 10E6/UL (ref 4.4–5.9)
SODIUM SERPL-SCNC: 136 MMOL/L (ref 133–144)
URATE SERPL-MCNC: 7.1 MG/DL (ref 3.5–7.2)
WBC # BLD AUTO: 4.8 10E3/UL (ref 4–11)

## 2022-05-02 PROCEDURE — 36415 COLL VENOUS BLD VENIPUNCTURE: CPT

## 2022-05-02 PROCEDURE — 84550 ASSAY OF BLOOD/URIC ACID: CPT | Performed by: INTERNAL MEDICINE

## 2022-05-02 PROCEDURE — 80053 COMPREHEN METABOLIC PANEL: CPT | Performed by: INTERNAL MEDICINE

## 2022-05-02 PROCEDURE — 84153 ASSAY OF PSA TOTAL: CPT | Performed by: INTERNAL MEDICINE

## 2022-05-02 PROCEDURE — 85014 HEMATOCRIT: CPT | Performed by: INTERNAL MEDICINE

## 2022-05-02 PROCEDURE — 96402 CHEMO HORMON ANTINEOPL SQ/IM: CPT

## 2022-05-02 PROCEDURE — 250N000011 HC RX IP 250 OP 636: Mod: JG | Performed by: NURSE PRACTITIONER

## 2022-05-02 RX ADMIN — LEUPROLIDE ACETATE 22.5 MG: KIT at 14:15

## 2022-05-02 NOTE — TELEPHONE ENCOUNTER
Oral Chemotherapy Monitoring Program  Lab Follow Up    Reviewed lab results from 5/2/2022 and sent a [employer]tal[employer] message to the patient on the results.    ORAL CHEMOTHERAPY 11/16/2021 12/16/2021 1/13/2022 1/21/2022 2/10/2022 3/14/2022 5/2/2022   Assessment Type Lab Monitoring;Refill Lab Monitoring Quarterly Follow up Chart Review Lab Monitoring Refill Lab Monitoring   Diagnosis Code Prostate Cancer Prostate Cancer Prostate Cancer Prostate Cancer Prostate Cancer Prostate Cancer Prostate Cancer   Providers Dr. Blayne Cisneros   [facility]   Drug Name Zytiga (abiraterone) Zytiga (abiraterone) Zytiga (abiraterone) Zytiga (abiraterone) Zytiga (abiraterone) Zytiga (abiraterone) Zytiga (abiraterone)   Dose 1,000 mg 1,000 mg 1,000 mg 1,000 mg 1,000 mg 1,000 mg 1,000 mg   Current Schedule Daily Daily Daily Daily Daily Daily Daily   Cycle Details Continuous Continuous Continuous Continuous Continuous Continuous Continuous   Start Date of Last Cycle - - - - - - -   Planned next cycle start date - - - - - - -   Doses missed in last 2 weeks - - 0 - - - -   Adherence Assessment - - Adherent - - - -   Adverse Effects - - No AE identified during assessment - - - Other (See Note for Details)   Nausea - - - - - - -   Pharmacist Intervention(nausea) - - - - - - -   Decrease Appetite/Weight Loss - - - - - - -   Pharmacist Intervention(decreased appetite/weight loss) - - - - - - -   Intervention(s) - - - - - - -   Pharmacist Intervention(increased ast/alt/t bili) - - - - - - -   Intervention(s) - - - - - - -   Hypertension - - - - - - -   Pharmacist intervention(hypertension) - - - - - - -   Intervention(s) - - - - - - -   Other (See Note for Details) - - - - - - Hypokalemia   Pharmacist intervention(other) - - - - - - Yes   Intervention(s) - - - - - - Rx medication recommendation   Home BPs - - - - - - -   Any new drug interactions?  - - No - - - -   Pharmacist Intervention? - - - - - - -   Intervention(s) - - - - - - -   Is the dose as ordered appropriate for the patient? - - Yes - - - -   Since the last time we talked, have you been hospitalized or used the emergency room? - - - - - - -       Labs:  _  Result Component Current Result Ref Range   Sodium 136 (5/2/2022) 133 - 144 mmol/L     _  Result Component Current Result Ref Range   Potassium 3.0 (L) (5/2/2022) 3.4 - 5.3 mmol/L     _  Result Component Current Result Ref Range   Calcium 9.2 (5/2/2022) 8.5 - 10.1 mg/dL     No results found for Mag within last 30 days.     No results found for Phos within last 30 days.     _  Result Component Current Result Ref Range   Albumin 3.8 (5/2/2022) 3.4 - 5.0 g/dL     _  Result Component Current Result Ref Range   Urea Nitrogen 19 (5/2/2022) 7 - 30 mg/dL     _  Result Component Current Result Ref Range   Creatinine 1.07 (5/2/2022) 0.66 - 1.25 mg/dL     _  Result Component Current Result Ref Range   AST 26 (5/2/2022) 0 - 45 U/L     _  Result Component Current Result Ref Range   ALT 33 (5/2/2022) 0 - 70 U/L     _  Result Component Current Result Ref Range   Bilirubin Total 2.2 (H) (5/2/2022) 0.2 - 1.3 mg/dL     _  Result Component Current Result Ref Range   WBC Count 4.8 (5/2/2022) 4.0 - 11.0 10e3/uL     _  Result Component Current Result Ref Range   Hemoglobin 13.4 (5/2/2022) 13.3 - 17.7 g/dL     _  Result Component Current Result Ref Range   Platelet Count 108 (L) (5/2/2022) 150 - 450 10e3/uL     No results found for ANC within last 30 days.     No results found for ANC within last 30 days.        Assessment & Plan:  Results are concerning for low potassium of 3.0 (lowest it has been for the patient). Sent IB to Esperanza Floyd asking if potassium supplementation should be started.     Remaining labs are stable without concern. Continue Zytiga therapy as plannned.     Follow-Up:  5/9: quarterly assessment  5/13: appt with Esperanza Up,  PharmD, BCACP  Hematology/Oncology Clinical Pharmacist  [facility]  [phone]        ADDENDUM:    Confirmed plan with Esperanza Floyd. Plan is to start a potassium supplement. Start potassium chloride 40mEq daily for 1 dose, then 20mEq daily thereafter. Esperanza will send this prescription to the pharmacy for the patient. Communicated plan to the patient via [employer] in response to his question about dietary changes he could make.

## 2022-05-02 NOTE — PROGRESS NOTES
Infusion Nursing Note:  Jonathon Santos presents today for Lupron.    Patient seen by provider today: No   present during visit today: Not Applicable.    Note: N/A.    Intravenous Access:  Labs drawn peripherally by .    Treatment Conditions:  Not Applicable.    Post Infusion Assessment:  Patient tolerated injection without incident.  Site patent and intact, free from redness, edema or discomfort.  No evidence of extravasations.    Discharge Plan:   Discharge instructions reviewed with: Patient.  Patient and/or family verbalized understanding of discharge instructions and all questions answered.  AVS to patient via [employer].  Patient will return 5/13/2022for video visit with Esperanza Floyd NP for next appointment.   Patient discharged in stable condition accompanied by: self.  Departure Mode: Ambulatory.    Devorah Collins RN

## 2022-05-03 RX ORDER — POTASSIUM CHLORIDE 1500 MG/1
TABLET, EXTENDED RELEASE ORAL
Qty: 60 TABLET | Refills: 1 | Status: SHIPPED | OUTPATIENT
Start: 2022-05-03 | End: 2022-07-27

## 2022-05-09 ENCOUNTER — TELEPHONE (OUTPATIENT)
Dept: ONCOLOGY | Facility: CLINIC | Age: 67
End: 2022-05-09
Payer: COMMERCIAL

## 2022-05-09 DIAGNOSIS — C77.9 REGIONAL LYMPH NODE METASTASIS PRESENT (H): Primary | ICD-10-CM

## 2022-05-09 DIAGNOSIS — C61 PROSTATE CANCER (H): ICD-10-CM

## 2022-05-09 RX ORDER — ABIRATERONE ACETATE 250 MG/1
1000 TABLET ORAL DAILY
Qty: 120 TABLET | Refills: 1 | Status: SHIPPED | OUTPATIENT
Start: 2022-05-09 | End: 2022-06-08

## 2022-05-09 RX ORDER — PREDNISONE 5 MG/1
5 TABLET ORAL DAILY
Qty: 30 TABLET | Refills: 11 | Status: CANCELLED | OUTPATIENT
Start: 2022-05-09 | End: 2022-06-08

## 2022-05-09 NOTE — TELEPHONE ENCOUNTER
Oral Chemotherapy Monitoring Program    Subjective/Objective:  Jonathon Santos is a 67 year old male contacted by phone for a follow-up visit for oral chemotherapy. Jonathon confirms taking the appropriate dose of Zytiga 1000mg (4x 250mg tablets) daily. Denies missed doses, medication changes or recent hospital or ED visits. He shares that he has has some episodes of diarrhea recently. He details that this started five days ago and he is having approximately 1 episode/day. He is thinking this could be more dietary related.     ORAL CHEMOTHERAPY 1/21/2022 2/10/2022 3/14/2022 5/2/2022 5/9/2022 5/9/2022 5/9/2022   Assessment Type Chart Review Lab Monitoring Refill Lab Monitoring Refill Left Voicemail Monthly Follow up   Diagnosis Code Prostate Cancer Prostate Cancer Prostate Cancer Prostate Cancer Prostate Cancer Prostate Cancer Prostate Cancer   Providers Dr. Blayne Cisneros   [facility]   Drug Name Zytiga (abiraterone) Zytiga (abiraterone) Zytiga (abiraterone) Zytiga (abiraterone) Zytiga (abiraterone) Zytiga (abiraterone) Zytiga (abiraterone)   Dose 1,000 mg 1,000 mg 1,000 mg 1,000 mg 1,000 mg 1,000 mg 1,000 mg   Current Schedule Daily Daily Daily Daily Daily Daily Daily   Cycle Details Continuous Continuous Continuous Continuous Continuous Continuous Continuous   Start Date of Last Cycle - - - - - - -   Planned next cycle start date - - - - - - -   Doses missed in last 2 weeks - - - - - - 0   Adherence Assessment - - - - - - Adherent   Adverse Effects - - - Other (See Note for Details) - - Diarrhea   Nausea - - - - - - -   Pharmacist Intervention(nausea) - - - - - - -   Diarrhea - - - - - - Grade 1   Pharmacist Intervention(diarrhea) - - - - - - Yes   Intervention(s) - - - - - - Patient education   Decrease Appetite/Weight Loss - - - - - - -   Pharmacist Intervention(decreased appetite/weight loss) - - - - - - -  "  Intervention(s) - - - - - - -   Pharmacist Intervention(increased ast/alt/t bili) - - - - - - -   Intervention(s) - - - - - - -   Hypertension - - - - - - -   Pharmacist intervention(hypertension) - - - - - - -   Intervention(s) - - - - - - -   Other (See Note for Details) - - - Hypokalemia - - -   Pharmacist intervention(other) - - - Yes - - -   Intervention(s) - - - Rx medication recommendation - - -   Home BPs - - - - - - -   Any new drug interactions? - - - - - - No   Pharmacist Intervention? - - - - - - -   Intervention(s) - - - - - - -   Is the dose as ordered appropriate for the patient? - - - - - - Yes   Since the last time we talked, have you been hospitalized or used the emergency room? - - - - - - No       Last PHQ-2 Score on record:   PHQ-2 ( 1999 Pfizer) 3/1/2022 4/9/2021   Q1: Little interest or pleasure in doing things 0 0   Q2: Feeling down, depressed or hopeless 0 0   PHQ-2 Score 0 0   PHQ-2 Total Score (12-17 Years)- Positive if 3 or more points; Administer PHQ-A if positive 0 0   Q1: Little interest or pleasure in doing things - -   Q2: Feeling down, depressed or hopeless - -   PHQ-2 Score - -       Vitals:  BP:   BP Readings from Last 1 Encounters:   05/02/22 (!) 141/88     Wt Readings from Last 1 Encounters:   03/01/22 94.5 kg (208 lb 6.4 oz)     Estimated body surface area is 2.21 meters squared as calculated from the following:    Height as of 3/1/22: 1.854 m (6' 1\").    Weight as of 3/1/22: 94.5 kg (208 lb 6.4 oz).    Labs:  _  Result Component Current Result Ref Range   Sodium 136 (5/2/2022) 133 - 144 mmol/L     _  Result Component Current Result Ref Range   Potassium 3.0 (L) (5/2/2022) 3.4 - 5.3 mmol/L     _  Result Component Current Result Ref Range   Calcium 9.2 (5/2/2022) 8.5 - 10.1 mg/dL     No results found for Mag within last 30 days.     No results found for Phos within last 30 days.     _  Result Component Current Result Ref Range   Albumin 3.8 (5/2/2022) 3.4 - 5.0 g/dL "     _  Result Component Current Result Ref Range   Urea Nitrogen 19 (5/2/2022) 7 - 30 mg/dL     _  Result Component Current Result Ref Range   Creatinine 1.07 (5/2/2022) 0.66 - 1.25 mg/dL     _  Result Component Current Result Ref Range   AST 26 (5/2/2022) 0 - 45 U/L     _  Result Component Current Result Ref Range   ALT 33 (5/2/2022) 0 - 70 U/L     _  Result Component Current Result Ref Range   Bilirubin Total 2.2 (H) (5/2/2022) 0.2 - 1.3 mg/dL     _  Result Component Current Result Ref Range   WBC Count 4.8 (5/2/2022) 4.0 - 11.0 10e3/uL     _  Result Component Current Result Ref Range   Hemoglobin 13.4 (5/2/2022) 13.3 - 17.7 g/dL     _  Result Component Current Result Ref Range   Platelet Count 108 (L) (5/2/2022) 150 - 450 10e3/uL     No results found for ANC within last 30 days.     No results found for ANC within last 30 days.        Assessment/Plan:  Jonathon continues to tolerate therapy well. Continue Zytiga therapy as planned.     Follow-Up:  5/13: appt with Esperanza  7/20: quarterly assessment    Refill Due:  [facility] to delivery on 5/11      Estefania Up, PharmD, BCACP  Hematology/Oncology Clinical Pharmacist  [facility]  [phone]

## 2022-05-09 NOTE — TELEPHONE ENCOUNTER
Oral Chemotherapy Monitoring Program     Placed call to Jonathon Santos in follow up of Zytiga oral chemotherapy.     Left a message requesting a call back. No drug names were mentioned in the voicemail. Will update when response is received.      Estefania Up, PharmD, BCACP  Oral Chemotherapy Monitoring Program  [facility]  [phone]  May 9, 2022

## 2022-05-10 ENCOUNTER — OFFICE VISIT (OUTPATIENT)
Dept: NEUROLOGY | Facility: CLINIC | Age: 67
End: 2022-05-10
Attending: PSYCHIATRY & NEUROLOGY
Payer: COMMERCIAL

## 2022-05-10 DIAGNOSIS — R20.2 NUMBNESS AND TINGLING OF BOTH FEET: Primary | ICD-10-CM

## 2022-05-10 DIAGNOSIS — G60.8 POLYNEUROPATHY, PERIPHERAL SENSORIMOTOR AXONAL: ICD-10-CM

## 2022-05-10 DIAGNOSIS — R20.0 NUMBNESS AND TINGLING OF BOTH FEET: Primary | ICD-10-CM

## 2022-05-10 PROCEDURE — 95886 MUSC TEST DONE W/N TEST COMP: CPT | Mod: LT | Performed by: PSYCHIATRY & NEUROLOGY

## 2022-05-10 PROCEDURE — 95910 NRV CNDJ TEST 7-8 STUDIES: CPT | Performed by: PSYCHIATRY & NEUROLOGY

## 2022-05-10 NOTE — PROCEDURES
[facility]    Formerly [facility]  [facility], [address]  [place] [address]  Tel: [phone]  Fax: [phone]          Full Name: Jonathon Santos Gender: Male  Patient ID: [id number] YOB: 1955      Visit Date: 5/10/2022 10:27  Age: 67 Years 1 Months Old  Interpreted By: Devyn Eisenberg MD   Ref Dr.: Pinky Thompson MD  Tech: ST   Height: 6 feet 1 inch  Reason for referral: Evaluate bilateral lowers. c/o numbness in both legs/feet for a year. h/o lumbar surgery, right big toe surgery. Being treated for Prostate Cancer with radiation and chemo.      Motor NCS      Nerve / Sites Lat Amp Dist Pepe    ms mV cm m/s   L Peroneal - EDB      Ankle 6.88 3.3 8       Fib head 16.20 3.3 35 38      Pop fossa 19.43 3.2 12 37   R Peroneal - EDB      Ankle 6.77 2.5 8       Fib head 15.78 2.3 31 34      Pop fossa 19.11 2.2 12 36   L Tibial - AH      Ankle 7.14 1.0 8       Pop fossa 19.69 1.1 42 33   R Tibial - AH      Ankle 6.72 1.0 8       Pop fossa 19.79 1.0 41 31       F  Wave      Nerve Fmin    ms   L Tibial - AH 63.33   R Tibial - AH 74.58       Sensory NCS      Nerve / Sites Pk Lat NP Amp Dist    ms  V cm   L Sural - Ankle (Calf)      Calf 3.80 7.8 14   R Sural - Ankle (Calf)      Calf 4.32 3.7 14   L Superficial peroneal - Ankle      Lat leg NR NR 12   R Superficial peroneal - Ankle      Lat leg NR NR 12       EMG Summary Table     Spontaneous MUAP Rcmt Note   Muscle Fib PSW Fasc IA # Amp Dur PPP Rate Type   L. Gluteus medius None None None N N N N N N N   L. Gluteus maia None None None N N N N N N N   L. Biceps femoris (short head) None None None N N N N N N N   L. Iliopsoas None None None N N N N N N N   L. Adductor sara None None None N N N N N N N   L. Quadriceps None None None N N N N N N N   L. Tibialis anterior None None None N Sl Red Incr Incr N N N   L. Gastrocnemius (Medial head) None None None N Sl Red Incr Incr N N N   L.  Tibialis posterior None None None N Sl Red Incr Incr N N N   R. Adductor sara None None None N N N N N N N   R. Quadriceps None None None N N N N N N N   R. Tibialis anterior None None None N Sl Red Incr Incr N N N   R. Gastrocnemius (Medial head) None None None N Sl Red Incr Incr N N N   R. Tibialis posterior None None None N Sl Red Incr Incr N N N         Left peroneal motor study slowed conduction velocity  Right peroneal motor nerve study slowed conduction velocity  Left tibial motor conduction study slowed conduction velocity  Right tibial motor conduction study slowed conduction velocity  Left tibial F wave latency prolonged  Right tibial F-wave latency prolonged    Left sural snap potential normal  Right sural snap potential prolonged  Left superficial peroneal snap potential absent  Right superficial peroneal snap potential absent    Needle examination left lower extremity mild chronic motor changes posterior tibial/anterior tibial/gastrocnemius  The examination right lower extremity mild chronic motor changes posterior tibial/anterior tibial/gastrocnemius      Impression    This EMG examination would be consistent 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

## 2022-05-10 NOTE — LETTER
5/10/2022         RE: Jonathon Santos  [address] [address] [place] [id number]        Dear Colleague,    Thank you for referring your patient, Jonathon Santos, to the [facility] [place]. Please see a copy of my visit note below.    See EMG report      Again, thank you for allowing me to participate in the care of your patient.        Sincerely,        Devyn Eisenberg MD

## 2022-05-11 NOTE — PROGRESS NOTES
Jonathon is a 67 year old who is being evaluated via a billable video visit.      Pt would like to discuss blood tests and potassium levels.    How would you like to obtain your AVS? MyChart  If the video visit is dropped, the invitation should be resent by: Text to cell phone: [phone]  Will anyone else be joining your video visit? No       Maame Mckenna VF    Video Start Time: 11:10 am  Video-Visit Details    Type of service:  Video Visit    Video End Time:11:34 AM    Originating Location (pt. Location): Home    Distant Location (provider location):  [facility]     Platform used for Video Visit: [facility] Medical Oncology Follow Up Note       Date of visit: 5/13/22      HPI:  67 yo male with elevated PSA who had a prostate biopsy on 12/21/20.   9/17/19 PSA =  6.57    Pathology from 12/21/20 shows Gl 4+5=9 disease.  The patient's MRI from 11/17/20 shows concern for enlarged lymph nodes raising concern for mets.  Has placement of fiducials and [employer] on 4/30 with Dr. Caban.      Started prednisone abiraterone 1/25/21 with plan for 2 years.    Completed radiation 5/19/21 to 7/15/21 with Dr. Francois  9/15/21 PSA<0.01  12/16/21 PSA<0.01  1/14/22 PSA<0.01.      INTERVAL HX:  Jonathon is overall doing fine. He continues to have days with sinus pressure/upset stomach/diarrhea but he manages. Other days he does not have these symptoms. He is working on eating more potassium rich foods. No new pain. Has dry skin. Eating and drinking okay. Has hot flashes and fatigue.     ROS  10 point ROS otherwise unremarkable.    PMH:  HTN  Gout    MEDS  Lisinopril - hydrochlorothiazide  Metoprolol  Abiraterone  Prednisone    PHYSICAL EXAM-video   General: Patient appears well in no acute distress.   Skin: No visualized rash or lesions on visualized skin  Eyes: EOMI, no erythema, sclera icterus or discharge noted  Resp: Appears to be breathing comfortably without accessory muscle usage,  speaking in full sentences, no cough  MSK: Appears to have normal range of motion based on visualized movements  Neurologic: No apparent tremors, facial movements symmetric  Psych: affect good, alert and oriented    The rest of a comprehensive physical examination is deferred due to PHE (public health emergency) video restrictions    LABS AND IMAGES    Most Recent 3 CBC's:Recent Labs   Lab Test 05/02/22  1422 02/10/22  1056 01/14/22  1113 12/16/21  1043 10/13/21  1056   WBC 4.8 3.9* 4.2 4.4 3.0*   HGB 13.4 12.0* 12.9* 13.4 12.3*   MCV 95 104* 101* 98 101*   * 84* 87* 100* 108*   ANEUTAUTO  --   --  2.7 3.1 2.0     Most Recent 3 BMP's:  Recent Labs   Lab Test 05/02/22  1422 02/10/22  1056 01/14/22  1112    139 139   POTASSIUM 3.0* 3.2* 3.4   CHLORIDE 101 105 105   CO2 26 29 27   BUN 19 19 18   CR 1.07 0.98 1.01   ANIONGAP 9 5 7   MISTY 9.2 9.4 9.1   * 106* 114*   PROTTOTAL 7.5 7.0 7.2   ALBUMIN 3.8 3.7 3.7    Most Recent 3 LFT's:  Recent Labs   Lab Test 05/02/22  1422 02/10/22  1056 01/14/22  1112   AST 26 26 35   ALT 33 31 38   ALKPHOS 72 59 57   BILITOTAL 2.2* 1.7* 1.4*    Most Recent 2 TSH and T4:No lab results found.  I reviewed the above labs today.        No new imaging studies.      IMPRESSION AND PLAN  Locally advanced prostate cancer with high grade Bee 9. No significant comorbidities.     Plan:   1. Two years of ADT plus Abiraterone--> 01/2023. Tolerating with mild hot flashes and some fatigue, both tolerable and not impacting QOL in a way that precludes continuation of therapy. No edema. Has some GI upset but not needing intervention    2. RT to the nodes and prostate completed with Dr. Francois    3. Lupron in [place] due August 2022    4. Mgmt of his gout per his rheumatologist Dr. Wang    5. Pancytopenias, most likely secondary to RT as clearly started with treatment. Improving with time as anticipated    6. Nasal congestion--would be unusual to be from [facility]? Offered ENT referral and  some general sinus mgmt with antihistamine and flonase he can try. He will let us know about ENT    7. Hypokalemia: he wants to try increasing potassium rich foods. Rx for supplementation that I recommended he start in June pending recheck.    50 minutes spent on the date of the encounter doing chart review, review of test results, interpretation of tests, patient visit, documentation and discussion with other provider(s)     Esperanza Floyd CNP on 5/13/2022 at 11:52 AM

## 2022-05-13 ENCOUNTER — VIRTUAL VISIT (OUTPATIENT)
Dept: ONCOLOGY | Facility: CLINIC | Age: 67
End: 2022-05-13
Attending: NURSE PRACTITIONER
Payer: COMMERCIAL

## 2022-05-13 DIAGNOSIS — C61 PROSTATE CANCER (H): ICD-10-CM

## 2022-05-13 DIAGNOSIS — C77.9 REGIONAL LYMPH NODE METASTASIS PRESENT (H): ICD-10-CM

## 2022-05-13 DIAGNOSIS — E87.6 HYPOKALEMIA: Primary | ICD-10-CM

## 2022-05-13 PROCEDURE — G0463 HOSPITAL OUTPT CLINIC VISIT: HCPCS | Mod: PN,RTG | Performed by: NURSE PRACTITIONER

## 2022-05-13 PROCEDURE — 99215 OFFICE O/P EST HI 40 MIN: CPT | Mod: 95 | Performed by: NURSE PRACTITIONER

## 2022-05-13 RX ORDER — POTASSIUM CHLORIDE 1500 MG/1
20 TABLET, EXTENDED RELEASE ORAL DAILY
Qty: 60 TABLET | Refills: 1 | Status: SHIPPED | OUTPATIENT
Start: 2022-05-13 | End: 2022-07-27

## 2022-05-16 ENCOUNTER — OFFICE VISIT (OUTPATIENT)
Dept: FAMILY MEDICINE | Facility: CLINIC | Age: 67
End: 2022-05-16
Payer: COMMERCIAL

## 2022-05-16 VITALS
WEIGHT: 205.6 LBS | TEMPERATURE: 97 F | HEIGHT: 73 IN | DIASTOLIC BLOOD PRESSURE: 65 MMHG | HEART RATE: 80 BPM | BODY MASS INDEX: 27.25 KG/M2 | SYSTOLIC BLOOD PRESSURE: 120 MMHG

## 2022-05-16 DIAGNOSIS — Z00.00 ENCOUNTER FOR MEDICARE ANNUAL WELLNESS EXAM: Primary | ICD-10-CM

## 2022-05-16 PROCEDURE — G0439 PPPS, SUBSEQ VISIT: HCPCS | Performed by: FAMILY MEDICINE

## 2022-05-16 ASSESSMENT — ENCOUNTER SYMPTOMS
MYALGIAS: 0
HEADACHES: 0
PALPITATIONS: 0
SHORTNESS OF BREATH: 1
COUGH: 0
HEMATURIA: 0
DIARRHEA: 0
ABDOMINAL PAIN: 0
FEVER: 0
DIZZINESS: 0
ARTHRALGIAS: 0
PARESTHESIAS: 0
NERVOUS/ANXIOUS: 0
EYE PAIN: 0
FREQUENCY: 1
CHILLS: 0
SORE THROAT: 0
DYSURIA: 0
WEAKNESS: 1
HEMATOCHEZIA: 0
NAUSEA: 0
HEARTBURN: 0
CONSTIPATION: 0

## 2022-05-16 ASSESSMENT — ACTIVITIES OF DAILY LIVING (ADL): CURRENT_FUNCTION: NO ASSISTANCE NEEDED

## 2022-05-16 ASSESSMENT — PAIN SCALES - GENERAL: PAINLEVEL: NO PAIN (0)

## 2022-05-16 NOTE — PATIENT INSTRUCTIONS
Because of the low potassium, consider switching from hydrochlorothiazide to amlodipine.       Patient Education   Personalized Prevention Plan  You are due for the preventive services outlined below.  Your care team is available to assist you in scheduling these services.  If you have already completed any of these items, please share that information with your care team to update in your medical record.  Health Maintenance Due   Topic Date Due    ANNUAL REVIEW OF HM ORDERS  Never done    Pneumococcal Vaccine (1 - PCV) Never done    Diptheria Tetanus Pertussis (DTAP/TDAP/TD) Vaccine (1 - Tdap) Never done    Zoster (Shingles) Vaccine (1 of 2) Never done    COVID-19 Vaccine (4 - Booster for Pfizer series) 04/16/2022

## 2022-05-16 NOTE — PROGRESS NOTES
"SUBJECTIVE:   Jonathon Santos is a 67 year old male who presents for Preventive Visit.    Patient has been advised of split billing requirements and indicates understanding: Yes     Are you in the first 12 months of your Medicare coverage?  No    Healthy Habits:     In general, how would you rate your overall health?  Fair    Frequency of exercise:  2-3 days/week    Duration of exercise:  15-30 minutes    Do you usually eat at least 4 servings of fruit and vegetables a day, include whole grains    & fiber and avoid regularly eating high fat or \"junk\" foods?  No    Taking medications regularly:  Yes    Medication side effects:  None    Ability to successfully perform activities of daily living:  No assistance needed    Home Safety:  No safety concerns identified    Hearing Impairment:  Feel that people are mumbling or not speaking clearly and find that men's voices are easier to understand than woman's    In the past 6 months, have you been bothered by leaking of urine?  No    In general, how would you rate your overall mental or emotional health?  Fair      PHQ-2 Total Score: 0    Additional concerns today:  No     Do you feel safe in your environment? Yes    Have you ever done Advance Care Planning? (For example, a Health Directive, POLST, or a discussion with a medical provider or your loved ones about your wishes): No, advance care planning information given to patient to review.  Patient declined advance care planning discussion at this time.  Hearing Acuity: Pass    Hearing Assessment: normal     Fall risk  Fallen 2 or more times in the past year?: No  Any fall with injury in the past year?: No    Cognitive Screening   1) Repeat 3 items (Leader, Season, Table)    2) Clock draw: NORMAL  3) 3 item recall: Recalls 1 object   Results: NORMAL clock, 1-2 items recalled: COGNITIVE IMPAIRMENT LESS LIKELY    Mini-CogTM Copyright CHEYENNE Gooden. Licensed by the author for use in [facility]; reprinted with " permission (joel@[facility]). All rights reserved.      Do you have sleep apnea, excessive snoring or daytime drowsiness?: no    Reviewed and updated as needed this visit by clinical staff   Tobacco  Allergies  Meds   Med Hx  Surg Hx  Fam Hx  Soc Hx          Reviewed and updated as needed this visit by Provider                   Social History     Tobacco Use     Smoking status: Never Smoker     Smokeless tobacco: Former User     Quit date: 2011   Substance Use Topics     Alcohol use: Yes     Alcohol/week: 7.0 - 14.0 standard drinks     Types: 7 - 14 Cans of beer per week     Comment: 1-2 beers daily     If you drink alcohol do you typically have >3 drinks per day or >7 drinks per week? No    Alcohol Use 5/16/2022   Prescreen: >3 drinks/day or >7 drinks/week? No   Prescreen: >3 drinks/day or >7 drinks/week? -   No flowsheet data found.        Current providers sharing in care for this patient include:  Patient Care Team:  Aron Cristina MD as PCP - General (Family Medicine)  Ferdinand Caban MD as Assigned Surgical Provider  Andrea Cisneros MD as MD (Medical Oncology)  Davion Francois MD as Assigned Cancer Care Provider  Davion Francois MD as MD (Radiation Oncology)  Davion Francois MD as MD (Radiation Oncology)  Layla Skelton, RADHA TIAN as Assigned PCP  Antoinette Mcpherson MBBS as Assigned Rheumatology Provider  Pinky Nick MD as Assigned Neuroscience Provider    The following health maintenance items are reviewed in Epic and correct as of today:  Health Maintenance Due   Topic Date Due     Pneumococcal Vaccine: 65+ Years (1 - PCV) Never done     DTAP/TDAP/TD IMMUNIZATION (1 - Tdap) Never done     ZOSTER IMMUNIZATION (1 of 2) Never done     COVID-19 Vaccine (4 - Booster for Pfizer series) 04/16/2022     Patient Active Problem List   Diagnosis     Refused influenza vaccine     Refused pneumococcal vaccination     Gout     Inflammatory arthritis     Renal insufficiency  syndrome     Mixed hyperlipidemia     Uncontrolled hypertension     Prostate cancer (H)     Regional lymph node metastasis present (H)     Past Surgical History:   Procedure Laterality Date     COLONOSCOPY       FOOT SURGERY       HC REVISE MEDIAN N/CARPAL TUNNEL SURG      Description: Neuroplasty Decompression Median Nerve At Carpal Tunnel;  Recorded: 05/20/2013;     INSERT SEED MARKER / MATRIX N/A 4/30/2021    Procedure: Transrectal ultrasound guided placement of fiducials and SpaceOar;  Surgeon: Ferdinand Caban MD;  Location: [facility]     PROSTATE BIOPSY, NEEDLE, SATURATION SAMPLING       SPINE SURGERY      unspecified low back surgery       Social History     Tobacco Use     Smoking status: Never Smoker     Smokeless tobacco: Former User     Quit date: 2011   Substance Use Topics     Alcohol use: Yes     Alcohol/week: 7.0 - 14.0 standard drinks     Types: 7 - 14 Cans of beer per week     Comment: 1-2 beers daily     Family History   Problem Relation Age of Onset     Hypertension Mother      Gout Mother      Hypertension Father      Diabetes Father      Hypertension Maternal Grandmother      Hypertension Maternal Grandfather      Hypertension Brother      Hypertension Sister      Diabetes Cousin      Deep Vein Thrombosis No family hx of      Anesthesia Reaction No family hx of          Current Outpatient Medications   Medication Sig Dispense Refill     abiraterone (ZYTIGA) 250 MG tablet Take 4 tablets (1,000 mg) by mouth daily for 30 doses Take on empty stomach. 120 tablet 1     febuxostat (ULORIC) 40 MG TABS tablet Take 40 mg by mouth       lisinopril-hydrochlorothiazide (PRINZIDE/ZESTORETIC) 20-12.5 MG tablet Take 2 tablets by mouth daily (Patient taking differently: Take 2 tablets by mouth every morning) 30 tablet 0     metoprolol succinate ER (TOPROL-XL) 25 MG 24 hr tablet daily (with lunch)        predniSONE (DELTASONE) 10 MG tablet Take 10 mg by mouth       potassium chloride ER (K-TAB) 20 MEQ CR  "tablet Take 1 tablet (20 mEq) by mouth daily (Patient not taking: Reported on 5/16/2022) 60 tablet 1     potassium chloride ER (K-TAB) 20 MEQ CR tablet Take 40 meQ (2 tabs) on the first day and then take 20 mEq daily thereafter until directed to stop. (Patient not taking: Reported on 5/16/2022) 60 tablet 1     No Known Allergies      Review of Systems   Constitutional: Negative for chills and fever.   HENT: Negative for congestion, ear pain, hearing loss and sore throat.    Eyes: Negative for pain and visual disturbance.   Respiratory: Positive for shortness of breath. Negative for cough.    Cardiovascular: Negative for chest pain, palpitations and peripheral edema.   Gastrointestinal: Negative for abdominal pain, constipation, diarrhea, heartburn, hematochezia and nausea.   Genitourinary: Positive for frequency. Negative for dysuria, genital sores, hematuria, impotence, penile discharge and urgency.   Musculoskeletal: Negative for arthralgias and myalgias.   Skin: Negative for rash.   Neurological: Positive for weakness. Negative for dizziness, headaches and paresthesias.   Psychiatric/Behavioral: Negative for mood changes. The patient is not nervous/anxious.      Constitutional, HEENT, cardiovascular, pulmonary, gi and gu systems are negative, except as otherwise noted.    OBJECTIVE:   /65   Pulse 80   Temp 97  F (36.1  C) (Tympanic)   Ht 1.854 m (6' 1\")   Wt 93.3 kg (205 lb 9.6 oz)   BMI 27.13 kg/m   Estimated body mass index is 27.13 kg/m  as calculated from the following:    Height as of this encounter: 1.854 m (6' 1\").    Weight as of this encounter: 93.3 kg (205 lb 9.6 oz).     Physical Exam  GENERAL: healthy, alert and no distress  NECK: no adenopathy, no asymmetry, masses, or scars and thyroid normal to palpation  RESP: lungs clear to auscultation - no rales, rhonchi or wheezes  CV: regular rate and rhythm, normal S1 S2, no S3 or S4, no murmur, click or rub, no peripheral edema and peripheral " "pulses strong  ABDOMEN: soft, nontender, no hepatosplenomegaly, no masses and bowel sounds normal  MS: no gross musculoskeletal defects noted, no edema    Diagnostic Test Results:  Labs reviewed in Epic    ASSESSMENT / PLAN:   (Z00.00) Encounter for Medicare annual wellness exam  (primary encounter diagnosis)      Patient has been advised of split billing requirements and indicates understanding: Yes    COUNSELING:  Reviewed preventive health counseling, as reflected in patient instructions       Regular exercise       Healthy diet/nutrition       Vision screening       Hearing screening       Bladder control       Colon cancer screening       Prostate cancer screening    Estimated body mass index is 27.13 kg/m  as calculated from the following:    Height as of this encounter: 1.854 m (6' 1\").    Weight as of this encounter: 93.3 kg (205 lb 9.6 oz).    Weight management plan: Patient referred to endocrine and/or weight management specialty    He reports that he has never smoked. He quit smokeless tobacco use about 11 years ago.    Appropriate preventive services were discussed with this patient, including applicable screening as appropriate for cardiovascular disease, diabetes, osteopenia/osteoporosis, and glaucoma.  As appropriate for age/gender, discussed screening for colorectal cancer, prostate cancer, breast cancer, and cervical cancer. Checklist reviewing preventive services available has been given to the patient.    Reviewed patients plan of care and provided an AVS. The Intermediate Care Plan ( asthma action plan, low back pain action plan, and migraine action plan) for Jonathon meets the Care Plan requirement. This Care Plan has been established and reviewed with the Patient.    Counseling Resources:  ATP IV Guidelines  Pooled Cohorts Equation Calculator  Breast Cancer Risk Calculator  Breast Cancer: Medication to Reduce Risk  FRAX Risk Assessment  ICSI Preventive Guidelines  Dietary Guidelines for Americans, " 2010  USDA's MyPlate  ASA Prophylaxis  Lung CA Screening    Jonathon Gonzales MD  [facility]    Identified Health Risks:

## 2022-05-23 ENCOUNTER — TELEPHONE (OUTPATIENT)
Dept: NEUROLOGY | Facility: CLINIC | Age: 67
End: 2022-05-23
Payer: COMMERCIAL

## 2022-05-23 DIAGNOSIS — G60.8 POLYNEUROPATHY, PERIPHERAL SENSORIMOTOR AXONAL: Primary | ICD-10-CM

## 2022-05-23 DIAGNOSIS — G60.9 HEREDITARY AND IDIOPATHIC NEUROPATHY, UNSPECIFIED: ICD-10-CM

## 2022-05-23 NOTE — TELEPHONE ENCOUNTER
[facility] Call Center    Phone Message    May a detailed message be left on voicemail: yes     Reason for Call: Other: Patient requests Dr. Elmore to add the additional lab work to his lab appointment on 6/15/22 she referred to in her Voicemail.       Action Taken: Message routed to:  [facility] [place] [facility]    Travel Screening: Not Applicable

## 2022-05-23 NOTE — TELEPHONE ENCOUNTER
Please let Jonathon know that his nerve conduction studies do show evidence of peripheral neuropathy.  This explains his symptoms, and certainly could be related to his history of chemotherapy.  That being said, we can do additional tests with some blood work to look into other potential contributors if he would like.  We can also wait to discuss this further in clinic when he follows up with me if he prefers.    Thank you,  Dr. Thompson

## 2022-05-25 NOTE — TELEPHONE ENCOUNTER
Informed patient that Dr. Thompson put in lab orders and he can get those test done at his next lab appointment. Patient verbalized understanding.     Ilya Butler CMA@ on 5/25/2022 at 3:01 PM

## 2022-06-02 ENCOUNTER — TELEPHONE (OUTPATIENT)
Dept: FAMILY MEDICINE | Facility: CLINIC | Age: 67
End: 2022-06-02

## 2022-06-02 DIAGNOSIS — Z12.11 COLON CANCER SCREENING: Primary | ICD-10-CM

## 2022-06-02 NOTE — TELEPHONE ENCOUNTER
Reason for call:    Symptom or request:     Patient called requesting orders for a colonoscopy, he reports his last one was 6 years ago and was told to have colonoscopies every 5 years.     Best Time:  any    Can we leave a detailed message on this number?  YES     Jessica BRITO  Station

## 2022-06-15 ENCOUNTER — LAB (OUTPATIENT)
Dept: LAB | Facility: CLINIC | Age: 67
End: 2022-06-15
Payer: COMMERCIAL

## 2022-06-15 DIAGNOSIS — C77.9 REGIONAL LYMPH NODE METASTASIS PRESENT (H): ICD-10-CM

## 2022-06-15 DIAGNOSIS — G60.9 HEREDITARY AND IDIOPATHIC NEUROPATHY, UNSPECIFIED: ICD-10-CM

## 2022-06-15 DIAGNOSIS — Z00.00 ENCOUNTER FOR MEDICARE ANNUAL WELLNESS EXAM: ICD-10-CM

## 2022-06-15 DIAGNOSIS — Z79.899 ENCOUNTER FOR LONG-TERM (CURRENT) USE OF MEDICATIONS: ICD-10-CM

## 2022-06-15 DIAGNOSIS — G60.8 POLYNEUROPATHY, PERIPHERAL SENSORIMOTOR AXONAL: ICD-10-CM

## 2022-06-15 DIAGNOSIS — M1A.9XX0 CHRONIC GOUT WITHOUT TOPHUS, UNSPECIFIED CAUSE, UNSPECIFIED SITE: ICD-10-CM

## 2022-06-15 DIAGNOSIS — C61 PROSTATE CANCER (H): ICD-10-CM

## 2022-06-15 LAB
ALBUMIN SERPL-MCNC: 3.8 G/DL (ref 3.4–5)
ALP SERPL-CCNC: 63 U/L (ref 40–150)
ALT SERPL W P-5'-P-CCNC: 26 U/L (ref 0–70)
ANION GAP SERPL CALCULATED.3IONS-SCNC: 4 MMOL/L (ref 3–14)
AST SERPL W P-5'-P-CCNC: 17 U/L (ref 0–45)
BILIRUB SERPL-MCNC: 1.6 MG/DL (ref 0.2–1.3)
BUN SERPL-MCNC: 16 MG/DL (ref 7–30)
CALCIUM SERPL-MCNC: 9.6 MG/DL (ref 8.5–10.1)
CHLORIDE BLD-SCNC: 104 MMOL/L (ref 94–109)
CHOLEST SERPL-MCNC: 179 MG/DL
CO2 SERPL-SCNC: 29 MMOL/L (ref 20–32)
CREAT SERPL-MCNC: 0.9 MG/DL (ref 0.66–1.25)
CRP SERPL-MCNC: <2.9 MG/L (ref 0–8)
ERYTHROCYTE [SEDIMENTATION RATE] IN BLOOD BY WESTERGREN METHOD: 21 MM/HR (ref 0–20)
FASTING STATUS PATIENT QL REPORTED: YES
FOLATE SERPL-MCNC: 9.8 NG/ML
GFR SERPL CREATININE-BSD FRML MDRD: >90 ML/MIN/1.73M2
GLUCOSE BLD-MCNC: 113 MG/DL (ref 70–99)
HDLC SERPL-MCNC: 37 MG/DL
LDLC SERPL CALC-MCNC: 62 MG/DL
NONHDLC SERPL-MCNC: 142 MG/DL
POTASSIUM BLD-SCNC: 3.2 MMOL/L (ref 3.4–5.3)
PROT SERPL-MCNC: 7.1 G/DL (ref 6.8–8.8)
PSA SERPL-MCNC: <0.01 UG/L (ref 0–4)
SODIUM SERPL-SCNC: 137 MMOL/L (ref 133–144)
TOTAL PROTEIN SERUM FOR ELP: 6.9 G/DL (ref 6.8–8.8)
TRIGL SERPL-MCNC: 399 MG/DL
TSH SERPL DL<=0.005 MIU/L-ACNC: 2.39 MU/L (ref 0.4–4)
URATE SERPL-MCNC: 7 MG/DL (ref 3.5–7.2)
VIT B12 SERPL-MCNC: 183 PG/ML (ref 193–986)

## 2022-06-15 PROCEDURE — 86364 TISS TRNSGLTMNASE EA IG CLAS: CPT

## 2022-06-15 PROCEDURE — 80053 COMPREHEN METABOLIC PANEL: CPT

## 2022-06-15 PROCEDURE — 82607 VITAMIN B-12: CPT

## 2022-06-15 PROCEDURE — 84443 ASSAY THYROID STIM HORMONE: CPT

## 2022-06-15 PROCEDURE — 84153 ASSAY OF PSA TOTAL: CPT

## 2022-06-15 PROCEDURE — 84165 PROTEIN E-PHORESIS SERUM: CPT

## 2022-06-15 PROCEDURE — 84155 ASSAY OF PROTEIN SERUM: CPT

## 2022-06-15 PROCEDURE — 80061 LIPID PANEL: CPT

## 2022-06-15 PROCEDURE — 84550 ASSAY OF BLOOD/URIC ACID: CPT

## 2022-06-15 PROCEDURE — 83921 ORGANIC ACID SINGLE QUANT: CPT

## 2022-06-15 PROCEDURE — 84207 ASSAY OF VITAMIN B-6: CPT | Mod: 90

## 2022-06-15 PROCEDURE — 85652 RBC SED RATE AUTOMATED: CPT

## 2022-06-15 PROCEDURE — 84425 ASSAY OF VITAMIN B-1: CPT | Mod: 90

## 2022-06-15 PROCEDURE — 36415 COLL VENOUS BLD VENIPUNCTURE: CPT

## 2022-06-15 PROCEDURE — 99000 SPECIMEN HANDLING OFFICE-LAB: CPT

## 2022-06-15 PROCEDURE — 86140 C-REACTIVE PROTEIN: CPT

## 2022-06-15 PROCEDURE — 86235 NUCLEAR ANTIGEN ANTIBODY: CPT

## 2022-06-15 PROCEDURE — 86334 IMMUNOFIX E-PHORESIS SERUM: CPT

## 2022-06-15 PROCEDURE — 82746 ASSAY OF FOLIC ACID SERUM: CPT

## 2022-06-15 PROCEDURE — 86038 ANTINUCLEAR ANTIBODIES: CPT

## 2022-06-16 LAB
ALBUMIN SERPL ELPH-MCNC: 4.4 G/DL (ref 3.7–5.1)
ALPHA1 GLOB SERPL ELPH-MCNC: 0.2 G/DL (ref 0.2–0.4)
ALPHA2 GLOB SERPL ELPH-MCNC: 0.7 G/DL (ref 0.5–0.9)
ANA SER QL IF: NEGATIVE
B-GLOBULIN SERPL ELPH-MCNC: 0.7 G/DL (ref 0.6–1)
ENA SS-A AB SER IA-ACNC: <0.5 U/ML
ENA SS-A AB SER IA-ACNC: NEGATIVE
ENA SS-B IGG SER IA-ACNC: 4.3 U/ML
ENA SS-B IGG SER IA-ACNC: NEGATIVE
GAMMA GLOB SERPL ELPH-MCNC: 0.9 G/DL (ref 0.7–1.6)
M PROTEIN SERPL ELPH-MCNC: 0 G/DL
PROT PATTERN SERPL ELPH-IMP: NORMAL
PROT PATTERN SERPL IFE-IMP: NORMAL
TTG IGA SER-ACNC: 0.3 U/ML
TTG IGG SER-ACNC: 0.6 U/ML

## 2022-06-18 LAB
PYRIDOXAL PHOS SERPL-SCNC: 23.2 NMOL/L
VIT B1 PYROPHOSHATE BLD-SCNC: 87 NMOL/L

## 2022-06-22 ENCOUNTER — PATIENT OUTREACH (OUTPATIENT)
Dept: ONCOLOGY | Facility: CLINIC | Age: 67
End: 2022-06-22

## 2022-06-22 DIAGNOSIS — C61 PROSTATE CANCER (H): Primary | ICD-10-CM

## 2022-06-22 LAB — METHYLMALONATE SERPL-SCNC: 0.5 UMOL/L (ref 0–0.4)

## 2022-06-22 NOTE — RESULT ENCOUNTER NOTE
Please let Jonathon know that his neuropathy labs are almost all in the normal or unremarkable range.  The only finding that I think we should address his is low B12 in the setting of high methylmalonic acid which means he may have a harder time absorbing it orally than other people.  Because of this, I recommend monthly B12 injections.  We do not do B12 injections in our [place] clinic, so I recommend he talk to his primary physician about having them done there.  We will send a message to Dr. Gonzales as well.    Thank you,  Dr. Thompson

## 2022-06-24 ENCOUNTER — MYC MEDICAL ADVICE (OUTPATIENT)
Dept: FAMILY MEDICINE | Facility: CLINIC | Age: 67
End: 2022-06-24

## 2022-07-12 ENCOUNTER — DOCUMENTATION ONLY (OUTPATIENT)
Dept: LAB | Facility: CLINIC | Age: 67
End: 2022-07-12

## 2022-07-12 ENCOUNTER — LAB (OUTPATIENT)
Dept: LAB | Facility: CLINIC | Age: 67
End: 2022-07-12
Payer: COMMERCIAL

## 2022-07-12 ENCOUNTER — TELEPHONE (OUTPATIENT)
Dept: ONCOLOGY | Facility: CLINIC | Age: 67
End: 2022-07-12

## 2022-07-12 DIAGNOSIS — G60.9 HEREDITARY AND IDIOPATHIC NEUROPATHY, UNSPECIFIED: ICD-10-CM

## 2022-07-12 DIAGNOSIS — Z00.00 ENCOUNTER FOR MEDICARE ANNUAL WELLNESS EXAM: ICD-10-CM

## 2022-07-12 DIAGNOSIS — C77.9 REGIONAL LYMPH NODE METASTASIS PRESENT (H): ICD-10-CM

## 2022-07-12 DIAGNOSIS — M1A.9XX0 CHRONIC GOUT WITHOUT TOPHUS, UNSPECIFIED CAUSE, UNSPECIFIED SITE: ICD-10-CM

## 2022-07-12 DIAGNOSIS — Z79.899 ENCOUNTER FOR LONG-TERM (CURRENT) USE OF MEDICATIONS: ICD-10-CM

## 2022-07-12 DIAGNOSIS — G60.8 POLYNEUROPATHY, PERIPHERAL SENSORIMOTOR AXONAL: ICD-10-CM

## 2022-07-12 DIAGNOSIS — C61 PROSTATE CANCER (H): ICD-10-CM

## 2022-07-12 DIAGNOSIS — M1A.9XX0 CHRONIC GOUT WITHOUT TOPHUS, UNSPECIFIED CAUSE, UNSPECIFIED SITE: Primary | ICD-10-CM

## 2022-07-12 LAB
ALBUMIN SERPL-MCNC: 3.9 G/DL (ref 3.4–5)
ALP SERPL-CCNC: 62 U/L (ref 40–150)
ALT SERPL W P-5'-P-CCNC: 30 U/L (ref 0–70)
ANION GAP SERPL CALCULATED.3IONS-SCNC: 5 MMOL/L (ref 3–14)
AST SERPL W P-5'-P-CCNC: 23 U/L (ref 0–45)
BILIRUB SERPL-MCNC: 2 MG/DL (ref 0.2–1.3)
BUN SERPL-MCNC: 24 MG/DL (ref 7–30)
CALCIUM SERPL-MCNC: 9.8 MG/DL (ref 8.5–10.1)
CHLORIDE BLD-SCNC: 104 MMOL/L (ref 94–109)
CO2 SERPL-SCNC: 28 MMOL/L (ref 20–32)
CREAT SERPL-MCNC: 0.96 MG/DL (ref 0.66–1.25)
GFR SERPL CREATININE-BSD FRML MDRD: 86 ML/MIN/1.73M2
GLUCOSE BLD-MCNC: 121 MG/DL (ref 70–99)
POTASSIUM BLD-SCNC: 2.9 MMOL/L (ref 3.4–5.3)
PROT SERPL-MCNC: 7.5 G/DL (ref 6.8–8.8)
SODIUM SERPL-SCNC: 137 MMOL/L (ref 133–144)

## 2022-07-12 PROCEDURE — 86335 IMMUNFIX E-PHORSIS/URINE/CSF: CPT

## 2022-07-12 PROCEDURE — 80053 COMPREHEN METABOLIC PANEL: CPT

## 2022-07-12 PROCEDURE — 36415 COLL VENOUS BLD VENIPUNCTURE: CPT

## 2022-07-12 PROCEDURE — 84550 ASSAY OF BLOOD/URIC ACID: CPT

## 2022-07-12 PROCEDURE — 84166 PROTEIN E-PHORESIS/URINE/CSF: CPT

## 2022-07-12 NOTE — TELEPHONE ENCOUNTER
Oral Chemotherapy Monitoring Program     Placed call to patient in follow up of labs from today. Left message requesting call back. No drug names were mentioned. Will update when response received.     Grace Myhre, PharmD  Hematology/Oncology Clinical Pharmacist  [facility]  [facility]  [phone]

## 2022-07-12 NOTE — PROGRESS NOTES
This patient was seen today for a lab only appointment and is requesting for a uric acid test. Patient has been drawn, please review and place orders if needed.     Thank you,  Rosalinda Mart

## 2022-07-13 NOTE — TELEPHONE ENCOUNTER
Contacted the Pt to schedule; Pt states he would like to transfer care to [facility] for it is more convenient for him to get to [place].  Pt is scheduled for the next available with Jennifer for September in [place].    Pt is wondering if Dr. Mcpherson would still place an order for him to check his uric acid level until he can get in to see Jennifer?  Please contact the Pt back to let him know.  Ok to leave a detailed message if it goes to a voicemail.

## 2022-07-14 ENCOUNTER — TELEPHONE (OUTPATIENT)
Dept: ONCOLOGY | Facility: CLINIC | Age: 67
End: 2022-07-14

## 2022-07-14 DIAGNOSIS — E87.6 HYPOKALEMIA: Primary | ICD-10-CM

## 2022-07-14 LAB
PROT PATTERN UR ELPH-IMP: NORMAL
URATE SERPL-MCNC: 7.3 MG/DL (ref 3.5–7.2)

## 2022-07-14 RX ORDER — POTASSIUM CHLORIDE 1500 MG/1
20 TABLET, EXTENDED RELEASE ORAL DAILY
Qty: 90 TABLET | Refills: 3 | Status: SHIPPED | OUTPATIENT
Start: 2022-07-14 | End: 2023-04-21

## 2022-07-14 NOTE — TELEPHONE ENCOUNTER
Oral Chemotherapy Monitoring Program     Placed call to patient in follow up of low potassium labs and starting potassium supplement.    Per Esperanza Floyd, pt should get repeat labs Monday, 7/18 to monitor potassium as he gets started on the potassium supplement.    Pt refused labs on 7/18, stating they were too soon after his last check.  Explained reasoning for monitoring potassium more frequently when it is low, pt verbalized understanding but believes it is stable and will not drop any lower.    Pt states he will get labs with his urology appt 7/27, but no sooner.    Grace Myhre, PharmD  Hematology/Oncology Clinical Pharmacist  [facility]  [facility]  [phone]

## 2022-07-20 DIAGNOSIS — C61 PROSTATE CANCER (H): ICD-10-CM

## 2022-07-20 DIAGNOSIS — C77.9 REGIONAL LYMPH NODE METASTASIS PRESENT (H): Primary | ICD-10-CM

## 2022-07-20 RX ORDER — ABIRATERONE ACETATE 250 MG/1
1000 TABLET ORAL DAILY
Qty: 120 TABLET | Refills: 1 | Status: SHIPPED | OUTPATIENT
Start: 2022-07-20 | End: 2022-08-19

## 2022-07-21 ENCOUNTER — TELEPHONE (OUTPATIENT)
Dept: ONCOLOGY | Facility: CLINIC | Age: 67
End: 2022-07-21

## 2022-07-21 NOTE — TELEPHONE ENCOUNTER
Oral Chemotherapy Monitoring Program    Subjective/Objective:  Jonathon Santos is a 67 year old male contacted by phone for a follow-up visit for oral chemotherapy.  Pt confirms taking the appropriate dose of abiraterone, 2h890bl daily on an empty stomach and prednisone 5mg daily. Denies new or worsening side effects, missed doses, and recent hospital or ED visits. Patient has not had any recent medication changes.     Pt states he will be starting the potassium supplement soon for his low potassium levels (no DDIs)      ORAL CHEMOTHERAPY 5/9/2022 5/9/2022 6/15/2022 7/12/2022 7/14/2022 7/20/2022 7/21/2022   Assessment Type Left Voicemail Monthly Follow up Lab Monitoring Left Voicemail Other Refill Quarterly Follow up   Diagnosis Code Prostate Cancer Prostate Cancer Prostate Cancer Prostate Cancer Prostate Cancer Prostate Cancer Prostate Cancer   Providers Dr. Blayne Cisneros   [facility]   Drug Name Zytiga (abiraterone) Zytiga (abiraterone) Zytiga (abiraterone) Zytiga (abiraterone) Zytiga (abiraterone) Zytiga (abiraterone) Zytiga (abiraterone)   Dose 1,000 mg 1,000 mg 1,000 mg 1,000 mg 1,000 mg 1,000 mg 1,000 mg   Current Schedule Daily Daily Daily Daily Daily Daily Daily   Cycle Details Continuous Continuous Continuous Continuous Continuous Continuous Continuous   Start Date of Last Cycle - - - - - - -   Planned next cycle start date - - - - - - -   Doses missed in last 2 weeks - 0 - - - - 0   Adherence Assessment - Adherent - - - - Adherent   Adverse Effects - Diarrhea - - - - No AE identified during assessment   Nausea - - - - - - -   Pharmacist Intervention(nausea) - - - - - - -   Diarrhea - Grade 1 - - - - -   Pharmacist Intervention(diarrhea) - Yes - - - - -   Intervention(s) - Patient education - - - - -   Decrease Appetite/Weight Loss - - - - - - -   Pharmacist Intervention(decreased appetite/weight  "loss) - - - - - - -   Intervention(s) - - - - - - -   Pharmacist Intervention(increased ast/alt/t bili) - - - - - - -   Intervention(s) - - - - - - -   Hypertension - - - - - - -   Pharmacist intervention(hypertension) - - - - - - -   Intervention(s) - - - - - - -   Other (See Note for Details) - - - - - - -   Pharmacist intervention(other) - - - - - - -   Intervention(s) - - - - - - -   Home BPs - - - - - - -   Any new drug interactions? - No - - - - No   Pharmacist Intervention? - - - - - - -   Intervention(s) - - - - - - -   Is the dose as ordered appropriate for the patient? - Yes - - - - Yes   Since the last time we talked, have you been hospitalized or used the emergency room? - No - - - - No       Last PHQ-2 Score on record:   PHQ-2 ( 1999 Pfizer) 5/16/2022 5/16/2022   Q1: Little interest or pleasure in doing things 0 0   Q2: Feeling down, depressed or hopeless 0 0   PHQ-2 Score 0 0   PHQ-2 Total Score (12-17 Years)- Positive if 3 or more points; Administer PHQ-A if positive - -   Q1: Little interest or pleasure in doing things Not at all -   Q2: Feeling down, depressed or hopeless Not at all -   PHQ-2 Score 0 -       Vitals:  BP:   BP Readings from Last 1 Encounters:   05/16/22 120/65     Wt Readings from Last 1 Encounters:   05/16/22 93.3 kg (205 lb 9.6 oz)     Estimated body surface area is 2.19 meters squared as calculated from the following:    Height as of 5/16/22: 1.854 m (6' 1\").    Weight as of 5/16/22: 93.3 kg (205 lb 9.6 oz).    Labs:  _  Result Component Current Result Ref Range   Sodium 137 (7/12/2022) 133 - 144 mmol/L     _  Result Component Current Result Ref Range   Potassium 2.9 (L) (7/12/2022) 3.4 - 5.3 mmol/L     _  Result Component Current Result Ref Range   Calcium 9.8 (7/12/2022) 8.5 - 10.1 mg/dL     No results found for Mag within last 30 days.     No results found for Phos within last 30 days.     _  Result Component Current Result Ref Range   Albumin 3.9 (7/12/2022) 3.4 - 5.0 g/dL "     _  Result Component Current Result Ref Range   Urea Nitrogen 24 (7/12/2022) 7 - 30 mg/dL     _  Result Component Current Result Ref Range   Creatinine 0.96 (7/12/2022) 0.66 - 1.25 mg/dL     _  Result Component Current Result Ref Range   AST 23 (7/12/2022) 0 - 45 U/L     _  Result Component Current Result Ref Range   ALT 30 (7/12/2022) 0 - 70 U/L     _  Result Component Current Result Ref Range   Bilirubin Total 2.0 (H) (7/12/2022) 0.2 - 1.3 mg/dL     No results found for WBC within last 30 days.     No results found for HGB within last 30 days.     No results found for PLT within last 30 days.     No results found for ANC within last 30 days.     No results found for ANC within last 30 days.        Assessment/Plan:  Pt continues to tolerate abiraterone and prednisone well.Continue current therapy as planned    Follow-Up:  7/27: review repeat labs    Refill Due:  [facility] to deliver abiraterone and prednisone on 7/26.    Grace Myhre, PharmD  Hematology/Oncology Pharmacist  [facility]  [facility]  [phone]

## 2022-07-27 ENCOUNTER — OFFICE VISIT (OUTPATIENT)
Dept: NEUROLOGY | Facility: CLINIC | Age: 67
End: 2022-07-27
Payer: COMMERCIAL

## 2022-07-27 VITALS — DIASTOLIC BLOOD PRESSURE: 95 MMHG | HEART RATE: 70 BPM | SYSTOLIC BLOOD PRESSURE: 143 MMHG | TEMPERATURE: 97.2 F

## 2022-07-27 DIAGNOSIS — E53.8 VITAMIN B12 DEFICIENCY (NON ANEMIC): ICD-10-CM

## 2022-07-27 DIAGNOSIS — G60.8 POLYNEUROPATHY, PERIPHERAL SENSORIMOTOR AXONAL: Primary | ICD-10-CM

## 2022-07-27 DIAGNOSIS — M1A.9XX0 CHRONIC GOUT WITHOUT TOPHUS, UNSPECIFIED CAUSE, UNSPECIFIED SITE: ICD-10-CM

## 2022-07-27 DIAGNOSIS — C61 PROSTATE CANCER (H): ICD-10-CM

## 2022-07-27 DIAGNOSIS — Z79.899 ENCOUNTER FOR LONG-TERM (CURRENT) USE OF MEDICATIONS: ICD-10-CM

## 2022-07-27 DIAGNOSIS — E87.6 HYPOKALEMIA: ICD-10-CM

## 2022-07-27 LAB
ALBUMIN SERPL-MCNC: 3.9 G/DL (ref 3.4–5)
ALP SERPL-CCNC: 62 U/L (ref 40–150)
ALT SERPL W P-5'-P-CCNC: 32 U/L (ref 0–70)
ANION GAP SERPL CALCULATED.3IONS-SCNC: 6 MMOL/L (ref 3–14)
AST SERPL W P-5'-P-CCNC: 21 U/L (ref 0–45)
BASOPHILS # BLD MANUAL: 0 10E3/UL (ref 0–0.2)
BASOPHILS NFR BLD MANUAL: 1 %
BILIRUB SERPL-MCNC: 1.6 MG/DL (ref 0.2–1.3)
BUN SERPL-MCNC: 14 MG/DL (ref 7–30)
CALCIUM SERPL-MCNC: 9.7 MG/DL (ref 8.5–10.1)
CHLORIDE BLD-SCNC: 99 MMOL/L (ref 94–109)
CO2 SERPL-SCNC: 28 MMOL/L (ref 20–32)
CREAT SERPL-MCNC: 1.04 MG/DL (ref 0.66–1.25)
EOSINOPHIL # BLD MANUAL: 0.1 10E3/UL (ref 0–0.7)
EOSINOPHIL NFR BLD MANUAL: 2 %
ERYTHROCYTE [DISTWIDTH] IN BLOOD BY AUTOMATED COUNT: 12.1 % (ref 10–15)
GFR SERPL CREATININE-BSD FRML MDRD: 78 ML/MIN/1.73M2
GLUCOSE BLD-MCNC: 114 MG/DL (ref 70–99)
HCT VFR BLD AUTO: 37.4 % (ref 40–53)
HGB BLD-MCNC: 13 G/DL (ref 13.3–17.7)
LYMPHOCYTES # BLD MANUAL: 1.5 10E3/UL (ref 0.8–5.3)
LYMPHOCYTES NFR BLD MANUAL: 32 %
MCH RBC QN AUTO: 33.2 PG (ref 26.5–33)
MCHC RBC AUTO-ENTMCNC: 34.8 G/DL (ref 31.5–36.5)
MCV RBC AUTO: 95 FL (ref 78–100)
MONOCYTES # BLD MANUAL: 0.5 10E3/UL (ref 0–1.3)
MONOCYTES NFR BLD MANUAL: 11 %
NEUTROPHILS # BLD MANUAL: 2.5 10E3/UL (ref 1.6–8.3)
NEUTROPHILS NFR BLD MANUAL: 54 %
PLAT MORPH BLD: ABNORMAL
PLATELET # BLD AUTO: 92 10E3/UL (ref 150–450)
POTASSIUM BLD-SCNC: 3.2 MMOL/L (ref 3.4–5.3)
PROT SERPL-MCNC: 7.6 G/DL (ref 6.8–8.8)
PSA SERPL-MCNC: <0.01 UG/L (ref 0–4)
RBC # BLD AUTO: 3.92 10E6/UL (ref 4.4–5.9)
RBC MORPH BLD: ABNORMAL
SODIUM SERPL-SCNC: 133 MMOL/L (ref 133–144)
STOMATOCYTES BLD QL SMEAR: ABNORMAL
VARIANT LYMPHS BLD QL SMEAR: PRESENT
VIT B12 SERPL-MCNC: >4000 PG/ML (ref 232–1245)
WBC # BLD AUTO: 4.7 10E3/UL (ref 4–11)

## 2022-07-27 PROCEDURE — 80053 COMPREHEN METABOLIC PANEL: CPT | Performed by: PSYCHIATRY & NEUROLOGY

## 2022-07-27 PROCEDURE — 96372 THER/PROPH/DIAG INJ SC/IM: CPT | Performed by: PSYCHIATRY & NEUROLOGY

## 2022-07-27 PROCEDURE — 84153 ASSAY OF PSA TOTAL: CPT | Performed by: PSYCHIATRY & NEUROLOGY

## 2022-07-27 PROCEDURE — 85007 BL SMEAR W/DIFF WBC COUNT: CPT | Performed by: PSYCHIATRY & NEUROLOGY

## 2022-07-27 PROCEDURE — 82607 VITAMIN B-12: CPT | Performed by: PSYCHIATRY & NEUROLOGY

## 2022-07-27 PROCEDURE — 99214 OFFICE O/P EST MOD 30 MIN: CPT | Mod: 25 | Performed by: PSYCHIATRY & NEUROLOGY

## 2022-07-27 PROCEDURE — 83921 ORGANIC ACID SINGLE QUANT: CPT | Performed by: PSYCHIATRY & NEUROLOGY

## 2022-07-27 PROCEDURE — 36415 COLL VENOUS BLD VENIPUNCTURE: CPT | Performed by: PSYCHIATRY & NEUROLOGY

## 2022-07-27 PROCEDURE — 85027 COMPLETE CBC AUTOMATED: CPT | Performed by: PSYCHIATRY & NEUROLOGY

## 2022-07-27 RX ADMIN — CYANOCOBALAMIN 1000 MCG: 1000 INJECTION, SOLUTION INTRAMUSCULAR; SUBCUTANEOUS at 13:38

## 2022-07-27 ASSESSMENT — PAIN SCALES - GENERAL: PAINLEVEL: NO PAIN (0)

## 2022-07-27 NOTE — NURSING NOTE
"Initial BP (!) 143/95 (BP Location: Right arm, Patient Position: Sitting, Cuff Size: Adult Large)   Pulse 70   Temp 97.2  F (36.2  C) (Tympanic)  Estimated body mass index is 27.13 kg/m  as calculated from the following:    Height as of 5/16/22: 1.854 m (6' 1\").    Weight as of 5/16/22: 93.3 kg (205 lb 9.6 oz). .    Gaby Dhillon LPN on 7/27/2022 at 1:32 PM    "

## 2022-07-27 NOTE — PATIENT INSTRUCTIONS
Plan:  -- Continue the daily B12 supplement.  Check on the dosing when you get home and let us know so I can document this.  -- I have added B12/MMA (enzyme) to your lab work for today.  I would also like for you to have a B12 level checked again in about 1 month.  This will help us decide whether or not we need to do additional monthly injections.  --Try cutting back on your alcohol intake.  This may help from a neuropathy standpoint.  -- Return to neurology clinic in 6 months.  Please let us know if your symptoms worsen or change in the meantime.

## 2022-07-27 NOTE — LETTER
7/27/2022         RE: Jonathon Santos  [address] [address] [place] [id number]        Dear Colleague,    Thank you for referring your patient, Jonathon Santos, to the [facility] [place]. Please see a copy of my visit note below.    ESTABLISHED PATIENT NEUROLOGY NOTE    DATE OF VISIT: 7/27/2022  MRN: [id number]  PATIENT NAME: Jonathon Santos  YOB: 1955    Chief Complaint   Patient presents with     NEUROPATHY     SUBJECTIVE:                                                      HISTORY OF PRESENT ILLNESS:  Jonathon is here for follow up regarding peripheral neuropathy.    I met the patient for initial consultation about 4 months ago.    History as previously documented by me (3.1.22):  The patient has an additional history of prostate cancer, status post radiation/chemotherapy. Additional history of rheumatoid arthritis, hypertension and gout.     Jonathon says he is here for numbness affecting the feet.  It started in the toes about 1 year ago and has since progressed proximally to involve three quarters of each foot.  Symptoms are mainly numbness, not really any tingling.  Occasional pain, though not really any shooting pain.  He has not noticed any weakness but it has affected his balance.  No symptoms in the hands.  He is not diabetic.  He tells me that he had some blood work done when he told his primary that he feels kind of cold all the time and his feet are cold.  No color changes.     Pancytopenia on available labs.  I do not see his TSH or A1c results in the chart.  History of hypokalemia, though the most recent test came back in normal range.     Note that carpal tunnel surgery is listed in his history, with Jonathon says he does not recall ever having any nerve conduction studies done.    We decided to go ahead with neurophysiologic testing and this showed evidence of length dependent axonal sensorimotor polyneuropathy.  Additional blood work revealed low B12 and elevated MMA so I  recommended monthly injections.  ESR was mildly elevated at 21.  TSH, CRP, PINO, SPEP, immunofixation, SSA, SSB, folate, TTG, B1 and B6 were all normal/negative.    He started the B12 about 5 days ago and would like to have this rechecked.  Our nurse gave him his first B12 injection just today before this visit.    He says the feet are still numb. He thinks the numbness is spreading a little further up toward his ankles perhaps.    He drinks beer and wine, a few drinks nightly.  He has thought about cutting back.  He does recall some correlation between his cancer treatment and symptom onset.  He wonders if we can check the B12 level today with other labs that he is having drawn.    CURRENT MEDICATIONS:   abiraterone (ZYTIGA) 250 MG tablet, Take 4 tablets (1,000 mg) by mouth daily for 30 doses Take on empty stomach.  lisinopril-hydrochlorothiazide (ZESTORETIC) 20-12.5 MG tablet, Take 2 tablets by mouth daily  metoprolol succinate ER (TOPROL XL) 25 MG 24 hr tablet, Take 1 tablet (25 mg) by mouth daily (with lunch)  potassium chloride ER (K-TAB) 20 MEQ CR tablet, Take 1 tablet (20 mEq) by mouth daily  predniSONE (DELTASONE) 10 MG tablet, Take 10 mg by mouth    cyanocobalamin injection 1,000 mcg      RECENT DIAGNOSTIC STUDIES:   Labs: No results found for any visits on 07/27/22.    Nerve conduction studies/EMG (5.10.22):  Impression     This EMG examination would be consistent 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    REVIEW OF SYSTEMS:                                                      10-point review of systems is negative except as mentioned above in HPI.    EXAM:                                                      Physical Exam:   Vitals: BP (!) 143/95 (BP Location: Right arm, Patient Position: Sitting, Cuff Size: Adult Large)   Pulse 70   Temp 97.2  F (36.2  C) (Tympanic)   BMI= There is no height or weight on file to calculate  BMI.  GENERAL: NAD.  HEENT: NC/AT..  PULM: Non-labored breathing.   EXTR: Hallux vulgas, Left.   Focused Neurologic:  MENTAL STATUS: Alert, attentive. Speech is fluent. Normal comprehension. Normal concentration. Adequate fund of knowledge.   CRANIAL NERVES:  Facial movement normal. EOM full.  Eyelids slightly drooped bilaterally.  Hearing intact to conversation. Trapezius strength intact.   MOTOR: 5/5 in proximal and distal muscle groups of lower extremities. Tone and bulk normal.   DTRs: Babinski down-going bilaterally.   SENSATION: Diminished pinprick in the feet to just above the ankle. Intact proprioception at great toes. Vibration: Diminished at both ankles.   COORDINATION: Tremor with action (L>R), otherwise normal fine motor movements.  STATION AND GAIT: Romberg positive. Casual gait is cautious but otherwise normal.    Left hand-dominant.    ASSESSMENT and PLAN:                                                      Assessment:    ICD-10-CM    1. Polyneuropathy, peripheral sensorimotor axonal  G60.8 Vitamin B12   2. Vitamin B12 deficiency (non anemic)  E53.8 Vitamin B12     Methylmalonic Acid     Vitamin B12        Mr. Santos is a pleasant 67-year-old man here for follow-up regarding peripheral neuropathy.  We reviewed the results of his recent nerve conduction studies and lab work.  He has recently started an oral B12 supplement, and received his first injection today.  He is having labs done later on and would like to have a B12 checked.  I told him it may not be increased as of yet, but we can check.  In addition, I would like to have the level checked again in 1 month.  We talked about his alcohol intake which is moderate.  Possibly contributing to his neuropathy symptoms so he will work on cutting back.  I would like to see Jonathon garsia in clinic again in about 6 months.  He understands and agrees with the plan.    Plan:  -- Continue the daily B12 supplement.  Check on the dosing when you get home and let  us know so I can document this.  -- I have added B12/MMA (enzyme) to your lab work for today.  I would also like for you to have a B12 level checked again in about 1 month.  This will help us decide whether or not we need to do additional monthly injections.  --Try cutting back on your alcohol intake.  This may help from a neuropathy standpoint.  -- Return to neurology clinic in 6 months.  Please let us know if your symptoms worsen or change in the meantime.    Total Time: 30 minutes were spent with the patient and in chart review/documentation (as described above in Assessment and Plan)/coordinating the care on date of service.    Pinky Thompson MD  Neurology    Dragon software used in the dictation of this note.                        Again, thank you for allowing me to participate in the care of your patient.        Sincerely,        Pinky Thompson MD

## 2022-07-27 NOTE — LETTER
August 2, 2022      Jonathon Santos  [address] [address] [place] [id number]        Dear ,    We are writing to inform you of your test results.    Your B12 level is quite high. I think you can switch your B12 supplement to every-other-day dosing for now and then we can recheck the level in one month.       Resulted Orders   Vitamin B12   Result Value Ref Range    Vitamin B12 >4,000 (H) 232 - 1,245 pg/mL   Methylmalonic Acid   Result Value Ref Range    Methylmalonic Acid 0.27 0.00 - 0.40 umol/L      Comment:      INTERPRETIVE INFORMATION:    Slight elevation 0.41-0.99 umol/L is consistent with mild vitamin B12 deficiency, renal insufficiency, or intravascular volume contraction.    Moderate elevation 1.00-9.99 umol/L is consistent with mild vitamin B12 deficiency.    Massive elevation 10.00 umol/L or greater is consistent with significant vitamin B12 deficiency or with inborn errors of metabolism.    Narrative    This test was developed and its performance characteristics determined by the [facility],  [facility]. It has not been cleared or approved by the FDA. The laboratory is regulated under CLIA as qualified to perform high-complexity testing. This test is used for clinical purposes. It should not be regarded as investigational or for research.       If you have any questions or concerns, please call the clinic at the number listed above.       Sincerely,      Pinky Thompson MD

## 2022-07-27 NOTE — PROGRESS NOTES
ESTABLISHED PATIENT NEUROLOGY NOTE    DATE OF VISIT: 7/27/2022  MRN: [id number]  PATIENT NAME: Jonathon Santos  YOB: 1955    Chief Complaint   Patient presents with     NEUROPATHY     SUBJECTIVE:                                                      HISTORY OF PRESENT ILLNESS:  Jonathon is here for follow up regarding peripheral neuropathy.    I met the patient for initial consultation about 4 months ago.    History as previously documented by me (3.1.22):  The patient has an additional history of prostate cancer, status post radiation/chemotherapy. Additional history of rheumatoid arthritis, hypertension and gout.     Jonathon says he is here for numbness affecting the feet.  It started in the toes about 1 year ago and has since progressed proximally to involve three quarters of each foot.  Symptoms are mainly numbness, not really any tingling.  Occasional pain, though not really any shooting pain.  He has not noticed any weakness but it has affected his balance.  No symptoms in the hands.  He is not diabetic.  He tells me that he had some blood work done when he told his primary that he feels kind of cold all the time and his feet are cold.  No color changes.     Pancytopenia on available labs.  I do not see his TSH or A1c results in the chart.  History of hypokalemia, though the most recent test came back in normal range.     Note that carpal tunnel surgery is listed in his history, with Jonathon says he does not recall ever having any nerve conduction studies done.    We decided to go ahead with neurophysiologic testing and this showed evidence of length dependent axonal sensorimotor polyneuropathy.  Additional blood work revealed low B12 and elevated MMA so I recommended monthly injections.  ESR was mildly elevated at 21.  TSH, CRP, PINO, SPEP, immunofixation, SSA, SSB, folate, TTG, B1 and B6 were all normal/negative.    He started the B12 about 5 days ago and would like to have this rechecked.  Our nurse gave  him his first B12 injection just today before this visit.    He says the feet are still numb. He thinks the numbness is spreading a little further up toward his ankles perhaps.    He drinks beer and wine, a few drinks nightly.  He has thought about cutting back.  He does recall some correlation between his cancer treatment and symptom onset.  He wonders if we can check the B12 level today with other labs that he is having drawn.    CURRENT MEDICATIONS:   abiraterone (ZYTIGA) 250 MG tablet, Take 4 tablets (1,000 mg) by mouth daily for 30 doses Take on empty stomach.  lisinopril-hydrochlorothiazide (ZESTORETIC) 20-12.5 MG tablet, Take 2 tablets by mouth daily  metoprolol succinate ER (TOPROL XL) 25 MG 24 hr tablet, Take 1 tablet (25 mg) by mouth daily (with lunch)  potassium chloride ER (K-TAB) 20 MEQ CR tablet, Take 1 tablet (20 mEq) by mouth daily  predniSONE (DELTASONE) 10 MG tablet, Take 10 mg by mouth    cyanocobalamin injection 1,000 mcg      RECENT DIAGNOSTIC STUDIES:   Labs: No results found for any visits on 07/27/22.    Nerve conduction studies/EMG (5.10.22):  Impression     This EMG examination would be consistent 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    REVIEW OF SYSTEMS:                                                      10-point review of systems is negative except as mentioned above in HPI.    EXAM:                                                      Physical Exam:   Vitals: BP (!) 143/95 (BP Location: Right arm, Patient Position: Sitting, Cuff Size: Adult Large)   Pulse 70   Temp 97.2  F (36.2  C) (Tympanic)   BMI= There is no height or weight on file to calculate BMI.  GENERAL: NAD.  HEENT: NC/AT..  PULM: Non-labored breathing.   EXTR: Hallux vulgas, Left.   Focused Neurologic:  MENTAL STATUS: Alert, attentive. Speech is fluent. Normal comprehension. Normal concentration. Adequate fund of knowledge.   CRANIAL  NERVES:  Facial movement normal. EOM full.  Eyelids slightly drooped bilaterally.  Hearing intact to conversation. Trapezius strength intact.   MOTOR: 5/5 in proximal and distal muscle groups of lower extremities. Tone and bulk normal.   DTRs: Babinski down-going bilaterally.   SENSATION: Diminished pinprick in the feet to just above the ankle. Intact proprioception at great toes. Vibration: Diminished at both ankles.   COORDINATION: Tremor with action (L>R), otherwise normal fine motor movements.  STATION AND GAIT: Romberg positive. Casual gait is cautious but otherwise normal.    Left hand-dominant.    ASSESSMENT and PLAN:                                                      Assessment:    ICD-10-CM    1. Polyneuropathy, peripheral sensorimotor axonal  G60.8 Vitamin B12   2. Vitamin B12 deficiency (non anemic)  E53.8 Vitamin B12     Methylmalonic Acid     Vitamin B12        Mr. Santos is a pleasant 67-year-old man here for follow-up regarding peripheral neuropathy.  We reviewed the results of his recent nerve conduction studies and lab work.  He has recently started an oral B12 supplement, and received his first injection today.  He is having labs done later on and would like to have a B12 checked.  I told him it may not be increased as of yet, but we can check.  In addition, I would like to have the level checked again in 1 month.  We talked about his alcohol intake which is moderate.  Possibly contributing to his neuropathy symptoms so he will work on cutting back.  I would like to see Jonathon garsia in clinic again in about 6 months.  He understands and agrees with the plan.    Plan:  -- Continue the daily B12 supplement.  Check on the dosing when you get home and let us know so I can document this.  -- I have added B12/MMA (enzyme) to your lab work for today.  I would also like for you to have a B12 level checked again in about 1 month.  This will help us decide whether or not we need to do additional monthly  injections.  --Try cutting back on your alcohol intake.  This may help from a neuropathy standpoint.  -- Return to neurology clinic in 6 months.  Please let us know if your symptoms worsen or change in the meantime.    Total Time: 30 minutes were spent with the patient and in chart review/documentation (as described above in Assessment and Plan)/coordinating the care on date of service.    Pinky Thompson MD  Neurology    [employer] software used in the dictation of this note.

## 2022-07-27 NOTE — PROGRESS NOTES
Clinic Administered Medication Documentation    Administrations This Visit     cyanocobalamin injection 1,000 mcg     Admin Date  07/27/2022 Action  Given Dose  1,000 mcg Route  Intramuscular Site  Right Quadriceps Administered By  Gaby Dhillon LPN    Ordering Provider: Pinky Nick MD    Patient Supplied?: No    Comments: 1st dose                  Injectable Medication Documentation    Patient was given Cyanocobalamin (B-12). Prior to medication administration, verified patients identity using patient s name and date of birth. Please see MAR and medication order for additional information. Patient instructed to remain in clinic for 15 minutes and report any adverse reaction to staff immediately .      Was entire vial of medication used? Yes  Vial/Syringe: Single dose vial  Expiration Date:  6/23  Was this medication supplied by the patient? No    Name of provider who requested the medication administration: Dr. Thompson  Name of provider on site (faculty or community preceptor) at the time of performing the medication administration: Dr. Thompson    Date of next administration: 30 days

## 2022-07-28 ENCOUNTER — ANESTHESIA EVENT (OUTPATIENT)
Dept: GASTROENTEROLOGY | Facility: CLINIC | Age: 67
End: 2022-07-28
Payer: COMMERCIAL

## 2022-07-28 NOTE — ANESTHESIA PREPROCEDURE EVALUATION
Anesthesia Pre-Procedure Evaluation    Patient: Jonathon Santos   MRN: [id number] : 1955        Procedure : Procedure(s):  COLONOSCOPY          Past Medical History:   Diagnosis Date     Benign essential hypertension      Gout      Prostate cancer (H)       Past Surgical History:   Procedure Laterality Date     COLONOSCOPY       FOOT SURGERY       HC REVISE MEDIAN N/CARPAL TUNNEL SURG      Description: Neuroplasty Decompression Median Nerve At Carpal Tunnel;  Recorded: 2013;     INSERT SEED MARKER / MATRIX N/A 2021    Procedure: Transrectal ultrasound guided placement of fiducials and SpaceOar;  Surgeon: Ferdinand Caban MD;  Location: [facility] OR     PROSTATE BIOPSY, NEEDLE, SATURATION SAMPLING       SPINE SURGERY      unspecified low back surgery      No Known Allergies   Social History     Tobacco Use     Smoking status: Never Smoker     Smokeless tobacco: Former User     Quit date:    Substance Use Topics     Alcohol use: Yes     Alcohol/week: 7.0 - 14.0 standard drinks     Types: 7 - 14 Cans of beer per week     Comment: 1-2 beers daily      Wt Readings from Last 1 Encounters:   22 93.3 kg (205 lb 9.6 oz)        Anesthesia Evaluation            ROS/MED HX  ENT/Pulmonary:  - neg pulmonary ROS     Neurologic:  - neg neurologic ROS     Cardiovascular:     (+) Dyslipidemia hypertension-----Previous cardiac testing   Echo: Date: Results:    Stress Test: Date: Results:    ECG Reviewed: Date: 21 Results:   The nuclear stress test is negative for inducible myocardial ischemia or infarction.    The left ventricular ejection fraction at rest is greater than 70%.    There is no prior study for comparison.        ECG Baseline electrocardiogram demonstrates sinus rhythm and nonspecific ST-T abnormalities.   The stress electrocardiogram was non-diagnostic due to baseline ST changes.  There were no arrhythmias during stress.      Cath: Date: Results:      METS/Exercise Tolerance:      Hematologic:  - neg hematologic  ROS     Musculoskeletal:   (+) arthritis,     GI/Hepatic:  - neg GI/hepatic ROS     Renal/Genitourinary:     (+) renal disease,     Endo:  - neg endo ROS     Psychiatric/Substance Use:  - neg psychiatric ROS     Infectious Disease:  - neg infectious disease ROS     Malignancy:   (+) Malignancy, History of Prostate.    Other:  - neg other ROS          Physical Exam    Airway  airway exam normal      Mallampati: II   TM distance: > 3 FB   Neck ROM: full   Mouth opening: > 3 cm    Respiratory Devices and Support         Dental  no notable dental history         Cardiovascular   cardiovascular exam normal          Pulmonary   pulmonary exam normal                OUTSIDE LABS:  CBC:   Lab Results   Component Value Date    WBC 4.7 07/27/2022    WBC 4.8 05/02/2022    HGB 13.0 (L) 07/27/2022    HGB 13.4 05/02/2022    HCT 37.4 (L) 07/27/2022    HCT 38.1 (L) 05/02/2022    PLT 92 (L) 07/27/2022     (L) 05/02/2022     BMP:   Lab Results   Component Value Date     07/27/2022     07/12/2022    POTASSIUM 3.2 (L) 07/27/2022    POTASSIUM 2.9 (L) 07/12/2022    CHLORIDE 99 07/27/2022    CHLORIDE 104 07/12/2022    CO2 28 07/27/2022    CO2 28 07/12/2022    BUN 14 07/27/2022    BUN 24 07/12/2022    CR 1.04 07/27/2022    CR 0.96 07/12/2022     (H) 07/27/2022     (H) 07/12/2022     COAGS: No results found for: PTT, INR, FIBR  POC:   Lab Results   Component Value Date     (H) 04/30/2021     HEPATIC:   Lab Results   Component Value Date    ALBUMIN 3.9 07/27/2022    PROTTOTAL 7.6 07/27/2022    ALT 32 07/27/2022    AST 21 07/27/2022    ALKPHOS 62 07/27/2022    BILITOTAL 1.6 (H) 07/27/2022     OTHER:   Lab Results   Component Value Date    MISTY 9.7 07/27/2022    TSH 2.39 06/15/2022    CRP <2.9 06/15/2022    SED 21 (H) 06/15/2022       Anesthesia Plan    ASA Status:  3   NPO Status:  NPO Appropriate    Anesthesia Type: General.   Induction: Intravenous.   Maintenance:  TIVA.        Consents    Anesthesia Plan(s) and associated risks, benefits, and realistic alternatives discussed. Questions answered and patient/representative(s) expressed understanding.     - Discussed: Risks, Benefits and Alternatives for BOTH SEDATION and the PROCEDURE were discussed     - Discussed with:  Patient         Postoperative Care            Comments:                RADHA Castro CRNA

## 2022-07-28 NOTE — ANESTHESIA PREPROCEDURE EVALUATION
Anesthesia Pre-Procedure Evaluation    Patient: Jonathon Santos   MRN: [id number] : 1955        Procedure : Procedure(s):  COLONOSCOPY          Past Medical History:   Diagnosis Date     Benign essential hypertension      Gout      Prostate cancer (H)       Past Surgical History:   Procedure Laterality Date     COLONOSCOPY       FOOT SURGERY       HC REVISE MEDIAN N/CARPAL TUNNEL SURG      Description: Neuroplasty Decompression Median Nerve At Carpal Tunnel;  Recorded: 2013;     INSERT SEED MARKER / MATRIX N/A 2021    Procedure: Transrectal ultrasound guided placement of fiducials and SpaceOar;  Surgeon: Ferdinand Caban MD;  Location: [facility] OR     PROSTATE BIOPSY, NEEDLE, SATURATION SAMPLING       SPINE SURGERY      unspecified low back surgery      No Known Allergies   Social History     Tobacco Use     Smoking status: Never Smoker     Smokeless tobacco: Former User     Quit date:    Substance Use Topics     Alcohol use: Yes     Alcohol/week: 7.0 - 14.0 standard drinks     Types: 7 - 14 Cans of beer per week     Comment: 1-2 beers daily      Wt Readings from Last 1 Encounters:   22 93.3 kg (205 lb 9.6 oz)              OUTSIDE LABS:  CBC:   Lab Results   Component Value Date    WBC 4.7 2022    WBC 4.8 2022    HGB 13.0 (L) 2022    HGB 13.4 2022    HCT 37.4 (L) 2022    HCT 38.1 (L) 2022    PLT 92 (L) 2022     (L) 2022     BMP:   Lab Results   Component Value Date     2022     2022    POTASSIUM 3.2 (L) 2022    POTASSIUM 2.9 (L) 2022    CHLORIDE 99 2022    CHLORIDE 104 2022    CO2 28 2022    CO2 28 2022    BUN 14 2022    BUN 24 2022    CR 1.04 2022    CR 0.96 2022     (H) 2022     (H) 2022     COAGS: No results found for: PTT, INR, FIBR  POC:   Lab Results   Component Value Date     (H) 2021     HEPATIC:  Good postoperative appearance.   Lab Results   Component Value Date    ALBUMIN 3.9 07/27/2022    PROTTOTAL 7.6 07/27/2022    ALT 32 07/27/2022    AST 21 07/27/2022    ALKPHOS 62 07/27/2022    BILITOTAL 1.6 (H) 07/27/2022     OTHER:   Lab Results   Component Value Date    MISTY 9.7 07/27/2022    TSH 2.39 06/15/2022    CRP <2.9 06/15/2022    SED 21 (H) 06/15/2022               RADHA Castro CRNA

## 2022-07-29 ENCOUNTER — HOSPITAL ENCOUNTER (OUTPATIENT)
Facility: CLINIC | Age: 67
Discharge: HOME | End: 2022-07-29
Attending: SURGERY | Admitting: SURGERY
Payer: COMMERCIAL

## 2022-07-29 ENCOUNTER — MYC MEDICAL ADVICE (OUTPATIENT)
Dept: FAMILY MEDICINE | Facility: CLINIC | Age: 67
End: 2022-07-29

## 2022-07-29 ENCOUNTER — ANESTHESIA (OUTPATIENT)
Dept: GASTROENTEROLOGY | Facility: CLINIC | Age: 67
End: 2022-07-29
Payer: COMMERCIAL

## 2022-07-29 VITALS
SYSTOLIC BLOOD PRESSURE: 130 MMHG | OXYGEN SATURATION: 97 % | TEMPERATURE: 98.4 F | HEIGHT: 73 IN | DIASTOLIC BLOOD PRESSURE: 90 MMHG | WEIGHT: 205 LBS | BODY MASS INDEX: 27.17 KG/M2 | HEART RATE: 70 BPM | RESPIRATION RATE: 20 BRPM

## 2022-07-29 LAB — COLONOSCOPY: NORMAL

## 2022-07-29 PROCEDURE — 370N000017 HC ANESTHESIA TECHNICAL FEE, PER MIN: Performed by: SURGERY

## 2022-07-29 PROCEDURE — 88305 TISSUE EXAM BY PATHOLOGIST: CPT | Mod: TC | Performed by: SURGERY

## 2022-07-29 PROCEDURE — 45380 COLONOSCOPY AND BIOPSY: CPT | Mod: PT | Performed by: SURGERY

## 2022-07-29 PROCEDURE — 250N000011 HC RX IP 250 OP 636: Performed by: NURSE ANESTHETIST, CERTIFIED REGISTERED

## 2022-07-29 PROCEDURE — 258N000003 HC RX IP 258 OP 636: Performed by: SURGERY

## 2022-07-29 PROCEDURE — 250N000009 HC RX 250: Performed by: NURSE ANESTHETIST, CERTIFIED REGISTERED

## 2022-07-29 RX ORDER — GLYCOPYRROLATE 0.2 MG/ML
INJECTION, SOLUTION INTRAMUSCULAR; INTRAVENOUS PRN
Status: DISCONTINUED | OUTPATIENT
Start: 2022-07-29 | End: 2022-07-29

## 2022-07-29 RX ORDER — LIDOCAINE 40 MG/G
CREAM TOPICAL
Status: DISCONTINUED | OUTPATIENT
Start: 2022-07-29 | End: 2022-07-29 | Stop reason: HOSPADM

## 2022-07-29 RX ORDER — LIDOCAINE HYDROCHLORIDE 10 MG/ML
INJECTION, SOLUTION EPIDURAL; INFILTRATION; INTRACAUDAL; PERINEURAL PRN
Status: DISCONTINUED | OUTPATIENT
Start: 2022-07-29 | End: 2022-07-29

## 2022-07-29 RX ORDER — SODIUM CHLORIDE, SODIUM LACTATE, POTASSIUM CHLORIDE, CALCIUM CHLORIDE 600; 310; 30; 20 MG/100ML; MG/100ML; MG/100ML; MG/100ML
INJECTION, SOLUTION INTRAVENOUS CONTINUOUS
Status: DISCONTINUED | OUTPATIENT
Start: 2022-07-29 | End: 2022-07-29 | Stop reason: HOSPADM

## 2022-07-29 RX ORDER — ONDANSETRON 2 MG/ML
4 INJECTION INTRAMUSCULAR; INTRAVENOUS
Status: DISCONTINUED | OUTPATIENT
Start: 2022-07-29 | End: 2022-07-29 | Stop reason: HOSPADM

## 2022-07-29 RX ORDER — NALOXONE HYDROCHLORIDE 0.4 MG/ML
0.2 INJECTION, SOLUTION INTRAMUSCULAR; INTRAVENOUS; SUBCUTANEOUS
Status: CANCELLED | OUTPATIENT
Start: 2022-07-29

## 2022-07-29 RX ORDER — NALOXONE HYDROCHLORIDE 0.4 MG/ML
0.4 INJECTION, SOLUTION INTRAMUSCULAR; INTRAVENOUS; SUBCUTANEOUS
Status: CANCELLED | OUTPATIENT
Start: 2022-07-29

## 2022-07-29 RX ORDER — PROPOFOL 10 MG/ML
INJECTION, EMULSION INTRAVENOUS PRN
Status: DISCONTINUED | OUTPATIENT
Start: 2022-07-29 | End: 2022-07-29

## 2022-07-29 RX ORDER — FLUMAZENIL 0.1 MG/ML
0.2 INJECTION, SOLUTION INTRAVENOUS
Status: CANCELLED | OUTPATIENT
Start: 2022-07-29 | End: 2022-07-29

## 2022-07-29 RX ADMIN — GLYCOPYRROLATE 0.2 MG: 0.2 INJECTION, SOLUTION INTRAMUSCULAR; INTRAVENOUS at 11:16

## 2022-07-29 RX ADMIN — PROPOFOL 50 MG: 10 INJECTION, EMULSION INTRAVENOUS at 11:16

## 2022-07-29 RX ADMIN — SODIUM CHLORIDE, POTASSIUM CHLORIDE, SODIUM LACTATE AND CALCIUM CHLORIDE: 600; 310; 30; 20 INJECTION, SOLUTION INTRAVENOUS at 11:18

## 2022-07-29 RX ADMIN — PROPOFOL 200 MCG/KG/MIN: 10 INJECTION, EMULSION INTRAVENOUS at 11:38

## 2022-07-29 RX ADMIN — LIDOCAINE HYDROCHLORIDE 40 MG: 10 INJECTION, SOLUTION EPIDURAL; INFILTRATION; INTRACAUDAL; PERINEURAL at 11:16

## 2022-07-29 NOTE — ANESTHESIA POSTPROCEDURE EVALUATION
Patient: Jonathon Santos    Procedure: Procedure(s):  COLONOSCOPY, WITH POLYPECTOMY AND BIOPSY       Anesthesia Type:  No value filed.    Note:  Disposition: Outpatient   Postop Pain Control: Uneventful            Sign Out: Well controlled pain   PONV: No   Neuro/Psych: Uneventful            Sign Out: Acceptable/Baseline neuro status   Airway/Respiratory: Uneventful            Sign Out: Acceptable/Baseline resp. status   CV/Hemodynamics: Uneventful            Sign Out: Acceptable CV status   Other NRE: NONE   DID A NON-ROUTINE EVENT OCCUR? No           Last vitals:  Vitals:    07/29/22 1030   BP: (!) 159/101   Pulse: 71   Temp: 36.9  C (98.4  F)   SpO2: 99%       Electronically Signed By: RADHA Castro CRNA  July 29, 2022  11:45 AM

## 2022-07-29 NOTE — LETTER
Jonathon Santos  [address] [address] [place] [id number]      August 10, 2022    Dear Jonathon,  This letter is written to inform you of the results of your recent colonoscopy.  Your examination showed polyp(s) in your ileocecal valve. All polyps were removed in their entirety and sent for review by a pathologist. As you will see on the pathology report below, the tissue(s) were tubular adenomatous polyps. Your examination also showed radiation proctitis.    Adenomatous polyps are entirely benign (non-cancerous); however, patients who have developed these polyps are at an increased risk for developing additional polyps in the future. If these are not eventually removed, there is a risk of developing colon cancer. We will advise more frequent examinations with you because of the risk associated with this type of polyp.    Ileocecal valve: Polypectomy:  - Tubular adenoma  - No evidence of high-grade dysplasia or invasive malignancy      Given these findings, your personal history of polyps, I recommend that you undergo a repeat colonoscopy in 5 year(s) for surveillance. We will enter you into a recall system so you receive a reminder closer to the time that you are due for repeat examination.     If you have persistent bleeding per rectum, a steroid cream can be prescribed to decrease bleeding.    Please remember that this recommendation is made with the understanding that you are not experiencing persistent changes in bowel function, bleeding per rectum, and/or significant abdominal pain. If you experience these symptoms, please contact your primary care provider for a further evaluation.     If you have any questions or concerns about the results of your colonoscopy or the appropriate follow-up, please contact my assistant at [phone]    Sincerely,      Ward Bearden,    [facility]  ___

## 2022-07-29 NOTE — ANESTHESIA CARE TRANSFER NOTE
Patient: Jonathon Santos    Procedure: Procedure(s):  COLONOSCOPY, WITH POLYPECTOMY AND BIOPSY       Diagnosis: Colon cancer screening [Z12.11]  Diagnosis Additional Information: No value filed.    Anesthesia Type:   No value filed.     Note:    Oropharynx: spontaneously breathing  Level of Consciousness: drowsy  Oxygen Supplementation: room air    Independent Airway: airway patency satisfactory and stable  Dentition: dentition unchanged  Vital Signs Stable: post-procedure vital signs reviewed and stable  Report to RN Given: handoff report given  Patient transferred to: Phase II    Handoff Report: Identifed the Patient, Identified the Reponsible Provider, Reviewed the pertinent medical history, Discussed the surgical course, Reviewed Intra-OP anesthesia mangement and issues during anesthesia, Set expectations for post-procedure period and Allowed opportunity for questions and acknowledgement of understanding      Vitals:  Vitals Value Taken Time   BP     Temp     Pulse     Resp     SpO2         Electronically Signed By: RADHA Castro CRNA  July 29, 2022  11:44 AM

## 2022-07-29 NOTE — H&P
67 year old year old male here for colonoscopy for personal history of polyps.  Last colonoscopy was 2016.  Patient denies any changes in stool caliber or blood in stool. Patient denies family history of colon cancer.  He did have prostate radiation last year.    Patient Active Problem List   Diagnosis     Refused influenza vaccine     Refused pneumococcal vaccination     Gout     Inflammatory arthritis     Renal insufficiency syndrome     Mixed hyperlipidemia     Uncontrolled hypertension     Prostate cancer (H)     Regional lymph node metastasis present (H)       Past Medical History:   Diagnosis Date     Benign essential hypertension      Gout      Prostate cancer (H)        Past Surgical History:   Procedure Laterality Date     COLONOSCOPY       FOOT SURGERY       HC REVISE MEDIAN N/CARPAL TUNNEL SURG      Description: Neuroplasty Decompression Median Nerve At Carpal Tunnel;  Recorded: 05/20/2013;     INSERT SEED MARKER / MATRIX N/A 4/30/2021    Procedure: Transrectal ultrasound guided placement of fiducials and SpaceOar;  Surgeon: Ferdinand Caban MD;  Location: [facility]     PROSTATE BIOPSY, NEEDLE, SATURATION SAMPLING       SPINE SURGERY      unspecified low back surgery       Family History   Problem Relation Age of Onset     Hypertension Mother      Gout Mother      Hypertension Father      Diabetes Father      Hypertension Maternal Grandmother      Hypertension Maternal Grandfather      Hypertension Brother      Hypertension Sister      Diabetes Cousin      Deep Vein Thrombosis No family hx of      Anesthesia Reaction No family hx of        No current outpatient medications on file.       No Known Allergies    Pt reports that he has never smoked. He quit smokeless tobacco use about 11 years ago. He reports current alcohol use of about 7.0 - 14.0 standard drinks of alcohol per week. He reports that he does not use drugs.    Exam:  BP (!) 159/101 (BP Location: Right arm)   Pulse 71   Temp 98.4  F  "(36.9  C) (Oral)   Ht 1.854 m (6' 1\")   Wt 93 kg (205 lb)   SpO2 99%   BMI 27.05 kg/m      Awake, Alert OX3  Lungs - CTA bilaterally  CV - RRR, no murmurs, distal pulses intact  Abd - soft, non-distended, non-tender, +BS  Extr - No cyanosis or edema    A/P 67 year old year old male in need of colonoscopy for personal history of polyps. Risks, benefits, alternatives, and complications were discussed including the possibility of perforation, bleeding, missed lesion and the patient agreed to proceed.      Ward Bearden, DO on 7/29/2022 at 11:10 AM      "

## 2022-08-02 ENCOUNTER — INFUSION THERAPY VISIT (OUTPATIENT)
Dept: INFUSION THERAPY | Facility: CLINIC | Age: 67
End: 2022-08-02
Attending: INTERNAL MEDICINE
Payer: COMMERCIAL

## 2022-08-02 ENCOUNTER — APPOINTMENT (OUTPATIENT)
Dept: LAB | Facility: CLINIC | Age: 67
End: 2022-08-02
Payer: COMMERCIAL

## 2022-08-02 ENCOUNTER — MYC MEDICAL ADVICE (OUTPATIENT)
Dept: ONCOLOGY | Facility: CLINIC | Age: 67
End: 2022-08-02

## 2022-08-02 VITALS — SYSTOLIC BLOOD PRESSURE: 141 MMHG | HEART RATE: 84 BPM | DIASTOLIC BLOOD PRESSURE: 88 MMHG | RESPIRATION RATE: 16 BRPM

## 2022-08-02 DIAGNOSIS — Z79.899 ENCOUNTER FOR LONG-TERM (CURRENT) USE OF MEDICATIONS: ICD-10-CM

## 2022-08-02 DIAGNOSIS — E53.8 B12 DEFICIENCY: Primary | ICD-10-CM

## 2022-08-02 DIAGNOSIS — E53.8 VITAMIN B12 DEFICIENCY (NON ANEMIC): ICD-10-CM

## 2022-08-02 DIAGNOSIS — C61 PROSTATE CANCER (H): Primary | ICD-10-CM

## 2022-08-02 LAB
ALBUMIN SERPL-MCNC: 3.9 G/DL (ref 3.4–5)
ALP SERPL-CCNC: 58 U/L (ref 40–150)
ALT SERPL W P-5'-P-CCNC: 39 U/L (ref 0–70)
ANION GAP SERPL CALCULATED.3IONS-SCNC: 9 MMOL/L (ref 3–14)
AST SERPL W P-5'-P-CCNC: 29 U/L (ref 0–45)
BASOPHILS # BLD MANUAL: 0 10E3/UL (ref 0–0.2)
BASOPHILS NFR BLD MANUAL: 0 %
BILIRUB SERPL-MCNC: 0.9 MG/DL (ref 0.2–1.3)
BUN SERPL-MCNC: 15 MG/DL (ref 7–30)
CALCIUM SERPL-MCNC: 9.7 MG/DL (ref 8.5–10.1)
CHLORIDE BLD-SCNC: 102 MMOL/L (ref 94–109)
CO2 SERPL-SCNC: 25 MMOL/L (ref 20–32)
CREAT SERPL-MCNC: 0.93 MG/DL (ref 0.66–1.25)
EOSINOPHIL # BLD MANUAL: 0 10E3/UL (ref 0–0.7)
EOSINOPHIL NFR BLD MANUAL: 1 %
ERYTHROCYTE [DISTWIDTH] IN BLOOD BY AUTOMATED COUNT: 11.9 % (ref 10–15)
GFR SERPL CREATININE-BSD FRML MDRD: 89 ML/MIN/1.73M2
GLUCOSE BLD-MCNC: 110 MG/DL (ref 70–99)
HCT VFR BLD AUTO: 36 % (ref 40–53)
HGB BLD-MCNC: 12.5 G/DL (ref 13.3–17.7)
LYMPHOCYTES # BLD MANUAL: 1.1 10E3/UL (ref 0.8–5.3)
LYMPHOCYTES NFR BLD MANUAL: 26 %
MCH RBC QN AUTO: 33 PG (ref 26.5–33)
MCHC RBC AUTO-ENTMCNC: 34.7 G/DL (ref 31.5–36.5)
MCV RBC AUTO: 95 FL (ref 78–100)
METHYLMALONATE SERPL-SCNC: 0.27 UMOL/L (ref 0–0.4)
MONOCYTES # BLD MANUAL: 0.5 10E3/UL (ref 0–1.3)
MONOCYTES NFR BLD MANUAL: 13 %
NEUTROPHILS # BLD MANUAL: 2.5 10E3/UL (ref 1.6–8.3)
NEUTROPHILS NFR BLD MANUAL: 60 %
PATH REPORT.COMMENTS IMP SPEC: NORMAL
PATH REPORT.COMMENTS IMP SPEC: NORMAL
PATH REPORT.FINAL DX SPEC: NORMAL
PATH REPORT.GROSS SPEC: NORMAL
PATH REPORT.MICROSCOPIC SPEC OTHER STN: NORMAL
PATH REPORT.RELEVANT HX SPEC: NORMAL
PHOTO IMAGE: NORMAL
PLAT MORPH BLD: ABNORMAL
PLATELET # BLD AUTO: 104 10E3/UL (ref 150–450)
POTASSIUM BLD-SCNC: 3.6 MMOL/L (ref 3.4–5.3)
PROT SERPL-MCNC: 7.3 G/DL (ref 6.8–8.8)
PSA SERPL-MCNC: <0.01 UG/L (ref 0–4)
RBC # BLD AUTO: 3.79 10E6/UL (ref 4.4–5.9)
RBC MORPH BLD: ABNORMAL
SODIUM SERPL-SCNC: 136 MMOL/L (ref 133–144)
VARIANT LYMPHS BLD QL SMEAR: PRESENT
WBC # BLD AUTO: 4.2 10E3/UL (ref 4–11)

## 2022-08-02 PROCEDURE — 88305 TISSUE EXAM BY PATHOLOGIST: CPT | Mod: 26 | Performed by: PATHOLOGY

## 2022-08-02 PROCEDURE — 84153 ASSAY OF PSA TOTAL: CPT | Performed by: INTERNAL MEDICINE

## 2022-08-02 PROCEDURE — 80053 COMPREHEN METABOLIC PANEL: CPT | Performed by: INTERNAL MEDICINE

## 2022-08-02 PROCEDURE — 85007 BL SMEAR W/DIFF WBC COUNT: CPT | Performed by: INTERNAL MEDICINE

## 2022-08-02 PROCEDURE — 36415 COLL VENOUS BLD VENIPUNCTURE: CPT

## 2022-08-02 PROCEDURE — 250N000011 HC RX IP 250 OP 636: Performed by: NURSE PRACTITIONER

## 2022-08-02 PROCEDURE — 96402 CHEMO HORMON ANTINEOPL SQ/IM: CPT

## 2022-08-02 PROCEDURE — 85027 COMPLETE CBC AUTOMATED: CPT | Performed by: INTERNAL MEDICINE

## 2022-08-02 PROCEDURE — 82607 VITAMIN B-12: CPT | Performed by: PSYCHIATRY & NEUROLOGY

## 2022-08-02 RX ADMIN — LEUPROLIDE ACETATE 22.5 MG: KIT at 14:07

## 2022-08-02 NOTE — RESULT ENCOUNTER NOTE
Please let Jonathon know that his B12 level is quite high. I think he can switch his B12 supplement to every-other-day dosing for now and then we can recheck the level in one month.    Thank you,  Dr. Thompson

## 2022-08-02 NOTE — TELEPHONE ENCOUNTER
Oral Chemotherapy Monitoring Program  Lab Follow Up    Reviewed lab results from today.    ORAL CHEMOTHERAPY 6/15/2022 7/12/2022 7/14/2022 7/20/2022 7/21/2022 7/27/2022 8/2/2022   Assessment Type Lab Monitoring Left Voicemail Other Refill Quarterly Follow up Lab Monitoring Lab Monitoring   Diagnosis Code Prostate Cancer Prostate Cancer Prostate Cancer Prostate Cancer Prostate Cancer Prostate Cancer Prostate Cancer   Providers Dr. Blayne Cisneros   [facility]   Drug Name Zytiga (abiraterone) Zytiga (abiraterone) Zytiga (abiraterone) Zytiga (abiraterone) Zytiga (abiraterone) Zytiga (abiraterone) Zytiga (abiraterone)   Dose 1,000 mg 1,000 mg 1,000 mg 1,000 mg 1,000 mg 1,000 mg 1,000 mg   Current Schedule Daily Daily Daily Daily Daily Daily Daily   Cycle Details Continuous Continuous Continuous Continuous Continuous Continuous Continuous   Start Date of Last Cycle - - - - - - -   Planned next cycle start date - - - - - - -   Doses missed in last 2 weeks - - - - 0 - -   Adherence Assessment - - - - Adherent - -   Adverse Effects - - - - No AE identified during assessment - -   Nausea - - - - - - -   Pharmacist Intervention(nausea) - - - - - - -   Diarrhea - - - - - - -   Pharmacist Intervention(diarrhea) - - - - - - -   Intervention(s) - - - - - - -   Decrease Appetite/Weight Loss - - - - - - -   Pharmacist Intervention(decreased appetite/weight loss) - - - - - - -   Intervention(s) - - - - - - -   Pharmacist Intervention(increased ast/alt/t bili) - - - - - - -   Intervention(s) - - - - - - -   Hypertension - - - - - - -   Pharmacist intervention(hypertension) - - - - - - -   Intervention(s) - - - - - - -   Other (See Note for Details) - - - - - - -   Pharmacist intervention(other) - - - - - - -   Intervention(s) - - - - - - -   Home BPs - - - - - - -   Any new drug interactions? - - - - No - -   Pharmacist  Intervention? - - - - - - -   Intervention(s) - - - - - - -   Is the dose as ordered appropriate for the patient? - - - - Yes - -   Since the last time we talked, have you been hospitalized or used the emergency room? - - - - No - -       Labs:  _  Result Component Current Result Ref Range   Sodium 136 (8/2/2022) 133 - 144 mmol/L     _  Result Component Current Result Ref Range   Potassium 3.6 (8/2/2022) 3.4 - 5.3 mmol/L     _  Result Component Current Result Ref Range   Calcium 9.7 (8/2/2022) 8.5 - 10.1 mg/dL     No results found for Mag within last 30 days.     No results found for Phos within last 30 days.     _  Result Component Current Result Ref Range   Albumin 3.9 (8/2/2022) 3.4 - 5.0 g/dL     _  Result Component Current Result Ref Range   Urea Nitrogen 15 (8/2/2022) 7 - 30 mg/dL     _  Result Component Current Result Ref Range   Creatinine 0.93 (8/2/2022) 0.66 - 1.25 mg/dL     _  Result Component Current Result Ref Range   AST 29 (8/2/2022) 0 - 45 U/L     _  Result Component Current Result Ref Range   ALT 39 (8/2/2022) 0 - 70 U/L     _  Result Component Current Result Ref Range   Bilirubin Total 0.9 (8/2/2022) 0.2 - 1.3 mg/dL     _  Result Component Current Result Ref Range   WBC Count 4.2 (8/2/2022) 4.0 - 11.0 10e3/uL     _  Result Component Current Result Ref Range   Hemoglobin 12.5 (L) (8/2/2022) 13.3 - 17.7 g/dL     _  Result Component Current Result Ref Range   Platelet Count 104 (L) (8/2/2022) 150 - 450 10e3/uL     _  Result Component Current Result Ref Range   Absolute Neutrophils 2.5 (8/2/2022) 1.6 - 8.3 10e3/uL     No results found for ANC within last 30 days.        Assessment & Plan:  Results show no concerning abnormalities. Potassium has improved back to normal range at 3.6. Continue Zytiga therapy as planned.     Follow-Up:  8/8: appt with Dr. Blayne Up, PharmD, BCACP  Hematology/Oncology Clinical Pharmacist  [facility]  [phone]

## 2022-08-02 NOTE — PROGRESS NOTES
Infusion Nursing Note:  Henok Tom presents today for Lupron.    Patient seen by provider today: No   present during visit today: Not Applicable.    Note: Pt denies any new health concerns.    Intravenous Access:  N/A.    Treatment Conditions:  Not Applicable.    Post Infusion Assessment:  Patient tolerated injection without incident.     Discharge Plan:   Discharge instructions reviewed with: Patient.  Patient and/or family verbalized understanding of discharge instructions and all questions answered.  Patient discharged in stable condition accompanied by: self.  Departure Mode: Ambulatory.      Cassidy Zhang RN

## 2022-08-03 ENCOUNTER — TELEPHONE (OUTPATIENT)
Dept: NEUROLOGY | Facility: CLINIC | Age: 67
End: 2022-08-03

## 2022-08-03 ENCOUNTER — PATIENT OUTREACH (OUTPATIENT)
Dept: ONCOLOGY | Facility: CLINIC | Age: 67
End: 2022-08-03

## 2022-08-03 DIAGNOSIS — E53.8 VITAMIN B12 DEFICIENCY (NON ANEMIC): Primary | ICD-10-CM

## 2022-08-03 LAB — VIT B12 SERPL-MCNC: 408 PG/ML (ref 232–1245)

## 2022-08-03 NOTE — TELEPHONE ENCOUNTER
Patient was given results as per Dr. Thompson . Patient verbalized understanding.     Patient would like Dr. Thompson to put in a lab standing order for Vitamin B12. Patient stated that since he is taking vitmain B12 orally he would like to check his level monthly to make sure he is getting enough vitamin B12.     Please advise.     Ilya Butler CMA@ on 8/3/2022 at 2:37 PM

## 2022-08-03 NOTE — RESULT ENCOUNTER NOTE
Please let Jonathon know that his B12 level looks good. Continue current supplementation schedule.    Thank you,  Dr. Thompson

## 2022-08-03 NOTE — TELEPHONE ENCOUNTER
----- Message from Pinky Thompson MD sent at 8/3/2022  8:45 AM CDT -----  Please let Jonathon know that his B12 level looks good. Continue current supplementation schedule.    Thank you,  Dr. Thompson

## 2022-08-03 NOTE — PROGRESS NOTES
TC to pt regarding moving appt with Dr. Cisneros from 08/08/2022 to 08/10/2022. Pt agreeable to plan.

## 2022-08-04 NOTE — TELEPHONE ENCOUNTER
Patient did not  so left a detailed message that Dr. Thompson placed a lab standing order for patient's vitamin B12 level to be checked monthly.     Ilya Butler CMA@ on 8/4/2022 at 9:29 AM

## 2022-08-08 ENCOUNTER — VIRTUAL VISIT (OUTPATIENT)
Dept: ONCOLOGY | Facility: CLINIC | Age: 67
End: 2022-08-08
Attending: NURSE PRACTITIONER
Payer: COMMERCIAL

## 2022-08-08 DIAGNOSIS — C61 PROSTATE CANCER (H): Primary | ICD-10-CM

## 2022-08-08 PROCEDURE — 99214 OFFICE O/P EST MOD 30 MIN: CPT | Mod: 95 | Performed by: INTERNAL MEDICINE

## 2022-08-08 PROCEDURE — G0463 HOSPITAL OUTPT CLINIC VISIT: HCPCS | Mod: PN,RTG | Performed by: INTERNAL MEDICINE

## 2022-08-08 NOTE — LETTER
8/8/2022         RE: Jonathon Santos  [address] [address] [place] [id number]        Dear Colleague,    Thank you for referring your patient, Jonathon Santos, to the [facility]. Please see a copy of my visit note below.    Jonathon is a 67 year old who is being evaluated via a billable video visit.      How would you like to obtain your AVS? MyChart  If the video visit is dropped, the invitation should be resent by: Send to e-mail at: [email or web address]  Will anyone else be joining your video visit? No      Maame LAYNE    Video-Visit Details    Video Start Time: 12:02 PM    Type of service:  Video Visit    Video End Time:12:19 PM    Originating Location (pt. Location): Home    Distant Location (provider location):  [facility]     Platform used for Video Visit: Tara Holt is a 67 year old who is being evaluated via a billable video visit.      Pt would like to discuss blood tests and potassium levels.    How would you like to obtain your AVS? MyChart  If the video visit is dropped, the invitation should be resent by: Text to cell phone: [phone]  Will anyone else be joining your video visit? No       Maame LAYNE    Video Start Time: 11:10 am  Video-Visit Details    Type of service:  Video Visit    Video End Time:11:34 AM    Originating Location (pt. Location): Home    Distant Location (provider location):  [facility]     Platform used for Video Visit: [facility] Medical Oncology Follow Up Note       Date of visit: 8/8/22      HPI:  67 yo male with elevated PSA who had a prostate biopsy on 12/21/20.   9/17/19 PSA =  6.57    Pathology from 12/21/20 shows Gl 4+5=9 disease.  The patient's MRI from 11/17/20 shows concern for enlarged lymph nodes raising concern for mets.  Has placement of fiducials and SpaceOar on 4/30 with Dr. Caban.      Started prednisone abiraterone 1/25/21 with plan  for 2 years.    Completed radiation 5/19/21 to 7/15/21 with Dr. Francois  9/15/21 PSA<0.01  12/16/21 PSA<0.01  1/14/22 PSA<0.01.    8/2/22  PSA<0.01    INTERVAL HX:  Jonathon is overall doing fine. He continues to have days with sinus pressure/upset stomach/diarrhea but he manages. Other days he does not have these symptoms. Continues to work on eating more potassium rich foods. No new pain. Has dry skin. Eating and drinking okay. Has hot flashes and fatigue. Eager to be done with the treatment.     ROS  10 point ROS otherwise unremarkable.    PMH:  HTN  Gout    MEDS  Lisinopril - hydrochlorothiazide  Metoprolol  Abiraterone  Prednisone    PHYSICAL EXAM-video   General: Patient appears well in no acute distress.   Skin: No visualized rash or lesions on visualized skin  Eyes: EOMI, no erythema, sclera icterus or discharge noted  Resp: Appears to be breathing comfortably without accessory muscle usage, speaking in full sentences, no cough  MSK: Appears to have normal range of motion based on visualized movements  Neurologic: No apparent tremors, facial movements symmetric  Psych: affect good, alert and oriented      LABS AND IMAGES    Most Recent 3 CBC's:  Recent Labs   Lab Test 08/02/22  1400 07/27/22  1407 05/02/22  1422 02/10/22  1056 01/14/22  1113 12/16/21  1043 10/13/21  1056   WBC 4.2 4.7 4.8   < > 4.2 4.4 3.0*   HGB 12.5* 13.0* 13.4   < > 12.9* 13.4 12.3*   MCV 95 95 95   < > 101* 98 101*   * 92* 108*   < > 87* 100* 108*   ANEUTAUTO  --   --   --   --  2.7 3.1 2.0    < > = values in this interval not displayed.     Most Recent 3 BMP's:  Recent Labs   Lab Test 08/02/22  1400 07/27/22  1407 07/12/22  1138    133 137   POTASSIUM 3.6 3.2* 2.9*   CHLORIDE 102 99 104   CO2 25 28 28   BUN 15 14 24   CR 0.93 1.04 0.96   ANIONGAP 9 6 5   MISTY 9.7 9.7 9.8   * 114* 121*   PROTTOTAL 7.3 7.6 7.5   ALBUMIN 3.9 3.9 3.9    Most Recent 3 LFT's:  Recent Labs   Lab Test 08/02/22  1400 07/27/22  1407 07/12/22  1138    AST 29 21 23   ALT 39 32 30   ALKPHOS 58 62 62   BILITOTAL 0.9 1.6* 2.0*    Most Recent 2 TSH and T4:  Recent Labs   Lab Test 06/15/22  1119   TSH 2.39     I reviewed the above labs today.    No new imaging studies.      IMPRESSION AND PLAN  Locally advanced prostate cancer with high grade Keith 9. No significant comorbidities.     Plan:   1. Two years of ADT plus Abiraterone--> 01/2023. Tolerating with mild hot flashes and some fatigue, both tolerable and not impacting QOL in a way that precludes continuation of therapy. No edema. Has some GI upset but not needing intervention    2. RT to the nodes and prostate completed with Dr. Francois    3. Lupron last week in [place] - one more shot in November    4. Mgmt of his gout per his rheumatologist Dr. Wang    5. Cytopenias  Improving     6. Hypokalemia: controlled.    followup in December   Plan to discontinue treatment at that time.   Will set up followup  Plan to monitor T and PSA at that time.              Again, thank you for allowing me to participate in the care of your patient.      Sincerely,    Andrea Cisneros MD

## 2022-08-08 NOTE — PROGRESS NOTES
Jonathon is a 67 year old who is being evaluated via a billable video visit.      How would you like to obtain your AVS? MyChart  If the video visit is dropped, the invitation should be resent by: Send to e-mail at: [email or web address]  Will anyone else be joining your video visit? No      Maame LAYNE    Video-Visit Details    Video Start Time: 12:02 PM    Type of service:  Video Visit    Video End Time:12:19 PM    Originating Location (pt. Location): Home    Distant Location (provider location):  [facility]     Platform used for Video Visit: Tara Holt is a 67 year old who is being evaluated via a billable video visit.      Pt would like to discuss blood tests and potassium levels.    How would you like to obtain your AVS? MyChart  If the video visit is dropped, the invitation should be resent by: Text to cell phone: [phone]  Will anyone else be joining your video visit? No       Maame LAYNE    Video Start Time: 11:10 am  Video-Visit Details    Type of service:  Video Visit    Video End Time:11:34 AM    Originating Location (pt. Location): Home    Distant Location (provider location):  [facility]     Platform used for Video Visit: [facility] Medical Oncology Follow Up Note       Date of visit: 8/8/22      HPI:  65 yo male with elevated PSA who had a prostate biopsy on 12/21/20.   9/17/19 PSA =  6.57    Pathology from 12/21/20 shows Gl 4+5=9 disease.  The patient's MRI from 11/17/20 shows concern for enlarged lymph nodes raising concern for mets.  Has placement of fiducials and SpaceOar on 4/30 with Dr. Caban.      Started prednisone abiraterone 1/25/21 with plan for 2 years.    Completed radiation 5/19/21 to 7/15/21 with Dr. Francois  9/15/21 PSA<0.01  12/16/21 PSA<0.01  1/14/22 PSA<0.01.    8/2/22  PSA<0.01    INTERVAL HX:  Jonathon is overall doing fine. He continues to have days with sinus pressure/upset  stomach/diarrhea but he manages. Other days he does not have these symptoms. Continues to work on eating more potassium rich foods. No new pain. Has dry skin. Eating and drinking okay. Has hot flashes and fatigue. Eager to be done with the treatment.     ROS  10 point ROS otherwise unremarkable.    PMH:  HTN  Gout    MEDS  Lisinopril - hydrochlorothiazide  Metoprolol  Abiraterone  Prednisone    PHYSICAL EXAM-video   General: Patient appears well in no acute distress.   Skin: No visualized rash or lesions on visualized skin  Eyes: EOMI, no erythema, sclera icterus or discharge noted  Resp: Appears to be breathing comfortably without accessory muscle usage, speaking in full sentences, no cough  MSK: Appears to have normal range of motion based on visualized movements  Neurologic: No apparent tremors, facial movements symmetric  Psych: affect good, alert and oriented      LABS AND IMAGES    Most Recent 3 CBC's:  Recent Labs   Lab Test 08/02/22  1400 07/27/22  1407 05/02/22  1422 02/10/22  1056 01/14/22  1113 12/16/21  1043 10/13/21  1056   WBC 4.2 4.7 4.8   < > 4.2 4.4 3.0*   HGB 12.5* 13.0* 13.4   < > 12.9* 13.4 12.3*   MCV 95 95 95   < > 101* 98 101*   * 92* 108*   < > 87* 100* 108*   ANEUTAUTO  --   --   --   --  2.7 3.1 2.0    < > = values in this interval not displayed.     Most Recent 3 BMP's:  Recent Labs   Lab Test 08/02/22  1400 07/27/22  1407 07/12/22  1138    133 137   POTASSIUM 3.6 3.2* 2.9*   CHLORIDE 102 99 104   CO2 25 28 28   BUN 15 14 24   CR 0.93 1.04 0.96   ANIONGAP 9 6 5   MISTY 9.7 9.7 9.8   * 114* 121*   PROTTOTAL 7.3 7.6 7.5   ALBUMIN 3.9 3.9 3.9    Most Recent 3 LFT's:  Recent Labs   Lab Test 08/02/22  1400 07/27/22  1407 07/12/22  1138   AST 29 21 23   ALT 39 32 30   ALKPHOS 58 62 62   BILITOTAL 0.9 1.6* 2.0*    Most Recent 2 TSH and T4:  Recent Labs   Lab Test 06/15/22  1119   TSH 2.39     I reviewed the above labs today.    No new imaging studies.      IMPRESSION AND  PLAN  Locally advanced prostate cancer with high grade Keith 9. No significant comorbidities.     Plan:   1. Two years of ADT plus Abiraterone--> 01/2023. Tolerating with mild hot flashes and some fatigue, both tolerable and not impacting QOL in a way that precludes continuation of therapy. No edema. Has some GI upset but not needing intervention    2. RT to the nodes and prostate completed with Dr. Francois    3. Lupron last week in [place] - one more shot in November    4. Mgmt of his gout per his rheumatologist Dr. Wang    5. Cytopenias  Improving     6. Hypokalemia: controlled.    followup in December   Plan to discontinue treatment at that time.   Will set up followup  Plan to monitor T and PSA at that time.

## 2022-09-06 ENCOUNTER — LAB (OUTPATIENT)
Dept: LAB | Facility: CLINIC | Age: 67
End: 2022-09-06
Payer: COMMERCIAL

## 2022-09-06 DIAGNOSIS — E53.8 VITAMIN B12 DEFICIENCY (NON ANEMIC): ICD-10-CM

## 2022-09-06 DIAGNOSIS — Z79.899 ENCOUNTER FOR LONG-TERM (CURRENT) USE OF MEDICATIONS: ICD-10-CM

## 2022-09-06 DIAGNOSIS — C61 PROSTATE CANCER (H): ICD-10-CM

## 2022-09-06 DIAGNOSIS — E53.8 B12 DEFICIENCY: ICD-10-CM

## 2022-09-06 LAB
ALBUMIN SERPL BCG-MCNC: 4.6 G/DL (ref 3.5–5.2)
ALP SERPL-CCNC: 61 U/L (ref 40–129)
ALT SERPL W P-5'-P-CCNC: 32 U/L (ref 10–50)
ANION GAP SERPL CALCULATED.3IONS-SCNC: 13 MMOL/L (ref 7–15)
AST SERPL W P-5'-P-CCNC: 28 U/L (ref 10–50)
BASOPHILS # BLD MANUAL: 0 10E3/UL (ref 0–0.2)
BASOPHILS NFR BLD MANUAL: 0 %
BILIRUB SERPL-MCNC: 1.6 MG/DL
BUN SERPL-MCNC: 22.1 MG/DL (ref 8–23)
CALCIUM SERPL-MCNC: 9.7 MG/DL (ref 8.8–10.2)
CHLORIDE SERPL-SCNC: 99 MMOL/L (ref 98–107)
CREAT SERPL-MCNC: 1.16 MG/DL (ref 0.67–1.17)
DEPRECATED HCO3 PLAS-SCNC: 27 MMOL/L (ref 22–29)
EOSINOPHIL # BLD MANUAL: 0.1 10E3/UL (ref 0–0.7)
EOSINOPHIL NFR BLD MANUAL: 3 %
ERYTHROCYTE [DISTWIDTH] IN BLOOD BY AUTOMATED COUNT: 12.3 % (ref 10–15)
GFR SERPL CREATININE-BSD FRML MDRD: 69 ML/MIN/1.73M2
GLUCOSE SERPL-MCNC: 98 MG/DL (ref 70–99)
HCT VFR BLD AUTO: 36.6 % (ref 40–53)
HGB BLD-MCNC: 12.7 G/DL (ref 13.3–17.7)
LYMPHOCYTES # BLD MANUAL: 1.1 10E3/UL (ref 0.8–5.3)
LYMPHOCYTES NFR BLD MANUAL: 24 %
MCH RBC QN AUTO: 33.2 PG (ref 26.5–33)
MCHC RBC AUTO-ENTMCNC: 34.7 G/DL (ref 31.5–36.5)
MCV RBC AUTO: 96 FL (ref 78–100)
MONOCYTES # BLD MANUAL: 0.4 10E3/UL (ref 0–1.3)
MONOCYTES NFR BLD MANUAL: 9 %
NEUTROPHILS # BLD MANUAL: 2.9 10E3/UL (ref 1.6–8.3)
NEUTROPHILS NFR BLD MANUAL: 64 %
NRBC # BLD AUTO: 0 10E3/UL
NRBC BLD AUTO-RTO: 0 /100
PLAT MORPH BLD: ABNORMAL
PLATELET # BLD AUTO: 111 10E3/UL (ref 150–450)
POTASSIUM SERPL-SCNC: 3.9 MMOL/L (ref 3.4–5.3)
PROT SERPL-MCNC: 7.4 G/DL (ref 6.4–8.3)
PSA SERPL-MCNC: <0.01 NG/ML (ref 0–4.5)
RBC # BLD AUTO: 3.83 10E6/UL (ref 4.4–5.9)
RBC MORPH BLD: ABNORMAL
SODIUM SERPL-SCNC: 139 MMOL/L (ref 136–145)
VARIANT LYMPHS BLD QL SMEAR: PRESENT
VIT B12 SERPL-MCNC: 338 PG/ML (ref 232–1245)
WBC # BLD AUTO: 4.6 10E3/UL (ref 4–11)

## 2022-09-06 PROCEDURE — 82607 VITAMIN B-12: CPT

## 2022-09-06 PROCEDURE — 85027 COMPLETE CBC AUTOMATED: CPT

## 2022-09-06 PROCEDURE — 84153 ASSAY OF PSA TOTAL: CPT

## 2022-09-06 PROCEDURE — 80053 COMPREHEN METABOLIC PANEL: CPT

## 2022-09-06 PROCEDURE — 85007 BL SMEAR W/DIFF WBC COUNT: CPT

## 2022-09-06 PROCEDURE — 36415 COLL VENOUS BLD VENIPUNCTURE: CPT

## 2022-09-07 ENCOUNTER — MYC MEDICAL ADVICE (OUTPATIENT)
Dept: ONCOLOGY | Facility: CLINIC | Age: 67
End: 2022-09-07

## 2022-09-16 NOTE — PROGRESS NOTES
Rheumatology Clinic Visit  [facility]  DEVON Linda     Jonathon Santos MRN# [id number]   YOB: 1955 Age: 67 year old   Date of Visit: 09/16/2022  Primary care provider: Jonathon Gonzales          Assessment and Plan:     1.  Idiopathic chronic gout  2.  Monitoring from febuxostat therapy    Patient presents today to establish care for his chronic idiopathic gout with tophi.  He was diagnosed approximately 3 to 4 years ago.  He states that initially he was diagnosed with rheumatoid arthritis.  He was then evaluated with Dr. Wang and it was found that he had gout and not rheumatoid arthritis.  He was started on allopurinol.  Around this time he was also going on treatment for his prostate cancer.  He states that he was on hormonal therapy and started to have a reaction.  He thought that it was from the allopurinol.  He therefore started on febuxostat.  He states that he continued to have those symptoms.  He is continued on this medication as he has it at home.  He would like to go back on allopurinol once he completes his current prescription.  Physical examination does not show any synovitis, dactylitis, tenosynovitis or enthesopathy.  He does have full fist formation and normal  strength.  The bilateral middle PIPs are enlarged.  No tophi around the elbows.  No tenderness to palpation of his joints.  Previous laboratory evaluations were reviewed.  His last uric acid on July 12 was 7.3.    He is not having any symptoms of his gout.  He has not had any gout flares.  His last uric acid was slightly elevated at 7.3.  He would prefer to not make any changes to his dosing.  He would however like to return to allopurinol once he completes his current febuxostat prescription.  We will plan to initiated at 100 mg and increase from there.  Patient is requesting to have his uric acid checked monthly.  Discussed that we typically do not need to check a uric acid that often especially when things  are stable, however he is requesting that it be checked monthly.  We will monitor for any medication toxicity.  He will let me know if he has any flares.    Plan:     1. Schedule follow-up with Jennifer Mars PA-C in 6 months, okay to be video visit.   2. Labs: uric acid, this does not need to be checked monthly. Per your request, order has been placed for monthly, however I only need it checked every 6 months, unless we make dose changes  3. Medication recommendations:   a. Continue Febuxostat 40mg daily  b. Plan to switch to Allopurinol 100mg daily when you're ready for a refill    DEVON Linda  Rheumatology         History of Present Illness:   Jonathon Santos presents for evaluation of gout.     Patient saw Dr. Mcpherson on April 26, 2021.  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  He does have a history of prostate cancer.  Reports of significant side effects from allopurinol.  Had been on Uloric..  Plan was to continue on 40 mg daily.    He was initially diagnosed with RA. He then saw Dr. Wang and was told he does not have RA and was diagnosed with gout. This was 3-4 years ago. He is currently on Zytiga and was getting reactions. So he switched to Uloric from Allopurinol. This did not help the reactions. He would like to go back on Allopurinol. He has not had any flares. He is not interested in taking more medication. He does have peripheral neuropathy likely from Chemo. He will be done with it at the end of December.     His middle PIPs have been affected by the gout.          Review of Systems:     Constitutional: negative  Skin: negative  Eyes: negative  Ears/Nose/Throat: negative  Respiratory: No shortness of breath, dyspnea on exertion, cough, or hemoptysis  Cardiovascular: negative  Gastrointestinal: negative  Genitourinary: negative  Musculoskeletal: as above  Neurologic: negative  Psychiatric: negative  Hematologic/Lymphatic/Immunologic:  negative  Endocrine: negative         Active Problem List:     Patient Active Problem List    Diagnosis Date Noted     Regional lymph node metastasis present (H) 01/18/2021     Priority: Medium     Prostate cancer (H) 12/21/2020     Priority: Medium     Mixed hyperlipidemia 09/24/2019     Priority: Medium     Uncontrolled hypertension 09/24/2019     Priority: Medium     Refused influenza vaccine 09/17/2019     Priority: Medium     Refused pneumococcal vaccination 09/17/2019     Priority: Medium     Inflammatory arthritis 09/17/2019     Priority: Medium     June '12, +CRP and sed rate.  serologies neg.  Knee tap pos. for rheumatoid   factor.       Renal insufficiency syndrome 08/05/2016     Priority: Medium     Creatinine 1.4 in 2012       Gout 07/30/2012     Priority: Medium     Uric acid normal after HCTZ stopped.  Knee tap pos for uric acid crystals June '12.    Uric acid normal after HCTZ stopped.  Knee tap pos for uric acid crystals June '12.              Past Medical History:     Past Medical History:   Diagnosis Date     Benign essential hypertension      Gout      Prostate cancer (H)      Past Surgical History:   Procedure Laterality Date     COLONOSCOPY       COLONOSCOPY N/A 7/29/2022    Procedure: COLONOSCOPY, WITH POLYPECTOMY AND BIOPSY;  Surgeon: Ward Bearden DO;  Location: [facility]     FOOT SURGERY       HC REVISE MEDIAN N/CARPAL TUNNEL SURG      Description: Neuroplasty Decompression Median Nerve At Carpal Tunnel;  Recorded: 05/20/2013;     INSERT SEED MARKER / MATRIX N/A 4/30/2021    Procedure: Transrectal ultrasound guided placement of fiducials and Space[employer];  Surgeon: Ferdinand Caban MD;  Location: [facility]     PROSTATE BIOPSY, NEEDLE, SATURATION SAMPLING       SPINE SURGERY      unspecified low back surgery            Social History:     Social History     Socioeconomic History     Marital status: Single     Spouse name: Not on file     Number of children: 2     Years of  education: Not on file     Highest education level: Not on file   Occupational History     Occupation: retired    Tobacco Use     Smoking status: Never Smoker     Smokeless tobacco: Former User     Quit date: 2011   Substance and Sexual Activity     Alcohol use: Yes     Alcohol/week: 7.0 - 14.0 standard drinks     Types: 7 - 14 Cans of beer per week     Comment: 1-2 beers daily     Drug use: Never     Sexual activity: Not Currently   Other Topics Concern     Not on file   Social History Narrative     Not on file     Social Determinants of Health     Financial Resource Strain: Not on file   Food Insecurity: Not on file   Transportation Needs: Not on file   Physical Activity: Not on file   Stress: Not on file   Social Connections: Not on file   Intimate Partner Violence: Not on file   Housing Stability: Not on file          Family History:     Family History   Problem Relation Age of Onset     Hypertension Mother      Gout Mother      Hypertension Father      Diabetes Father      Hypertension Maternal Grandmother      Hypertension Maternal Grandfather      Hypertension Brother      Hypertension Sister      Diabetes Cousin      Deep Vein Thrombosis No family hx of      Anesthesia Reaction No family hx of             Allergies:   No Known Allergies         Medications:     Current Outpatient Medications   Medication Sig Dispense Refill     cyanocobalamin (VITAMIN B-12) 1000 MCG SUBL sublingual tablet Place 1,000 mcg under the tongue daily       lisinopril-hydrochlorothiazide (ZESTORETIC) 20-12.5 MG tablet Take 2 tablets by mouth daily 180 tablet 3     metoprolol succinate ER (TOPROL XL) 25 MG 24 hr tablet Take 1 tablet (25 mg) by mouth daily (with lunch) 90 tablet 4     potassium chloride ER (K-TAB) 20 MEQ CR tablet Take 1 tablet (20 mEq) by mouth daily 90 tablet 3     predniSONE (DELTASONE) 10 MG tablet Take 10 mg by mouth              Physical Exam:   There were no vitals taken for this visit.  Wt  Readings from Last 6 Encounters:   22 93 kg (205 lb)   22 93.3 kg (205 lb 9.6 oz)   22 94.5 kg (208 lb 6.4 oz)   02/15/22 94.3 kg (208 lb)   10/29/21 93.5 kg (206 lb 3.2 oz)   21 88.6 kg (195 lb 6.4 oz)     Constitutional: well-developed, appearing stated age; cooperative  Eyes: nl  conjunctiva, sclera  ENT: nl external ears, nose, hearing, lips, teeth, gums, throat. No mucositis.   No mucous membrane lesions, normal saliva pool  Resp: lungs clear to auscultation  CV: RRR, no murmurs, rubs or gallops, no edema  MS: He does have enlarged PIPs of the left third bilaterally.  No active synovitis or altered joint anatomy. Full joint ROM. Normal  strength. No dactylitis,  tenosynovitis, enthesopathy.   Skin: no nail pitting, alopecia, rash, nodules or lesions.   Neuro: nl cranial nerves.   Psych: nl judgement, orientation, memory, affect.           Data:   Imagin2017 xray hands    FINDINGS: No evidence of fracture or contusion in either hand or wrist.   Moderate degenerative osteoarthritic changes at the left first CMC joint.   Scattered mild degenerative osteoarthritic changes at the PIP and DIP   joints. No evidence of erosive changes.    2017 xray foot  FINDINGS: Periarticular erosive changes involving the medial aspect of the   left first metatarsal head likely secondary to gout. There are also some   periarticular erosive changes involving the left and right fifth metatarsal    heads with associated soft tissue swelling.      Laboratory:  2012  Moderate intracellular crystals present consistent with uric acid of the left knee synovial fluid    2022  Uric acid 7.3    2022  Creatinine 1.16, GFR 69  Albumin 4.6  ALT 32, AST 28  Vitamin B12 338  White blood cell count 4.6, hemoglobin 12.7, platelet count 111

## 2022-09-19 DIAGNOSIS — C77.9 REGIONAL LYMPH NODE METASTASIS PRESENT (H): Primary | ICD-10-CM

## 2022-09-19 DIAGNOSIS — C61 PROSTATE CANCER (H): ICD-10-CM

## 2022-09-19 RX ORDER — ABIRATERONE ACETATE 250 MG/1
1000 TABLET ORAL DAILY
Qty: 120 TABLET | Refills: 1 | Status: SHIPPED | OUTPATIENT
Start: 2022-09-19 | End: 2022-10-19

## 2022-09-20 ENCOUNTER — OFFICE VISIT (OUTPATIENT)
Dept: RHEUMATOLOGY | Facility: CLINIC | Age: 67
End: 2022-09-20
Payer: COMMERCIAL

## 2022-09-20 VITALS
WEIGHT: 205 LBS | SYSTOLIC BLOOD PRESSURE: 138 MMHG | BODY MASS INDEX: 27.17 KG/M2 | DIASTOLIC BLOOD PRESSURE: 82 MMHG | HEIGHT: 73 IN

## 2022-09-20 DIAGNOSIS — M1A.09X1 IDIOPATHIC CHRONIC GOUT, MULTIPLE SITES, WITH TOPHUS (TOPHI): Primary | ICD-10-CM

## 2022-09-20 DIAGNOSIS — Z79.899 ENCOUNTER FOR MONITORING FEBUXOSTAT THERAPY: ICD-10-CM

## 2022-09-20 DIAGNOSIS — Z51.81 ENCOUNTER FOR MONITORING FEBUXOSTAT THERAPY: ICD-10-CM

## 2022-09-20 PROCEDURE — 99214 OFFICE O/P EST MOD 30 MIN: CPT | Performed by: PHYSICIAN ASSISTANT

## 2022-09-20 RX ORDER — FEBUXOSTAT 40 MG/1
40 TABLET, FILM COATED ORAL DAILY
COMMUNITY
End: 2023-03-24

## 2022-09-20 NOTE — PATIENT INSTRUCTIONS
After Visit Instructions:     Thank you for coming to [facility] for your care. It is my goal to partner with you to help you reach your optimal state of health.       Plan:     Schedule follow-up with Jennifer Mars PA-C in 6 months, okay to be video visit.   Labs: uric acid, this does not need to be checked monthly. Per your request, order has been placed for monthly, however I only need it checked every 6 months, unless we make dose changes  Medication recommendations:   Continue Febuxostat 40mg daily  Plan to switch to Allopurinol 100mg daily when you're ready for a refill      Jennifer Mars PA-C  [facility]  [place]    Contact information: [facility]  Clinic Number:  [phone]  Please call or send a [employer] message with any questions about your care

## 2022-10-04 ENCOUNTER — LAB (OUTPATIENT)
Dept: LAB | Facility: CLINIC | Age: 67
End: 2022-10-04
Payer: COMMERCIAL

## 2022-10-04 DIAGNOSIS — M1A.09X1 IDIOPATHIC CHRONIC GOUT, MULTIPLE SITES, WITH TOPHUS (TOPHI): ICD-10-CM

## 2022-10-04 DIAGNOSIS — E53.8 VITAMIN B12 DEFICIENCY (NON ANEMIC): ICD-10-CM

## 2022-10-04 DIAGNOSIS — E87.6 HYPOKALEMIA: ICD-10-CM

## 2022-10-04 DIAGNOSIS — Z79.899 ENCOUNTER FOR LONG-TERM (CURRENT) USE OF MEDICATIONS: ICD-10-CM

## 2022-10-04 DIAGNOSIS — C61 PROSTATE CANCER (H): ICD-10-CM

## 2022-10-04 LAB
ALBUMIN SERPL BCG-MCNC: 4.4 G/DL (ref 3.5–5.2)
ALP SERPL-CCNC: 62 U/L (ref 40–129)
ALT SERPL W P-5'-P-CCNC: 24 U/L (ref 10–50)
ANION GAP SERPL CALCULATED.3IONS-SCNC: 16 MMOL/L (ref 7–15)
AST SERPL W P-5'-P-CCNC: 23 U/L (ref 10–50)
BASOPHILS # BLD MANUAL: 0 10E3/UL (ref 0–0.2)
BASOPHILS NFR BLD MANUAL: 1 %
BILIRUB SERPL-MCNC: 1.3 MG/DL
BUN SERPL-MCNC: 19.2 MG/DL (ref 8–23)
CALCIUM SERPL-MCNC: 9.4 MG/DL (ref 8.8–10.2)
CHLORIDE SERPL-SCNC: 95 MMOL/L (ref 98–107)
CREAT SERPL-MCNC: 1.03 MG/DL (ref 0.67–1.17)
DEPRECATED HCO3 PLAS-SCNC: 21 MMOL/L (ref 22–29)
EOSINOPHIL # BLD MANUAL: 0 10E3/UL (ref 0–0.7)
EOSINOPHIL NFR BLD MANUAL: 0 %
ERYTHROCYTE [DISTWIDTH] IN BLOOD BY AUTOMATED COUNT: 12.2 % (ref 10–15)
GFR SERPL CREATININE-BSD FRML MDRD: 79 ML/MIN/1.73M2
GLUCOSE SERPL-MCNC: 103 MG/DL (ref 70–99)
HCT VFR BLD AUTO: 33.9 % (ref 40–53)
HGB BLD-MCNC: 12.2 G/DL (ref 13.3–17.7)
LYMPHOCYTES # BLD MANUAL: 1.4 10E3/UL (ref 0.8–5.3)
LYMPHOCYTES NFR BLD MANUAL: 29 %
MCH RBC QN AUTO: 32.8 PG (ref 26.5–33)
MCHC RBC AUTO-ENTMCNC: 36 G/DL (ref 31.5–36.5)
MCV RBC AUTO: 91 FL (ref 78–100)
MONOCYTES # BLD MANUAL: 0.6 10E3/UL (ref 0–1.3)
MONOCYTES NFR BLD MANUAL: 12 %
NEUTROPHILS # BLD MANUAL: 2.8 10E3/UL (ref 1.6–8.3)
NEUTROPHILS NFR BLD MANUAL: 58 %
PLAT MORPH BLD: NORMAL
PLATELET # BLD AUTO: 94 10E3/UL (ref 150–450)
POTASSIUM SERPL-SCNC: 3.8 MMOL/L (ref 3.4–5.3)
PROT SERPL-MCNC: 7.1 G/DL (ref 6.4–8.3)
PSA SERPL-MCNC: <0.01 NG/ML (ref 0–4.5)
RBC # BLD AUTO: 3.72 10E6/UL (ref 4.4–5.9)
RBC MORPH BLD: NORMAL
SODIUM SERPL-SCNC: 132 MMOL/L (ref 136–145)
URATE SERPL-MCNC: 6.9 MG/DL (ref 3.4–7)
VIT B12 SERPL-MCNC: 389 PG/ML (ref 232–1245)
WBC # BLD AUTO: 4.8 10E3/UL (ref 4–11)

## 2022-10-04 PROCEDURE — 80053 COMPREHEN METABOLIC PANEL: CPT

## 2022-10-04 PROCEDURE — 84550 ASSAY OF BLOOD/URIC ACID: CPT

## 2022-10-04 PROCEDURE — 85027 COMPLETE CBC AUTOMATED: CPT

## 2022-10-04 PROCEDURE — 85007 BL SMEAR W/DIFF WBC COUNT: CPT

## 2022-10-04 PROCEDURE — 84153 ASSAY OF PSA TOTAL: CPT

## 2022-10-04 PROCEDURE — 82607 VITAMIN B-12: CPT

## 2022-10-04 PROCEDURE — 36415 COLL VENOUS BLD VENIPUNCTURE: CPT

## 2022-10-05 NOTE — ORAL ONC MGMT
Oral Chemotherapy Monitoring Program  Lab Follow Up  Reviewed lab results from 10/4/2022.    Labs:  _  Result Component Current Result Ref Range   Sodium 132 (L) (10/4/2022) 136 - 145 mmol/L   _  Result Component Current Result Ref Range   Potassium 3.8 (10/4/2022) 3.4 - 5.3 mmol/L   _  Result Component Current Result Ref Range   Calcium 9.4 (10/4/2022) 8.8 - 10.2 mg/dL   _  Result Component Current Result Ref Range   Albumin 4.4 (10/4/2022) 3.5 - 5.2 g/dL     Result Component Current Result Ref Range   Urea Nitrogen 19.2 (10/4/2022) 8.0 - 23.0 mg/dL   _  Result Component Current Result Ref Range   Creatinine 1.03 (10/4/2022) 0.67 - 1.17 mg/dL   _  Result Component Current Result Ref Range   AST 23 (10/4/2022) 10 - 50 U/L   _  Result Component Current Result Ref Range   ALT 24 (10/4/2022) 10 - 50 U/L   _  Result Component Current Result Ref Range   Bilirubin Total 1.3 (H) (10/4/2022) <=1.2 mg/dL   _  Result Component Current Result Ref Range   WBC Count 4.8 (10/4/2022) 4.0 - 11.0 10e3/uL   _  Result Component Current Result Ref Range   Hemoglobin 12.2 (L) (10/4/2022) 13.3 - 17.7 g/dL   _  Result Component Current Result Ref Range   Platelet Count 94 (L) (10/4/2022) 150 - 450 10e3/uL   _  Result Component Current Result Ref Range   Absolute Neutrophils 2.8 (10/4/2022) 1.6 - 8.3 10e3/uL     Assessment & Plan:  No concerning abnormalities. Continue plan of care.     Follow-Up:  11/8: review labs     Osmar Haynes, PharmD  Hematology/Oncology Clinical Pharmacist  [facility]  [phone]

## 2022-10-12 ENCOUNTER — MYC MEDICAL ADVICE (OUTPATIENT)
Dept: NEUROLOGY | Facility: CLINIC | Age: 67
End: 2022-10-12

## 2022-10-12 NOTE — RESULT ENCOUNTER NOTE
Please let Jonathon know that his vitamin B12 level remains in the low-normal range.  I think he is taking 2000mcg daily, per our previous correspondence.  We can increase further or have him resume monthly B12 injections for potentially better absorption.  Let me know what Jonathon would like to do.    Thank you,  Dr. Thompson

## 2022-10-13 ENCOUNTER — TELEPHONE (OUTPATIENT)
Dept: NEUROLOGY | Facility: CLINIC | Age: 67
End: 2022-10-13

## 2022-10-13 NOTE — TELEPHONE ENCOUNTER
Dr. Thompson, I relayed your message to the pt. Per pt will not do the monthly B12 injections.   Per pt said that he has been taking 1500 mcg daily and not 2000 mcg daily. He stated that he does not understand his lab result for Vitamin-B12 and does not get why he is in the low-normal range when his result is normal. Per pt wants a good explanation as to why he needs to increase his Vitamin B12 if his result is in normal range. Please advise. Thank you.      NORMA Linares on 10/13/2022 at 3:57 PM

## 2022-10-13 NOTE — TELEPHONE ENCOUNTER
----- Message from Pinky Thompson MD sent at 10/12/2022 12:12 PM CDT -----  Please let Jonathon know that his vitamin B12 level remains in the low-normal range.  I think he is taking 2000mcg daily, per our previous correspondence.  We can increase further or have him resume monthly B12 injections for potentially better absorption.  Let me know what Jonathon would like to do.    Thank you,  Dr. Thompson

## 2022-10-14 ENCOUNTER — TELEPHONE (OUTPATIENT)
Dept: ONCOLOGY | Facility: CLINIC | Age: 67
End: 2022-10-14

## 2022-10-14 NOTE — TELEPHONE ENCOUNTER
Oral Chemotherapy Monitoring Program     Placed call to patient in follow up of oral chemotherapy. Left message requesting call back. No drug names were mentioned. Will update when response received.     Rolanda Choe, PharmD  Hematology/Oncology Clinical Pharmacist  [facility]  [facility]

## 2022-10-15 ENCOUNTER — HEALTH MAINTENANCE LETTER (OUTPATIENT)
Age: 67
End: 2022-10-15

## 2022-10-20 NOTE — TELEPHONE ENCOUNTER
Dr. Thompson-  See pt's FYI mychart message below.    Erica Shoemaker LPN on 10/20/2022 at 8:56 AM

## 2022-10-30 DIAGNOSIS — C61 PROSTATE CANCER (H): ICD-10-CM

## 2022-10-30 DIAGNOSIS — C77.9 REGIONAL LYMPH NODE METASTASIS PRESENT (H): Primary | ICD-10-CM

## 2022-11-01 DIAGNOSIS — C77.9 REGIONAL LYMPH NODE METASTASIS PRESENT (H): Primary | ICD-10-CM

## 2022-11-01 DIAGNOSIS — C61 PROSTATE CANCER (H): ICD-10-CM

## 2022-11-02 ENCOUNTER — INFUSION THERAPY VISIT (OUTPATIENT)
Dept: INFUSION THERAPY | Facility: CLINIC | Age: 67
End: 2022-11-02
Attending: INTERNAL MEDICINE
Payer: COMMERCIAL

## 2022-11-02 VITALS — TEMPERATURE: 97.8 F | SYSTOLIC BLOOD PRESSURE: 130 MMHG | HEART RATE: 74 BPM | DIASTOLIC BLOOD PRESSURE: 73 MMHG

## 2022-11-02 DIAGNOSIS — C77.9 REGIONAL LYMPH NODE METASTASIS PRESENT (H): Primary | ICD-10-CM

## 2022-11-02 DIAGNOSIS — C61 PROSTATE CANCER (H): ICD-10-CM

## 2022-11-02 PROCEDURE — 250N000011 HC RX IP 250 OP 636: Performed by: INTERNAL MEDICINE

## 2022-11-02 PROCEDURE — 96402 CHEMO HORMON ANTINEOPL SQ/IM: CPT

## 2022-11-02 RX ADMIN — LEUPROLIDE ACETATE 22.5 MG: KIT at 14:52

## 2022-11-02 NOTE — PROGRESS NOTES
Infusion Nursing Note:  Jonathon Rivasell presents today for Lupron.    Patient seen by provider today: No   present during visit today: Not Applicable.    Note: N/A.    Intravenous Access:  No Intravenous access/labs at this visit.    Treatment Conditions:  Not Applicable.    Post Infusion Assessment:  Patient tolerated Lupron injection IM to the left gluteus maia without incident.     Discharge Plan:   Patient discharged in stable condition accompanied by: self.  Departure Mode: Ambulatory.      Rolanda Winkler RN                       allow her to proceed with additional physical therapy. A repeat referral was provided. She can advance her activities as tolerated at this point. She may continue with strengthening and range of motion as tolerated. I encouraged her to attempt to get more overhead motion if possible. I would see her back in 1 month for repeat assessment. I did inform her that she had significant subacromial impingement, this could be managed arthroscopically in the future, however it is unlikely that it would be worth the risks of surgical intervention at that point. I would see her back in 4 weeks.)  3/23/22  Pt will be seeing MD on Friday and her  is to have heart bypass surgery 4/4/22 so they are unsure as to availability of coming to therapy. Reports she is able to use her arm better but pain can still get up to 4/10  3/16/22   Reports being pretty tender to the touch still, not really wearing sling any more  2/23/22 Reports she is sleeping in recliner, does not really like to lay flat, usually slept on side before shoulder injury/  2/17/22 Patient states fall 1/3/22 nondisplaced humeral fracture left    RADIOGRAPHIC EXAM: from 3/25/22  4 views of the left shoulder including AP, Grashey, scapular Y, and Velpeau view demonstrates improvement in prior inferior pseudosubluxation of the humeral head. The glenohumeral articulation is well positioned. Greater tuberosity fragment is in unchanged position from previous x-ray. There is maturing bony healing noted in near-anatomic position. There is a bone spur projecting superiorly into the subacromial space.  No additional fractures are seen.    Deltoid atony and/or subacromial hematoma resolved. No significant degenerative changes.     Exercises:   Exercise/Equipment Resistance/Repetitions Other comments   Pulley  5'  x   Pend  x10 assist from daughter due to pt guarding x   Shrugs/scap retractions x10 ea x   Cane ER  x10 x   AAROM supine flex  x5-10 as able hands assist x   Table slide flex x10 x   Bicep curl  x10 or 2x5 x   Supine elbow ext stretch  30-60\" at a time xLet arm swing when walking at home   Elbow ext alexa against door frame  5-10x    5\" hold x   4/11/22  Leaning on counter: sh ext  2x10     1# x          Leaning short row 2x10     \"                  x     Seated bicep curls 2#  2x 10   x   Scapular depression in supine or recline ( back pockets) 2x10 x   Supine elbow ext 1#  2x 10  will add to HEP   Small ceiling punch w/ asst 2x10  ''                ''   scap ex in SL w/ resitance 3x10                   NV- check on HEP, advance HEP,                                 Therapeutic Exercise:   30 ,  Advanced HEP to work on AROM and strength. 4/18/22 IR [email or web address], ER [email or web address], ABD 70, FLEX 105, elbow ext -10    3/30/22  PROM IR [email or web address], [email or web address], ABD 65, FLEX 70 some popping during rom (pt states MD is aware)  Elbow ext 27 active  20 passive, elbow flex 148    Group Therapy:      Home Exercise Program:      Therapeutic Activity:      Neuromuscular Re-education:      Gait:      Manual Therapy:   15 min    PROM and manual resistance with SL scap ex            Canalith Repositioning Procedure:       Modalities:  declined in favor of ice at home'   []? GAME READY (VASO)- for significant edema, swelling, pain control. 38 degrees, mild compression x 15'     Charges:  Timed Code Treatment Minutes: 45   Total Treatment Minutes:  45   BWC time for each procedure?:  TE TIME:  NMR TIME:  MANUAL TIME:  UNTIMED MINUTES:       [] EVAL (LOW) 54953 (typically 20 minutes face-to-face)  [] EVAL (MOD) 00222 (typically 30 minutes face-to-face)  [] EVAL (HIGH) 25873 (typically 45 minutes face-to-face)  [] RE-EVAL     [x] WW(17699) x  2    [] IONTO  [] NMR (28433) x     [] VASO  [x] Manual (46400) x 1   [] Other:  [] TA x      [] Mech Traction (46945)  [] ES(attended) (54491)     [] ES (un) (23049):     Medicare Cap Total YTD:  $723    Treatment/Activity Tolerance:    [x] Patient tolerated treatment well but rom is small [] Patient limited by fatigue   [] Patient limited by pain [] Patient limited by other medical complications   [] Other:     Prognosis: [x] Good [] Fair  [] Poor    Patient Requires Follow-up:  [x] Yes  [] No    Plan: [x] Continue per plan of care [] Alter current plan (see comments)   [] Plan of care initiated [] Hold pending MD visit [] Discharge    See Progress Note: [] Yes  [x] No       Next due:         Electronically signed by:  Shirley Larsen,  [id number]    Note: If patient does not return for scheduled/ recommended follow up visits, this note will serve as a discharge from care along with most recent update on progress. Outpatient Physical Therapy  Progress Note      Progress Note covers period from:   2/17/22 To  3/23/22      Subjective:  -Pain is 1-4/10   -Pt will be seeing MD on Friday and her  is to have heart bypass surgery 4/4/22 so they are unsure as to availability of coming to therapy. -Reports she is able to use her arm better but pain can still get up to 4/10       Objective:   Observation:   Test measurements:    PROM IR [email or web address], [email or web address], ABD 63 a couple of small pops with return to neutral position when done with prom, FLEX 70    Functional Outcome Measure:   Quick dash 2/14/22  86% disability    Assessment:   Summary: pt limited due to pain. She has been seen x 4 visits. ROM has improved a small amount.  Patient's response to treatment: fair with reports of pain and some popping in shoulder with return from rom     Goals:  Patient stated goal: reach/ ADLs     Therapist goals for Patient:   Short Term Goals: To be achieved in: 2 weeks   1. Independent in HEP and progression per patient tolerance, in order to prevent re-injury. [x]? Progressing: []? Met: []? Not Met: []? Adjusted      2. Patient will have a decrease in pain to facilitate improvement in movement, function, and ADLs as indicated by Functional Deficits. [x]?  Progressing: []? Met: []? Not Met: []? Adjusted      Long Term Goals: To be achieved in: 6 weeks  1. Disability index score of 43% or less for the DASH to assist with reaching prior level of function. [x]? Progressing: []? Met: []? Not Met: []? Adjusted      2. Patient will demonstrate increased AROM to 140 to allow for proper joint functioning as indicated by patients Functional Deficits. [x]? Progressing: []? Met: []? Not Met: []? Adjusted      3. Patient will demonstrate an increase in Strength to 4+/5 to allow for proper functional mobility as indicated by patients Functional Deficits. [x]? Progressing: []? Met: []? Not Met: []? Adjusted      4. Patient will return to [facility] for  functional activities without increased symptoms or restriction. [x]? Progressing: []? Met: []? Not Met: []? Adjusted      5. ADLs/ reach with min limitations (patient specific functional goal)    [x]? Progressing: []? Met: []? Not Met: []? Adjusted        · Progress toward previous goals:  working towards goals. Plan: will cont with therapy if pt is able to attend.   ·     Electronically Signed by: Adam Lima [id number]  ЕЛЕ[address]А [id number]

## 2022-11-08 ENCOUNTER — LAB (OUTPATIENT)
Dept: LAB | Facility: CLINIC | Age: 67
End: 2022-11-08
Payer: COMMERCIAL

## 2022-11-08 DIAGNOSIS — Z79.899 ENCOUNTER FOR LONG-TERM (CURRENT) USE OF MEDICATIONS: ICD-10-CM

## 2022-11-08 DIAGNOSIS — M1A.09X1 IDIOPATHIC CHRONIC GOUT, MULTIPLE SITES, WITH TOPHUS (TOPHI): ICD-10-CM

## 2022-11-08 DIAGNOSIS — E53.8 VITAMIN B12 DEFICIENCY (NON ANEMIC): ICD-10-CM

## 2022-11-08 DIAGNOSIS — C61 PROSTATE CANCER (H): ICD-10-CM

## 2022-11-08 DIAGNOSIS — C77.9 REGIONAL LYMPH NODE METASTASIS PRESENT (H): ICD-10-CM

## 2022-11-08 DIAGNOSIS — C77.9 REGIONAL LYMPH NODE METASTASIS PRESENT (H): Primary | ICD-10-CM

## 2022-11-08 LAB
ALBUMIN SERPL BCG-MCNC: 4.4 G/DL (ref 3.5–5.2)
ALP SERPL-CCNC: 59 U/L (ref 40–129)
ALT SERPL W P-5'-P-CCNC: 29 U/L (ref 10–50)
ANION GAP SERPL CALCULATED.3IONS-SCNC: 13 MMOL/L (ref 7–15)
AST SERPL W P-5'-P-CCNC: 24 U/L (ref 10–50)
BASOPHILS # BLD MANUAL: 0 10E3/UL (ref 0–0.2)
BASOPHILS NFR BLD MANUAL: 0 %
BILIRUB SERPL-MCNC: 1 MG/DL
BUN SERPL-MCNC: 22 MG/DL (ref 8–23)
CALCIUM SERPL-MCNC: 9.9 MG/DL (ref 8.8–10.2)
CHLORIDE SERPL-SCNC: 101 MMOL/L (ref 98–107)
CREAT SERPL-MCNC: 1.08 MG/DL (ref 0.67–1.17)
DEPRECATED HCO3 PLAS-SCNC: 26 MMOL/L (ref 22–29)
EOSINOPHIL # BLD MANUAL: 0.1 10E3/UL (ref 0–0.7)
EOSINOPHIL NFR BLD MANUAL: 2 %
ERYTHROCYTE [DISTWIDTH] IN BLOOD BY AUTOMATED COUNT: 12.1 % (ref 10–15)
GFR SERPL CREATININE-BSD FRML MDRD: 75 ML/MIN/1.73M2
GLUCOSE SERPL-MCNC: 115 MG/DL (ref 70–99)
HCT VFR BLD AUTO: 35.2 % (ref 40–53)
HGB BLD-MCNC: 12.1 G/DL (ref 13.3–17.7)
LYMPHOCYTES # BLD MANUAL: 1.8 10E3/UL (ref 0.8–5.3)
LYMPHOCYTES NFR BLD MANUAL: 42 %
MCH RBC QN AUTO: 32.4 PG (ref 26.5–33)
MCHC RBC AUTO-ENTMCNC: 34.4 G/DL (ref 31.5–36.5)
MCV RBC AUTO: 94 FL (ref 78–100)
MONOCYTES # BLD MANUAL: 0.2 10E3/UL (ref 0–1.3)
MONOCYTES NFR BLD MANUAL: 5 %
NEUTROPHILS # BLD MANUAL: 2.1 10E3/UL (ref 1.6–8.3)
NEUTROPHILS NFR BLD MANUAL: 51 %
PLAT MORPH BLD: ABNORMAL
PLATELET # BLD AUTO: 107 10E3/UL (ref 150–450)
POTASSIUM SERPL-SCNC: 3.6 MMOL/L (ref 3.4–5.3)
PROT SERPL-MCNC: 7 G/DL (ref 6.4–8.3)
PSA SERPL-MCNC: <0.01 NG/ML (ref 0–4.5)
RBC # BLD AUTO: 3.73 10E6/UL (ref 4.4–5.9)
RBC MORPH BLD: ABNORMAL
SODIUM SERPL-SCNC: 140 MMOL/L (ref 136–145)
URATE SERPL-MCNC: 6.2 MG/DL (ref 3.4–7)
VARIANT LYMPHS BLD QL SMEAR: PRESENT
VIT B12 SERPL-MCNC: 455 PG/ML (ref 232–1245)
WBC # BLD AUTO: 4.2 10E3/UL (ref 4–11)

## 2022-11-08 PROCEDURE — 84153 ASSAY OF PSA TOTAL: CPT

## 2022-11-08 PROCEDURE — 82607 VITAMIN B-12: CPT

## 2022-11-08 PROCEDURE — 36415 COLL VENOUS BLD VENIPUNCTURE: CPT

## 2022-11-08 PROCEDURE — 85007 BL SMEAR W/DIFF WBC COUNT: CPT

## 2022-11-08 PROCEDURE — 80053 COMPREHEN METABOLIC PANEL: CPT

## 2022-11-08 PROCEDURE — 84550 ASSAY OF BLOOD/URIC ACID: CPT

## 2022-11-08 PROCEDURE — 85027 COMPLETE CBC AUTOMATED: CPT

## 2022-11-08 RX ORDER — PREDNISONE 5 MG/1
5 TABLET ORAL DAILY
Qty: 30 TABLET | Refills: 0 | Status: SHIPPED | OUTPATIENT
Start: 2022-11-08 | End: 2022-12-08

## 2022-11-08 RX ORDER — ABIRATERONE ACETATE 250 MG/1
1000 TABLET ORAL DAILY
Qty: 120 TABLET | Refills: 0 | Status: SHIPPED | OUTPATIENT
Start: 2022-11-08 | End: 2022-12-08

## 2022-11-09 ENCOUNTER — TELEPHONE (OUTPATIENT)
Dept: ONCOLOGY | Facility: CLINIC | Age: 67
End: 2022-11-09

## 2022-11-09 NOTE — ORAL ONC MGMT
Oral Chemotherapy Monitoring Program    Subjective/Objective:  Jonathon Santos is a 67 year old male contacted by phone for a follow-up visit for oral chemotherapy.  Jonathon confirms taking the appropriate dose of abiraterone. Denies new or worsening side effects, missed doses, and recent hospital or ED visits. Patient has not had any recent medication changes.     ORAL CHEMOTHERAPY 8/2/2022 9/7/2022 9/19/2022 10/5/2022 10/14/2022 11/8/2022 11/9/2022   Assessment Type Lab Monitoring Lab Monitoring Refill Lab Monitoring Left Voicemail Refill Quarterly Follow up   Diagnosis Code Prostate Cancer Prostate Cancer Prostate Cancer Prostate Cancer Prostate Cancer Prostate Cancer Prostate Cancer   Providers Dr. Blayne Cisneros   [facility]   Drug Name Zytiga (abiraterone) Zytiga (abiraterone) Zytiga (abiraterone) Zytiga (abiraterone) Zytiga (abiraterone) Zytiga (abiraterone) Zytiga (abiraterone)   Dose 1,000 mg 1,000 mg 1,000 mg 1,000 mg 1,000 mg 1,000 mg 1,000 mg   Current Schedule Daily Daily Daily Daily Daily Daily Daily   Cycle Details Continuous Continuous Continuous Continuous Continuous Continuous Continuous   Start Date of Last Cycle - - - - - - -   Planned next cycle start date - - - - - - -   Doses missed in last 2 weeks - - - - - - 0   Adherence Assessment - - - - - - Adherent   Adverse Effects - - - - - - No AE identified during assessment   Nausea - - - - - - -   Pharmacist Intervention(nausea) - - - - - - -   Diarrhea - - - - - - -   Pharmacist Intervention(diarrhea) - - - - - - -   Intervention(s) - - - - - - -   Decrease Appetite/Weight Loss - - - - - - -   Pharmacist Intervention(decreased appetite/weight loss) - - - - - - -   Intervention(s) - - - - - - -   Pharmacist Intervention(increased ast/alt/t bili) - - - - - - -   Intervention(s) - - - - - - -   Hypertension - - - - - - -   Pharmacist  "intervention(hypertension) - - - - - - -   Intervention(s) - - - - - - -   Other (See Note for Details) - - - - - - -   Pharmacist intervention(other) - - - - - - -   Intervention(s) - - - - - - -   Home BPs - - - - - - -   Any new drug interactions? - - - - - - No   Pharmacist Intervention? - - - - - - -   Intervention(s) - - - - - - -   Is the dose as ordered appropriate for the patient? - - - - - - Yes   Since the last time we talked, have you been hospitalized or used the emergency room? - - - - - - No       Last PHQ-2 Score on record:   PHQ-2 ( 1999 [employer]) 5/16/2022 5/16/2022   Q1: Little interest or pleasure in doing things 0 0   Q2: Feeling down, depressed or hopeless 0 0   PHQ-2 Score 0 0   PHQ-2 Total Score (12-17 Years)- Positive if 3 or more points; Administer PHQ-A if positive - -   Q1: Little interest or pleasure in doing things Not at all -   Q2: Feeling down, depressed or hopeless Not at all -   PHQ-2 Score 0 -       Vitals:  BP:   BP Readings from Last 1 Encounters:   11/02/22 130/73     Wt Readings from Last 1 Encounters:   09/20/22 93 kg (205 lb)     Estimated body surface area is 2.19 meters squared as calculated from the following:    Height as of 9/20/22: 1.854 m (6' 1\").    Weight as of 9/20/22: 93 kg (205 lb).    Labs:  _  Result Component Current Result Ref Range   Sodium 140 (11/8/2022) 136 - 145 mmol/L     _  Result Component Current Result Ref Range   Potassium 3.6 (11/8/2022) 3.4 - 5.3 mmol/L     _  Result Component Current Result Ref Range   Calcium 9.9 (11/8/2022) 8.8 - 10.2 mg/dL     _  Result Component Current Result Ref Range   Albumin 4.4 (11/8/2022) 3.5 - 5.2 g/dL     _  Result Component Current Result Ref Range   Urea Nitrogen 22.0 (11/8/2022) 8.0 - 23.0 mg/dL     _  Result Component Current Result Ref Range   Creatinine 1.08 (11/8/2022) 0.67 - 1.17 mg/dL     _  Result Component Current Result Ref Range   AST 24 (11/8/2022) 10 - 50 U/L     _  Result Component Current Result Ref " Range   ALT 29 (11/8/2022) 10 - 50 U/L     _  Result Component Current Result Ref Range   Bilirubin Total 1.0 (11/8/2022) <=1.2 mg/dL     _  Result Component Current Result Ref Range   WBC Count 4.2 (11/8/2022) 4.0 - 11.0 10e3/uL     _  Result Component Current Result Ref Range   Hemoglobin 12.1 (L) (11/8/2022) 13.3 - 17.7 g/dL     _  Result Component Current Result Ref Range   Platelet Count 107 (L) (11/8/2022) 150 - 450 10e3/uL     _  Result Component Current Result Ref Range   Absolute Neutrophils 2.1 (11/8/2022) 1.6 - 8.3 10e3/uL     Assessment/Plan:  Denies new or worse side effects. Continue plan of care.     Follow-Up:  12/6: labs  12/12: Dr. Cisneros appointment     Refill Due:  [facility] will deliver refill 11/15    Osmar Haynes, PharmD  Hematology/Oncology Clinical Pharmacist  [facility]  [facility]

## 2022-12-06 ENCOUNTER — MYC MEDICAL ADVICE (OUTPATIENT)
Dept: NEUROLOGY | Facility: CLINIC | Age: 67
End: 2022-12-06

## 2022-12-06 ENCOUNTER — LAB (OUTPATIENT)
Dept: LAB | Facility: CLINIC | Age: 67
End: 2022-12-06
Payer: COMMERCIAL

## 2022-12-06 DIAGNOSIS — M1A.09X1 IDIOPATHIC CHRONIC GOUT, MULTIPLE SITES, WITH TOPHUS (TOPHI): ICD-10-CM

## 2022-12-06 DIAGNOSIS — E53.8 VITAMIN B12 DEFICIENCY (NON ANEMIC): Primary | ICD-10-CM

## 2022-12-06 DIAGNOSIS — E87.6 HYPOKALEMIA: ICD-10-CM

## 2022-12-06 DIAGNOSIS — C61 PROSTATE CANCER (H): ICD-10-CM

## 2022-12-06 DIAGNOSIS — Z79.899 ENCOUNTER FOR LONG-TERM (CURRENT) USE OF MEDICATIONS: ICD-10-CM

## 2022-12-06 LAB
ALBUMIN SERPL BCG-MCNC: 4.4 G/DL (ref 3.5–5.2)
ALP SERPL-CCNC: 66 U/L (ref 40–129)
ALT SERPL W P-5'-P-CCNC: 32 U/L (ref 10–50)
ANION GAP SERPL CALCULATED.3IONS-SCNC: 14 MMOL/L (ref 7–15)
AST SERPL W P-5'-P-CCNC: 25 U/L (ref 10–50)
BASOPHILS # BLD MANUAL: 0 10E3/UL (ref 0–0.2)
BASOPHILS NFR BLD MANUAL: 0 %
BILIRUB SERPL-MCNC: 1 MG/DL
BUN SERPL-MCNC: 21.1 MG/DL (ref 8–23)
CALCIUM SERPL-MCNC: 9.9 MG/DL (ref 8.8–10.2)
CHLORIDE SERPL-SCNC: 101 MMOL/L (ref 98–107)
CREAT SERPL-MCNC: 1 MG/DL (ref 0.67–1.17)
DEPRECATED HCO3 PLAS-SCNC: 24 MMOL/L (ref 22–29)
EOSINOPHIL # BLD MANUAL: 0 10E3/UL (ref 0–0.7)
EOSINOPHIL NFR BLD MANUAL: 0 %
ERYTHROCYTE [DISTWIDTH] IN BLOOD BY AUTOMATED COUNT: 12.2 % (ref 10–15)
GFR SERPL CREATININE-BSD FRML MDRD: 82 ML/MIN/1.73M2
GLUCOSE SERPL-MCNC: 111 MG/DL (ref 70–99)
HCT VFR BLD AUTO: 36.6 % (ref 40–53)
HGB BLD-MCNC: 12.6 G/DL (ref 13.3–17.7)
HOLD SPECIMEN: NORMAL
LYMPHOCYTES # BLD MANUAL: 1.8 10E3/UL (ref 0.8–5.3)
LYMPHOCYTES NFR BLD MANUAL: 38 %
MCH RBC QN AUTO: 32.1 PG (ref 26.5–33)
MCHC RBC AUTO-ENTMCNC: 34.4 G/DL (ref 31.5–36.5)
MCV RBC AUTO: 93 FL (ref 78–100)
MONOCYTES # BLD MANUAL: 0.2 10E3/UL (ref 0–1.3)
MONOCYTES NFR BLD MANUAL: 5 %
NEUTROPHILS # BLD MANUAL: 2.7 10E3/UL (ref 1.6–8.3)
NEUTROPHILS NFR BLD MANUAL: 57 %
NRBC # BLD AUTO: 0 10E3/UL
NRBC BLD AUTO-RTO: 0 /100
PLAT MORPH BLD: ABNORMAL
PLATELET # BLD AUTO: 102 10E3/UL (ref 150–450)
POTASSIUM SERPL-SCNC: 3.6 MMOL/L (ref 3.4–5.3)
PROT SERPL-MCNC: 6.9 G/DL (ref 6.4–8.3)
PSA SERPL-MCNC: <0.01 NG/ML (ref 0–4.5)
RBC # BLD AUTO: 3.93 10E6/UL (ref 4.4–5.9)
RBC MORPH BLD: ABNORMAL
SODIUM SERPL-SCNC: 139 MMOL/L (ref 136–145)
URATE SERPL-MCNC: 5.3 MG/DL (ref 3.4–7)
VARIANT LYMPHS BLD QL SMEAR: PRESENT
WBC # BLD AUTO: 4.8 10E3/UL (ref 4–11)

## 2022-12-06 PROCEDURE — 85027 COMPLETE CBC AUTOMATED: CPT

## 2022-12-06 PROCEDURE — 84153 ASSAY OF PSA TOTAL: CPT

## 2022-12-06 PROCEDURE — 84550 ASSAY OF BLOOD/URIC ACID: CPT

## 2022-12-06 PROCEDURE — 85007 BL SMEAR W/DIFF WBC COUNT: CPT

## 2022-12-06 PROCEDURE — 80053 COMPREHEN METABOLIC PANEL: CPT

## 2022-12-06 PROCEDURE — 36415 COLL VENOUS BLD VENIPUNCTURE: CPT

## 2022-12-06 NOTE — PROGRESS NOTES
Jonathon Chatterjee was in today for labs. He said he's supposed to have a standing order for a B12 but there isn't one in there. I looked back and can see that there is supposed to be a standing order, but it's no longer showing up for us. Would it be possible to put a new order in for him? I drew a tube for it.  Thank you,  [facility]

## 2022-12-07 NOTE — ORAL ONC MGMT
Oral Chemotherapy Monitoring Program  Lab Follow Up  Reviewed lab results from 12/6/2022.    Labs:  _  Result Component Current Result Ref Range   Sodium 139 (12/6/2022) 136 - 145 mmol/L   _  Result Component Current Result Ref Range   Potassium 3.6 (12/6/2022) 3.4 - 5.3 mmol/L   _  Result Component Current Result Ref Range   Calcium 9.9 (12/6/2022) 8.8 - 10.2 mg/dL   _  Result Component Current Result Ref Range   Albumin 4.4 (12/6/2022) 3.5 - 5.2 g/dL   _  Result Component Current Result Ref Range   Urea Nitrogen 21.1 (12/6/2022) 8.0 - 23.0 mg/dL   _  Result Component Current Result Ref Range   Creatinine 1.00 (12/6/2022) 0.67 - 1.17 mg/dL   _  Result Component Current Result Ref Range   AST 25 (12/6/2022) 10 - 50 U/L   _  Result Component Current Result Ref Range   ALT 32 (12/6/2022) 10 - 50 U/L   _  Result Component Current Result Ref Range   Bilirubin Total 1.0 (12/6/2022) <=1.2 mg/dL   _  Result Component Current Result Ref Range   WBC Count 4.8 (12/6/2022) 4.0 - 11.0 10e3/uL   _  Result Component Current Result Ref Range   Hemoglobin 12.6 (L) (12/6/2022) 13.3 - 17.7 g/dL   _  Result Component Current Result Ref Range   Platelet Count 102 (L) (12/6/2022) 150 - 450 10e3/uL   _  Result Component Current Result Ref Range   Absolute Neutrophils 2.7 (12/6/2022) 1.6 - 8.3 10e3/uL     Assessment & Plan:  No concerning abnormalities. Continue plan of care.     Follow-Up:  12/12: Dr. Cisneros appointment     Osmar Haynes, PharmD  Hematology/Oncology Clinical Pharmacist  [facility]  [facility]

## 2022-12-09 DIAGNOSIS — C61 PROSTATE CANCER (H): ICD-10-CM

## 2022-12-09 DIAGNOSIS — C77.9 REGIONAL LYMPH NODE METASTASIS PRESENT (H): Primary | ICD-10-CM

## 2022-12-12 ENCOUNTER — VIRTUAL VISIT (OUTPATIENT)
Dept: ONCOLOGY | Facility: CLINIC | Age: 67
End: 2022-12-12
Attending: INTERNAL MEDICINE
Payer: COMMERCIAL

## 2022-12-12 DIAGNOSIS — C61 PROSTATE CANCER (H): Primary | ICD-10-CM

## 2022-12-12 PROCEDURE — G0463 HOSPITAL OUTPT CLINIC VISIT: HCPCS | Mod: PN,GT | Performed by: INTERNAL MEDICINE

## 2022-12-12 PROCEDURE — 99214 OFFICE O/P EST MOD 30 MIN: CPT | Mod: 95 | Performed by: INTERNAL MEDICINE

## 2022-12-12 RX ORDER — PREDNISONE 10 MG/1
5 TABLET ORAL DAILY
Qty: 7 TABLET | Refills: 0 | Status: SHIPPED | OUTPATIENT
Start: 2022-12-12 | End: 2022-12-19

## 2022-12-12 NOTE — PROGRESS NOTES
Jonathon is a 67 year old who is being evaluated via a billable video visit.      How would you like to obtain your AVS? [employer]  If the video visit is dropped, the invitation should be resent by: Text to cell phone: [phone]  Will anyone else be joining your video visit? No        Video-Visit Details    Video Start Time: 9:39 AM    Type of service:  Video Visit    Video End Time:9:56 AM    Originating Location (pt. Location): Home        Distant Location (provider location):  On-site    Platform used for Video Visit: Tara Hernandez      [facility] Medical Oncology Follow Up Note       Date of visit: 12/12/22      HPI:  67 yo male with elevated PSA who had a prostate biopsy on 12/21/20.   9/17/19 PSA =  6.57    Pathology from 12/21/20 shows Gl 4+5=9 disease.  The patient's MRI from 11/17/20 shows concern for enlarged lymph nodes raising concern for mets.  Has placement of fiducials and Space[employer] on 4/30 with Dr. Caban.      Started prednisone abiraterone 1/25/21 with plan for 2 years.    Completed radiation 5/19/21 to 7/15/21 with Dr. Francois  9/15/21 PSA<0.01  12/16/21 PSA<0.01  1/14/22 PSA<0.01.    8/2/22  PSA<0.01    INTERVAL HX:  Jonathon is overall doing fine.  He has had some issues of hematochezia and is concerned that it is resolving from the low platelets.  He also thinks it happens more likely when his stools are hard and when he is dehydrated.  He has no cramping.  He had a colonoscopy in July which was consistent with mild radiation change but no significant inflammation and no tumors.  He is due to discontinue the abiraterone in about 10 days    ROS  10 point ROS otherwise unremarkable.    PMH:  HTN  Gout    MEDS  Lisinopril - hydrochlorothiazide  Metoprolol  Abiraterone  Prednisone    PHYSICAL EXAM-video   General: Patient appears well in no acute distress.   Skin: No visualized rash or lesions on visualized skin  Eyes: EOMI, no erythema, sclera icterus or discharge noted  Resp: Appears to be  breathing comfortably without accessory muscle usage, speaking in full sentences, no cough  MSK: Appears to have normal range of motion based on visualized movements  Neurologic: No apparent tremors, facial movements symmetric  Psych: affect good, alert and oriented      LABS AND IMAGES    Most Recent 3 CBC's:  Recent Labs   Lab Test 12/06/22  1335 11/08/22  1317 10/04/22  1330 02/10/22  1056 01/14/22  1113 12/16/21  1043 10/13/21  1056   WBC 4.8 4.2 4.8   < > 4.2 4.4 3.0*   HGB 12.6* 12.1* 12.2*   < > 12.9* 13.4 12.3*   MCV 93 94 91   < > 101* 98 101*   * 107* 94*   < > 87* 100* 108*   ANEUTAUTO  --   --   --   --  2.7 3.1 2.0    < > = values in this interval not displayed.     Most Recent 3 BMP's:  Recent Labs   Lab Test 12/06/22  1335 11/08/22  1317 10/04/22  1330    140 132*   POTASSIUM 3.6 3.6 3.8   CHLORIDE 101 101 95*   CO2 24 26 21*   BUN 21.1 22.0 19.2   CR 1.00 1.08 1.03   ANIONGAP 14 13 16*   MISTY 9.9 9.9 9.4   * 115* 103*   PROTTOTAL 6.9 7.0 7.1   ALBUMIN 4.4 4.4 4.4    Most Recent 3 LFT's:  Recent Labs   Lab Test 12/06/22  1335 11/08/22  1317 10/04/22  1330   AST 25 24 23   ALT 32 29 24   ALKPHOS 66 59 62   BILITOTAL 1.0 1.0 1.3*    Most Recent 2 TSH and T4:  Recent Labs   Lab Test 06/15/22  1119   TSH 2.39       7-2022   The perianal and digital rectal examinations were normal. Pertinent        negatives include normal sphincter tone and no palpable rectal lesions.        A 2 mm polyp was found in the ileocecal valve. The polyp was sessile.        The polyp was removed with a jumbo cold forceps. Resection and retrieval        were complete. Verification of patient identification for the specimen        was done by the nurse and technician using the patient's name and birth        date. Estimated blood loss was minimal.        A medium post polypectomy scar was found in the cecum. There was no        evidence of the previous polyp.        Scattered small-mouthed diverticula were found  in the entire colon.        The mucosa vascular pattern in the recto-sigmoid colon was segmentally        increased. Consistent with history of radiation        I reviewed the above labs today.    No new imaging studies.      IMPRESSION AND PLAN  Locally advanced prostate cancer with high grade [place] 9. No significant comorbidities.     Plan:   1. Two years of ADT plus Abiraterone--> 01/2023. Tolerating with mild hot flashes and some fatigue, both tolerable and not impacting QOL in a way that precludes continuation of therapy. No edema. Has some GI upset but not needing intervention    2. RT to the nodes and prostate completed with Dr. Francois    3. Lupron completed.  He will continue his abiraterone until the bottle is finished.  He will drop from 10 mg a day of prednisone to 5 mg of prednisone for day for about 1 week and then discontinue altogether.  We do not need to do further prostate cancer blood work for the next few months.  I would like him to have a testosterone and the PSA in about 6 months time.  I explained to him that the definition of relapse by PSA rise after radiation therapy consists of a rising PSA above 2.0 from the kevan as well as a normal testosterone.  I doubt this will happen before July and in fact it is highly doubtful to be happening even in July.  We will therefore set him up for a every 6 month PSA and testosterone level    4.  Blood in the stool appears to be from hardened stool and mucosal changes from radiation.  I do not think we should give him a steroid suppository at this time although if these problems get worse we could do that.  I have encouraged him to use a stool softener and remain hydrated in order to reduce straining.        Andrea Cisneros MD

## 2022-12-12 NOTE — LETTER
12/12/2022         RE: Jonathon Santos  [address] [address] [place] [id number]        Dear Colleague,    Thank you for referring your patient, Jonathon Santos, to the [facility]. Please see a copy of my visit note below.    Jonathon is a 67 year old who is being evaluated via a billable video visit.      How would you like to obtain your AVS? MyChart  If the video visit is dropped, the invitation should be resent by: Text to cell phone: [phone]  Will anyone else be joining your video visit? No        Video-Visit Details    Video Start Time: 9:39 AM    Type of service:  Video Visit    Video End Time:9:56 AM    Originating Location (pt. Location): Home        Distant Location (provider location):  On-site    Platform used for Video Visit: Tara Hernandez      [facility] Medical Oncology Follow Up Note       Date of visit: 12/12/22      HPI:  65 yo male with elevated PSA who had a prostate biopsy on 12/21/20.   9/17/19 PSA =  6.57    Pathology from 12/21/20 shows Gl 4+5=9 disease.  The patient's MRI from 11/17/20 shows concern for enlarged lymph nodes raising concern for mets.  Has placement of fiducials and SpaceOar on 4/30 with Dr. Caban.      Started prednisone abiraterone 1/25/21 with plan for 2 years.    Completed radiation 5/19/21 to 7/15/21 with Dr. Francois  9/15/21 PSA<0.01  12/16/21 PSA<0.01  1/14/22 PSA<0.01.    8/2/22  PSA<0.01    INTERVAL HX:  Jonathon is overall doing fine.  He has had some issues of hematochezia and is concerned that it is resolving from the low platelets.  He also thinks it happens more likely when his stools are hard and when he is dehydrated.  He has no cramping.  He had a colonoscopy in July which was consistent with mild radiation change but no significant inflammation and no tumors.  He is due to discontinue the abiraterone in about 10 days    ROS  10 point ROS otherwise unremarkable.    PMH:  HTN  Gout    MEDS  Lisinopril -  hydrochlorothiazide  Metoprolol  Abiraterone  Prednisone    PHYSICAL EXAM-video   General: Patient appears well in no acute distress.   Skin: No visualized rash or lesions on visualized skin  Eyes: EOMI, no erythema, sclera icterus or discharge noted  Resp: Appears to be breathing comfortably without accessory muscle usage, speaking in full sentences, no cough  MSK: Appears to have normal range of motion based on visualized movements  Neurologic: No apparent tremors, facial movements symmetric  Psych: affect good, alert and oriented      LABS AND IMAGES    Most Recent 3 CBC's:  Recent Labs   Lab Test 12/06/22  1335 11/08/22  1317 10/04/22  1330 02/10/22  1056 01/14/22  1113 12/16/21  1043 10/13/21  1056   WBC 4.8 4.2 4.8   < > 4.2 4.4 3.0*   HGB 12.6* 12.1* 12.2*   < > 12.9* 13.4 12.3*   MCV 93 94 91   < > 101* 98 101*   * 107* 94*   < > 87* 100* 108*   ANEUTAUTO  --   --   --   --  2.7 3.1 2.0    < > = values in this interval not displayed.     Most Recent 3 BMP's:  Recent Labs   Lab Test 12/06/22  1335 11/08/22  1317 10/04/22  1330    140 132*   POTASSIUM 3.6 3.6 3.8   CHLORIDE 101 101 95*   CO2 24 26 21*   BUN 21.1 22.0 19.2   CR 1.00 1.08 1.03   ANIONGAP 14 13 16*   MISTY 9.9 9.9 9.4   * 115* 103*   PROTTOTAL 6.9 7.0 7.1   ALBUMIN 4.4 4.4 4.4    Most Recent 3 LFT's:  Recent Labs   Lab Test 12/06/22  1335 11/08/22  1317 10/04/22  1330   AST 25 24 23   ALT 32 29 24   ALKPHOS 66 59 62   BILITOTAL 1.0 1.0 1.3*    Most Recent 2 TSH and T4:  Recent Labs   Lab Test 06/15/22  1119   TSH 2.39       7-2022   The perianal and digital rectal examinations were normal. Pertinent        negatives include normal sphincter tone and no palpable rectal lesions.        A 2 mm polyp was found in the ileocecal valve. The polyp was sessile.        The polyp was removed with a jumbo cold forceps. Resection and retrieval        were complete. Verification of patient identification for the specimen        was done by  the nurse and technician using the patient's name and birth        date. Estimated blood loss was minimal.        A medium post polypectomy scar was found in the cecum. There was no        evidence of the previous polyp.        Scattered small-mouthed diverticula were found in the entire colon.        The mucosa vascular pattern in the recto-sigmoid colon was segmentally        increased. Consistent with history of radiation        I reviewed the above labs today.    No new imaging studies.      IMPRESSION AND PLAN  Locally advanced prostate cancer with high grade [place] 9. No significant comorbidities.     Plan:   1. Two years of ADT plus Abiraterone--> 01/2023. Tolerating with mild hot flashes and some fatigue, both tolerable and not impacting QOL in a way that precludes continuation of therapy. No edema. Has some GI upset but not needing intervention    2. RT to the nodes and prostate completed with Dr. Francois    3. Lupron completed.  He will continue his abiraterone until the bottle is finished.  He will drop from 10 mg a day of prednisone to 5 mg of prednisone for day for about 1 week and then discontinue altogether.  We do not need to do further prostate cancer blood work for the next few months.  I would like him to have a testosterone and the PSA in about 6 months time.  I explained to him that the definition of relapse by PSA rise after radiation therapy consists of a rising PSA above 2.0 from the kevan as well as a normal testosterone.  I doubt this will happen before July and in fact it is highly doubtful to be happening even in July.  We will therefore set him up for a every 6 month PSA and testosterone level    4.  Blood in the stool appears to be from hardened stool and mucosal changes from radiation.  I do not think we should give him a steroid suppository at this time although if these problems get worse we could do that.  I have encouraged him to use a stool softener and remain hydrated in order to  reduce straining.        Andrea Cisneros MD

## 2022-12-19 ENCOUNTER — DOCUMENTATION ONLY (OUTPATIENT)
Dept: ONCOLOGY | Facility: CLINIC | Age: 67
End: 2022-12-19

## 2022-12-19 DIAGNOSIS — C61 PROSTATE CANCER (H): Primary | ICD-10-CM

## 2022-12-19 RX ORDER — PREDNISONE 5 MG/1
2.5 TABLET ORAL DAILY
Qty: 4 TABLET | Refills: 0 | Status: SHIPPED | OUTPATIENT
Start: 2022-12-19 | End: 2023-03-01

## 2022-12-19 NOTE — PROGRESS NOTES
[facility] Oral Chemotherapy Monitoring Program    Thank you for the opportunity to be a part in the care of this patient's oral chemotherapy. The oncology pharmacy will no longer be following this patient for oral chemotherapy. If there are any questions or the plan changes, feel free to contact us.    ORAL CHEMOTHERAPY 9/19/2022 10/5/2022 10/14/2022 11/8/2022 11/9/2022 12/7/2022 12/19/2022   Assessment Type Refill Lab Monitoring Left Voicemail Refill Quarterly Follow up Lab Monitoring Discontinuation   Stop Date - - - - - - 12/23/2022   Reason for Discontinuation - - - - - - Therapy complete   Diagnosis Code Prostate Cancer Prostate Cancer Prostate Cancer Prostate Cancer Prostate Cancer Prostate Cancer Prostate Cancer   Providers Dr. Blayne Cisneros   [facility]   Drug Name Zytiga (abiraterone) Zytiga (abiraterone) Zytiga (abiraterone) Zytiga (abiraterone) Zytiga (abiraterone) Zytiga (abiraterone) Zytiga (abiraterone)   Dose 1,000 mg 1,000 mg 1,000 mg 1,000 mg 1,000 mg 1,000 mg 1,000 mg   Current Schedule Daily Daily Daily Daily Daily Daily Daily   Cycle Details Continuous Continuous Continuous Continuous Continuous Continuous Continuous   Start Date of Last Cycle - - - - - - -   Planned next cycle start date - - - - - - -   Doses missed in last 2 weeks - - - - 0 - -   Adherence Assessment - - - - Adherent - -   Adverse Effects - - - - No AE identified during assessment - -   Nausea - - - - - - -   Pharmacist Intervention(nausea) - - - - - - -   Diarrhea - - - - - - -   Pharmacist Intervention(diarrhea) - - - - - - -   Intervention(s) - - - - - - -   Decrease Appetite/Weight Loss - - - - - - -   Pharmacist Intervention(decreased appetite/weight loss) - - - - - - -   Intervention(s) - - - - - - -   Pharmacist Intervention(increased ast/alt/t bili) - - - - - - -    Intervention(s) - - - - - - -   Hypertension - - - - - - -   Pharmacist intervention(hypertension) - - - - - - -   Intervention(s) - - - - - - -   Other (See Note for Details) - - - - - - -   Pharmacist intervention(other) - - - - - - -   Intervention(s) - - - - - - -   Home BPs - - - - - - -   Any new drug interactions? - - - - No - -   Pharmacist Intervention? - - - - - - -   Intervention(s) - - - - - - -   Is the dose as ordered appropriate for the patient? - - - - Yes - -   Since the last time we talked, have you been hospitalized or used the emergency room? - - - - No - -       Estefania Up, PharmD, BCACP  Hematology/Oncology Clinical Pharmacist  [facility]  [phone]

## 2022-12-19 NOTE — PROGRESS NOTES
Reordered prednisone taper to be taking one-half tablet (2.5mg) daily for 1 week at the direction of Dr. Cisneros.     Estefania Up, PharmD, BCACP  Hematology/Oncology Clinical Pharmacist  [facility]  [phone]

## 2023-02-09 DIAGNOSIS — C61 PROSTATE CANCER (H): Primary | ICD-10-CM

## 2023-02-28 ENCOUNTER — LAB (OUTPATIENT)
Dept: LAB | Facility: CLINIC | Age: 68
End: 2023-02-28
Payer: COMMERCIAL

## 2023-02-28 DIAGNOSIS — M1A.09X1 IDIOPATHIC CHRONIC GOUT, MULTIPLE SITES, WITH TOPHUS (TOPHI): ICD-10-CM

## 2023-02-28 DIAGNOSIS — C61 PROSTATE CANCER (H): ICD-10-CM

## 2023-02-28 DIAGNOSIS — E53.8 VITAMIN B12 DEFICIENCY (NON ANEMIC): ICD-10-CM

## 2023-02-28 DIAGNOSIS — E87.6 HYPOKALEMIA: ICD-10-CM

## 2023-02-28 LAB
BASOPHILS # BLD MANUAL: 0 10E3/UL (ref 0–0.2)
BASOPHILS NFR BLD MANUAL: 0 %
EOSINOPHIL # BLD MANUAL: 0.1 10E3/UL (ref 0–0.7)
EOSINOPHIL NFR BLD MANUAL: 2 %
ERYTHROCYTE [DISTWIDTH] IN BLOOD BY AUTOMATED COUNT: 12.3 % (ref 10–15)
HCT VFR BLD AUTO: 39.4 % (ref 40–53)
HGB BLD-MCNC: 13.7 G/DL (ref 13.3–17.7)
LYMPHOCYTES # BLD MANUAL: 2.2 10E3/UL (ref 0.8–5.3)
LYMPHOCYTES NFR BLD MANUAL: 52 %
MCH RBC QN AUTO: 32.1 PG (ref 26.5–33)
MCHC RBC AUTO-ENTMCNC: 34.8 G/DL (ref 31.5–36.5)
MCV RBC AUTO: 92 FL (ref 78–100)
MONOCYTES # BLD MANUAL: 0.3 10E3/UL (ref 0–1.3)
MONOCYTES NFR BLD MANUAL: 7 %
NEUTROPHILS # BLD MANUAL: 1.6 10E3/UL (ref 1.6–8.3)
NEUTROPHILS NFR BLD MANUAL: 39 %
PLAT MORPH BLD: ABNORMAL
PLATELET # BLD AUTO: 88 10E3/UL (ref 150–450)
POTASSIUM SERPL-SCNC: 4.1 MMOL/L (ref 3.4–5.3)
PSA SERPL-MCNC: <0.01 NG/ML (ref 0–4.5)
RBC # BLD AUTO: 4.27 10E6/UL (ref 4.4–5.9)
RBC MORPH BLD: ABNORMAL
URATE SERPL-MCNC: 9.1 MG/DL (ref 3.4–7)
VARIANT LYMPHS BLD QL SMEAR: PRESENT
VIT B12 SERPL-MCNC: 747 PG/ML (ref 232–1245)
WBC # BLD AUTO: 4.2 10E3/UL (ref 4–11)

## 2023-02-28 PROCEDURE — 82607 VITAMIN B-12: CPT

## 2023-02-28 PROCEDURE — 84403 ASSAY OF TOTAL TESTOSTERONE: CPT

## 2023-02-28 PROCEDURE — 84550 ASSAY OF BLOOD/URIC ACID: CPT

## 2023-02-28 PROCEDURE — 36415 COLL VENOUS BLD VENIPUNCTURE: CPT

## 2023-02-28 PROCEDURE — 85027 COMPLETE CBC AUTOMATED: CPT

## 2023-02-28 PROCEDURE — 84132 ASSAY OF SERUM POTASSIUM: CPT

## 2023-02-28 PROCEDURE — 85007 BL SMEAR W/DIFF WBC COUNT: CPT

## 2023-02-28 PROCEDURE — 84153 ASSAY OF PSA TOTAL: CPT

## 2023-03-01 ENCOUNTER — OFFICE VISIT (OUTPATIENT)
Dept: NEUROLOGY | Facility: CLINIC | Age: 68
End: 2023-03-01
Payer: COMMERCIAL

## 2023-03-01 VITALS — DIASTOLIC BLOOD PRESSURE: 74 MMHG | HEART RATE: 71 BPM | SYSTOLIC BLOOD PRESSURE: 125 MMHG | TEMPERATURE: 97.4 F

## 2023-03-01 DIAGNOSIS — G62.89 AXONAL SENSORIMOTOR NEUROPATHY: Primary | ICD-10-CM

## 2023-03-01 PROCEDURE — 99213 OFFICE O/P EST LOW 20 MIN: CPT | Performed by: PSYCHIATRY & NEUROLOGY

## 2023-03-01 ASSESSMENT — PAIN SCALES - GENERAL: PAINLEVEL: NO PAIN (0)

## 2023-03-01 NOTE — NURSING NOTE
"Initial /74   Pulse 71   Temp 97.4  F (36.3  C) (Tympanic)  Estimated body mass index is 27.05 kg/m  as calculated from the following:    Height as of 9/20/22: 1.854 m (6' 1\").    Weight as of 9/20/22: 93 kg (205 lb). .    Gaby Dhillon LPN on 3/1/2023 at 11:41 AM    "

## 2023-03-01 NOTE — LETTER
3/1/2023         RE: Jonathon Santos  [id number] [address] [place] [id number]        Dear Colleague,    Thank you for referring your patient, Jonathon Santos, to the [facility] [place]. Please see a copy of my visit note below.    ESTABLISHED PATIENT NEUROLOGY NOTE    DATE OF VISIT: 3/1/2023  MRN: [id number]  PATIENT NAME: Jonathon Santos  YOB: 1955    Chief Complaint   Patient presents with     RECHECK     SUBJECTIVE:                                                      HISTORY OF PRESENT ILLNESS:  Jonathon is here for follow up regarding peripheral neuropathy.  Jonathon has additional history of prostate cancer, s/p chemotherapy and radiation.  He also has rheumatoid arthritis, hypertension and gout.  I evaluated him initially about a year ago for numbness affecting his feet.  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  Upon additional testing, he was found to have a low B12 and elevated MMA so I recommended monthly B12 injections.  ESR had been mildly elevated at 21.  TSH, CRP, PINO, SPEP, immunofixation, SSA, SSB, folate, TTG, B1 and B6 were all normal/negative.  We have also talked about cutting back on alcohol intake in the past.  Jonathon had an updated B12 level checked yesterday and this was in good range at 747.    Jonathon is here alone today. He clarifies that he takes 1500-2000mcg daily of the oral B12.  He says that he is starting to have some prickly sensations in the feet now, which feels closer to normal than when his numbness was more profound.  He still does not have very good balance and has to be cautious.  He has not had any recent falls.  No progression approximately of the neuropathy symptoms.  He reminds me that he did not like doing the monthly B12 injections.  He said it is quite a large tube of medication.    CURRENT MEDICATIONS:   cyanocobalamin (VITAMIN B-12) 1000 MCG SUBL sublingual tablet, Place 1,000 mcg under the tongue  daily  febuxostat (ULORIC) 40 MG TABS tablet, Take 40 mg by mouth daily  lisinopril-hydrochlorothiazide (ZESTORETIC) 20-12.5 MG tablet, Take 2 tablets by mouth daily  metoprolol succinate ER (TOPROL XL) 25 MG 24 hr tablet, Take 1 tablet (25 mg) by mouth daily (with lunch)  potassium chloride ER (K-TAB) 20 MEQ CR tablet, Take 1 tablet (20 mEq) by mouth daily    No current facility-administered medications on file prior to visit.      RECENT DIAGNOSTIC STUDIES:   Labs: No results found for any visits on 03/01/23.  B12: 747 (2.28.23)    REVIEW OF SYSTEMS:                                                      10-point review of systems is negative except as mentioned above in HPI.    EXAM:                                                      Physical Exam:   Vitals: /74   Pulse 71   Temp 97.4  F (36.3  C) (Tympanic)   BMI= There is no height or weight on file to calculate BMI.  GENERAL: NAD.  HEENT: NC/AT.  CV: RRR. S1, S2.   NECK: No bruits.  PULM: Non-labored breathing.   Focused Neurologic:  MENTAL STATUS: Alert, attentive. Speech is fluent. Normal comprehension. Normal concentration. Adequate fund of knowledge.   CRANIAL NERVES:  Facial movement normal. EOM full.  Eyelids slightly drooped bilaterally.  Hearing intact to conversation. Trapezius strength intact.   MOTOR: 5/5 in proximal and distal muscle groups of lower extremities. Tone and bulk normal.   DTRs: Babinski down-going bilaterally.   SENSATION: Intact pinprick in the feet, this is an improvement. Intact proprioception at great toes. Vibration: 3-4 seconds at both ankles.   COORDINATION: No observed tremor today. Normal fine motor movements.  STATION AND GAIT: Romberg positive. Casual gait is cautious but otherwise normal.    Left hand-dominant.    ASSESSMENT and PLAN:                                                      Assessment:    ICD-10-CM    1. Axonal sensorimotor neuropathy  G62.89 Vitamin B12           Mr. Ha is a pleasant 66 yo man  here for follow-up regarding peripheral neuropathy. He is reporting some positive symptoms/paresthesias which could represent some improvement. We will continue to monitor his B12 level. I would like to see Jonathon back in about 6 months. He understands and agrees with a plan.    Plan:  -- Continue the B12 supplement: 1500-2000mcg daily.  -- We will continue to monitor your B12 levels when you have blood work done.  -- Return to neurology clinic in 6 months.  Please let us know if any additional concerns arise in the meantime.    Total Time: 20 minutes were spent with the patient and in chart review/documentation (as described above in Assessment and Plan)/coordinating the care on date of service.    Pinky Thompson MD  Neurology    Dragon software used in the dictation of this note.                        Again, thank you for allowing me to participate in the care of your patient.        Sincerely,        Pinky Thompson MD

## 2023-03-01 NOTE — PROGRESS NOTES
ESTABLISHED PATIENT NEUROLOGY NOTE    DATE OF VISIT: 3/1/2023  MRN: [id number]  PATIENT NAME: Jonathon Santos  YOB: 1955    Chief Complaint   Patient presents with     RECHECK     SUBJECTIVE:                                                      HISTORY OF PRESENT ILLNESS:  Jonathon is here for follow up regarding peripheral neuropathy.  Jonathon has additional history of prostate cancer, s/p chemotherapy and radiation.  He also has rheumatoid arthritis, hypertension and gout.  I evaluated him initially about a year ago for numbness affecting his feet.  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  Upon additional testing, he was found to have a low B12 and elevated MMA so I recommended monthly B12 injections.  ESR had been mildly elevated at 21.  TSH, CRP, PINO, SPEP, immunofixation, SSA, SSB, folate, TTG, B1 and B6 were all normal/negative.  We have also talked about cutting back on alcohol intake in the past.  Jonathon had an updated B12 level checked yesterday and this was in good range at 747.    Jonathon is here alone today. He clarifies that he takes 1500-2000mcg daily of the oral B12.  He says that he is starting to have some prickly sensations in the feet now, which feels closer to normal than when his numbness was more profound.  He still does not have very good balance and has to be cautious.  He has not had any recent falls.  No progression approximately of the neuropathy symptoms.  He reminds me that he did not like doing the monthly B12 injections.  He said it is quite a large tube of medication.    CURRENT MEDICATIONS:   cyanocobalamin (VITAMIN B-12) 1000 MCG SUBL sublingual tablet, Place 1,000 mcg under the tongue daily  febuxostat (ULORIC) 40 MG TABS tablet, Take 40 mg by mouth daily  lisinopril-hydrochlorothiazide (ZESTORETIC) 20-12.5 MG tablet, Take 2 tablets by mouth daily  metoprolol succinate ER (TOPROL XL) 25 MG 24 hr tablet, Take 1 tablet (25 mg) by mouth daily (with  lunch)  potassium chloride ER (K-TAB) 20 MEQ CR tablet, Take 1 tablet (20 mEq) by mouth daily    No current facility-administered medications on file prior to visit.      RECENT DIAGNOSTIC STUDIES:   Labs: No results found for any visits on 03/01/23.  B12: 747 (2.28.23)    REVIEW OF SYSTEMS:                                                      10-point review of systems is negative except as mentioned above in HPI.    EXAM:                                                      Physical Exam:   Vitals: /74   Pulse 71   Temp 97.4  F (36.3  C) (Tympanic)   BMI= There is no height or weight on file to calculate BMI.  GENERAL: NAD.  HEENT: NC/AT.  CV: RRR. S1, S2.   NECK: No bruits.  PULM: Non-labored breathing.   Focused Neurologic:  MENTAL STATUS: Alert, attentive. Speech is fluent. Normal comprehension. Normal concentration. Adequate fund of knowledge.   CRANIAL NERVES:  Facial movement normal. EOM full.  Eyelids slightly drooped bilaterally.  Hearing intact to conversation. Trapezius strength intact.   MOTOR: 5/5 in proximal and distal muscle groups of lower extremities. Tone and bulk normal.   DTRs: Babinski down-going bilaterally.   SENSATION: Intact pinprick in the feet, this is an improvement. Intact proprioception at great toes. Vibration: 3-4 seconds at both ankles.   COORDINATION: No observed tremor today. Normal fine motor movements.  STATION AND GAIT: Romberg positive. Casual gait is cautious but otherwise normal.    Left hand-dominant.    ASSESSMENT and PLAN:                                                      Assessment:    ICD-10-CM    1. Axonal sensorimotor neuropathy  G62.89 Vitamin B12           Mr. Ha is a pleasant 68 yo man here for follow-up regarding peripheral neuropathy. He is reporting some positive symptoms/paresthesias which could represent some improvement. We will continue to monitor his B12 level. I would like to see Jonathon back in about 6 months. He understands and agrees with a  plan.    Plan:  -- Continue the B12 supplement: 1500-2000mcg daily.  -- We will continue to monitor your B12 levels when you have blood work done.  -- Return to neurology clinic in 6 months.  Please let us know if any additional concerns arise in the meantime.    Total Time: 20 minutes were spent with the patient and in chart review/documentation (as described above in Assessment and Plan)/coordinating the care on date of service.    Pinky Thompson MD  Neurology    The [employer] software used in the dictation of this note.

## 2023-03-01 NOTE — PATIENT INSTRUCTIONS
Plan:  -- Continue the B12 supplement: 1500-2000mcg daily.  -- We will continue to monitor your B12 levels when you have blood work done.  -- Return to neurology clinic in 6 months.  Please let us know if any additional concerns arise in the meantime.

## 2023-03-02 LAB — TESTOST SERPL-MCNC: 3 NG/DL (ref 240–950)

## 2023-03-07 ENCOUNTER — MYC MEDICAL ADVICE (OUTPATIENT)
Dept: RHEUMATOLOGY | Facility: CLINIC | Age: 68
End: 2023-03-07
Payer: COMMERCIAL

## 2023-03-07 NOTE — TELEPHONE ENCOUNTER
"Per 2/28/23 result note: \"Your uric acid has jumped up to 9.1. Any episodes of a flare? Did you switch back to Allopuriol?\"    Please see patient's mychart response and advise.  Stacy Light RN on 3/7/2023 at 10:18 AM    "

## 2023-03-22 NOTE — PROGRESS NOTES
Virtual Visit Details    Type of service:  Video Visit   Is Pt currently in [place]? Yes    NOTE:  If Pt is not in [place], Appointment needs to be canceled and rescheduled.    Video Start Time: 8:52am  Video End Time:8:58 AM    Originating Location (pt. Location): Home    Distant Location (provider location):  Off-site  Platform used for Video Visit: [facility]      Rheumatology Clinic Visit  [facility]  [facility]     Jonathon Santos MRN# [id number]   YOB: 1955 Age: 68 year old   Date of Visit: 3/24/2023  Primary care provider: Jonathon Gonzales          Assessment and Plan:     1.  Idiopathic chronic gout  2.  On allopurinol    Patient presents today for follow-up regarding his chronic idiopathic gout with tophi.  Overall he states that he has been doing well.  He does state that he has not been as diligent with taking his allopurinol as he could be.  He did start to take it more consistently when he seen that his uric acid was up to 9.1.  At this time he like to continue on the 150 mg daily and see where his uric acid is at with him taking it more consistently.  He has not had any other issues.  Therefore we will continue him on his current dose and monitor.  Follow-up with me in 6 months.      Plan:     1. Schedule follow-up with Jennifer Mars PA-C in 6 months, okay to be video visit.   2. Labs: uric acid monthly   3. Medication recommendations:   a. Continue Allopurinol 150mg daily-we'll see what your next uric acid is and then decide if we need to increase the dose    DEVON Linda  Rheumatology         History of Present Illness:   Jonathon Santos presents for evaluation of gout.     Interval history March 24, 2023:  He reports that he has not had any flares with his gout. He is taking Allopurinol 150mg daily. He states that he has not been diligent about taking it. He states that he has been better about taking it more daily since his uric acid was elevated.     HPI from  consult on 9/20/2022:  Patient saw Dr. Mcpherson on April 26, 2021.  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  He does have a history of prostate cancer.  Reports of significant side effects from allopurinol.  Had been on Uloric..  Plan was to continue on 40 mg daily.    He was initially diagnosed with RA. He then saw Dr. Wang and was told he does not have RA and was diagnosed with gout. This was 3-4 years ago. He is currently on Zytiga and was getting reactions. So he switched to Uloric from Allopurinol. This did not help the reactions. He would like to go back on Allopurinol. He has not had any flares. He is not interested in taking more medication. He does have peripheral neuropathy likely from Chemo. He will be done with it at the end of December.     His middle PIPs have been affected by the gout.          Review of Systems:     Constitutional: negative  Skin: negative  Eyes: negative  Ears/Nose/Throat: negative  Respiratory: No shortness of breath, dyspnea on exertion, cough, or hemoptysis  Cardiovascular: negative  Gastrointestinal: negative  Genitourinary: negative  Musculoskeletal: as above  Neurologic: negative  Psychiatric: negative  Hematologic/Lymphatic/Immunologic: negative  Endocrine: negative         Active Problem List:     Patient Active Problem List    Diagnosis Date Noted     Regional lymph node metastasis present (H) 01/18/2021     Priority: Medium     Prostate cancer (H) 12/21/2020     Priority: Medium     Mixed hyperlipidemia 09/24/2019     Priority: Medium     Uncontrolled hypertension 09/24/2019     Priority: Medium     Refused influenza vaccine 09/17/2019     Priority: Medium     Refused pneumococcal vaccination 09/17/2019     Priority: Medium     Inflammatory arthritis 09/17/2019     Priority: Medium     Maribell '12, +CRP and sed rate.  serologies neg.  Knee tap pos. for rheumatoid   factor.       Renal insufficiency syndrome 08/05/2016      Priority: Medium     Creatinine 1.4 in 2012       Gout 07/30/2012     Priority: Medium     Uric acid normal after HCTZ stopped.  Knee tap pos for uric acid crystals June '12.    Uric acid normal after HCTZ stopped.  Knee tap pos for uric acid crystals June '12.              Past Medical History:     Past Medical History:   Diagnosis Date     Benign essential hypertension      Gout      Prostate cancer (H)      Past Surgical History:   Procedure Laterality Date     COLONOSCOPY       COLONOSCOPY N/A 7/29/2022    Procedure: COLONOSCOPY, WITH POLYPECTOMY AND BIOPSY;  Surgeon: Ward Bearden DO;  Location: [facility]     FOOT SURGERY       HC REVISE MEDIAN N/CARPAL TUNNEL SURG      Description: Neuroplasty Decompression Median Nerve At Carpal Tunnel;  Recorded: 05/20/2013;     INSERT SEED MARKER / MATRIX N/A 4/30/2021    Procedure: Transrectal ultrasound guided placement of fiducials and SpaceOar;  Surgeon: Ferdinand Caban MD;  Location: [facility]     PROSTATE BIOPSY, NEEDLE, SATURATION SAMPLING       SPINE SURGERY      unspecified low back surgery            Social History:     Social History     Socioeconomic History     Marital status: Single     Spouse name: Not on file     Number of children: 2     Years of education: Not on file     Highest education level: Not on file   Occupational History     Occupation: retired    Tobacco Use     Smoking status: Never     Smokeless tobacco: Former     Quit date: 2011   Substance and Sexual Activity     Alcohol use: Yes     Alcohol/week: 7.0 - 14.0 standard drinks     Types: 7 - 14 Cans of beer per week     Comment: 1-2 beers daily     Drug use: Never     Sexual activity: Not Currently   Other Topics Concern     Not on file   Social History Narrative     Not on file     Social Determinants of Health     Financial Resource Strain: Not on file   Food Insecurity: Not on file   Transportation Needs: Not on file   Physical Activity: Not on file    Stress: Not on file   Social Connections: Not on file   Intimate Partner Violence: Not on file   Housing Stability: Not on file          Family History:     Family History   Problem Relation Age of Onset     Hypertension Mother      Gout Mother      Hypertension Father      Diabetes Father      Hypertension Maternal Grandmother      Hypertension Maternal Grandfather      Hypertension Brother      Hypertension Sister      Diabetes Cousin      Deep Vein Thrombosis No family hx of      Anesthesia Reaction No family hx of             Allergies:   No Known Allergies         Medications:     Current Outpatient Medications   Medication Sig Dispense Refill     cyanocobalamin (VITAMIN B-12) 1000 MCG SUBL sublingual tablet Place 1,000 mcg under the tongue daily       febuxostat (ULORIC) 40 MG TABS tablet Take 40 mg by mouth daily       lisinopril-hydrochlorothiazide (ZESTORETIC) 20-12.5 MG tablet Take 2 tablets by mouth daily 180 tablet 3     metoprolol succinate ER (TOPROL XL) 25 MG 24 hr tablet Take 1 tablet (25 mg) by mouth daily (with lunch) 90 tablet 4     potassium chloride ER (K-TAB) 20 MEQ CR tablet Take 1 tablet (20 mEq) by mouth daily 90 tablet 3            Physical Exam:   There were no vitals taken for this visit.  Wt Readings from Last 6 Encounters:   22 93 kg (205 lb)   22 93 kg (205 lb)   22 93.3 kg (205 lb 9.6 oz)   22 94.5 kg (208 lb 6.4 oz)   02/15/22 94.3 kg (208 lb)   10/29/21 93.5 kg (206 lb 3.2 oz)     Constitutional: well-developed, appearing stated age; cooperative  Eyes: nl  conjunctiva, sclera  ENT: nl external ears, nose, hearing, lips,   Resp: No shortness of breath with normal conversation  Psych: nl judgement, orientation, memory, affect.           Data:   Imagin2017 xray hands    FINDINGS: No evidence of fracture or contusion in either hand or wrist.   Moderate degenerative osteoarthritic changes at the left first CMC joint.   Scattered mild degenerative  osteoarthritic changes at the PIP and DIP   joints. No evidence of erosive changes.    5/30/2017 xray foot  FINDINGS: Periarticular erosive changes involving the medial aspect of the   left first metatarsal head likely secondary to gout. There are also some   periarticular erosive changes involving the left and right fifth metatarsal    heads with associated soft tissue swelling.      Laboratory:  6/8/2012  Moderate intracellular crystals present consistent with uric acid of the left knee synovial fluid    7/12/2022  Uric acid 7.3    9/6/2022  Creatinine 1.16, GFR 69  Albumin 4.6  ALT 32, AST 28  Vitamin B12 338  White blood cell count 4.6, hemoglobin 12.7, platelet count 111    12/6/2022  Creatinine 1.00, GFR 82  ALT 32, AST 25  Albumin 4.4  Uric acid 5.3  White blood cell count 4.8, hemoglobin 12.6, platelet count 102    2/20/2023  Uric acid 9.1

## 2023-03-24 ENCOUNTER — VIRTUAL VISIT (OUTPATIENT)
Dept: RHEUMATOLOGY | Facility: CLINIC | Age: 68
End: 2023-03-24
Payer: COMMERCIAL

## 2023-03-24 DIAGNOSIS — Z79.899 ON ALLOPURINOL THERAPY: Primary | ICD-10-CM

## 2023-03-24 PROCEDURE — 99213 OFFICE O/P EST LOW 20 MIN: CPT | Mod: VID | Performed by: PHYSICIAN ASSISTANT

## 2023-03-24 NOTE — PATIENT INSTRUCTIONS
After Visit Instructions:     Thank you for coming to [facility] for your care. It is my goal to partner with you to help you reach your optimal state of health.       Plan:     Schedule follow-up with Jennifer Mars PA-C in 6 months, okay to be video visit.   Labs: uric acid monthly   Medication recommendations:   Continue Allopurinol 150mg daily-we'll see what your next uric acid is and then decide if we need to increase the dose      Jennifer Mars PA-C  [facility]  [facility]    Contact information: [facility]  Clinic Number:  [phone]  Please call or send a [employer] message with any questions about your care

## 2023-04-17 DIAGNOSIS — E87.6 HYPOKALEMIA: ICD-10-CM

## 2023-04-17 NOTE — TELEPHONE ENCOUNTER
Medication: K-Tab CR 20 MEQ  Last prescribing provider: Esperanza Bynum    Last clinic visit date: 12/12/22 w/    Any missed appointments or no-shows since last clinic visit?: No    Recommendations for requested medication (if none, N/A): N/A    Next clinic visit date: 06/26/23 w/    Any other pertinent information (if none, N/A): N/A    Pended and Routed to Provider.

## 2023-04-21 RX ORDER — POTASSIUM CHLORIDE 1500 MG/1
20 TABLET, EXTENDED RELEASE ORAL DAILY
Qty: 90 TABLET | Refills: 3 | Status: SHIPPED | OUTPATIENT
Start: 2023-04-21 | End: 2023-11-27

## 2023-05-06 DIAGNOSIS — I10 UNCONTROLLED HYPERTENSION: ICD-10-CM

## 2023-05-08 NOTE — TELEPHONE ENCOUNTER
Routing refill request to provider for review/approval because:  Patient needs to be seen because it has been more than 1 year since last office visit.    Layla Colón RN on 5/8/2023 at 12:06 PM

## 2023-05-10 RX ORDER — LISINOPRIL AND HYDROCHLOROTHIAZIDE 12.5; 2 MG/1; MG/1
2 TABLET ORAL DAILY
Qty: 200 TABLET | Refills: 2 | Status: SHIPPED | OUTPATIENT
Start: 2023-05-10 | End: 2024-05-10

## 2023-05-10 NOTE — TELEPHONE ENCOUNTER
Patient is out and needs refill    He states he will make appointment    Call patient [phone] ok to leave message

## 2023-05-24 ENCOUNTER — TELEPHONE (OUTPATIENT)
Dept: FAMILY MEDICINE | Facility: CLINIC | Age: 68
End: 2023-05-24
Payer: COMMERCIAL

## 2023-05-24 NOTE — TELEPHONE ENCOUNTER
Patient Quality Outreach    Patient is due for the following:   Physical Annual Wellness Visit      Topic Date Due     Pneumococcal Vaccine (1 - PCV) Never done     Zoster (Shingles) Vaccine (1 of 2) Never done     Diptheria Tetanus Pertussis (DTAP/TDAP/TD) Vaccine (1 - Tdap) Never done     COVID-19 Vaccine (4 - Pfizer series) 02/10/2022     Flu Vaccine (1) Never done       Next Steps:   Patient has upcoming appointment, these items will be addressed at that time.    Type of outreach:    Chart review performed, no outreach needed.      Questions for provider review:    None           Thea Cook  Chart routed to self.

## 2023-06-08 ENCOUNTER — OFFICE VISIT (OUTPATIENT)
Dept: FAMILY MEDICINE | Facility: CLINIC | Age: 68
End: 2023-06-08
Payer: COMMERCIAL

## 2023-06-08 VITALS
TEMPERATURE: 96.9 F | OXYGEN SATURATION: 97 % | HEART RATE: 78 BPM | DIASTOLIC BLOOD PRESSURE: 72 MMHG | HEIGHT: 73 IN | WEIGHT: 217.8 LBS | RESPIRATION RATE: 14 BRPM | BODY MASS INDEX: 28.86 KG/M2 | SYSTOLIC BLOOD PRESSURE: 110 MMHG

## 2023-06-08 DIAGNOSIS — D61.818 OTHER PANCYTOPENIA (H): ICD-10-CM

## 2023-06-08 DIAGNOSIS — M1A.09X0 IDIOPATHIC CHRONIC GOUT OF MULTIPLE SITES WITHOUT TOPHUS: ICD-10-CM

## 2023-06-08 DIAGNOSIS — Z00.00 ENCOUNTER FOR MEDICARE ANNUAL WELLNESS EXAM: Primary | ICD-10-CM

## 2023-06-08 DIAGNOSIS — M05.79 RHEUMATOID ARTHRITIS INVOLVING MULTIPLE SITES WITH POSITIVE RHEUMATOID FACTOR (H): ICD-10-CM

## 2023-06-08 DIAGNOSIS — C77.9 REGIONAL LYMPH NODE METASTASIS PRESENT (H): ICD-10-CM

## 2023-06-08 PROCEDURE — 92551 PURE TONE HEARING TEST AIR: CPT | Performed by: FAMILY MEDICINE

## 2023-06-08 PROCEDURE — G0439 PPPS, SUBSEQ VISIT: HCPCS | Performed by: FAMILY MEDICINE

## 2023-06-08 ASSESSMENT — ENCOUNTER SYMPTOMS
SHORTNESS OF BREATH: 0
EYE PAIN: 0
MYALGIAS: 0
JOINT SWELLING: 0
HEMATURIA: 0
SORE THROAT: 0
HEARTBURN: 0
NERVOUS/ANXIOUS: 0
COUGH: 0
DIZZINESS: 0
FREQUENCY: 0
NAUSEA: 0
CONSTIPATION: 0
WEAKNESS: 0
FEVER: 0
CHILLS: 0
ABDOMINAL PAIN: 0
PARESTHESIAS: 0
DYSURIA: 0
HEMATOCHEZIA: 0
PALPITATIONS: 0
ARTHRALGIAS: 0
DIARRHEA: 0
HEADACHES: 0

## 2023-06-08 ASSESSMENT — PAIN SCALES - GENERAL: PAINLEVEL: NO PAIN (0)

## 2023-06-08 ASSESSMENT — ACTIVITIES OF DAILY LIVING (ADL): CURRENT_FUNCTION: NO ASSISTANCE NEEDED

## 2023-06-08 NOTE — PATIENT INSTRUCTIONS
Patient Education   Personalized Prevention Plan  You are due for the preventive services outlined below.  Your care team is available to assist you in scheduling these services.  If you have already completed any of these items, please share that information with your care team to update in your medical record.  Health Maintenance Due   Topic Date Due     Pneumococcal Vaccine (1 - PCV) Never done     Zoster (Shingles) Vaccine (1 of 2) Never done     Diptheria Tetanus Pertussis (DTAP/TDAP/TD) Vaccine (1 - Tdap) Never done     COVID-19 Vaccine (4 - Pfizer series) 02/10/2022     ANNUAL REVIEW OF HM ORDERS  05/16/2023     Your Health Risk Assessment indicates you feel you are not in good health    A healthy lifestyle helps keep the body fit and the mind alert. It helps protect you from disease, helps you fight disease, and helps prevent chronic disease (disease that doesn't go away) from getting worse. This is important as you get older and begin to notice twinges in muscles and joints and a decline in the strength and stamina you once took for granted. A healthy lifestyle includes good healthcare, good nutrition, weight control, recreation, and regular exercise. Avoid harmful substances and do what you can to keep safe. Another part of a healthy lifestyle is stay mentally active and socially involved.    Good healthcare     Have a wellness visit every year.     If you have new symptoms, let us know right away. Don't wait until the next checkup.     Take medicines exactly as prescribed and keep your medicines in a safe place. Tell us if your medicine causes problems.   Healthy diet and weight control     Eat 3 or 4 small, nutritious, low-fat, high-fiber meals a day. Include a variety of fruits, vegetables, and whole-grain foods.     Make sure you get enough calcium in your diet. Calcium, vitamin D, and exercise help prevent osteoporosis (bone thinning).     If you live alone, try eating with others when you can. That  way you get a good meal and have company while you eat it.     Try to keep a healthy weight. If you eat more calories than your body uses for energy, it will be stored as fat and you will gain weight.     Recreation   Recreation is not limited to sports and team events. It includes any activity that provides relaxation, interest, enjoyment, and exercise. Recreation provides an outlet for physical, mental, and social energy. It can give a sense of worth and achievement. It can help you stay healthy.    Mental Exercise and Social Involvement  Mental and emotional health is as important as physical health. Keep in touch with friends and family. Stay as active as possible. Continue to learn and challenge yourself.   Things you can do to stay mentally active are:    Learn something new, like a foreign language or musical instrument.     Play SCRABBLE or do crossword puzzles. If you cannot find people to play these games with you at home, you can play them with others on your computer through the Internet.     Join a games club--anything from card games to chess or checkers or lawn bowling.     Start a new hobby.     Go back to school.     Volunteer.     Read.   Keep up with world events.    Signs of Hearing Loss  Hearing loss is a problem shared by many people. In fact, it's one of the most common health problems, particularly as people age. Most people aged 65 and older have some hearing loss. By age 80, almost everyone does. Hearing loss often occurs slowly over the years. So, you may not realize your hearing has gotten worse.   When sudden hearing loss occurs, it's important to contact your healthcare provider right away. Your provider will do a medical exam and a hearing exam as soon as possible. This is to help find the cause and type of your sudden hearing loss. Based on your diagnosis, your healthcare provider will discuss possible treatments.      Hearing much better with one ear can be a sign of hearing loss.      Have your hearing checked  Call your healthcare provider if you:     Have to strain to hear normal conversation    Have to watch other people s faces very carefully to follow what they re saying    Need to ask people to repeat what they ve said    Often misunderstand what people are saying    Turn the volume of the television or radio up so high that others complain    Feel that people are mumbling when they re talking to you    Find that the effort to hear leaves you feeling tired and irritated    Notice, when using the phone, that you hear better with one ear than the other  Jonelle last reviewed this educational content on 6/1/2022 2000-2022 [employer]. All rights reserved. This information is not intended as a substitute for professional medical care. Always follow your healthcare professional's instructions.

## 2023-06-08 NOTE — PROGRESS NOTES
"SUBJECTIVE:   Jonathon is a 68 year old who presents for Preventive Visit.       View : No data to display.              Are you in the first 12 months of your Medicare coverage?      Healthy Habits:     In general, how would you rate your overall health?  Fair    Frequency of exercise:  2-3 days/week    Duration of exercise:  15-30 minutes    Do you usually eat at least 4 servings of fruit and vegetables a day, include whole grains    & fiber and avoid regularly eating high fat or \"junk\" foods?  Yes    Taking medications regularly:  Yes    Medication side effects:  None    Ability to successfully perform activities of daily living:  No assistance needed    Home Safety:  No safety concerns identified    Hearing Impairment:  Difficulty following a conversation in a noisy restaurant or crowded room, need to ask people to speak up or repeat themselves, find that men's voices are easier to understand than woman's and difficulty understanding soft or whispered speech    In the past 6 months, have you been bothered by leaking of urine?  No    In general, how would you rate your overall mental or emotional health?  Good      PHQ-2 Total Score: 0    Additional concerns today:  No    Have you ever done Advance Care Planning? (For example, a Health Directive, POLST, or a discussion with a medical provider or your loved ones about your wishes): No, advance care planning information given to patient to review.  Patient declined advance care planning discussion at this time.      Right Ear:      1000 Hz RESPONSE- on Level:   20 db  (Conditioning sound)   1000 Hz: RESPONSE- on Level:   20 db    2000 Hz: RESPONSE- on Level: 35 db   4000 Hz: RESPONSE- on Level: 45 db    Left Ear:      4000 Hz: RESPONSE- on Level: 50 db   2000 Hz: RESPONSE- on Level: 35 db   1000 Hz: RESPONSE- on Level: 25 db    500 Hz: RESPONSE- on Level: 25 db    Right Ear:    500 Hz: RESPONSE- on Level:   20 db     Hearing Acuity: REFER    Hearing Assessment: normal "     Fall risk  Fallen 2 or more times in the past year?: No  Any fall with injury in the past year?: No    Cognitive Screening   1) Repeat 3 items (Leader, Season, Table)    2) Clock draw: NORMAL  3) 3 item recall: Recalls 1 object   Results: NORMAL clock, 1-2 items recalled: COGNITIVE IMPAIRMENT LESS LIKELY    Mini-CogTM Copyright CHEYENNE Gooden. Licensed by the author for use in [facility]; reprinted with permission (joel@[email or web address]). All rights reserved.      Do you have sleep apnea, excessive snoring or daytime drowsiness?: no    Reviewed and updated as needed this visit by clinical staff   Tobacco  Allergies  Meds   Med Hx  Surg Hx  Fam Hx  Soc Hx        Reviewed and updated as needed this visit by Provider                 Social History     Tobacco Use     Smoking status: Never     Smokeless tobacco: Former     Quit date: 2011   Vaping Use     Vaping status: Not on file   Substance Use Topics     Alcohol use: Yes     Alcohol/week: 7.0 - 14.0 standard drinks of alcohol     Types: 7 - 14 Cans of beer per week     Comment: 1-2 beers daily             6/8/2023     9:55 AM   Alcohol Use   Prescreen: >3 drinks/day or >7 drinks/week? No          View : No data to display.              Do you have a current opioid prescription? No  Do you use any other controlled substances or medications that are not prescribed by a provider? None        Current providers sharing in care for this patient include:   Patient Care Team:  Jonathon Gonzales MD as PCP - General (Family Practice)  Davion Francois MD as MD (Radiation Oncology)  Pinky Nick MD as Assigned Neuroscience Provider  Jonathon Gonzales MD as Assigned PCP  Jennifer Mars PA-C as Assigned Rheumatology Provider  Andrea Cisneros MD as Assigned Cancer Care Provider    The following health maintenance items are reviewed in Epic and correct as of today:  Health Maintenance   Topic Date Due     Pneumococcal Vaccine: 65+ Years (1 - PCV)  Never done     ZOSTER IMMUNIZATION (1 of 2) Never done     DTAP/TDAP/TD IMMUNIZATION (1 - Tdap) Never done     COVID-19 Vaccine (4 - Pfizer series) 02/10/2022     INFLUENZA VACCINE (Season Ended) 09/01/2023     MEDICARE ANNUAL WELLNESS VISIT  06/08/2024     ANNUAL REVIEW OF HM ORDERS  06/08/2024     FALL RISK ASSESSMENT  06/08/2024     LIPID  06/15/2027     COLORECTAL CANCER SCREENING  07/29/2027     ADVANCE CARE PLANNING  06/08/2028     HEPATITIS C SCREENING  Completed     PHQ-2 (once per calendar year)  Completed     AORTIC ANEURYSM SCREENING (SYSTEM ASSIGNED)  Completed     IPV IMMUNIZATION  Aged Out     MENINGITIS IMMUNIZATION  Aged Out     BP Readings from Last 3 Encounters:   06/08/23 110/72   03/01/23 125/74   11/02/22 130/73    Wt Readings from Last 3 Encounters:   06/08/23 98.8 kg (217 lb 12.8 oz)   09/20/22 93 kg (205 lb)   07/29/22 93 kg (205 lb)                  Patient Active Problem List   Diagnosis     Refused influenza vaccine     Refused pneumococcal vaccination     Gout     Inflammatory arthritis     Renal insufficiency syndrome     Mixed hyperlipidemia     Uncontrolled hypertension     Prostate cancer (H)     Regional lymph node metastasis present (H)     Other pancytopenia (H)     Rheumatoid arthritis involving multiple sites with positive rheumatoid factor (H)     Past Surgical History:   Procedure Laterality Date     COLONOSCOPY       COLONOSCOPY N/A 7/29/2022    Procedure: COLONOSCOPY, WITH POLYPECTOMY AND BIOPSY;  Surgeon: Ward Bearden DO;  Location: [facility]     FOOT SURGERY       HC REVISE MEDIAN N/CARPAL TUNNEL SURG      Description: Neuroplasty Decompression Median Nerve At Carpal Tunnel;  Recorded: 05/20/2013;     INSERT SEED MARKER / MATRIX N/A 4/30/2021    Procedure: Transrectal ultrasound guided placement of fiducials and SpaceOar;  Surgeon: Ferdinand Caban MD;  Location: [facility]     PROSTATE BIOPSY, NEEDLE, SATURATION SAMPLING       SPINE SURGERY      unspecified  low back surgery       Social History     Tobacco Use     Smoking status: Never     Smokeless tobacco: Former     Quit date: 2011   Vaping Use     Vaping status: Not on file   Substance Use Topics     Alcohol use: Yes     Alcohol/week: 7.0 - 14.0 standard drinks of alcohol     Types: 7 - 14 Cans of beer per week     Comment: 1-2 beers daily     Family History   Problem Relation Age of Onset     Hypertension Mother      Gout Mother      Hypertension Father      Diabetes Father      Hypertension Maternal Grandmother      Hypertension Maternal Grandfather      Hypertension Brother      Hypertension Sister      Diabetes Cousin      Deep Vein Thrombosis No family hx of      Anesthesia Reaction No family hx of          Current Outpatient Medications   Medication Sig Dispense Refill     cyanocobalamin (VITAMIN B-12) 1000 MCG SUBL sublingual tablet Place 1,000 mcg under the tongue daily       lisinopril-hydrochlorothiazide (ZESTORETIC) 20-12.5 MG tablet TAKE 2 TABLETS BY MOUTH  DAILY 200 tablet 2     metoprolol succinate ER (TOPROL XL) 25 MG 24 hr tablet Take 1 tablet (25 mg) by mouth daily (with lunch) 90 tablet 4     potassium chloride ER (K-TAB) 20 MEQ CR tablet Take 1 tablet (20 mEq) by mouth daily 90 tablet 3     No Known Allergies      Review of Systems   Constitutional: Negative for chills and fever.   HENT: Negative for congestion, ear pain, hearing loss and sore throat.    Eyes: Negative for pain and visual disturbance.   Respiratory: Negative for cough and shortness of breath.    Cardiovascular: Negative for chest pain, palpitations and peripheral edema.   Gastrointestinal: Negative for abdominal pain, constipation, diarrhea, heartburn, hematochezia and nausea.   Genitourinary: Negative for dysuria, frequency, genital sores, hematuria, impotence, penile discharge and urgency.   Musculoskeletal: Negative for arthralgias, joint swelling and myalgias.   Skin: Negative for rash.   Neurological: Negative for dizziness,  "weakness, headaches and paresthesias.   Psychiatric/Behavioral: Negative for mood changes. The patient is not nervous/anxious.      Constitutional, HEENT, cardiovascular, pulmonary, gi and gu systems are negative, except as otherwise noted.    OBJECTIVE:   /72   Pulse 78   Temp 96.9  F (36.1  C) (Tympanic)   Resp 14   Ht 1.854 m (6' 1\")   Wt 98.8 kg (217 lb 12.8 oz)   SpO2 97%   BMI 28.74 kg/m   Estimated body mass index is 28.74 kg/m  as calculated from the following:    Height as of this encounter: 1.854 m (6' 1\").    Weight as of this encounter: 98.8 kg (217 lb 12.8 oz).  Physical Exam  GENERAL: healthy, alert and no distress  NECK: no adenopathy, no asymmetry, masses, or scars and thyroid normal to palpation  RESP: lungs clear to auscultation - no rales, rhonchi or wheezes  CV: regular rate and rhythm, normal S1 S2, no S3 or S4, no murmur, click or rub, no peripheral edema and peripheral pulses strong  ABDOMEN: soft, nontender, no hepatosplenomegaly, no masses and bowel sounds normal  MS: no gross musculoskeletal defects noted, no edema    Diagnostic Test Results:  Labs reviewed in Epic    ASSESSMENT / PLAN:   (Z00.00) Encounter for Medicare annual wellness exam  (primary encounter diagnosis)      (D61.818) Other pancytopenia (H)  Good control.  Continue currant medications, continue to monitor.      (M05.79) Rheumatoid arthritis involving multiple sites with positive rheumatoid factor (H)  Good control.  Continue currant medications, continue to monitor.        (M1A.09X0) Idiopathic chronic gout of multiple sites without tophus  Good control.  Continue currant medications, continue to monitor.    Plan: Uric acid        Patient has been advised of split billing requirements and indicates understanding: Yes      COUNSELING:  Reviewed preventive health counseling, as reflected in patient instructions       Consider AAA screening for ages 65-75 and smoking history       Regular exercise       Healthy " "diet/nutrition       Vision screening       Hearing screening       Dental care       Bladder control       Fall risk prevention       Colon cancer screening       Prostate cancer screening      BMI:   Estimated body mass index is 28.74 kg/m  as calculated from the following:    Height as of this encounter: 1.854 m (6' 1\").    Weight as of this encounter: 98.8 kg (217 lb 12.8 oz).   Weight management plan: Discussed healthy diet and exercise guidelines      He reports that he has never smoked. He quit smokeless tobacco use about 12 years ago.      Appropriate preventive services were discussed with this patient, including applicable screening as appropriate for cardiovascular disease, diabetes, osteopenia/osteoporosis, and glaucoma.  As appropriate for age/gender, discussed screening for colorectal cancer, prostate cancer, breast cancer, and cervical cancer. Checklist reviewing preventive services available has been given to the patient.    Reviewed patients plan of care and provided an AVS. The Intermediate Care Plan ( asthma action plan, low back pain action plan, and migraine action plan) for Jonathon meets the Care Plan requirement. This Care Plan has been established and reviewed with the Patient.      Jonathon Gonzales MD  [facility]    Identified Health Risks:    I have reviewed Opioid Use Disorder and Substance Use Disorder risk factors and made any needed referrals.       The patient was provided with suggestions to help him develop a healthy physical lifestyle.  The patient was provided with written information regarding signs of hearing loss.  "

## 2023-06-17 DIAGNOSIS — I10 UNCONTROLLED HYPERTENSION: ICD-10-CM

## 2023-06-19 ENCOUNTER — LAB (OUTPATIENT)
Dept: LAB | Facility: CLINIC | Age: 68
End: 2023-06-19
Payer: COMMERCIAL

## 2023-06-19 DIAGNOSIS — M1A.09X0 IDIOPATHIC CHRONIC GOUT OF MULTIPLE SITES WITHOUT TOPHUS: ICD-10-CM

## 2023-06-19 DIAGNOSIS — Z79.899 ENCOUNTER FOR LONG-TERM (CURRENT) USE OF MEDICATIONS: ICD-10-CM

## 2023-06-19 DIAGNOSIS — C61 PROSTATE CANCER (H): ICD-10-CM

## 2023-06-19 DIAGNOSIS — E87.6 HYPOKALEMIA: ICD-10-CM

## 2023-06-19 DIAGNOSIS — E53.8 VITAMIN B12 DEFICIENCY (NON ANEMIC): ICD-10-CM

## 2023-06-19 DIAGNOSIS — M1A.09X1 IDIOPATHIC CHRONIC GOUT, MULTIPLE SITES, WITH TOPHUS (TOPHI): ICD-10-CM

## 2023-06-19 LAB
BASOPHILS # BLD MANUAL: 0 10E3/UL (ref 0–0.2)
BASOPHILS NFR BLD MANUAL: 0 %
EOSINOPHIL # BLD MANUAL: 0 10E3/UL (ref 0–0.7)
EOSINOPHIL NFR BLD MANUAL: 1 %
ERYTHROCYTE [DISTWIDTH] IN BLOOD BY AUTOMATED COUNT: 13.6 % (ref 10–15)
HCT VFR BLD AUTO: 38 % (ref 40–53)
HGB BLD-MCNC: 12.8 G/DL (ref 13.3–17.7)
LYMPHOCYTES # BLD MANUAL: 3.3 10E3/UL (ref 0.8–5.3)
LYMPHOCYTES NFR BLD MANUAL: 67 %
MCH RBC QN AUTO: 32.3 PG (ref 26.5–33)
MCHC RBC AUTO-ENTMCNC: 33.7 G/DL (ref 31.5–36.5)
MCV RBC AUTO: 96 FL (ref 78–100)
MONOCYTES # BLD MANUAL: 0.3 10E3/UL (ref 0–1.3)
MONOCYTES NFR BLD MANUAL: 7 %
NEUTROPHILS # BLD MANUAL: 1.2 10E3/UL (ref 1.6–8.3)
NEUTROPHILS NFR BLD MANUAL: 25 %
PLAT MORPH BLD: ABNORMAL
PLATELET # BLD AUTO: 69 10E3/UL (ref 150–450)
POTASSIUM SERPL-SCNC: 4.4 MMOL/L (ref 3.4–5.3)
PSA SERPL DL<=0.01 NG/ML-MCNC: <0.01 NG/ML (ref 0–4.5)
RBC # BLD AUTO: 3.96 10E6/UL (ref 4.4–5.9)
RBC MORPH BLD: ABNORMAL
URATE SERPL-MCNC: 3.8 MG/DL (ref 3.4–7)
VIT B12 SERPL-MCNC: 699 PG/ML (ref 232–1245)
WBC # BLD AUTO: 4.9 10E3/UL (ref 4–11)

## 2023-06-19 PROCEDURE — 36415 COLL VENOUS BLD VENIPUNCTURE: CPT

## 2023-06-19 PROCEDURE — 84153 ASSAY OF PSA TOTAL: CPT

## 2023-06-19 PROCEDURE — 85007 BL SMEAR W/DIFF WBC COUNT: CPT

## 2023-06-19 PROCEDURE — 85027 COMPLETE CBC AUTOMATED: CPT

## 2023-06-19 PROCEDURE — 82607 VITAMIN B-12: CPT

## 2023-06-19 PROCEDURE — 84132 ASSAY OF SERUM POTASSIUM: CPT

## 2023-06-19 PROCEDURE — 84550 ASSAY OF BLOOD/URIC ACID: CPT

## 2023-06-19 RX ORDER — METOPROLOL SUCCINATE 25 MG/1
TABLET, EXTENDED RELEASE ORAL
Qty: 100 TABLET | Refills: 2 | Status: SHIPPED | OUTPATIENT
Start: 2023-06-19 | End: 2024-03-12

## 2023-06-26 ENCOUNTER — VIRTUAL VISIT (OUTPATIENT)
Dept: ONCOLOGY | Facility: CLINIC | Age: 68
End: 2023-06-26
Attending: INTERNAL MEDICINE
Payer: COMMERCIAL

## 2023-06-26 DIAGNOSIS — C61 PROSTATE CANCER (H): Primary | ICD-10-CM

## 2023-06-26 DIAGNOSIS — D69.6 THROMBOCYTOPENIA (H): ICD-10-CM

## 2023-06-26 PROCEDURE — 99214 OFFICE O/P EST MOD 30 MIN: CPT | Mod: VID | Performed by: INTERNAL MEDICINE

## 2023-06-26 NOTE — PROGRESS NOTES
Jonathon is a 67 year old who is being evaluated via a billable video visit.      How would you like to obtain your AVS? MyChart  If the video visit is dropped, the invitation should be resent by: Text to cell phone: [phone]  Will anyone else be joining your video visit? No        [facility] Medical Oncology Follow Up Note       Date of visit: 6/26/23      HPI:  69 yo male with elevated PSA who had a prostate biopsy on 12/21/20.   9/17/19 PSA =  6.57    Pathology from 12/21/20 shows Gl 4+5=9 disease.  The patient's MRI from 11/17/20 shows concern for enlarged lymph nodes raising concern for mets.  Has placement of fiducials and SpaceOar on 4/30 with Dr. Caban.      Started prednisone abiraterone 1/25/21 with plan for 2 years.    Completed radiation 5/19/21 to 7/15/21 with Dr. Francois  9/15/21 PSA<0.01  12/16/21 PSA<0.01  1/14/22 PSA<0.01.    8/2/22  PSA<0.01  6/19/23 PSA<0.01    INTERVAL HX:  Jonathon is overall doing fine.  He has had some issues of hematochezia and is concerned that it is resolving from the low platelets.  He also thinks it happens more likely when his stools are hard and when he is dehydrated.  He has no cramping.  He had a colonoscopy in July which was consistent with mild radiation change but no significant inflammation and no tumors.  He is due to discontinue the abiraterone in about 10 days    ROS  10 point ROS otherwise unremarkable.    PMH:  HTN  Gout    MEDS  Lisinopril - hydrochlorothiazide  Metoprolol      PHYSICAL EXAM-video   General: Patient appears well in no acute distress.   Skin: No visualized rash or lesions on visualized skin  Eyes: EOMI, no erythema, sclera icterus or discharge noted  Resp: Appears to be breathing comfortably without accessory muscle usage, speaking in full sentences, no cough  MSK: Appears to have normal range of motion based on visualized movements  Neurologic: No apparent tremors, facial movements symmetric  Psych: affect good, alert and oriented      LABS  AND IMAGES    Most Recent 3 CBC's:  Recent Labs   Lab Test 06/19/23  1111 02/28/23  1131 12/06/22  1335 02/10/22  1056 01/14/22  1113 12/16/21  1043 10/13/21  1056   WBC 4.9 4.2 4.8   < > 4.2 4.4 3.0*   HGB 12.8* 13.7 12.6*   < > 12.9* 13.4 12.3*   MCV 96 92 93   < > 101* 98 101*   PLT 69* 88* 102*   < > 87* 100* 108*   ANEUTAUTO  --   --   --   --  2.7 3.1 2.0    < > = values in this interval not displayed.     Most Recent 3 BMP's:  Recent Labs   Lab Test 06/19/23  1111 02/28/23  1131 12/06/22  1335 11/08/22  1317 10/04/22  1330   NA  --   --  139 140 132*   POTASSIUM 4.4 4.1 3.6 3.6 3.8   CHLORIDE  --   --  101 101 95*   CO2  --   --  24 26 21*   BUN  --   --  21.1 22.0 19.2   CR  --   --  1.00 1.08 1.03   ANIONGAP  --   --  14 13 16*   MISTY  --   --  9.9 9.9 9.4   GLC  --   --  111* 115* 103*   PROTTOTAL  --   --  6.9 7.0 7.1   ALBUMIN  --   --  4.4 4.4 4.4    Most Recent 3 LFT's:  Recent Labs   Lab Test 12/06/22  1335 11/08/22  1317 10/04/22  1330   AST 25 24 23   ALT 32 29 24   ALKPHOS 66 59 62   BILITOTAL 1.0 1.0 1.3*    Most Recent 2 TSH and T4:  Recent Labs   Lab Test 06/15/22  1119   TSH 2.39       7-2022   The perianal and digital rectal examinations were normal. Pertinent        negatives include normal sphincter tone and no palpable rectal lesions.        A 2 mm polyp was found in the ileocecal valve. The polyp was sessile.        The polyp was removed with a jumbo cold forceps. Resection and retrieval        were complete. Verification of patient identification for the specimen        was done by the nurse and technician using the patient's name and birth        date. Estimated blood loss was minimal.        A medium post polypectomy scar was found in the cecum. There was no        evidence of the previous polyp.        Scattered small-mouthed diverticula were found in the entire colon.        The mucosa vascular pattern in the recto-sigmoid colon was segmentally        increased. Consistent with history  of radiation        I reviewed the above labs today.     Latest Reference Range & Units 06/19/23 11:11   Potassium 3.4 - 5.3 mmol/L 4.4   PSA Tumor Marker 0.00 - 4.50 ng/mL <0.01   Uric Acid 3.4 - 7.0 mg/dL 3.8   Vitamin B12 232 - 1,245 pg/mL 699   WBC 4.0 - 11.0 10e3/uL 4.9   Hemoglobin 13.3 - 17.7 g/dL 12.8 (L)   Hematocrit 40.0 - 53.0 % 38.0 (L)   Platelet Count 150 - 450 10e3/uL 69 (L)   RBC Count 4.40 - 5.90 10e6/uL 3.96 (L)   MCV 78 - 100 fL 96   MCH 26.5 - 33.0 pg 32.3   MCHC 31.5 - 36.5 g/dL 33.7   RDW 10.0 - 15.0 % 13.6   % Neutrophils % 25   % Lymphocytes % 67   % Monocytes % 7   % Eosinophils % 1   % Basophils % 0   Absolute Basophils 0.0 - 0.2 10e3/uL 0.0   Absolute Neutrophil 1.6 - 8.3 10e3/uL 1.2 (L)   Absolute Lymphocytes 0.8 - 5.3 10e3/uL 3.3   Absolute Monocytes 0.0 - 1.3 10e3/uL 0.3   Absolute Eosinophils 0.0 - 0.7 10e3/uL 0.0   RBC Morphology  Confirmed RBC Indices   Platelet Morphology Automated Count Confirmed. Platelet morphology is normal.  Automated Count Confirmed. Platelet morphology is normal.   (L): Data is abnormally low      IMPRESSION AND PLAN  Locally advanced prostate cancer with high grade [place] 9. No significant comorbidities.     Plan:   1. Two years of ADT plus Abiraterone--> 01/2023. Off Rx and feeling good. Feels muscle mass coming back, still with some fatigue    2. RT to the nodes and prostate completed with Dr. Francois    3. Lupron completed.    a. Return six months with AGATHA  b. If psa rises above 2 would do PSMA PET scan   c. Consider RT to metastasis or restart ADT at that time.     4.  Blood in the stool appears to be from hardened stool and mucosal changes from radiation.  I do not think we should give him a steroid suppository at this time although if these problems get worse we could do that.  I have encouraged him to use a stool softener and remain hydrated in order to reduce straining.    # Thrombocytopenia - still declining. Will send to hematology for evaluation (  which may include a bone marrow evaluation)      Andrea Cisneros MD

## 2023-06-26 NOTE — LETTER
6/26/2023         RE: Jonathon Santos  [id number] [facility] [address]        Dear Colleague,    Thank you for referring your patient, Jonathon Santso, to the [facility]. Please see a copy of my visit note below.    Jonathon is a 67 year old who is being evaluated via a billable video visit.      How would you like to obtain your AVS? MyChart  If the video visit is dropped, the invitation should be resent by: Text to cell phone: [phone]  Will anyone else be joining your video visit? No        [facility] Medical Oncology Follow Up Note       Date of visit: 6/26/23      HPI:  69 yo male with elevated PSA who had a prostate biopsy on 12/21/20.   9/17/19 PSA =  6.57    Pathology from 12/21/20 shows Gl 4+5=9 disease.  The patient's MRI from 11/17/20 shows concern for enlarged lymph nodes raising concern for mets.  Has placement of fiducials and SpaceOar on 4/30 with Dr. Caban.      Started prednisone abiraterone 1/25/21 with plan for 2 years.    Completed radiation 5/19/21 to 7/15/21 with Dr. Francois  9/15/21 PSA<0.01  12/16/21 PSA<0.01  1/14/22 PSA<0.01.    8/2/22  PSA<0.01  6/19/23 PSA<0.01    INTERVAL HX:  Jonathon is overall doing fine.  He has had some issues of hematochezia and is concerned that it is resolving from the low platelets.  He also thinks it happens more likely when his stools are hard and when he is dehydrated.  He has no cramping.  He had a colonoscopy in July which was consistent with mild radiation change but no significant inflammation and no tumors.  He is due to discontinue the abiraterone in about 10 days    ROS  10 point ROS otherwise unremarkable.    PMH:  HTN  Gout    MEDS  Lisinopril - hydrochlorothiazide  Metoprolol      PHYSICAL EXAM-video   General: Patient appears well in no acute distress.   Skin: No visualized rash or lesions on visualized skin  Eyes: EOMI, no erythema, sclera icterus or discharge noted  Resp: Appears to be breathing comfortably  without accessory muscle usage, speaking in full sentences, no cough  MSK: Appears to have normal range of motion based on visualized movements  Neurologic: No apparent tremors, facial movements symmetric  Psych: affect good, alert and oriented      LABS AND IMAGES    Most Recent 3 CBC's:  Recent Labs   Lab Test 06/19/23  1111 02/28/23  1131 12/06/22  1335 02/10/22  1056 01/14/22  1113 12/16/21  1043 10/13/21  1056   WBC 4.9 4.2 4.8   < > 4.2 4.4 3.0*   HGB 12.8* 13.7 12.6*   < > 12.9* 13.4 12.3*   MCV 96 92 93   < > 101* 98 101*   PLT 69* 88* 102*   < > 87* 100* 108*   ANEUTAUTO  --   --   --   --  2.7 3.1 2.0    < > = values in this interval not displayed.     Most Recent 3 BMP's:  Recent Labs   Lab Test 06/19/23  1111 02/28/23  1131 12/06/22  1335 11/08/22  1317 10/04/22  1330   NA  --   --  139 140 132*   POTASSIUM 4.4 4.1 3.6 3.6 3.8   CHLORIDE  --   --  101 101 95*   CO2  --   --  24 26 21*   BUN  --   --  21.1 22.0 19.2   CR  --   --  1.00 1.08 1.03   ANIONGAP  --   --  14 13 16*   MISTY  --   --  9.9 9.9 9.4   GLC  --   --  111* 115* 103*   PROTTOTAL  --   --  6.9 7.0 7.1   ALBUMIN  --   --  4.4 4.4 4.4    Most Recent 3 LFT's:  Recent Labs   Lab Test 12/06/22  1335 11/08/22  1317 10/04/22  1330   AST 25 24 23   ALT 32 29 24   ALKPHOS 66 59 62   BILITOTAL 1.0 1.0 1.3*    Most Recent 2 TSH and T4:  Recent Labs   Lab Test 06/15/22  1119   TSH 2.39       7-2022   The perianal and digital rectal examinations were normal. Pertinent        negatives include normal sphincter tone and no palpable rectal lesions.        A 2 mm polyp was found in the ileocecal valve. The polyp was sessile.        The polyp was removed with a jumbo cold forceps. Resection and retrieval        were complete. Verification of patient identification for the specimen        was done by the nurse and technician using the patient's name and birth        date. Estimated blood loss was minimal.        A medium post polypectomy scar was found in  the cecum. There was no        evidence of the previous polyp.        Scattered small-mouthed diverticula were found in the entire colon.        The mucosa vascular pattern in the recto-sigmoid colon was segmentally        increased. Consistent with history of radiation        I reviewed the above labs today.     Latest Reference Range & Units 06/19/23 11:11   Potassium 3.4 - 5.3 mmol/L 4.4   PSA Tumor Marker 0.00 - 4.50 ng/mL <0.01   Uric Acid 3.4 - 7.0 mg/dL 3.8   Vitamin B12 232 - 1,245 pg/mL 699   WBC 4.0 - 11.0 10e3/uL 4.9   Hemoglobin 13.3 - 17.7 g/dL 12.8 (L)   Hematocrit 40.0 - 53.0 % 38.0 (L)   Platelet Count 150 - 450 10e3/uL 69 (L)   RBC Count 4.40 - 5.90 10e6/uL 3.96 (L)   MCV 78 - 100 fL 96   MCH 26.5 - 33.0 pg 32.3   MCHC 31.5 - 36.5 g/dL 33.7   RDW 10.0 - 15.0 % 13.6   % Neutrophils % 25   % Lymphocytes % 67   % Monocytes % 7   % Eosinophils % 1   % Basophils % 0   Absolute Basophils 0.0 - 0.2 10e3/uL 0.0   Absolute Neutrophil 1.6 - 8.3 10e3/uL 1.2 (L)   Absolute Lymphocytes 0.8 - 5.3 10e3/uL 3.3   Absolute Monocytes 0.0 - 1.3 10e3/uL 0.3   Absolute Eosinophils 0.0 - 0.7 10e3/uL 0.0   RBC Morphology  Confirmed RBC Indices   Platelet Morphology Automated Count Confirmed. Platelet morphology is normal.  Automated Count Confirmed. Platelet morphology is normal.   (L): Data is abnormally low      IMPRESSION AND PLAN  Locally advanced prostate cancer with high grade [place] 9. No significant comorbidities.     Plan:   1. Two years of ADT plus Abiraterone--> 01/2023. Off Rx and feeling good. Feels muscle mass coming back, still with some fatigue    2. RT to the nodes and prostate completed with Dr. Francois    3. Lupron completed.    Return six months with AGATHA  If psa rises above 2 would do PSMA PET scan   Consider RT to metastasis or restart ADT at that time.     4.  Blood in the stool appears to be from hardened stool and mucosal changes from radiation.  I do not think we should give him a steroid suppository  at this time although if these problems get worse we could do that.  I have encouraged him to use a stool softener and remain hydrated in order to reduce straining.    # Thrombocytopenia - still declining. Will send to hematology for evaluation ( which may include a bone marrow evaluation)      Andrea Cisneros MD

## 2023-06-26 NOTE — PATIENT INSTRUCTIONS
Return six months  If psa rises above 2 would do PSMA PET scan  Consider RT to metastasis or restart ADT at that time.

## 2023-06-27 ENCOUNTER — PATIENT OUTREACH (OUTPATIENT)
Dept: ONCOLOGY | Facility: CLINIC | Age: 68
End: 2023-06-27
Payer: COMMERCIAL

## 2023-06-27 DIAGNOSIS — C61 PROSTATE CANCER (H): Primary | ICD-10-CM

## 2023-06-27 NOTE — PROGRESS NOTES
Referral received for benign heme services, see below.    Referral reason: thrombocytopenia, currently receiving prednisone abiraterone with end date in approximately 10 days, completed radiation therapy in July of 2021    Current abnormal labs: Available in Chart Review    Outreach: Ifrah sent to patient    Plan: Triage instructions updated and sent to NPS for completion.

## 2023-06-27 NOTE — PROGRESS NOTES
Lab orders updated to reflect provider as Esperanza Floyd AGATHA, as Dr. Cisneros will have left the clinic by December when labs are due.

## 2023-07-07 NOTE — TELEPHONE ENCOUNTER
RECORDS STATUS - ALL OTHER DIAGNOSIS    thrombocytopenia  RECORDS RECEIVED FROM:    Appt Date: 9/8/2023   NOTES STATUS DETAILS   OFFICE NOTE from referring provider Complete Andrea Man MD   OFFICE NOTE from medical oncologist Complete EPIC   DISCHARGE SUMMARY from hospital     DISCHARGE REPORT from the ER     OPERATIVE REPORT     MEDICATION LIST Complete [facility]   CLINICAL TRIAL TREATMENTS TO DATE     LABS     PATHOLOGY REPORTS     ANYTHING RELATED TO DIAGNOSIS Complete Labs last updated on 6/27/2023   GENONOMIC TESTING     TYPE:     IMAGING (NEED IMAGES & REPORT)     CT SCANS     MRI     MAMMO     ULTRASOUND     PET

## 2023-08-08 ENCOUNTER — TELEPHONE (OUTPATIENT)
Dept: ONCOLOGY | Facility: CLINIC | Age: 68
End: 2023-08-08
Payer: COMMERCIAL

## 2023-09-06 DIAGNOSIS — D70.9 NEUTROPENIA, UNSPECIFIED TYPE (H): Primary | ICD-10-CM

## 2023-09-06 NOTE — PROGRESS NOTES
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    His cytopenias are likely due to a hematologic malignancy, and flow cytometry is pending to help with the diagnosis of this.  He is asymptomatic from the perspective of his neutropenia and thrombocytopenia, so no intervention needs to be done for these counts as of yet.    Plan:  -Follow-up flow cytometry  -Follow-up based on results    The patient is given our center's contact information and is instructed to call if he should have any further questions or concerns.    Hetal Mckeon, nurse clinician, is assigned to provide patient care coordination and education.     Jacob Cogan, MD  Hematology    45 minutes spent by me on the date of the encounter doing chart review, history and exam, documentation and further activities per the note  ----------------------------------------------------------------------------------------------------------------------    History of Present Illness:  Jonathon Santos is a 68 year old man with a history of prostate cancer s/p abiraterone/prednisone (starting 2021), radiation (May to 2021), all therapy completed 2023, now referred to hematology for thrombocytopenia.    On review of labs, he had a normal CBC in 2006.  Thrombocytopenia was first noted in 2019 with a count of 134,000/mcL.  It has gradually trended down over time, and is currently 69,000/mcL.  He has mild anemia as well with a hemoglobin of 12.8 g/dL.  He also has an ANC of 1200/mcL, which is also new for him.    2023: On presentation to the clinic today, pt states that he fells like his overall health is returning to baseline. He has noticed  improvement in energy levels, regrowth of hair and nails in comparison to when he was receiving treatment for prostate cancer. He denies any bleeding episodes including epistaxis, hemoptysis, hematochezia, melena, and hematuria. Furthermore, pt denies any episodes of fevers, chills or night sweats. He has not had any unintentional tyrell loss and is in-fact gaining weight. Otherwise, no other concerns or complaints.     Past Medical History:  Past Medical History:   Diagnosis Date    Benign essential hypertension     Gout     Prostate cancer (H)        Past Surgical History:  Past Surgical History:   Procedure Laterality Date    COLONOSCOPY      COLONOSCOPY N/A 7/29/2022    Procedure: COLONOSCOPY, WITH POLYPECTOMY AND BIOPSY;  Surgeon: Ward Bearden DO;  Location: [facility]    FOOT SURGERY      HC REVISE MEDIAN N/CARPAL TUNNEL SURG      Description: Neuroplasty Decompression Median Nerve At Carpal Tunnel;  Recorded: 05/20/2013;    INSERT SEED MARKER / MATRIX N/A 4/30/2021    Procedure: Transrectal ultrasound guided placement of fiducials and SpaceOar;  Surgeon: Ferdinand Caban MD;  Location: [facility]    PROSTATE BIOPSY, NEEDLE, SATURATION SAMPLING      SPINE SURGERY      unspecified low back surgery       Medications:  Current Outpatient Medications   Medication Sig Dispense Refill    cyanocobalamin (VITAMIN B-12) 1000 MCG SUBL sublingual tablet Place 1,000 mcg under the tongue daily      lisinopril-hydrochlorothiazide (ZESTORETIC) 20-12.5 MG tablet TAKE 2 TABLETS BY MOUTH  DAILY 200 tablet 2    metoprolol succinate ER (TOPROL XL) 25 MG 24 hr tablet TAKE 1 TABLET BY MOUTH  DAILY WITH LUNCH 100 tablet 2    potassium chloride ER (K-TAB) 20 MEQ CR tablet Take 1 tablet (20 mEq) by mouth daily 90 tablet 3        Allergies:  No Known Allergies    ROS:  A 14 point ROS is negative except as stated in the HPI    Social History:  Denies any tobacco use. 2-3 bottles/cans of beer daily. Denies any illicit drug  "use.     Family History:  Non-contributory to the current presentation.    Objective:  BP (!) 141/88 (BP Location: Right arm, Patient Position: Sitting, Cuff Size: Adult Small)   Pulse 68   Temp 98  F (36.7  C) (Oral)   Resp 16   Ht 1.87 m (6' 1.62\")   Wt 101.9 kg (224 lb 9.6 oz)   SpO2 98%   BMI 29.13 kg/m    Exam:   Constitutional: Appears well, no distress  HEENT: Pupils equal and reactive to light. No scleral icterus or hemorrhage. Nares without evidence of telangiectasia. Mucous membranes moist with no wet purpura. Dentition overall ok with no signs of decay. No pharyngeal exudates. No lymphadenopathy, no thyromeagaly  CV: regular rate and rhythm, no murmurs  Respiratory: clear  GI: abdomen soft, nontender, without guarding or rebound. No hepatomeagaly. Splenomegaly 4-5 cm below costal margin. Rectal exam deferred.   Mus/Skele: no edema  Skin: no petechiae. Mild bruising bilateral arms  Neuro: CN II-XII intact. Normal gait. AOx3  Heme/Lymph: no supraclavicular, axillary or umbilical adenopathy.     Labs/Imaging:  Reviewed    "

## 2023-09-07 ENCOUNTER — OFFICE VISIT (OUTPATIENT)
Dept: NEUROLOGY | Facility: CLINIC | Age: 68
End: 2023-09-07
Payer: COMMERCIAL

## 2023-09-07 ENCOUNTER — LAB (OUTPATIENT)
Dept: LAB | Facility: CLINIC | Age: 68
End: 2023-09-07
Payer: COMMERCIAL

## 2023-09-07 VITALS — WEIGHT: 223.2 LBS | BODY MASS INDEX: 29.45 KG/M2

## 2023-09-07 DIAGNOSIS — G62.89 AXONAL SENSORIMOTOR NEUROPATHY: Primary | ICD-10-CM

## 2023-09-07 DIAGNOSIS — G62.89 AXONAL SENSORIMOTOR NEUROPATHY: ICD-10-CM

## 2023-09-07 DIAGNOSIS — E53.8 VITAMIN B12 DEFICIENCY (NON ANEMIC): ICD-10-CM

## 2023-09-07 DIAGNOSIS — C61 PROSTATE CANCER (H): ICD-10-CM

## 2023-09-07 DIAGNOSIS — M1A.09X1 IDIOPATHIC CHRONIC GOUT, MULTIPLE SITES, WITH TOPHUS (TOPHI): ICD-10-CM

## 2023-09-07 DIAGNOSIS — D70.9 NEUTROPENIA, UNSPECIFIED TYPE (H): ICD-10-CM

## 2023-09-07 LAB
ALBUMIN SERPL BCG-MCNC: 4.4 G/DL (ref 3.5–5.2)
ALP SERPL-CCNC: 87 U/L (ref 40–129)
ALT SERPL W P-5'-P-CCNC: 19 U/L (ref 0–70)
ANION GAP SERPL CALCULATED.3IONS-SCNC: 10 MMOL/L (ref 7–15)
AST SERPL W P-5'-P-CCNC: 43 U/L (ref 0–45)
BASOPHILS # BLD MANUAL: 0.2 10E3/UL (ref 0–0.2)
BASOPHILS NFR BLD MANUAL: 2 %
BILIRUB SERPL-MCNC: 0.9 MG/DL
BUN SERPL-MCNC: 24.9 MG/DL (ref 8–23)
CALCIUM SERPL-MCNC: 9.6 MG/DL (ref 8.8–10.2)
CHLORIDE SERPL-SCNC: 104 MMOL/L (ref 98–107)
CREAT SERPL-MCNC: 1.38 MG/DL (ref 0.67–1.17)
DEPRECATED HCO3 PLAS-SCNC: 24 MMOL/L (ref 22–29)
EGFRCR SERPLBLD CKD-EPI 2021: 55 ML/MIN/1.73M2
EOSINOPHIL # BLD MANUAL: 0.1 10E3/UL (ref 0–0.7)
EOSINOPHIL NFR BLD MANUAL: 1 %
ERYTHROCYTE [DISTWIDTH] IN BLOOD BY AUTOMATED COUNT: 13.3 % (ref 10–15)
GLUCOSE SERPL-MCNC: 128 MG/DL (ref 70–99)
HCT VFR BLD AUTO: 39.6 % (ref 40–53)
HGB BLD-MCNC: 13.2 G/DL (ref 13.3–17.7)
LYMPHOCYTES # BLD MANUAL: 5.2 10E3/UL (ref 0.8–5.3)
LYMPHOCYTES NFR BLD MANUAL: 68 %
MCH RBC QN AUTO: 32.4 PG (ref 26.5–33)
MCHC RBC AUTO-ENTMCNC: 33.3 G/DL (ref 31.5–36.5)
MCV RBC AUTO: 97 FL (ref 78–100)
MONOCYTES # BLD MANUAL: 1.1 10E3/UL (ref 0–1.3)
MONOCYTES NFR BLD MANUAL: 14 %
NEUTROPHILS # BLD MANUAL: 1.2 10E3/UL (ref 1.6–8.3)
NEUTROPHILS NFR BLD MANUAL: 15 %
PLAT MORPH BLD: ABNORMAL
PLATELET # BLD AUTO: 65 10E3/UL (ref 150–450)
POTASSIUM SERPL-SCNC: 4.7 MMOL/L (ref 3.4–5.3)
PROT SERPL-MCNC: 7.3 G/DL (ref 6.4–8.3)
PSA SERPL DL<=0.01 NG/ML-MCNC: <0.01 NG/ML (ref 0–4.5)
RBC # BLD AUTO: 4.08 10E6/UL (ref 4.4–5.9)
RBC MORPH BLD: ABNORMAL
RETICS # AUTO: 0.1 10E6/UL (ref 0.01–0.11)
RETICS/RBC NFR AUTO: 2.6 % (ref 0.8–2.7)
SODIUM SERPL-SCNC: 138 MMOL/L (ref 136–145)
URATE SERPL-MCNC: 5.6 MG/DL (ref 3.4–7)
VARIANT LYMPHS BLD QL SMEAR: PRESENT
VIT B12 SERPL-MCNC: 1022 PG/ML (ref 232–1245)
WBC # BLD AUTO: 7.7 10E3/UL (ref 4–11)

## 2023-09-07 PROCEDURE — 84153 ASSAY OF PSA TOTAL: CPT

## 2023-09-07 PROCEDURE — 99207 BLOOD MORPHOLOGY PATHOLOGIST REVIEW: CPT | Performed by: PATHOLOGY

## 2023-09-07 PROCEDURE — 80053 COMPREHEN METABOLIC PANEL: CPT

## 2023-09-07 PROCEDURE — 85027 COMPLETE CBC AUTOMATED: CPT

## 2023-09-07 PROCEDURE — 84550 ASSAY OF BLOOD/URIC ACID: CPT

## 2023-09-07 PROCEDURE — 82607 VITAMIN B-12: CPT

## 2023-09-07 PROCEDURE — 85007 BL SMEAR W/DIFF WBC COUNT: CPT

## 2023-09-07 PROCEDURE — 36415 COLL VENOUS BLD VENIPUNCTURE: CPT

## 2023-09-07 PROCEDURE — 84403 ASSAY OF TOTAL TESTOSTERONE: CPT

## 2023-09-07 PROCEDURE — 85045 AUTOMATED RETICULOCYTE COUNT: CPT

## 2023-09-07 PROCEDURE — 99213 OFFICE O/P EST LOW 20 MIN: CPT | Performed by: PSYCHIATRY & NEUROLOGY

## 2023-09-07 NOTE — PATIENT INSTRUCTIONS
Plan:  -- Updated B12 levels when you have your every 6-month blood draw.  Order is in.  -- Let's plan to follow-up again in 6 months for monitoring purposes.  Please let us know if any concerns arise in the meantime.

## 2023-09-07 NOTE — LETTER
9/7/2023         RE: Jonathon Santos  [address] [address] [place] [id number]        Dear Colleague,    Thank you for referring your patient, Jonathon Santos, to the [facility] [place]. Please see a copy of my visit note below.    ESTABLISHED PATIENT NEUROLOGY NOTE    DATE OF VISIT: 9/7/2023  MRN: [id number]  PATIENT NAME: Jonathon Santos  YOB: 1955    Chief Complaint   Patient presents with     NEUROPATHY     6 mo follow-up   Felt like the neuropathy has gotten slightly worse     SUBJECTIVE:                                                      HISTORY OF PRESENT ILLNESS:  Jonathon is here for follow up regarding peripheral neuropathy.    History as previously documented by me (3.1.23):   Jonathon has additional history of prostate cancer, s/p chemotherapy and radiation.  He also has rheumatoid arthritis, hypertension and gout.  I evaluated him initially about a year ago for numbness affecting his feet.  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  Upon additional testing, he was found to have a low B12 and elevated MMA so I recommended monthly B12 injections.  ESR had been mildly elevated at 21.  TSH, CRP, PINO, SPEP, immunofixation, SSA, SSB, folate, TTG, B1 and B6 were all normal/negative.  We have also talked about cutting back on alcohol intake in the past.  Jonathon had an updated B12 level checked yesterday and this was in good range at 747.     Jonathon is here alone today. He clarifies that he takes 1500-2000mcg daily of the oral B12.  He says that he is starting to have some prickly sensations in the feet now, which feels closer to normal than when his numbness was more profound.  He still does not have very good balance and has to be cautious.  He has not had any recent falls.  No progression approximately of the neuropathy symptoms.  He reminds me that he did not like doing the monthly B12 injections.  He said it is quite a large tube of medication.     Our  plan was to continue his B12 supplementation and keep an eye on the level.  B12 level was most recently 699 in 6.2023.    Jonathon says that he feels like the numbness is moving  to the heel in the feet.  No new weakness.  He continues on B12, 2000mcg daily.  He is not really having problems with balance in fact he feels that this has improved, he tests himself.  No falls.  No issues with the arms or hands.    He will be doing blood work later on today.    Current alcohol intake is no more than 1-2 beers per day.    CURRENT MEDICATIONS:   cyanocobalamin (VITAMIN B-12) 1000 MCG SUBL sublingual tablet, Place 1,000 mcg under the tongue daily  lisinopril-hydrochlorothiazide (ZESTORETIC) 20-12.5 MG tablet, TAKE 2 TABLETS BY MOUTH  DAILY  metoprolol succinate ER (TOPROL XL) 25 MG 24 hr tablet, TAKE 1 TABLET BY MOUTH  DAILY WITH LUNCH  potassium chloride ER (K-TAB) 20 MEQ CR tablet, Take 1 tablet (20 mEq) by mouth daily    No current facility-administered medications on file prior to visit.      RECENT DIAGNOSTIC STUDIES:   Labs: No results found for any visits on 09/07/23.    REVIEW OF SYSTEMS:                                                      10-point review of systems is negative except as mentioned above in HPI.    EXAM:                                                      Physical Exam:   Vitals: Wt 101.2 kg (223 lb 3.2 oz)   BMI 29.45 kg/m    BMI= Body mass index is 29.45 kg/m .  GENERAL: NAD.  HEENT: NC/AT.  PULM: Non-labored breathing.   EXTR: Hallux valgus deformity Left great toe.  Focused Neurologic:  MENTAL STATUS: Alert, attentive. Speech is fluent. Normal comprehension. Normal concentration. Adequate fund of knowledge.   CRANIAL NERVES:  Facial movement normal. EOM full.  Hearing intact to conversation. Trapezius strength intact.   MOTOR: 5/5 in proximal and distal muscle groups of lower extremities. Tone and bulk normal.   DTRs: Babinski down-going bilaterally.   SENSATION: Intact pinprick in the  feet, this is an improvement. Intact proprioception at great toes. Vibration: 2-3 seconds at both ankles.   COORDINATION: No observed tremor today. Normal fine motor movements.  STATION AND GAIT: Romberg positive. Casual gait is cautious but otherwise normal.  He stands on the right foot alone without problem, left foot is more difficult for him.  Left hand-dominant.    ASSESSMENT and PLAN:                                                      Assessment:    ICD-10-CM    1. Axonal sensorimotor neuropathy  G62.89 Vitamin B12          Mr. Santos is a pleasant 68-year-old man with peripheral neuropathy here for follow-up.  He does feel like perhaps the numbness is extending further back into his feet but this is not causing any functional impairment.  Because he does not have pain, I do not think her neuropathic medications would be helpful.  We will continue to monitor B12 and plan to follow-up again in 6 months.  Jonathon understands and agrees with the plan.    Plan:  -- Updated B12 levels when you have your every 6-month blood draw.  Order is in.  -- Let's plan to follow-up again in 6 months for monitoring purposes.  Please let us know if any concerns arise in the meantime.    Total Time: 20 minutes were spent with the patient and in chart review/documentation (as described above in Assessment and Plan)/coordinating the care on date of service.    Pinky Thompson MD  Neurology    Dragon software used in the dictation of this note.                    Again, thank you for allowing me to participate in the care of your patient.        Sincerely,        Pinky Thompson MD

## 2023-09-07 NOTE — PROGRESS NOTES
ESTABLISHED PATIENT NEUROLOGY NOTE    DATE OF VISIT: 9/7/2023  MRN: [id number]  PATIENT NAME: Jonathon Santos  YOB: 1955    Chief Complaint   Patient presents with    NEUROPATHY     6 mo follow-up   Felt like the neuropathy has gotten slightly worse     SUBJECTIVE:                                                      HISTORY OF PRESENT ILLNESS:  Jonathon is here for follow up regarding peripheral neuropathy.    History as previously documented by me (3.1.23):   Jonathon has additional history of prostate cancer, s/p chemotherapy and radiation.  He also has rheumatoid arthritis, hypertension and gout.  I evaluated him initially about a year ago for numbness affecting his feet.  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  Upon additional testing, he was found to have a low B12 and elevated MMA so I recommended monthly B12 injections.  ESR had been mildly elevated at 21.  TSH, CRP, PINO, SPEP, immunofixation, SSA, SSB, folate, TTG, B1 and B6 were all normal/negative.  We have also talked about cutting back on alcohol intake in the past.  Jonathon had an updated B12 level checked yesterday and this was in good range at 747.     Jonathon is here alone today. He clarifies that he takes 1500-2000mcg daily of the oral B12.  He says that he is starting to have some prickly sensations in the feet now, which feels closer to normal than when his numbness was more profound.  He still does not have very good balance and has to be cautious.  He has not had any recent falls.  No progression approximately of the neuropathy symptoms.  He reminds me that he did not like doing the monthly B12 injections.  He said it is quite a large tube of medication.     Our plan was to continue his B12 supplementation and keep an eye on the level.  B12 level was most recently 699 in 6.2023.    Jonathon says that he feels like the numbness is moving  to the heel in the feet.  No new weakness.  He continues on B12,  2000mcg daily.  He is not really having problems with balance in fact he feels that this has improved, he tests himself.  No falls.  No issues with the arms or hands.    He will be doing blood work later on today.    Current alcohol intake is no more than 1-2 beers per day.    CURRENT MEDICATIONS:   cyanocobalamin (VITAMIN B-12) 1000 MCG SUBL sublingual tablet, Place 1,000 mcg under the tongue daily  lisinopril-hydrochlorothiazide (ZESTORETIC) 20-12.5 MG tablet, TAKE 2 TABLETS BY MOUTH  DAILY  metoprolol succinate ER (TOPROL XL) 25 MG 24 hr tablet, TAKE 1 TABLET BY MOUTH  DAILY WITH LUNCH  potassium chloride ER (K-TAB) 20 MEQ CR tablet, Take 1 tablet (20 mEq) by mouth daily    No current facility-administered medications on file prior to visit.      RECENT DIAGNOSTIC STUDIES:   Labs: No results found for any visits on 09/07/23.    REVIEW OF SYSTEMS:                                                      10-point review of systems is negative except as mentioned above in HPI.    EXAM:                                                      Physical Exam:   Vitals: Wt 101.2 kg (223 lb 3.2 oz)   BMI 29.45 kg/m    BMI= Body mass index is 29.45 kg/m .  GENERAL: NAD.  HEENT: NC/AT.  PULM: Non-labored breathing.   EXTR: Hallux valgus deformity Left great toe.  Focused Neurologic:  MENTAL STATUS: Alert, attentive. Speech is fluent. Normal comprehension. Normal concentration. Adequate fund of knowledge.   CRANIAL NERVES:  Facial movement normal. EOM full.  Hearing intact to conversation. Trapezius strength intact.   MOTOR: 5/5 in proximal and distal muscle groups of lower extremities. Tone and bulk normal.   DTRs: Babinski down-going bilaterally.   SENSATION: Intact pinprick in the feet, this is an improvement. Intact proprioception at great toes. Vibration: 2-3 seconds at both ankles.   COORDINATION: No observed tremor today. Normal fine motor movements.  STATION AND GAIT: Romberg positive. Casual gait is cautious but otherwise  normal.  He stands on the right foot alone without problem, left foot is more difficult for him.  Left hand-dominant.    ASSESSMENT and PLAN:                                                      Assessment:    ICD-10-CM    1. Axonal sensorimotor neuropathy  G62.89 Vitamin B12          Mr. Santos is a pleasant 68-year-old man with peripheral neuropathy here for follow-up.  He does feel like perhaps the numbness is extending further back into his feet but this is not causing any functional impairment.  Because he does not have pain, I do not think her neuropathic medications would be helpful.  We will continue to monitor B12 and plan to follow-up again in 6 months.  Jonathon understands and agrees with the plan.    Plan:  -- Updated B12 levels when you have your every 6-month blood draw.  Order is in.  -- Let's plan to follow-up again in 6 months for monitoring purposes.  Please let us know if any concerns arise in the meantime.    Total Time: 20 minutes were spent with the patient and in chart review/documentation (as described above in Assessment and Plan)/coordinating the care on date of service.    Pinky Thompson MD  Neurology    Dragon software used in the dictation of this note.

## 2023-09-08 ENCOUNTER — ONCOLOGY VISIT (OUTPATIENT)
Dept: ONCOLOGY | Facility: CLINIC | Age: 68
End: 2023-09-08
Attending: INTERNAL MEDICINE
Payer: COMMERCIAL

## 2023-09-08 ENCOUNTER — PRE VISIT (OUTPATIENT)
Dept: ONCOLOGY | Facility: CLINIC | Age: 68
End: 2023-09-08
Payer: COMMERCIAL

## 2023-09-08 VITALS
HEIGHT: 74 IN | HEART RATE: 68 BPM | DIASTOLIC BLOOD PRESSURE: 88 MMHG | WEIGHT: 224.6 LBS | BODY MASS INDEX: 28.83 KG/M2 | SYSTOLIC BLOOD PRESSURE: 141 MMHG | RESPIRATION RATE: 16 BRPM | TEMPERATURE: 98 F | OXYGEN SATURATION: 98 %

## 2023-09-08 DIAGNOSIS — C61 PROSTATE CANCER (H): ICD-10-CM

## 2023-09-08 LAB
PATH REPORT.COMMENTS IMP SPEC: NORMAL
PATH REPORT.COMMENTS IMP SPEC: NORMAL
PATH REPORT.FINAL DX SPEC: NORMAL
PATH REPORT.RELEVANT HX SPEC: NORMAL

## 2023-09-08 PROCEDURE — 99204 OFFICE O/P NEW MOD 45 MIN: CPT | Performed by: STUDENT IN AN ORGANIZED HEALTH CARE EDUCATION/TRAINING PROGRAM

## 2023-09-08 PROCEDURE — G0463 HOSPITAL OUTPT CLINIC VISIT: HCPCS | Performed by: STUDENT IN AN ORGANIZED HEALTH CARE EDUCATION/TRAINING PROGRAM

## 2023-09-08 ASSESSMENT — PAIN SCALES - GENERAL: PAINLEVEL: NO PAIN (0)

## 2023-09-08 NOTE — LETTER
2023         RE: Jonathon Santos  [address] [address] [place] [id number]        Dear Colleague,    Thank you for referring your patient, Jonathon Santos, to the [facility]. Please see a copy of my visit note below.        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    His cytopenias are likely due to a hematologic malignancy, and flow cytometry is pending to help with the diagnosis of this.  He is asymptomatic from the perspective of his neutropenia and thrombocytopenia, so no intervention needs to be done for these counts as of yet.    Plan:  -Follow-up flow cytometry  -Follow-up based on results    The patient is given our center's contact information and is instructed to call if he should have any further questions or concerns.    Hetal Mckeon, nurse clinician, is assigned to provide patient care coordination and education.     Jacob Cogan, MD  Hematology    45 minutes spent by me on the date of the encounter doing chart review, history and exam, documentation and further activities per the note  ----------------------------------------------------------------------------------------------------------------------    History of Present Illness:  Jonathon Santos is a 68 year old man with a history of prostate cancer s/p abiraterone/prednisone (starting 2021), radiation (May to 2021), all therapy completed 2023, now referred to hematology for thrombocytopenia.    On review of labs, he had a normal CBC in 2006.  Thrombocytopenia was first noted in 2019 with a count of 134,000/mcL.  It has gradually trended down over time, and is currently 69,000/mcL.  He has mild anemia  as well with a hemoglobin of 12.8 g/dL.  He also has an ANC of 1200/mcL, which is also new for him.    September 8, 2023: On presentation to the clinic today, pt states that he fells like his overall health is returning to baseline. He has noticed improvement in energy levels, regrowth of hair and nails in comparison to when he was receiving treatment for prostate cancer. He denies any bleeding episodes including epistaxis, hemoptysis, hematochezia, melena, and hematuria. Furthermore, pt denies any episodes of fevers, chills or night sweats. He has not had any unintentional tyrell loss and is in-fact gaining weight. Otherwise, no other concerns or complaints.     Past Medical History:  Past Medical History:   Diagnosis Date    Benign essential hypertension     Gout     Prostate cancer (H)        Past Surgical History:  Past Surgical History:   Procedure Laterality Date    COLONOSCOPY      COLONOSCOPY N/A 7/29/2022    Procedure: COLONOSCOPY, WITH POLYPECTOMY AND BIOPSY;  Surgeon: Ward Bearden DO;  Location: [facility]    FOOT SURGERY      HC REVISE MEDIAN N/CARPAL TUNNEL SURG      Description: Neuroplasty Decompression Median Nerve At Carpal Tunnel;  Recorded: 05/20/2013;    INSERT SEED MARKER / MATRIX N/A 4/30/2021    Procedure: Transrectal ultrasound guided placement of fiducials and Space[employer];  Surgeon: Ferdinand Caban MD;  Location: [facility]    PROSTATE BIOPSY, NEEDLE, SATURATION SAMPLING      SPINE SURGERY      unspecified low back surgery       Medications:  Current Outpatient Medications   Medication Sig Dispense Refill    cyanocobalamin (VITAMIN B-12) 1000 MCG SUBL sublingual tablet Place 1,000 mcg under the tongue daily      lisinopril-hydrochlorothiazide (ZESTORETIC) 20-12.5 MG tablet TAKE 2 TABLETS BY MOUTH  DAILY 200 tablet 2    metoprolol succinate ER (TOPROL XL) 25 MG 24 hr tablet TAKE 1 TABLET BY MOUTH  DAILY WITH LUNCH 100 tablet 2    potassium chloride ER (K-TAB) 20 MEQ CR tablet Take 1  "tablet (20 mEq) by mouth daily 90 tablet 3        Allergies:  No Known Allergies    ROS:  A 14 point ROS is negative except as stated in the HPI    Social History:  Denies any tobacco use. 2-3 bottles/cans of beer daily. Denies any illicit drug use.     Family History:  Non-contributory to the current presentation.    Objective:  BP (!) 141/88 (BP Location: Right arm, Patient Position: Sitting, Cuff Size: Adult Small)   Pulse 68   Temp 98  F (36.7  C) (Oral)   Resp 16   Ht 1.87 m (6' 1.62\")   Wt 101.9 kg (224 lb 9.6 oz)   SpO2 98%   BMI 29.13 kg/m    Exam:   Constitutional: Appears well, no distress  HEENT: Pupils equal and reactive to light. No scleral icterus or hemorrhage. Nares without evidence of telangiectasia. Mucous membranes moist with no wet purpura. Dentition overall ok with no signs of decay. No pharyngeal exudates. No lymphadenopathy, no thyromeagaly  CV: regular rate and rhythm, no murmurs  Respiratory: clear  GI: abdomen soft, nontender, without guarding or rebound. No hepatomeagaly. Splenomegaly 4-5 cm below costal margin. Rectal exam deferred.   Mus/Skele: no edema  Skin: no petechiae. Mild bruising bilateral arms  Neuro: CN II-XII intact. Normal gait. AOx3  Heme/Lymph: no supraclavicular, axillary or umbilical adenopathy.     Labs/Imaging:  Reviewed          Jacob Cogan, MD  "

## 2023-09-08 NOTE — NURSING NOTE
"Oncology Rooming Note    September 8, 2023 12:58 PM   Jonathon Santos is a 68 year old male who presents for:    Chief Complaint   Patient presents with    Oncology Clinic Visit     Prostate CA     Initial Vitals: BP (!) 141/88 (BP Location: Right arm, Patient Position: Sitting, Cuff Size: Adult Small)   Pulse 68   Temp 98  F (36.7  C) (Oral)   Resp 16   Ht 1.87 m (6' 1.62\")   Wt 101.9 kg (224 lb 9.6 oz)   SpO2 98%   BMI 29.13 kg/m   Estimated body mass index is 29.13 kg/m  as calculated from the following:    Height as of this encounter: 1.87 m (6' 1.62\").    Weight as of this encounter: 101.9 kg (224 lb 9.6 oz). Body surface area is 2.3 meters squared.  No Pain (0) Comment: Data Unavailable   No LMP for male patient.  Allergies reviewed: Yes  Medications reviewed: Yes    Medications: Medication refills not needed today.  Pharmacy name entered into [employer]:    [facility] - [place], [place] - [address]  [facility] - [place], [place] - [address]    Clinical concerns: New patient consult for Prostate CA.        Trace Skelton              "

## 2023-09-09 LAB — TESTOST SERPL-MCNC: 135 NG/DL (ref 240–950)

## 2023-09-11 ENCOUNTER — MYC MEDICAL ADVICE (OUTPATIENT)
Dept: RHEUMATOLOGY | Facility: CLINIC | Age: 68
End: 2023-09-11
Payer: COMMERCIAL

## 2023-09-26 ENCOUNTER — PATIENT OUTREACH (OUTPATIENT)
Dept: ONCOLOGY | Facility: CLINIC | Age: 68
End: 2023-09-26
Payer: COMMERCIAL

## 2023-09-26 DIAGNOSIS — D69.6 THROMBOCYTOPENIA (H): Primary | ICD-10-CM

## 2023-09-29 ENCOUNTER — LAB (OUTPATIENT)
Dept: LAB | Facility: CLINIC | Age: 68
End: 2023-09-29
Payer: COMMERCIAL

## 2023-09-29 DIAGNOSIS — D69.6 THROMBOCYTOPENIA (H): ICD-10-CM

## 2023-09-29 DIAGNOSIS — C61 PROSTATE CANCER (H): ICD-10-CM

## 2023-09-29 LAB
ERYTHROCYTE [DISTWIDTH] IN BLOOD BY AUTOMATED COUNT: 12.7 % (ref 10–15)
HCT VFR BLD AUTO: 37.3 % (ref 40–53)
HGB BLD-MCNC: 13 G/DL (ref 13.3–17.7)
MCH RBC QN AUTO: 32.3 PG (ref 26.5–33)
MCHC RBC AUTO-ENTMCNC: 34.9 G/DL (ref 31.5–36.5)
MCV RBC AUTO: 93 FL (ref 78–100)
PLATELET # BLD AUTO: 63 10E3/UL (ref 150–450)
RBC # BLD AUTO: 4.03 10E6/UL (ref 4.4–5.9)
RETICS # AUTO: 0.08 10E6/UL (ref 0.03–0.1)
RETICS/RBC NFR AUTO: 1.9 % (ref 0.5–2)
WBC # BLD AUTO: 7.6 10E3/UL (ref 4–11)

## 2023-09-29 PROCEDURE — 99207 BLOOD MORPHOLOGY PATHOLOGIST REVIEW: CPT | Performed by: PATHOLOGY

## 2023-09-29 PROCEDURE — 85027 COMPLETE CBC AUTOMATED: CPT | Performed by: STUDENT IN AN ORGANIZED HEALTH CARE EDUCATION/TRAINING PROGRAM

## 2023-09-29 PROCEDURE — 36415 COLL VENOUS BLD VENIPUNCTURE: CPT | Performed by: NURSE PRACTITIONER

## 2023-09-29 PROCEDURE — 84403 ASSAY OF TOTAL TESTOSTERONE: CPT | Performed by: NURSE PRACTITIONER

## 2023-09-29 PROCEDURE — 85007 BL SMEAR W/DIFF WBC COUNT: CPT | Performed by: STUDENT IN AN ORGANIZED HEALTH CARE EDUCATION/TRAINING PROGRAM

## 2023-09-29 PROCEDURE — 85045 AUTOMATED RETICULOCYTE COUNT: CPT | Performed by: STUDENT IN AN ORGANIZED HEALTH CARE EDUCATION/TRAINING PROGRAM

## 2023-10-03 DIAGNOSIS — D70.9 NEUTROPENIA, UNSPECIFIED TYPE (H): Primary | ICD-10-CM

## 2023-10-03 LAB
BASOPHILS # BLD MANUAL: 0.1 10E3/UL (ref 0–0.2)
BASOPHILS NFR BLD MANUAL: 1 %
EOSINOPHIL # BLD MANUAL: 0.2 10E3/UL (ref 0–0.7)
EOSINOPHIL NFR BLD MANUAL: 2 %
LYMPHOCYTES # BLD MANUAL: 5.5 10E3/UL (ref 0.8–5.3)
LYMPHOCYTES NFR BLD MANUAL: 72 %
MONOCYTES # BLD MANUAL: 0.2 10E3/UL (ref 0–1.3)
MONOCYTES NFR BLD MANUAL: 2 %
NEUTROPHILS # BLD MANUAL: 1.7 10E3/UL (ref 1.6–8.3)
NEUTROPHILS NFR BLD MANUAL: 23 %
PATH REPORT.COMMENTS IMP SPEC: ABNORMAL
PATH REPORT.COMMENTS IMP SPEC: ABNORMAL
PATH REPORT.COMMENTS IMP SPEC: YES
PATH REPORT.FINAL DX SPEC: ABNORMAL
PATH REPORT.MICROSCOPIC SPEC OTHER STN: ABNORMAL
PATH REPORT.MICROSCOPIC SPEC OTHER STN: ABNORMAL
PATH REPORT.RELEVANT HX SPEC: ABNORMAL
PLAT MORPH BLD: ABNORMAL
RBC MORPH BLD: ABNORMAL
TESTOST SERPL-MCNC: 106 NG/DL (ref 240–950)
VARIANT LYMPHS BLD QL SMEAR: PRESENT

## 2023-10-05 ENCOUNTER — LAB (OUTPATIENT)
Dept: LAB | Facility: CLINIC | Age: 68
End: 2023-10-05
Payer: COMMERCIAL

## 2023-10-05 DIAGNOSIS — D70.9 NEUTROPENIA, UNSPECIFIED TYPE (H): ICD-10-CM

## 2023-10-05 DIAGNOSIS — E53.8 VITAMIN B12 DEFICIENCY (NON ANEMIC): ICD-10-CM

## 2023-10-05 DIAGNOSIS — D70.9 NEUTROPENIA, UNSPECIFIED TYPE (H): Primary | ICD-10-CM

## 2023-10-05 LAB — VIT B12 SERPL-MCNC: 763 PG/ML (ref 232–1245)

## 2023-10-05 PROCEDURE — 88189 FLOWCYTOMETRY/READ 16 & >: CPT | Mod: GC | Performed by: PATHOLOGY

## 2023-10-05 PROCEDURE — 81342 TRG GENE REARRANGEMENT ANAL: CPT | Performed by: STUDENT IN AN ORGANIZED HEALTH CARE EDUCATION/TRAINING PROGRAM

## 2023-10-05 PROCEDURE — 88184 FLOWCYTOMETRY/ TC 1 MARKER: CPT | Mod: GC

## 2023-10-05 PROCEDURE — 88185 FLOWCYTOMETRY/TC ADD-ON: CPT | Mod: GC

## 2023-10-05 PROCEDURE — 82607 VITAMIN B-12: CPT

## 2023-10-05 PROCEDURE — 36415 COLL VENOUS BLD VENIPUNCTURE: CPT

## 2023-10-09 LAB
PATH REPORT.COMMENTS IMP SPEC: ABNORMAL
PATH REPORT.COMMENTS IMP SPEC: YES
PATH REPORT.FINAL DX SPEC: ABNORMAL
PATH REPORT.MICROSCOPIC SPEC OTHER STN: ABNORMAL
PATH REPORT.RELEVANT HX SPEC: ABNORMAL

## 2023-10-10 DIAGNOSIS — R77.9 ABNORMALITY OF PLASMA PROTEIN, UNSPECIFIED: ICD-10-CM

## 2023-10-10 DIAGNOSIS — D70.9 NEUTROPENIA, UNSPECIFIED TYPE (H): Primary | ICD-10-CM

## 2023-10-11 ENCOUNTER — PATIENT OUTREACH (OUTPATIENT)
Dept: ONCOLOGY | Facility: CLINIC | Age: 68
End: 2023-10-11
Payer: COMMERCIAL

## 2023-10-13 DIAGNOSIS — C83.09 SMALL B-CELL LYMPHOMA OF EXTRANODAL SITE EXCLUDING SPLEEN AND OTHER SOLID ORGANS (H): Primary | ICD-10-CM

## 2023-10-13 DIAGNOSIS — Z11.59 SCREENING FOR VIRAL DISEASE: ICD-10-CM

## 2023-10-13 NOTE — PROGRESS NOTES
[facility]: Cancer Care                                                                   Hem/Onc  Referral reviewed  Referral Details  Referred By  Referred To   Cogan, Jacob, MD   [address] [facility] [address]   Phone: [phone]   Fax: [phone]    Diagnoses: Neutropenia, unspecified type (H24)   Order: Adult Oncology/Hematology  Referral    [facility]   Lymphoma service     Order Questions  Question Answer   My Clinical Question Is: flow cytometry evidence of lymphoma        ASSESSMENT      Clinical History (per Nurse review of records provided):    68 year old male patient with   Oncology History   Prostate cancer (H)   1/18/2021 Initial Diagnosis    Prostate cancer (H)     1/28/2021 - 9/19/2022 Chemotherapy    OP ONC Prostate Cancer - Abiraterone (Zytiga) / Prednisone / Leuprolide  Plan Provider: Andrea Cisneros MD  Treatment goal: Other  Line of treatment: First Line     2/8/2021 - 2/8/2021 Chemotherapy    OP ONC Prostate Cancer - Abiraterone (Zytiga) / Prednisone / Leuprolide  Plan Provider: Andrea Cisneros MD  Treatment goal: Other  Line of treatment: First Line     7/29/2021 - 8/2/2022 Supportive Treatment    OP ONC Prostate Cancer - Leuprolide Depot (Lupron) EVERY 3 MONTHS  Plan Provider: Andrea Cisneros MD  Treatment goal: Other  Line of treatment: First Line     11/2/2022 - 11/2/2022 Chemotherapy    OP ONC Prostate Cancer - Abiraterone (Zytiga) / Prednisone / Leuprolide  Plan Provider: Andrea Cisneros MD  Treatment goal: Other  Line of treatment: First Line         L[facility] in  [facility]     Records Location: [facility]   Pertinent labs -- BOOKMARKED  Pertinent pathology report(s) -- BOOKMARKED  Pertinent imaging -- PET ordered but not scheduled yet  Referring provider note(s)-- BOOKMARKED    INTERVENTION(S)                                                      -Referral Triage  Scheduling Instructions added to Hem/Onc  referral for Patient Access rep    PLAN                                                      Hem/Onc consult   Future Appointments   Date Time Provider Department Center   10/20/2023  3:15 PM Vannesa Burciaga MD [facility]   12/11/2023 11:30 AM [facility]   12/14/2023  8:00 AM Geraldo Eduardo MD [facility]   3/11/2024  9:40 AM Pinky Nick MD [facility]       Additional testing needed prior to consult: TBD - October 13, 2023 msg to Dr Burciaga re: referral / consult date.    See records team's Pre-Visit encounter documentation for additional records retrieval information.    Stacy Zazueta, RN, BSN, OCN  Hematology/Oncology New Patient Nurse Navigator   [facility]  [phone]

## 2023-10-14 NOTE — TELEPHONE ENCOUNTER
RECORDS STATUS - ALL OTHER DIAGNOSIS      RECORDS RECEIVED FROM: Epic   DATE RECEIVED:    NOTES STATUS DETAILS   OFFICE NOTE from referring provider Epic 09/08/23: Dr. Jacob Cogan   OFFICE NOTE from medical oncologist Epic 09/08/23: Dr. Jacob Cogan    06/26/23: Dr. Andrea Cisneros   OFFICE NOTE from other specialist [facility] Rad Onc:  02/15/22: Dr. Davion Francois   MEDICATION LIST [facility]    LABS     PATHOLOGY REPORTS Reports in EPIC Flow Cytom:  10/05/23: [id number]    Blood Morph:  09/29/23: [id number]  09/07/23: [id number]   ANYTHING RELATED TO DIAGNOSIS Epic Most recent 10/05/23

## 2023-10-18 ENCOUNTER — HOSPITAL ENCOUNTER (OUTPATIENT)
Dept: PET IMAGING | Facility: HOSPITAL | Age: 68
Discharge: HOME | End: 2023-10-18
Attending: INTERNAL MEDICINE
Payer: COMMERCIAL

## 2023-10-18 ENCOUNTER — LAB (OUTPATIENT)
Dept: LAB | Facility: HOSPITAL | Age: 68
End: 2023-10-18
Attending: INTERNAL MEDICINE
Payer: COMMERCIAL

## 2023-10-18 DIAGNOSIS — C83.09 SMALL B-CELL LYMPHOMA OF EXTRANODAL SITE EXCLUDING SPLEEN AND OTHER SOLID ORGANS (H): ICD-10-CM

## 2023-10-18 DIAGNOSIS — E80.6 HYPERBILIRUBINEMIA: ICD-10-CM

## 2023-10-18 DIAGNOSIS — Z11.59 SCREENING FOR VIRAL DISEASE: ICD-10-CM

## 2023-10-18 DIAGNOSIS — E80.6 HYPERBILIRUBINEMIA: Primary | ICD-10-CM

## 2023-10-18 LAB
ALBUMIN SERPL BCG-MCNC: 4.5 G/DL (ref 3.5–5.2)
ALP SERPL-CCNC: 100 U/L (ref 40–129)
ALT SERPL W P-5'-P-CCNC: 16 U/L (ref 0–70)
ANION GAP SERPL CALCULATED.3IONS-SCNC: 12 MMOL/L (ref 7–15)
AST SERPL W P-5'-P-CCNC: 25 U/L (ref 0–45)
BASO+EOS+MONOS # BLD AUTO: ABNORMAL 10*3/UL
BASO+EOS+MONOS NFR BLD AUTO: ABNORMAL %
BASOPHILS # BLD AUTO: ABNORMAL 10*3/UL
BASOPHILS # BLD MANUAL: 0 10E3/UL (ref 0–0.2)
BASOPHILS NFR BLD AUTO: ABNORMAL %
BASOPHILS NFR BLD MANUAL: 0 %
BILIRUB DIRECT SERPL-MCNC: 0.4 MG/DL (ref 0–0.3)
BILIRUB SERPL-MCNC: 1.3 MG/DL
BUN SERPL-MCNC: 15.4 MG/DL (ref 8–23)
CALCIUM SERPL-MCNC: 9.3 MG/DL (ref 8.8–10.2)
CHLORIDE SERPL-SCNC: 93 MMOL/L (ref 98–107)
CREAT SERPL-MCNC: 1.17 MG/DL (ref 0.67–1.17)
DEPRECATED HCO3 PLAS-SCNC: 25 MMOL/L (ref 22–29)
EGFRCR SERPLBLD CKD-EPI 2021: 67 ML/MIN/1.73M2
EOSINOPHIL # BLD AUTO: ABNORMAL 10*3/UL
EOSINOPHIL # BLD MANUAL: 0 10E3/UL (ref 0–0.7)
EOSINOPHIL NFR BLD AUTO: ABNORMAL %
EOSINOPHIL NFR BLD MANUAL: 0 %
ERYTHROCYTE [DISTWIDTH] IN BLOOD BY AUTOMATED COUNT: 13.5 % (ref 10–15)
GLUCOSE BLDC GLUCOMTR-MCNC: 118 MG/DL (ref 70–99)
GLUCOSE SERPL-MCNC: 115 MG/DL (ref 70–99)
HBV CORE AB SERPL QL IA: NONREACTIVE
HBV SURFACE AB SERPL IA-ACNC: 0 M[IU]/ML
HBV SURFACE AB SERPL IA-ACNC: NONREACTIVE M[IU]/ML
HBV SURFACE AG SERPL QL IA: NONREACTIVE
HCT VFR BLD AUTO: 39 % (ref 40–53)
HCV AB SERPL QL IA: NONREACTIVE
HGB BLD-MCNC: 13.3 G/DL (ref 13.3–17.7)
HIV 1+2 AB+HIV1 P24 AG SERPL QL IA: NONREACTIVE
IMM GRANULOCYTES # BLD: ABNORMAL 10*3/UL
IMM GRANULOCYTES NFR BLD: ABNORMAL %
INTERPRETATION: NORMAL
LDH SERPL L TO P-CCNC: 403 U/L (ref 0–250)
LYMPHOCYTES # BLD AUTO: ABNORMAL 10*3/UL
LYMPHOCYTES # BLD MANUAL: 6.7 10E3/UL (ref 0.8–5.3)
LYMPHOCYTES NFR BLD AUTO: ABNORMAL %
LYMPHOCYTES NFR BLD MANUAL: 74 %
MCH RBC QN AUTO: 32 PG (ref 26.5–33)
MCHC RBC AUTO-ENTMCNC: 34.1 G/DL (ref 31.5–36.5)
MCV RBC AUTO: 94 FL (ref 78–100)
MONOCYTES # BLD AUTO: ABNORMAL 10*3/UL
MONOCYTES # BLD MANUAL: 0.1 10E3/UL (ref 0–1.3)
MONOCYTES NFR BLD AUTO: ABNORMAL %
MONOCYTES NFR BLD MANUAL: 1 %
NEUTROPHILS # BLD AUTO: ABNORMAL 10*3/UL
NEUTROPHILS # BLD MANUAL: 2.3 10E3/UL (ref 1.6–8.3)
NEUTROPHILS NFR BLD AUTO: ABNORMAL %
NEUTROPHILS NFR BLD MANUAL: 25 %
NRBC # BLD AUTO: 0 10E3/UL
NRBC BLD AUTO-RTO: 0 /100
PHOSPHATE SERPL-MCNC: 3.6 MG/DL (ref 2.5–4.5)
PLAT MORPH BLD: ABNORMAL
PLATELET # BLD AUTO: 73 10E3/UL (ref 150–450)
POTASSIUM SERPL-SCNC: 3.9 MMOL/L (ref 3.4–5.3)
PROT SERPL-MCNC: 7.3 G/DL (ref 6.4–8.3)
RBC # BLD AUTO: 4.16 10E6/UL (ref 4.4–5.9)
RBC MORPH BLD: ABNORMAL
SIGNIFICANT RESULTS: NORMAL
SODIUM SERPL-SCNC: 130 MMOL/L (ref 135–145)
SPECIMEN DESCRIPTION: NORMAL
TEST DETAILS, MDL: NORMAL
TOTAL PROTEIN SERUM FOR ELP: 6.7 G/DL (ref 6.4–8.3)
URATE SERPL-MCNC: 4.2 MG/DL (ref 3.4–7)
VARIANT LYMPHS BLD QL SMEAR: PRESENT
WBC # BLD AUTO: 9.1 10E3/UL (ref 4–11)

## 2023-10-18 PROCEDURE — 80053 COMPREHEN METABOLIC PANEL: CPT

## 2023-10-18 PROCEDURE — A9552 F18 FDG: HCPCS | Performed by: INTERNAL MEDICINE

## 2023-10-18 PROCEDURE — 36415 COLL VENOUS BLD VENIPUNCTURE: CPT

## 2023-10-18 PROCEDURE — 82232 ASSAY OF BETA-2 PROTEIN: CPT

## 2023-10-18 PROCEDURE — 87340 HEPATITIS B SURFACE AG IA: CPT

## 2023-10-18 PROCEDURE — 70491 CT SOFT TISSUE NECK W/DYE: CPT

## 2023-10-18 PROCEDURE — 82962 GLUCOSE BLOOD TEST: CPT

## 2023-10-18 PROCEDURE — 84100 ASSAY OF PHOSPHORUS: CPT

## 2023-10-18 PROCEDURE — 87389 HIV-1 AG W/HIV-1&-2 AB AG IA: CPT

## 2023-10-18 PROCEDURE — 85027 COMPLETE CBC AUTOMATED: CPT

## 2023-10-18 PROCEDURE — 85007 BL SMEAR W/DIFF WBC COUNT: CPT

## 2023-10-18 PROCEDURE — 83521 IG LIGHT CHAINS FREE EACH: CPT | Mod: 59

## 2023-10-18 PROCEDURE — 250N000011 HC RX IP 250 OP 636: Performed by: INTERNAL MEDICINE

## 2023-10-18 PROCEDURE — 86706 HEP B SURFACE ANTIBODY: CPT

## 2023-10-18 PROCEDURE — 74177 CT ABD & PELVIS W/CONTRAST: CPT

## 2023-10-18 PROCEDURE — 84165 PROTEIN E-PHORESIS SERUM: CPT | Mod: 26 | Performed by: PATHOLOGY

## 2023-10-18 PROCEDURE — 83615 LACTATE (LD) (LDH) ENZYME: CPT

## 2023-10-18 PROCEDURE — 78816 PET IMAGE W/CT FULL BODY: CPT | Mod: PI

## 2023-10-18 PROCEDURE — 84165 PROTEIN E-PHORESIS SERUM: CPT | Mod: TC | Performed by: PATHOLOGY

## 2023-10-18 PROCEDURE — G0452 MOLECULAR PATHOLOGY INTERPR: HCPCS | Mod: 26 | Performed by: STUDENT IN AN ORGANIZED HEALTH CARE EDUCATION/TRAINING PROGRAM

## 2023-10-18 PROCEDURE — 82784 ASSAY IGA/IGD/IGG/IGM EACH: CPT

## 2023-10-18 PROCEDURE — 86803 HEPATITIS C AB TEST: CPT

## 2023-10-18 PROCEDURE — 84155 ASSAY OF PROTEIN SERUM: CPT | Mod: 91

## 2023-10-18 PROCEDURE — 81305 MYD88 GENE P.LEU265PRO VRNT: CPT

## 2023-10-18 PROCEDURE — 84550 ASSAY OF BLOOD/URIC ACID: CPT

## 2023-10-18 PROCEDURE — 82248 BILIRUBIN DIRECT: CPT

## 2023-10-18 PROCEDURE — 86334 IMMUNOFIX E-PHORESIS SERUM: CPT | Mod: 26 | Performed by: PATHOLOGY

## 2023-10-18 PROCEDURE — 343N000001 HC RX 343: Performed by: INTERNAL MEDICINE

## 2023-10-18 PROCEDURE — 86334 IMMUNOFIX E-PHORESIS SERUM: CPT | Performed by: PATHOLOGY

## 2023-10-18 PROCEDURE — 86704 HEP B CORE ANTIBODY TOTAL: CPT

## 2023-10-18 RX ORDER — IOPAMIDOL 755 MG/ML
100 INJECTION, SOLUTION INTRAVASCULAR ONCE
Status: COMPLETED | OUTPATIENT
Start: 2023-10-18 | End: 2023-10-18

## 2023-10-18 RX ADMIN — FLUDEOXYGLUCOSE F-18 13.39 MILLICURIE: 500 INJECTION, SOLUTION INTRAVENOUS at 11:51

## 2023-10-18 RX ADMIN — IOPAMIDOL 100 ML: 755 INJECTION, SOLUTION INTRAVENOUS at 12:58

## 2023-10-19 LAB
B2 MICROGLOB TUMOR MARKER SER-MCNC: 6.6 MG/L
IGA SERPL-MCNC: 75 MG/DL (ref 84–499)
IGG SERPL-MCNC: 966 MG/DL (ref 610–1616)
IGM SERPL-MCNC: 68 MG/DL (ref 35–242)
KAPPA LC FREE SER-MCNC: 2.56 MG/DL (ref 0.33–1.94)
KAPPA LC FREE/LAMBDA FREE SER NEPH: 0.72 {RATIO} (ref 0.26–1.65)
LAMBDA LC FREE SERPL-MCNC: 3.55 MG/DL (ref 0.57–2.63)

## 2023-10-20 ENCOUNTER — PRE VISIT (OUTPATIENT)
Dept: ONCOLOGY | Facility: CLINIC | Age: 68
End: 2023-10-20
Payer: COMMERCIAL

## 2023-10-20 LAB
ALBUMIN SERPL ELPH-MCNC: 3.9 G/DL (ref 3.7–5.1)
ALPHA1 GLOB SERPL ELPH-MCNC: 0.3 G/DL (ref 0.2–0.4)
ALPHA2 GLOB SERPL ELPH-MCNC: 1 G/DL (ref 0.5–0.9)
B-GLOBULIN SERPL ELPH-MCNC: 0.6 G/DL (ref 0.6–1)
GAMMA GLOB SERPL ELPH-MCNC: 0.8 G/DL (ref 0.7–1.6)
M PROTEIN SERPL ELPH-MCNC: 0 G/DL
PROT PATTERN SERPL ELPH-IMP: ABNORMAL
PROT PATTERN SERPL IFE-IMP: NORMAL

## 2023-10-25 DIAGNOSIS — D69.6 THROMBOCYTOPENIA (H): Primary | ICD-10-CM

## 2023-10-26 ENCOUNTER — PATIENT OUTREACH (OUTPATIENT)
Dept: ONCOLOGY | Facility: CLINIC | Age: 68
End: 2023-10-26
Payer: COMMERCIAL

## 2023-10-26 NOTE — PROGRESS NOTES
[facility]: Cancer Care Plan of Care Education Note                                    Discussion with Patient:                                                      RNCC called patient to complete bone marrow biopsy teaching.        Assessment:                                                      Assessment completed with:: Patient    Plan of Care Education   Yearly learning assessment completed?: Yes (see Education tab)  Diagnosis:: Pancytopenia  Does patient understand diagnosis?: Yes  Tx plan/regimen:: bmbx  Does patient understand treatment plan/regimen?: Yes  Preparing for treatment:: Reviewed treatment preparation information with patient (vascular access, day of chemo, visitor policy, what to bring, etc.)  Vascular access education provided for:: Peripheral IV  Plan of Care:: Lab appointment;AGATHA follow-up appointment  When to call provider:: Bleeding;Uncontrolled nausea/vomiting;Increased shortness of breath;New/worsening pain;Shaking chills;Temperature >100.4F;Uncontrolled diarrhea/constipation  Additional education provided for: : Bleeding precautions  Procedure education provided for: : Bone marrow biopsy    Evaluation of Learning  Patient Education Provided: Yes  Readiness:: Acceptance  Method:: Explanation  Response:: Verbalizes understanding    No assessment indicated    Intervention/Education provided during outreach:                                                       Verbal instruction provided re: BMBX using EPIC Reference: Bone Marrow Aspiration and Biopsy.  Educated on reason for bmbx, bmbx process, benefits, risks, and monitoring after.  Discussed option for sedative IV with procedure: pt accepts.  Pt understands she will need a  if she opts for sedative.        Patient to follow up as scheduled at next appointment.         Signature:  Hetal Mckeon RN

## 2023-10-27 ENCOUNTER — APPOINTMENT (OUTPATIENT)
Dept: LAB | Facility: CLINIC | Age: 68
End: 2023-10-27
Attending: INTERNAL MEDICINE
Payer: COMMERCIAL

## 2023-10-27 ENCOUNTER — OFFICE VISIT (OUTPATIENT)
Dept: ONCOLOGY | Facility: CLINIC | Age: 68
End: 2023-10-27
Attending: INTERNAL MEDICINE
Payer: COMMERCIAL

## 2023-10-27 VITALS
RESPIRATION RATE: 16 BRPM | DIASTOLIC BLOOD PRESSURE: 78 MMHG | HEART RATE: 71 BPM | BODY MASS INDEX: 29.09 KG/M2 | TEMPERATURE: 98 F | OXYGEN SATURATION: 98 % | WEIGHT: 224.3 LBS | SYSTOLIC BLOOD PRESSURE: 115 MMHG

## 2023-10-27 DIAGNOSIS — D69.6 THROMBOCYTOPENIA (H): Primary | ICD-10-CM

## 2023-10-27 LAB
BASOPHILS # BLD AUTO: ABNORMAL 10*3/UL
BASOPHILS # BLD MANUAL: 0 10E3/UL (ref 0–0.2)
BASOPHILS NFR BLD AUTO: ABNORMAL %
BASOPHILS NFR BLD MANUAL: 0 %
EOSINOPHIL # BLD AUTO: ABNORMAL 10*3/UL
EOSINOPHIL # BLD MANUAL: 0 10E3/UL (ref 0–0.7)
EOSINOPHIL NFR BLD AUTO: ABNORMAL %
EOSINOPHIL NFR BLD MANUAL: 0 %
ERYTHROCYTE [DISTWIDTH] IN BLOOD BY AUTOMATED COUNT: 13.3 % (ref 10–15)
HCT VFR BLD AUTO: 36.8 % (ref 40–53)
HGB BLD-MCNC: 12.5 G/DL (ref 13.3–17.7)
IMM GRANULOCYTES # BLD: ABNORMAL 10*3/UL
IMM GRANULOCYTES NFR BLD: ABNORMAL %
INTERPRETATION: NORMAL
LYMPHOCYTES # BLD AUTO: ABNORMAL 10*3/UL
LYMPHOCYTES # BLD MANUAL: 12.1 10E3/UL (ref 0.8–5.3)
LYMPHOCYTES NFR BLD AUTO: ABNORMAL %
LYMPHOCYTES NFR BLD MANUAL: 79 %
MCH RBC QN AUTO: 31.2 PG (ref 26.5–33)
MCHC RBC AUTO-ENTMCNC: 34 G/DL (ref 31.5–36.5)
MCV RBC AUTO: 92 FL (ref 78–100)
MONOCYTES # BLD AUTO: ABNORMAL 10*3/UL
MONOCYTES # BLD MANUAL: 1.8 10E3/UL (ref 0–1.3)
MONOCYTES NFR BLD AUTO: ABNORMAL %
MONOCYTES NFR BLD MANUAL: 12 %
NEUTROPHILS # BLD AUTO: ABNORMAL 10*3/UL
NEUTROPHILS # BLD MANUAL: 1.4 10E3/UL (ref 1.6–8.3)
NEUTROPHILS NFR BLD AUTO: ABNORMAL %
NEUTROPHILS NFR BLD MANUAL: 9 %
NRBC # BLD AUTO: 0 10E3/UL
NRBC BLD AUTO-RTO: 0 /100
PLAT MORPH BLD: ABNORMAL
PLATELET # BLD AUTO: 64 10E3/UL (ref 150–450)
RBC # BLD AUTO: 4.01 10E6/UL (ref 4.4–5.9)
RBC MORPH BLD: ABNORMAL
SIGNIFICANT RESULTS: NORMAL
SPECIMEN DESCRIPTION: NORMAL
TEST DETAILS, MDL: NORMAL
WBC # BLD AUTO: 15.3 10E3/UL (ref 4–11)

## 2023-10-27 PROCEDURE — 88305 TISSUE EXAM BY PATHOLOGIST: CPT | Mod: 26 | Performed by: STUDENT IN AN ORGANIZED HEALTH CARE EDUCATION/TRAINING PROGRAM

## 2023-10-27 PROCEDURE — 38222 DX BONE MARROW BX & ASPIR: CPT | Performed by: STUDENT IN AN ORGANIZED HEALTH CARE EDUCATION/TRAINING PROGRAM

## 2023-10-27 PROCEDURE — 88368 INSITU HYBRIDIZATION MANUAL: CPT | Mod: 26 | Performed by: MEDICAL GENETICS

## 2023-10-27 PROCEDURE — 88264 CHROMOSOME ANALYSIS 20-25: CPT | Performed by: STUDENT IN AN ORGANIZED HEALTH CARE EDUCATION/TRAINING PROGRAM

## 2023-10-27 PROCEDURE — 88271 CYTOGENETICS DNA PROBE: CPT | Performed by: STUDENT IN AN ORGANIZED HEALTH CARE EDUCATION/TRAINING PROGRAM

## 2023-10-27 PROCEDURE — 85027 COMPLETE CBC AUTOMATED: CPT | Performed by: STUDENT IN AN ORGANIZED HEALTH CARE EDUCATION/TRAINING PROGRAM

## 2023-10-27 PROCEDURE — 88237 TISSUE CULTURE BONE MARROW: CPT | Performed by: STUDENT IN AN ORGANIZED HEALTH CARE EDUCATION/TRAINING PROGRAM

## 2023-10-27 PROCEDURE — 88313 SPECIAL STAINS GROUP 2: CPT | Mod: 26 | Performed by: STUDENT IN AN ORGANIZED HEALTH CARE EDUCATION/TRAINING PROGRAM

## 2023-10-27 PROCEDURE — 88341 IMHCHEM/IMCYTCHM EA ADD ANTB: CPT | Mod: 26 | Performed by: STUDENT IN AN ORGANIZED HEALTH CARE EDUCATION/TRAINING PROGRAM

## 2023-10-27 PROCEDURE — 88311 DECALCIFY TISSUE: CPT | Mod: 26 | Performed by: STUDENT IN AN ORGANIZED HEALTH CARE EDUCATION/TRAINING PROGRAM

## 2023-10-27 PROCEDURE — 88185 FLOWCYTOMETRY/TC ADD-ON: CPT | Performed by: STUDENT IN AN ORGANIZED HEALTH CARE EDUCATION/TRAINING PROGRAM

## 2023-10-27 PROCEDURE — 88360 TUMOR IMMUNOHISTOCHEM/MANUAL: CPT | Mod: 26 | Performed by: STUDENT IN AN ORGANIZED HEALTH CARE EDUCATION/TRAINING PROGRAM

## 2023-10-27 PROCEDURE — 88364 INSITU HYBRIDIZATION (FISH): CPT | Mod: 26 | Performed by: STUDENT IN AN ORGANIZED HEALTH CARE EDUCATION/TRAINING PROGRAM

## 2023-10-27 PROCEDURE — 88305 TISSUE EXAM BY PATHOLOGIST: CPT | Mod: TC | Performed by: STUDENT IN AN ORGANIZED HEALTH CARE EDUCATION/TRAINING PROGRAM

## 2023-10-27 PROCEDURE — 88189 FLOWCYTOMETRY/READ 16 & >: CPT | Mod: XU | Performed by: STUDENT IN AN ORGANIZED HEALTH CARE EDUCATION/TRAINING PROGRAM

## 2023-10-27 PROCEDURE — 36415 COLL VENOUS BLD VENIPUNCTURE: CPT | Performed by: STUDENT IN AN ORGANIZED HEALTH CARE EDUCATION/TRAINING PROGRAM

## 2023-10-27 PROCEDURE — 88291 CYTO/MOLECULAR REPORT: CPT | Performed by: MEDICAL GENETICS

## 2023-10-27 PROCEDURE — 88365 INSITU HYBRIDIZATION (FISH): CPT | Mod: TC | Performed by: STUDENT IN AN ORGANIZED HEALTH CARE EDUCATION/TRAINING PROGRAM

## 2023-10-27 PROCEDURE — 88365 INSITU HYBRIDIZATION (FISH): CPT | Mod: 26 | Performed by: STUDENT IN AN ORGANIZED HEALTH CARE EDUCATION/TRAINING PROGRAM

## 2023-10-27 PROCEDURE — 88275 CYTOGENETICS 100-300: CPT | Performed by: STUDENT IN AN ORGANIZED HEALTH CARE EDUCATION/TRAINING PROGRAM

## 2023-10-27 PROCEDURE — 85007 BL SMEAR W/DIFF WBC COUNT: CPT | Performed by: STUDENT IN AN ORGANIZED HEALTH CARE EDUCATION/TRAINING PROGRAM

## 2023-10-27 PROCEDURE — 88342 IMHCHEM/IMCYTCHM 1ST ANTB: CPT | Mod: 26 | Performed by: STUDENT IN AN ORGANIZED HEALTH CARE EDUCATION/TRAINING PROGRAM

## 2023-10-27 PROCEDURE — 88369 M/PHMTRC ALYSISHQUANT/SEMIQ: CPT | Mod: 26 | Performed by: MEDICAL GENETICS

## 2023-10-27 PROCEDURE — 85097 BONE MARROW INTERPRETATION: CPT | Mod: GC | Performed by: STUDENT IN AN ORGANIZED HEALTH CARE EDUCATION/TRAINING PROGRAM

## 2023-10-27 RX ORDER — ALLOPURINOL 100 MG/1
100 TABLET ORAL DAILY
COMMUNITY
End: 2023-11-08

## 2023-10-27 ASSESSMENT — PAIN SCALES - GENERAL: PAINLEVEL: NO PAIN (0)

## 2023-10-27 NOTE — PROGRESS NOTES
BMT ONC Adult Bone Marrow Biopsy Procedure Note  October 26, 2023  /78   Pulse 71   Temp 98  F (36.7  C) (Oral)   Resp 16   Wt 101.7 kg (224 lb 4.8 oz)   SpO2 98%   BMI 29.09 kg/m       Learning needs assessment complete within 12 months? YES    DIAGNOSIS: thrombocytopenia     PROCEDURE: Unilateral Bone Marrow Biopsy and Unilateral Aspirate    LOCATION: [facility] 2nd Floor    Patient s identification was positively verified by verbal identification and invasive procedure safety checklist was completed. Informed consent was obtained. Following the administration of  no  pre-medication, patient was placed in the prone position and prepped and draped in a sterile manner. Approximately 10 cc of 1% Lidocaine was used over the right posterior iliac spine. Following this a 3 mm incision was made. Trephine bone marrow core(s) was (were) obtained from the [facility]. Bone marrow aspirates were obtained from the [facility]. Aspirates were sent for morphology, immunophenotyping, cytogenetics, and molecular diagnostics --process and hold. A total of approximately 22 ml of marrow was aspirated. Following this procedure a sterile dressing was applied to the bone marrow biopsy site(s). The patient was placed in the supine position to maintain pressure on the biopsy site. Post-procedure wound care instructions were given.     Complications: NO    Interventions: NO    Post-procedure pain: None    Length of procedure:20 minutes or less      Procedure performed by: Amber Scheierl, CNP

## 2023-10-27 NOTE — LETTER
10/27/2023         RE: Jonathon Santos  [address] [address] [place] [id number]        Dear Colleague,    Thank you for referring your patient, Jonathon Santos, to the [facility]. Please see a copy of my visit note below.      BMT ONC Adult Bone Marrow Biopsy Procedure Note  October 26, 2023  /78   Pulse 71   Temp 98  F (36.7  C) (Oral)   Resp 16   Wt 101.7 kg (224 lb 4.8 oz)   SpO2 98%   BMI 29.09 kg/m       Learning needs assessment complete within 12 months? YES    DIAGNOSIS: thrombocytopenia     PROCEDURE: Unilateral Bone Marrow Biopsy and Unilateral Aspirate    LOCATION: [facility]    Patient s identification was positively verified by verbal identification and invasive procedure safety checklist was completed. Informed consent was obtained. Following the administration of  no  pre-medication, patient was placed in the prone position and prepped and draped in a sterile manner. Approximately 10 cc of 1% Lidocaine was used over the right posterior iliac spine. Following this a 3 mm incision was made. Trephine bone marrow core(s) was (were) obtained from the [facility]. Bone marrow aspirates were obtained from the [facility]. Aspirates were sent for morphology, immunophenotyping, cytogenetics, and molecular diagnostics --process and hold. A total of approximately 22 ml of marrow was aspirated. Following this procedure a sterile dressing was applied to the bone marrow biopsy site(s). The patient was placed in the supine position to maintain pressure on the biopsy site. Post-procedure wound care instructions were given.     Complications: NO    Interventions: NO    Post-procedure pain: None    Length of procedure:20 minutes or less      Procedure performed by: Amber Scheierl, CNP

## 2023-10-27 NOTE — NURSING NOTE
"Oncology Rooming Note    October 27, 2023 12:29 PM   Jonathon Santos is a 68 year old male who presents for:    No chief complaint on file.    Initial Vitals: /78   Pulse 71   Temp 98  F (36.7  C) (Oral)   Resp 16   Wt 101.7 kg (224 lb 4.8 oz)   SpO2 98%   BMI 29.09 kg/m   Estimated body mass index is 29.09 kg/m  as calculated from the following:    Height as of 9/8/23: 1.87 m (6' 1.62\").    Weight as of this encounter: 101.7 kg (224 lb 4.8 oz). Body surface area is 2.3 meters squared.  No Pain (0) Comment: Data Unavailable   No LMP for male patient.  Allergies reviewed: Yes  Medications reviewed: Yes    Medications: Medication refills not needed today.  Pharmacy name entered into [employer]:    [facility] - [place], [place] - [address]  [facility] - [address]      BM Teaching and Assessment       Teaching concerns addressed: Bone marrow biopsy and infection prevention.     Person(s) involved in teaching: Patient  Motivation Level  Asks Questions: Yes  Eager to Learn: Yes  Cooperative: Yes  Receptive (willing/able to accept information): Yes    Patient demonstrates understanding of the following:     Reason for the appointment, diagnosis and treatment plan: Yes  Knowledge of proper use of medications and conditions for which they are ordered (with special attention to potential side effects or drug interactions): Yes  Which situations necessitate calling provider and whom to contact: Yes    Teaching concerns addressed:   Reviewed activity restrictions if received premeds, potential for bleeding and actions to take if develops any of the issues below    Pain management techniques: Yes  Patient instructed on hand hygiene: Yes  How and/when to access community resources: Yes    Infection Control:  Patient demonstrates understanding of the following:   Bone marrow procedure site care taught: Yes  Signs and symptoms of infection taught: Yes     "   Instructional Materials Used/Given: Pt instructed to keep bmbx site clean and dry for 24hrs. Pt educated to monitor site for signs of infection such as redness, rash, oozing, puss, bleeding, pain, and elevated temp. Pt instructed to go to call the [facility] triage line or go to the ER if any signs of infection should occur. Pt educated to not operate machinery if receiving versed. Pt verbalizes understanding.     Pre-procedure labs drawn via venipuncture. Post procedure: Patient vital signs stable, ambulating, site is clean, dry and intact prior to discharge and line removed. Pt discharged with wife as .           Eleanor Rodriguez RN

## 2023-10-30 LAB — INTERPRETATION: NORMAL

## 2023-11-01 LAB
PATH REPORT.COMMENTS IMP SPEC: ABNORMAL
PATH REPORT.COMMENTS IMP SPEC: YES
PATH REPORT.COMMENTS IMP SPEC: YES
PATH REPORT.FINAL DX SPEC: ABNORMAL
PATH REPORT.FINAL DX SPEC: ABNORMAL
PATH REPORT.GROSS SPEC: ABNORMAL
PATH REPORT.MICROSCOPIC SPEC OTHER STN: ABNORMAL
PATH REPORT.RELEVANT HX SPEC: ABNORMAL
PATH REPORT.RELEVANT HX SPEC: ABNORMAL

## 2023-11-01 NOTE — PROGRESS NOTES
[facility]               NEW PATIENT REFERRAL        Nov 2, 2023   Subjective   REFERRAL SOURCE: Jacob Cogan, MD    REASON FOR VISIT: Low grade B-cell lymphoma    HISTORY OF PRESENT ILLNESS:  Mr. Jonathon Santos is a 68 year old male who presents for consultation regarding new lymphoma diagnosis.     He recently saw Dr. Cogan in hematology for thrombocytopenia dating back to 2019. Platelets were 134k at the time but have now gradually decreased to 65-70k range. Hgb also mildly low at 12.8 and ANC 1.2. He notes some fatigue associated with his prostate cancer treatment but still able to do normal daily activities including walking his dog and taking care of his horse. He has occasional LUQ discomfort but no early satiety. Also has occasional blood-tinged stools. Otherwise no fever/chills, night sweats, weight loss, lumps/bumps, SOB, chest pain, N/V, or diarrhea.     10/7/23 peripheral flow cytometry showed several populations of lambda-monotypic B-cells; however the predominant one was CD5/CD10 negative raising possibility of marginal zone lymphoma, lymphoplasmacytic, or rarely follicular lymphoma. Thus, he was referred to see me.     PAST MEDICAL HISTORY:  Locally advanced prostate cancer ([place] 4+5=9) dx 12/2020 s/p radiation 5/2021-7/2021 and 2 years of abiraterone/prednisone ending 6/2023  Hypertension  Hepatic steatosis   Peripheral neuropathy, possibly 2/2 B12 deficiency  Gout    FAMILY HISTORY:  Mother had lymphoma and lung cancer.     SOCIAL HISTORY:  Never smoker. Drinks 2-3 cans of beer daily. Smokes marijuana daily.   Lives in [place], [place] by himself.   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      No Known Allergies    Current Outpatient Medications:     allopurinol (ZYLOPRIM) 100 MG tablet, Take 100 mg by mouth daily Pt unsure of dosage, Disp: , Rfl:     cyanocobalamin (VITAMIN B-12) 1000 MCG SUBL sublingual tablet, Place 1,000 mcg under  the tongue daily, Disp: , Rfl:     lisinopril-hydrochlorothiazide (ZESTORETIC) 20-12.5 MG tablet, TAKE 2 TABLETS BY MOUTH  DAILY, Disp: 200 tablet, Rfl: 2    metoprolol succinate ER (TOPROL XL) 25 MG 24 hr tablet, TAKE 1 TABLET BY MOUTH  DAILY WITH LUNCH, Disp: 100 tablet, Rfl: 2    potassium chloride ER (K-TAB) 20 MEQ CR tablet, Take 1 tablet (20 mEq) by mouth daily (Patient not taking: Reported on 10/27/2023), Disp: 90 tablet, Rfl: 3     REVIEW OF SYSTEMS:  12-point ROS reviewed and negative other than that mentioned in HPI.     Objective   VITAL SIGNS:  /87 (BP Location: Right arm, Patient Position: Sitting, Cuff Size: Adult Regular)   Pulse 74   Temp 97.6  F (36.4  C) (Oral)   Resp 16   Wt 100.2 kg (220 lb 14.4 oz)   SpO2 98%   BMI 28.65 kg/m      ECOG PS: 0     PHYSICAL EXAM:  General: Awake, alert, in no acute distress.   HEENT: Normocephalic, atraumatic. No scleral icterus.  Lymph: No significant cervical, supraclavicular, or axillary lymphadenopathy appreciated.   CV: Regular rate and rhythm. No murmurs, rubs, or gallops appreciated.  Resp: Good inspiratory effort, lungs clear to auscultation bilaterally.  GI: Abdomen soft, nontender, nondistended. Spleen tip palpable 3 cm below costal margin.   Ext: No peripheral edema bilaterally.  Neuro: CN II-XII grossly intact. No focal deficits.   Skin: No rash, unusual bruising or prominent lesions.  Psych: Pleasant, normal affect.    LABS:  I reviewed the following labs:  Lab Results   Component Value Date    WBC 15.3 (H) 10/27/2023    HGB 12.5 (L) 10/27/2023    HCT 36.8 (L) 10/27/2023    MCV 92 10/27/2023    PLT 64 (L) 10/27/2023     Lab Results   Component Value Date     (L) 10/18/2023    POTASSIUM 3.9 10/18/2023    CR 1.17 10/18/2023    BUN 15.4 10/18/2023    CHLORIDE 93 (L) 10/18/2023    MISTY 9.3 10/18/2023     (H) 10/18/2023      Lab Results   Component Value Date    ALT 16 10/18/2023    AST 25 10/18/2023    ALKPHOS 100 10/18/2023     BILITOTAL 1.3 (H) 10/18/2023      Lab Results   Component Value Date     (H) 10/18/2023    URIC 4.2 10/18/2023     IMAGING:  10/18/23 PET-CT:  IMPRESSION:  1. Findings suspicious for lymphoma involving lymph nodes above and below the diaphragm, the spleen, with additional possible diffuse marrow and bilateral lower cervical chain lymph node involvement.  2. Mildly FDG avid subsolid opacity in the infrahilar left lower lobe should be infectious/inflammatory. Consider short interval CT chest in 1-3 months to assess for resolution.    PATHOLOGY:  10/5/23 Peripheral flow cytometry:  A. Peripheral Blood:     - Lambda-monotypic B cells (56%)  - CD5 (dim), CD20 (bright) positive B cells (1.8%)  - CD5, CD20 (dim) positive kappa-monotypic B cells (0.3%)  - No aberrant immunophenotype on T cells  - See comment    10/27/23 Bone marrow biopsy:  Bone marrow, posterior iliac crest, right decalcified trephine biopsy and touch imprint; right direct and concentrated aspirate smears; and peripheral blood smear:      Low grade B cell lymphoma with the following features:  - Hypercellular marrow for age (overall 60%) with trilineage hematopoiesis, no overt dysplasia, no increase in blasts, and 40% involvement by B cell lymphoma with nodular and diffuse growth pattern   - Peripheral blood showing slight normochromic normocytic anemia, moderate thrombocytopenia, and leukocytosis with neutropenia, monocytosis, and atypical lymphocytosis  - See comment    Comment:  Flow cytometry analysis on concurrent specimen ([id number]) showed lambda (cytoplasmic)-monotypic B cells (63%) and CD5 positive kappa-monotypic B cells (0.2%).     For further characterization of B cell lymphoma, a lymph node biopsy (preferably excision) as well as comprehensive serum protein studies (such as SPEP, immunofixation, Quantitative serum immunoglobulin tests, to name a few) are recommended. In addition, molecular testing for B cell lymphoma panel and  cytogenetic studies on bone marrow specimen could be considered.      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        Assessment & Plan   #Stage IV low-grade B-cell lymphoma  Pleasant 68 year old with progressive thrombocytopenia over the last several years, now down to 64. Peripheral flow cytometry and subsequent bone marrow biopsy shows several populations of clonal B-cells but the predominant one is CD5/U89-[id number] low-grade B-cell lymphoma comprising about 40% of the bone marrow. There is another small CD5+ population compatible with monoclonal B-cell lymphocytosis. His PET additionally shows significant splenomegaly (20 cm with SUVmax 6.7), mildly-avid lymphadenopathy above/below diaphragm, and diffuse bone marrow uptake.     I discussed his bone marrow biopsy results extensively with Hemepath - they are unable to narrow the diagnosis further and recommend excisional biopsy of a lymph node. The axillary ones are largest and most avid so will request Surg Onc consult. The differential includes marginal zone lymphoma (especially splenic subtype given massive splenomegaly), Waldenstrom's, or less likely an atypical follicular lymphoma or CLL/SLL. MYD88 from peripheral blood did come back negative and IgM/M-spike is normal, which makes Waldenstrom's much less likely. Given that CLL/SLL would be treated differently than all the rest, I think we will need the lymph node to put the whole picture together.     We briefly reviewed his diagnosis of low-grade B-cell lymphoma and discussed that treatment will be indicated due to his degree of thrombocytopenia. However, will review in much more detail once we have the final diagnosis.     #Hx of locally advanced prostate cancer  Dx 12/2020, received radiation 5/2021-7/2021 and 2 years of  abiraterone/prednisone ending 6/2023.  - On observation, transferring care to Dr. Eduardo 12/2023.       PLAN:  - Flu and PCV20 shots today   - Surgery consult for excisional axillary LN biopsy  - RTC 1 week after biopsy    Patient was seen and discussed with resident Gaby Molina.     Total of 60 minutes on patient visit, reviewing records, interpreting test results, placing orders, and documentation on the day of service.    Vannesa Burciaga MD  Attending Physician, [facility]

## 2023-11-02 ENCOUNTER — ONCOLOGY VISIT (OUTPATIENT)
Dept: ONCOLOGY | Facility: CLINIC | Age: 68
End: 2023-11-02
Attending: STUDENT IN AN ORGANIZED HEALTH CARE EDUCATION/TRAINING PROGRAM
Payer: COMMERCIAL

## 2023-11-02 VITALS
DIASTOLIC BLOOD PRESSURE: 87 MMHG | BODY MASS INDEX: 28.65 KG/M2 | OXYGEN SATURATION: 98 % | RESPIRATION RATE: 16 BRPM | WEIGHT: 220.9 LBS | SYSTOLIC BLOOD PRESSURE: 137 MMHG | HEART RATE: 74 BPM | TEMPERATURE: 97.6 F

## 2023-11-02 DIAGNOSIS — D69.6 THROMBOCYTOPENIA (H): ICD-10-CM

## 2023-11-02 DIAGNOSIS — D70.9 NEUTROPENIA, UNSPECIFIED TYPE (H): ICD-10-CM

## 2023-11-02 DIAGNOSIS — C85.98 NON-HODGKIN LYMPHOMA OF LYMPH NODES OF MULTIPLE REGIONS, UNSPECIFIED NON-HODGKIN LYMPHOMA TYPE (H): Primary | ICD-10-CM

## 2023-11-02 PROBLEM — Z28.21 REFUSED PNEUMOCOCCAL VACCINATION: Status: RESOLVED | Noted: 2019-09-17 | Resolved: 2023-11-02

## 2023-11-02 PROBLEM — Z28.21 REFUSED INFLUENZA VACCINE: Status: RESOLVED | Noted: 2019-09-17 | Resolved: 2023-11-02

## 2023-11-02 LAB
CULTURE HARVEST COMPLETE DATE: NORMAL
INTERPRETATION: NORMAL

## 2023-11-02 PROCEDURE — G0009 ADMIN PNEUMOCOCCAL VACCINE: HCPCS | Performed by: INTERNAL MEDICINE

## 2023-11-02 PROCEDURE — 99215 OFFICE O/P EST HI 40 MIN: CPT | Performed by: INTERNAL MEDICINE

## 2023-11-02 PROCEDURE — G0463 HOSPITAL OUTPT CLINIC VISIT: HCPCS | Mod: 25 | Performed by: INTERNAL MEDICINE

## 2023-11-02 PROCEDURE — G0008 ADMIN INFLUENZA VIRUS VAC: HCPCS | Performed by: INTERNAL MEDICINE

## 2023-11-02 PROCEDURE — 250N000011 HC RX IP 250 OP 636: Performed by: INTERNAL MEDICINE

## 2023-11-02 PROCEDURE — 90677 PCV20 VACCINE IM: CPT | Performed by: INTERNAL MEDICINE

## 2023-11-02 PROCEDURE — 90662 IIV NO PRSV INCREASED AG IM: CPT | Performed by: INTERNAL MEDICINE

## 2023-11-02 RX ADMIN — PNEUMOCOCCAL 20-VALENT CONJUGATE VACCINE 0.5 ML
2.2; 2.2; 2.2; 2.2; 2.2; 2.2; 2.2; 2.2; 2.2; 2.2; 2.2; 2.2; 2.2; 2.2; 2.2; 2.2; 4.4; 2.2; 2.2; 2.2 INJECTION, SUSPENSION INTRAMUSCULAR at 13:16

## 2023-11-02 RX ADMIN — INFLUENZA A VIRUS A/VICTORIA/4897/2022 IVR-238 (H1N1) ANTIGEN (FORMALDEHYDE INACTIVATED), INFLUENZA A VIRUS A/DARWIN/9/2021 SAN-010 (H3N2) ANTIGEN (FORMALDEHYDE INACTIVATED), INFLUENZA B VIRUS B/PHUKET/3073/2013 ANTIGEN (FORMALDEHYDE INACTIVATED), AND INFLUENZA B VIRUS B/MICHIGAN/01/2021 ANTIGEN (FORMALDEHYDE INACTIVATED) 0.7 ML: 60; 60; 60; 60 INJECTION, SUSPENSION INTRAMUSCULAR at 13:17

## 2023-11-02 ASSESSMENT — PAIN SCALES - GENERAL: PAINLEVEL: NO PAIN (0)

## 2023-11-02 NOTE — NURSING NOTE
Flu vaccine and pneumococcal injections given per provider's request. Patient instructed to wait 10 minutes post injection to monitor for signs of a reaction; no reaction noted. Information sheet on vaccines given to patient; no questions at this time.

## 2023-11-02 NOTE — LETTER
11/2/2023         RE: Jonathon Santos  [address] [place] [address]        Dear Colleague,    Thank you for referring your patient, Jonathon Santos, to the [facility]. Please see a copy of my visit note below.                                     [facility]               NEW PATIENT REFERRAL        Nov 2, 2023   Subjective  REFERRAL SOURCE: Jacob Cogan, MD    REASON FOR VISIT: Low grade B-cell lymphoma    HISTORY OF PRESENT ILLNESS:  Mr. Jonathon Santos is a 68 year old male who presents for consultation regarding new lymphoma diagnosis.     He recently saw Dr. Cogan in hematology for thrombocytopenia dating back to 2019. Platelets were 134k at the time but have now gradually decreased to 65-70k range. Hgb also mildly low at 12.8 and ANC 1.2. He notes some fatigue associated with his prostate cancer treatment but still able to do normal daily activities including walking his dog and taking care of his horse. He has occasional LUQ discomfort but no early satiety. Also has occasional blood-tinged stools. Otherwise no fever/chills, night sweats, weight loss, lumps/bumps, SOB, chest pain, N/V, or diarrhea.     10/7/23 peripheral flow cytometry showed several populations of lambda-monotypic B-cells; however the predominant one was CD5/CD10 negative raising possibility of marginal zone lymphoma, lymphoplasmacytic, or rarely follicular lymphoma. Thus, he was referred to see me.     PAST MEDICAL HISTORY:  Locally advanced prostate cancer ([place] 4+5=9) dx 12/2020 s/p radiation 5/2021-7/2021 and 2 years of abiraterone/prednisone ending 6/2023  Hypertension  Hepatic steatosis   Peripheral neuropathy, possibly 2/2 B12 deficiency  Gout    FAMILY HISTORY:  Mother had lymphoma and lung cancer.     SOCIAL HISTORY:  Never smoker. Drinks 2-3 cans of beer daily. Smokes marijuana daily.   Lives in [place], [place] by himself.   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       No Known Allergies    Current Outpatient Medications:     allopurinol (ZYLOPRIM) 100 MG tablet, Take 100 mg by mouth daily Pt unsure of dosage, Disp: , Rfl:     cyanocobalamin (VITAMIN B-12) 1000 MCG SUBL sublingual tablet, Place 1,000 mcg under the tongue daily, Disp: , Rfl:     lisinopril-hydrochlorothiazide (ZESTORETIC) 20-12.5 MG tablet, TAKE 2 TABLETS BY MOUTH  DAILY, Disp: 200 tablet, Rfl: 2    metoprolol succinate ER (TOPROL XL) 25 MG 24 hr tablet, TAKE 1 TABLET BY MOUTH  DAILY WITH LUNCH, Disp: 100 tablet, Rfl: 2    potassium chloride ER (K-TAB) 20 MEQ CR tablet, Take 1 tablet (20 mEq) by mouth daily (Patient not taking: Reported on 10/27/2023), Disp: 90 tablet, Rfl: 3     REVIEW OF SYSTEMS:  12-point ROS reviewed and negative other than that mentioned in HPI.     Objective  VITAL SIGNS:  /87 (BP Location: Right arm, Patient Position: Sitting, Cuff Size: Adult Regular)   Pulse 74   Temp 97.6  F (36.4  C) (Oral)   Resp 16   Wt 100.2 kg (220 lb 14.4 oz)   SpO2 98%   BMI 28.65 kg/m      ECOG PS: 0     PHYSICAL EXAM:  General: Awake, alert, in no acute distress.   HEENT: Normocephalic, atraumatic. No scleral icterus.  Lymph: No significant cervical, supraclavicular, or axillary lymphadenopathy appreciated.   CV: Regular rate and rhythm. No murmurs, rubs, or gallops appreciated.  Resp: Good inspiratory effort, lungs clear to auscultation bilaterally.  GI: Abdomen soft, nontender, nondistended. Spleen tip palpable 3 cm below costal margin.   Ext: No peripheral edema bilaterally.  Neuro: CN II-XII grossly intact. No focal deficits.   Skin: No rash, unusual bruising or prominent lesions.  Psych: Pleasant, normal affect.    LABS:  I reviewed the following labs:  Lab Results   Component Value Date    WBC 15.3 (H) 10/27/2023    HGB 12.5 (L) 10/27/2023    HCT 36.8 (L) 10/27/2023    MCV 92 10/27/2023    PLT 64 (L) 10/27/2023     Lab Results   Component Value Date     (L) 10/18/2023    POTASSIUM 3.9  10/18/2023    CR 1.17 10/18/2023    BUN 15.4 10/18/2023    CHLORIDE 93 (L) 10/18/2023    MISTY 9.3 10/18/2023     (H) 10/18/2023      Lab Results   Component Value Date    ALT 16 10/18/2023    AST 25 10/18/2023    ALKPHOS 100 10/18/2023    BILITOTAL 1.3 (H) 10/18/2023      Lab Results   Component Value Date     (H) 10/18/2023    URIC 4.2 10/18/2023     IMAGING:  10/18/23 PET-CT:  IMPRESSION:  1. Findings suspicious for lymphoma involving lymph nodes above and below the diaphragm, the spleen, with additional possible diffuse marrow and bilateral lower cervical chain lymph node involvement.  2. Mildly FDG avid subsolid opacity in the infrahilar left lower lobe should be infectious/inflammatory. Consider short interval CT chest in 1-3 months to assess for resolution.    PATHOLOGY:  10/5/23 Peripheral flow cytometry:  A. Peripheral Blood:     - Lambda-monotypic B cells (56%)  - CD5 (dim), CD20 (bright) positive B cells (1.8%)  - CD5, CD20 (dim) positive kappa-monotypic B cells (0.3%)  - No aberrant immunophenotype on T cells  - See comment    10/27/23 Bone marrow biopsy:  Bone marrow, posterior iliac crest, right decalcified trephine biopsy and touch imprint; right direct and concentrated aspirate smears; and peripheral blood smear:      Low grade B cell lymphoma with the following features:  - Hypercellular marrow for age (overall 60%) with trilineage hematopoiesis, no overt dysplasia, no increase in blasts, and 40% involvement by B cell lymphoma with nodular and diffuse growth pattern   - Peripheral blood showing slight normochromic normocytic anemia, moderate thrombocytopenia, and leukocytosis with neutropenia, monocytosis, and atypical lymphocytosis  - See comment    Comment:  Flow cytometry analysis on concurrent specimen ([id number]) showed lambda (cytoplasmic)-monotypic B cells (63%) and CD5 positive kappa-monotypic B cells (0.2%).     For further characterization of B cell lymphoma, a lymph node  biopsy (preferably excision) as well as comprehensive serum protein studies (such as SPEP, immunofixation, Quantitative serum immunoglobulin tests, to name a few) are recommended. In addition, molecular testing for B cell lymphoma panel and cytogenetic studies on bone marrow specimen could be considered.      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        Assessment & Plan  #Stage IV low-grade B-cell lymphoma  Pleasant 68 year old with progressive thrombocytopenia over the last several years, now down to 64. Peripheral flow cytometry and subsequent bone marrow biopsy shows several populations of clonal B-cells but the predominant one is CD5/Q86-[id number] low-grade B-cell lymphoma comprising about 40% of the bone marrow. There is another small CD5+ population compatible with monoclonal B-cell lymphocytosis. His PET additionally shows significant splenomegaly (20 cm with SUVmax 6.7), mildly-avid lymphadenopathy above/below diaphragm, and diffuse bone marrow uptake.     I discussed his bone marrow biopsy results extensively with Hemep[facility] - they are unable to narrow the diagnosis further and recommend excisional biopsy of a lymph node. The axillary ones are largest and most avid so will request Surg Onc consult. The differential includes marginal zone lymphoma (especially splenic subtype given massive splenomegaly), Waldenstrom's, or less likely an atypical follicular lymphoma or CLL/SLL. MYD88 from peripheral blood did come back negative and IgM/M-spike is normal, which makes Waldenstrom's much less likely. Given that CLL/SLL would be treated differently than all the rest, I think we will need the lymph node to put the whole picture together.     We briefly reviewed his diagnosis of low-grade B-cell lymphoma and discussed that  treatment will be indicated due to his degree of thrombocytopenia. However, will review in much more detail once we have the final diagnosis.     #Hx of locally advanced prostate cancer  Dx 12/2020, received radiation 5/2021-7/2021 and 2 years of abiraterone/prednisone ending 6/2023.  - On observation, transferring care to Dr. Eduardo 12/2023.       PLAN:  - Flu and PCV20 shots today   - Surgery consult for excisional axillary LN biopsy  - RTC 1 week after biopsy    Patient was seen and discussed with resident Gaby Molina.     Total of 60 minutes on patient visit, reviewing records, interpreting test results, placing orders, and documentation on the day of service.    Vannesa Burciaga MD  Attending Physician, [facility]

## 2023-11-02 NOTE — NURSING NOTE
"Oncology Rooming Note    November 2, 2023 12:10 PM   Jonathon Santos is a 68 year old male who presents for:    Chief Complaint   Patient presents with    Oncology Clinic Visit     Neutropenia, unspecified type (H24)      Initial Vitals: /87 (BP Location: Right arm, Patient Position: Sitting, Cuff Size: Adult Regular)   Pulse 74   Temp 97.6  F (36.4  C) (Oral)   Resp 16   Wt 100.2 kg (220 lb 14.4 oz)   SpO2 98%   BMI 28.65 kg/m   Estimated body mass index is 28.65 kg/m  as calculated from the following:    Height as of 9/8/23: 1.87 m (6' 1.62\").    Weight as of this encounter: 100.2 kg (220 lb 14.4 oz). Body surface area is 2.28 meters squared.  No Pain (0) Comment: Data Unavailable   No LMP for male patient.  Allergies reviewed: Yes  Medications reviewed: Yes    Medications: Medication refills not needed today.  Pharmacy name entered into [employer]:    [facility] - [place], [place] - [address]  [facility] - [place], [place] - [address] [place] [address]    Clinical concerns: none       Savana James              "

## 2023-11-03 ENCOUNTER — PATIENT OUTREACH (OUTPATIENT)
Dept: ONCOLOGY | Facility: CLINIC | Age: 68
End: 2023-11-03
Payer: COMMERCIAL

## 2023-11-03 NOTE — PROGRESS NOTES
"New Patient Oncology Nurse Navigator Note     Referring provider: Dr Vannesa Burciaga, Heme/Once    Referring Clinic/Organization: [facility]     Referred to: Surgical Oncology - Soft Tissue     Requested provider (if applicable): First available - did not specify     Referral Received: 11/02/23       Evaluation for : Non Hodgkin lymphoma of lymph nodes, needs excisional bx     Clinical History (per Nurse review of records provided):        * 10/18/2023 PET ([facility])  IMPRESSION:     1. Findings suspicious for lymphoma involving lymph nodes above and below the diaphragm, the spleen, with additional possible diffuse marrow and bilateral lower cervical chain lymph node involvement.  2. Mildly FDG avid subsolid opacity in the infrahilar left lower lobe should be infectious/inflammatory. Consider short interval CT chest in 1-3 months to assess for resolution.      * 10/27/2023 BMBX ([facility])  Final Diagnosis   Bone marrow, posterior iliac crest, right decalcified trephine biopsy and touch imprint; right direct and concentrated aspirate smears; and peripheral blood smear:      Low grade B cell lymphoma with the following features:  - Hypercellular marrow for age (overall 60%) with trilineage hematopoiesis, no overt dysplasia, no increase in blasts, and 40% involvement by B cell lymphoma with nodular and diffuse growth pattern   - Peripheral blood showing slight normochromic normocytic anemia, moderate thrombocytopenia, and leukocytosis with neutropenia, monocytosis, and atypical lymphocytosis  - See comment   Electronically signed by Delonte Olivera MD on 11/1/2023 at 12:13 PM       Clinical Assessment / Barriers to Care (Per Nurse):    Pt with lymphoma, per Dr Burciaga \"For further characterization of B cell lymphoma, a lymph node biopsy (preferably excision) as well as comprehensive serum protein studies (such as SPEP, immunofixation, Quantitative serum immunoglobulin tests, to name a few) are recommended.\"        Will " offer next available consult with Dacia Llanos or Kaila. Dr Llanos has availability next Thurs 11/9/23 at 9:15am, [facility], 30min, in person. Or schedule per pt preference.      Records Location: [facility]     Records Needed:     N/A    Additional testing needed prior to consult:     TBD          Aubrey Maher, RN, BSN, OCN  Oncology New Patient Nurse Navigator   [facility]  [phone]

## 2023-11-06 NOTE — PROGRESS NOTES
RECORDS STATUS - ALL OTHER DIAGNOSIS      RECORDS RECEIVED FROM: Epic   DATE RECEIVED:    NOTES STATUS DETAILS   OFFICE NOTE from referring provider Epic 11/02/23: Dr. Vannesa Burciaga   OFFICE NOTE from medical oncologist Epic 11/02/23: Dr. Vannesa Burciaga  09/08/23: Dr. Jacob Cogan  06/26/23: Dr. Andrea Cisneros   OFFICE NOTE from other specialist Epic 02/15/22: Dr. Davion Francois    MEDICATION LIST [facility]    LABS     PATHOLOGY REPORTS Reports in [facility] Bone Marrow Biopsy:  10/27/23: [id number]    Flow Cytom:  10/05/23: [id number]     Blood Morph:  09/29/23: [id number]  09/07/23: [id number]   ANYTHING RELATED TO DIAGNOSIS Epic Most recent 10/27/23   IMAGING (NEED IMAGES & REPORT)     CT SCANS PACS 10/18/23, 04/13/21: CT CAP  10/18/23: CT Soft Tissue Neck  01/12/21: CT Abd Pel   MRI PACS 05/06/21: MR Pelvis  11/17/20: MR Prostate   NM PACS 01/12/21: NM Bone Scan   PET PACS 10/18/23: PET Onc Whole Body

## 2023-11-07 LAB
CULTURE HARVEST COMPLETE DATE: NORMAL

## 2023-11-08 DIAGNOSIS — M1A.09X1 IDIOPATHIC CHRONIC GOUT, MULTIPLE SITES, WITH TOPHUS (TOPHI): Primary | ICD-10-CM

## 2023-11-08 RX ORDER — ALLOPURINOL 100 MG/1
100 TABLET ORAL DAILY
Qty: 90 TABLET | Refills: 0 | Status: SHIPPED | OUTPATIENT
Start: 2023-11-08 | End: 2024-01-04

## 2023-11-08 NOTE — TELEPHONE ENCOUNTER
Allopurinol refilled. If doing well, okay for 1 year follow up.    Jennifer Mars PA-C on 11/8/2023 at 3:12 PM

## 2023-11-08 NOTE — TELEPHONE ENCOUNTER
Last OV 3/24/23 with Jennifer.  Per OV note:    Plan:      Schedule follow-up with Jennifer Mars PA-C in 6 months, okay to be video visit.   Labs: uric acid monthly   Medication recommendations:             Continue Allopurinol 150mg daily-we'll see what your next uric acid is and then decide if we need to increase the dose    Patient scheduled Rheumatology follow up for 3/5/24. Last Uric acid level was high at 5.6. Was asked if he had any recent flares. Patient never responded.

## 2023-11-09 ENCOUNTER — VIRTUAL VISIT (OUTPATIENT)
Dept: ONCOLOGY | Facility: CLINIC | Age: 68
End: 2023-11-09
Attending: INTERNAL MEDICINE
Payer: COMMERCIAL

## 2023-11-09 ENCOUNTER — PRE VISIT (OUTPATIENT)
Dept: ONCOLOGY | Facility: CLINIC | Age: 68
End: 2023-11-09
Payer: COMMERCIAL

## 2023-11-09 VITALS — WEIGHT: 220 LBS | HEIGHT: 73 IN | BODY MASS INDEX: 29.16 KG/M2

## 2023-11-09 DIAGNOSIS — C85.98 NON-HODGKIN LYMPHOMA OF LYMPH NODES OF MULTIPLE REGIONS, UNSPECIFIED NON-HODGKIN LYMPHOMA TYPE (H): ICD-10-CM

## 2023-11-09 PROCEDURE — 99202 OFFICE O/P NEW SF 15 MIN: CPT | Mod: VID | Performed by: SURGERY

## 2023-11-09 ASSESSMENT — PAIN SCALES - GENERAL: PAINLEVEL: NO PAIN (0)

## 2023-11-09 NOTE — NURSING NOTE
"  Is the patient currently in the state of [place]? YES    Visit mode:VIDEO    If the visit is dropped, the patient can be reconnected by: VIDEO VISIT: Text to cell phone:   Telephone Information:   Mobile [phone]       Will anyone else be joining the visit? YES: How would they like to receive their invitation? Send to e-mail: [email or web address] [facility] [facility]  (If patient encounters technical issues they should call [phone])    How would you like to obtain your AVS? MyChart    Are changes needed to the allergy or medication list? No    Reason for visit: Consult (Patient said, \" talk about having a lymph node biopsy, said has had a consistent cough and not sure about allergies to a medication because when taking cancer medication it was bad and now that the medication has stopped still have a small cough and mucus build up when it gets to the bottom of throat and phlem comes up that gets spit out.\")      Stacy Beckwith VVF     "

## 2023-11-09 NOTE — LETTER
11/9/2023         RE: Jonathon Santos  [id number] [address] [place] [id number]        Dear Colleague,    Thank you for referring your patient, Jonathon Santos, to the [facility]. Please see a copy of my visit note below.    Virtual Visit Details      Patient is a 68-year-old man with a new diagnosis of presumed lymphoma.  He has had a recent PET CT scan demonstrating fairly extensive lymphadenopathy particularly in the right and left axilla.  A biopsy of the axillary lymph nodes was requested.  His past medical history is significant for hypertension and prostate cancer.  His last PSA according to the patient was nondetectable.  Today I talked him about performance of an axillary lymph node biopsy.  He would like to have a general anesthesia which is reasonable.  I will schedule this either at the [facility] surgery [place] or the [facility] in the next couple of weeks.    The video started at 9:17 AM and ended at 9:24 AM and the total time was 20 minutes which included reviewing his imaging      Sincerely,        Manjinder Bright MD

## 2023-11-09 NOTE — PROGRESS NOTES
Patient is a 68-year-old man with a new diagnosis of presumed lymphoma.  He has had a recent PET CT scan demonstrating fairly extensive lymphadenopathy particularly in the right and left axilla.  A biopsy of the axillary lymph nodes was requested.  His past medical history is significant for hypertension and prostate cancer.  His last PSA according to the patient was nondetectable.  Today I talked him about performance of an axillary lymph node biopsy.  He would like to have a general anesthesia which is reasonable.  I will schedule this either at the [facility] or the [facility] in the next couple of weeks.    The video started at 9:17 AM and ended at 9:24 AM and the total time was 20 minutes which included reviewing his imaging

## 2023-11-10 ENCOUNTER — PATIENT OUTREACH (OUTPATIENT)
Dept: ONCOLOGY | Facility: CLINIC | Age: 68
End: 2023-11-10
Payer: COMMERCIAL

## 2023-11-10 NOTE — PROGRESS NOTES
[facility]: Surgical Oncology Plan of Care Education Note                                     Discussion with Patient:                                                         Spoke with Jonathon following his visit with Dr. Bright on 11/9/2023. Introduced self and explained role of RNCC.       Plan:                                                       Reviewed plan for right axillary lymph node biopsy at the [facility].  Provided appropriate OR location map. Discussed need for H&P within 30 days of surgery. Jonathon prefers to schedule with his PCP. Reviewed shower instructions and mailed written hand-out and bottle of surgical scrub.     Informed patient they should get a call within the next three business days from our OR  with surgery date, H&P date and date of post-op visit with their surgeon. Writer answered all questions and concerns to the best of her ability and provided her contact information. Reviewed use of eV[employer] Check as alternative way to contact team.  Encouraged patient to contact with questions.         Ksaey Watson, RNCC  [facility]  Surgical Onolcogy      Approximately 5 minutes was spent in discussion with patient.

## 2023-11-13 ENCOUNTER — HOSPITAL ENCOUNTER (OUTPATIENT)
Facility: AMBULATORY SURGERY CENTER | Age: 68
End: 2023-11-13
Attending: SURGERY | Admitting: SURGERY
Payer: COMMERCIAL

## 2023-11-13 ENCOUNTER — TELEPHONE (OUTPATIENT)
Dept: ONCOLOGY | Facility: CLINIC | Age: 68
End: 2023-11-13
Payer: COMMERCIAL

## 2023-11-13 PROBLEM — C85.98: Status: ACTIVE | Noted: 2023-11-09

## 2023-11-13 NOTE — TELEPHONE ENCOUNTER
Scheduled surgery for 12/4 in [place] with Dr. Bright. Offered earlier date of 11/22 but this date did not work for patient.    H&P scheduled with PAC for 11/27 per patient request.    Post-op scheduled for 12/15 with Dr. Bright in [place]. Patient would like time later in the day if possible due to commute.    Writer will mail surgery packet.    No further questions/concerns at this time.    Stacy Moncada on 11/13/2023 at 10:56 AM

## 2023-11-14 NOTE — TELEPHONE ENCOUNTER
FUTURE VISIT INFORMATION      SURGERY INFORMATION:  Date: 12/4/23  [facility]  Surgeon:  Manjinder Bright MD   Anesthesia Type:  general  Procedure: BIOPSY, LYMPH NODE, AXILLARY RIGHT   Consult: virtual visit 11/9/23    RECORDS REQUESTED FROM:       Primary Care Provider: [facility]    Pertinent Medical History: hypertension    Most recent Cardiac Stress Test: 4/19/11

## 2023-11-25 ENCOUNTER — ANESTHESIA EVENT (OUTPATIENT)
Dept: SURGERY | Facility: AMBULATORY SURGERY CENTER | Age: 68
End: 2023-11-25
Payer: COMMERCIAL

## 2023-11-27 ENCOUNTER — MYC MEDICAL ADVICE (OUTPATIENT)
Dept: SURGERY | Facility: CLINIC | Age: 68
End: 2023-11-27

## 2023-11-27 ENCOUNTER — VIRTUAL VISIT (OUTPATIENT)
Dept: SURGERY | Facility: CLINIC | Age: 68
End: 2023-11-27
Payer: COMMERCIAL

## 2023-11-27 ENCOUNTER — PRE VISIT (OUTPATIENT)
Dept: SURGERY | Facility: CLINIC | Age: 68
End: 2023-11-27

## 2023-11-27 DIAGNOSIS — Z01.818 PREOP EXAMINATION: Primary | ICD-10-CM

## 2023-11-27 DIAGNOSIS — D61.818 OTHER PANCYTOPENIA (H): Primary | ICD-10-CM

## 2023-11-27 DIAGNOSIS — C85.98 NON-HODGKIN LYMPHOMA OF LYMPH NODES OF MULTIPLE REGIONS, UNSPECIFIED NON-HODGKIN LYMPHOMA TYPE (H): ICD-10-CM

## 2023-11-27 DIAGNOSIS — I10 HTN (HYPERTENSION), BENIGN: ICD-10-CM

## 2023-11-27 PROCEDURE — 99214 OFFICE O/P EST MOD 30 MIN: CPT | Mod: VID | Performed by: NURSE PRACTITIONER

## 2023-11-27 ASSESSMENT — ENCOUNTER SYMPTOMS: ORTHOPNEA: 0

## 2023-11-27 ASSESSMENT — PAIN SCALES - GENERAL: PAINLEVEL: NO PAIN (0)

## 2023-11-27 NOTE — PROGRESS NOTES
Jonathon is a 68 year old who is being evaluated via a billable video visit.      How would you like to obtain your AVS? MyChart  If the video visit is dropped, the invitation should be resent by: Text to cell phone: [phone]        HPI               Review of Systems             Physical Exam

## 2023-11-27 NOTE — H&P
Pre-Operative H & P     CC:  Preoperative exam to assess for increased cardiopulmonary risk while undergoing surgery and anesthesia.    Date of Encounter: 11/27/2023  Primary Care Physician:  Jonathon Gonzales     Reason for visit:   Encounter Diagnoses   Name Primary?    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    Providers: Manjinder Bright MD            Jonathon Santos is a 68 year old male with hypertension, CKD, anemia, thrombocytopenia, gout and prostate cancer in remission that now has presumed lymphoma.  A recent PET scan shows several areas of lymphadenopathy.  He was referred to Dr. Bright for surgical biopsy consultation and the above listed procedure has now been recommended before determining a treatment plan.     History is obtained from the patient and chart review    Hx of abnormal bleeding or anti-platelet use: none      Past Medical History  Past Medical History:   Diagnosis Date    Anemia     Benign essential hypertension     Chronic kidney disease     Gout     Lymphoma (H)     Prostate cancer (H)     Thrombocytopenia (H24)        Past Surgical History  Past Surgical History:   Procedure Laterality Date    COLONOSCOPY      COLONOSCOPY N/A 7/29/2022    Procedure: COLONOSCOPY, WITH POLYPECTOMY AND BIOPSY;  Surgeon: Suleiman  Ward Chiu DO;  Location: [facility]    FOOT SURGERY      HC REVISE MEDIAN N/CARPAL TUNNEL SURG      Description: Neuroplasty Decompression Median Nerve At Carpal Tunnel;  Recorded: 05/20/2013;    INSERT SEED MARKER / MATRIX N/A 4/30/2021    Procedure: Transrectal ultrasound guided placement of fiducials and SpaceOar;  Surgeon: Ferdinand Caban MD;  Location: [facility]    PROSTATE BIOPSY, NEEDLE, SATURATION SAMPLING      SPINE SURGERY      unspecified low back surgery       Prior to Admission Medications  Current Outpatient Medications   Medication Sig Dispense Refill    allopurinol (ZYLOPRIM) 100 MG tablet Take 1 tablet (100 mg) by mouth daily (Patient taking differently: Take 100 mg by mouth daily (with lunch)) 90 tablet 0    cyanocobalamin (VITAMIN B-12) 1000 MCG SUBL sublingual tablet Place 1,000 mcg under the tongue daily (with lunch)      lisinopril-hydrochlorothiazide (ZESTORETIC) 20-12.5 MG tablet TAKE 2 TABLETS BY MOUTH  DAILY (Patient taking differently: Take 2 tablets by mouth every morning) 200 tablet 2    metoprolol succinate ER (TOPROL XL) 25 MG 24 hr tablet TAKE 1 TABLET BY MOUTH  DAILY WITH LUNCH (Patient taking differently: Take 25 mg by mouth daily (with lunch)) 100 tablet 2       Allergies  No Known Allergies    Social History  Social History     Socioeconomic History    Marital status: Single     Spouse name: Not on file    Number of children: 2    Years of education: Not on file    Highest education level: Not on file   Occupational History    Occupation: retired    Tobacco Use    Smoking status: Never     Passive exposure: Never    Smokeless tobacco: Former     Types: Chew     Quit date: 2011   Substance and Sexual Activity    Alcohol use: Yes     Alcohol/week: 7.0 - 14.0 standard drinks of alcohol     Types: 7 - 14 Cans of beer per week     Comment: 1-2 beers daily    Drug use: Never    Sexual activity: Not Currently   Other Topics Concern    Not on file   Social History  Narrative    Not on file     Social Determinants of Health     Financial Resource Strain: Not on file   Food Insecurity: Not on file   Transportation Needs: Not on file   Physical Activity: Not on file   Stress: Not on file   Social Connections: Not on file   Interpersonal Safety: Unknown (9/8/2023)    Humiliation, Afraid, Rape, and Kick questionnaire     Fear of Current or Ex-Partner: No     Emotionally Abused: No     Physically Abused: Not on file     Sexually Abused: Not on file   Housing Stability: Not on file       Family History  Family History   Problem Relation Age of Onset    Hypertension Mother     Gout Mother     Hypertension Father     Diabetes Father     Hypertension Sister     Hypertension Brother     Hypertension Maternal Grandmother     Hypertension Maternal Grandfather     Diabetes Cousin     Deep Vein Thrombosis No family hx of     Anesthesia Reaction No family hx of        Review of Systems  The complete review of systems is negative other than noted in the HPI or here.   Anesthesia Evaluation   Pt has had prior anesthetic.     No history of anesthetic complications       ROS/MED HX  ENT/Pulmonary:     (+)     HECTOR risk factors,  hypertension,                             (-) recent URI   Neurologic:     (+)    peripheral neuropathy, - feet.                           Cardiovascular:     (+)  hypertension- -   -  - -                                 Previous cardiac testing   Echo: Date: Results:    Stress Test:  Date: 2021 Results:  Result Text        The nuclear stress test is negative for inducible myocardial ischemia or infarction.     The left ventricular ejection fraction at rest is greater than 70%.     There is no prior study for comparison.    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    Hematologic: Comments:  Chronic thrombocytopenia    (+)      anemia,          Musculoskeletal:  - neg musculoskeletal ROS     GI/Hepatic: Comment: Occasional LUQ pain thought to be secondary to lymph node enlargement near spleen      Renal/Genitourinary:     (+) renal disease, type: CRI,            Endo:  - neg endo ROS     Psychiatric/Substance Use:  - neg psychiatric ROS     Infectious Disease:  - neg infectious disease ROS     Malignancy:   (+) Malignancy, History of Prostate and Lymphoma/Leukemia.Prostate CA Remission status post Radiation.  Lymph CA Active status post.      Other:  - neg other ROS          Virtual visit -  No vitals were obtained    Physical Exam  Constitutional: Awake, alert, cooperative, no apparent distress, and appears stated age.  Eyes: Pupils equal  HENT: Normocephalic  Respiratory: non labored breathing   Neurologic: Awake, alert, oriented to name, place and time.   Neuropsychiatric: Calm, cooperative. Normal affect.      Prior Labs/Diagnostic Studies   All labs and imaging personally reviewed     EKG/ stress test - if available please see in ROS above       Labs:  Component      Latest Ref Rng 11/29/2023  1:02 PM   Sodium      135 - 145 mmol/L 133 (L)    Potassium      3.4 - 5.3 mmol/L 4.5    Chloride      98 - 107 mmol/L 96 (L)    Carbon Dioxide (CO2)      22 - 29 mmol/L 18 (L)    Anion Gap      7 - 15 mmol/L 19 (H)    Urea Nitrogen      8.0 - 23.0 mg/dL 23.5 (H)    Creatinine      0.67 - 1.17 mg/dL 1.49 (H)    GFR Estimate      >60 mL/min/1.73m2 50 (L)    Calcium      8.8 - 10.2 mg/dL 9.5    Glucose      70 - 99 mg/dL 117 (H)    WBC      4.0 - 11.0 10e3/uL 15.9 (H)    RBC Count      4.40 - 5.90 10e6/uL 4.15 (L)    Hemoglobin      13.3 - 17.7 g/dL 12.9 (L)    Hematocrit      40.0 - 53.0 % 38.3 (L)    MCV      78 - 100 fL 92    MCH      26.5 - 33.0 pg 31.1    MCHC      31.5 - 36.5 g/dL 33.7    RDW      10.0 - 15.0 % 13.4    Platelet Count      150 - 450 10e3/uL 68 (L)       Legend:  (L) Low  (H) High      The  "patient's records and results personally reviewed by this provider.     Outside records reviewed from: Care Everywhere      Assessment    Jonathon Santos is a 68 year old male seen as a PAC referral for risk assessment and optimization for anesthesia.    Plan/Recommendations  Pt will be optimized for the proposed procedure.  See below for details on the assessment, risk, and preoperative recommendations    NEUROLOGY  - No history of TIA, CVA or seizure    -Post Op delirium risk factors:  No risk identified    ENT  - No current airway concerns.  Will need to be reassessed day of surgery.  Mallampati: Unable to assess  TM: Unable to assess    CARDIAC  - No history of CAD and Afib  - hold Zestoric DOS    - METS (Metabolic Equivalents)  Patient performs 4 or more METS exercise without symptoms            Total Score: 0      RCRI-Very low risk: Class 1 0.4% complication rate            Total Score: 0        PULMONARY    HECTOR Medium Risk            Total Score: 3    HECTOR: Hypertension    HECTOR: Over 50 ys old    HECTOR: Male      - Denies asthma or inhaler use  - Tobacco History    History   Smoking Status    Never   Smokeless Tobacco    Former    Types: Chew    Quit date: 2011       GI  - denies GERD  PONV Medium Risk  Total Score: 2           1 AN PONV: Patient is not a current smoker    1 AN PONV: Intended Post Op Opioids        /RENAL  - prostate cancer in remission  - CKD with labile creatinine.  See labs above.     ENDOCRINE    - BMI: Estimated body mass index is 29.03 kg/m  as calculated from the following:    Height as of 11/9/23: 1.854 m (6' 1\").    Weight as of 11/9/23: 99.8 kg (220 lb).  Overweight (BMI 25.0-29.9)  - No history of Diabetes Mellitus    - last sodium level was 130 (previous was normal).  Recheck BMP ordered.     HEME  VTE High Risk 3%            Total Score: 8    VTE: Greater than 59 yrs old    VTE: Male    VTE: Current cancer      - No history of abnormal bleeding or antiplatelet use.    - presumed " lymphoma.  Procedure planned as above.     -chronic thrombocytopenia.  Stable.  See recent CBC above.  - chronic anemia.  Stable.  See recent CBC above.     Different anesthesia methods/types have been discussed with the patient, but they are aware that the final plan will be decided by the assigned anesthesia provider on the date of service.      The patient is optimized for their procedure. AVS with information on surgery time/arrival time, meds and NPO status given by nursing staff. No further diagnostic testing indicated.    Please refer to the physical examination documented by the anesthesiologist in the anesthesia record on the day of surgery.    Video-Visit Details    Type of service:  Video Visit    Provider received verbal consent for a Video Visit from the patient? Yes   Video Start Time:  0849  Video End Time: 0907    Originating Location (pt. Location): Home    Distant Location (provider location):  Off-site  Mode of Communication:  Video Conference via MiNeeds    On the day of service:     Prep time: 5 minutes  Visit time: 18 minutes  Documentation time: 10 minutes  ------------------------------------------  Total time: 33 minutes      RADHA Cotrez CNP  Preoperative Assessment Center  [facility]  Clinic and Surgery Center  Phone: [phone]  Fax: [phone]

## 2023-11-27 NOTE — PATIENT INSTRUCTIONS
Preparing for Your Surgery      Name:  Jonathon Santos   MRN:  [id number]   :  1955   Today's Date:  2023         Arriving for surgery:  Surgery date:  23  Arrival time:  9:05 am  Surgery time: 10:35 am    Restrictions due to COVID 19:    Please maintain social distance.  Masking is optional        parking is available for anyone with mobility limitations or disabilities. (Monday- Friday 7 am- 5 pm)    Please come to:    [facility]  [address] [place] [phone]    Check in on the 5th floor, Ambulatory Surgery Center.    What can I eat or drink?    -  You may eat and drink normally until 8 hours before arrival time  (Until 1:05 am)  -  You may have clear liquids until 2 hours before arrival time  (Until 7:05 am)    Examples of clear liquids:  Water  Clear broth  Juices (apple, white grape, white cranberry  and cider) without pulp  Noncarbonated, powder based beverages  (lemonade and Jared-Aid)  Sodas (Sprite, 7-Up, ginger ale and seltzer)  Coffee or tea (without milk or cream)  Gatorade    --No alcohol or cannabis products for at least 24 hours before surgery    Which medicines can I take?    Hold Aspirin for 7 days before surgery.   Hold Multivitamins for 7 days before surgery.  Hold Supplements for 7 days before surgery.  Hold Ibuprofen (Advil, Motrin) for 1 day before surgery--unless otherwise directed by surgeon.  Hold Naproxen (Aleve) for 4 days before surgery.    -  DO NOT take the following medications the day of surgery:  Vitamin B-12  Lisinopril/Hydrochlorothiazide (Zestoretic)    -  PLEASE TAKE the following medications per your usual routine:  Allopurinol  Metoprolol    How do I prepare myself?  - Please take 2 showers (one the night prior to surgery and one the morning of surgery) using Scrubcare or Hibiclens soap.    Use this soap only from the neck to your toes.     Leave the soap on your skin for one minute--then rinse thoroughly.      You  may use your own shampoo and conditioner; no other hair products.   - Please remove all jewelry and body piercings.  - No lotions, deodorants or fragrance.  - No makeup or fingernail polish.   - Bring your ID and insurance card.        ALL PATIENTS ARE REQUIRED TO HAVE A RESPONSIBLE ADULT TO DRIVE AND BE IN ATTENDANCE WITH THEM FOR 24 HOURS FOLLOWING SURGERY       Covid testing policy as of 12/06/2022  Your surgeon will notify and schedule you for a COVID test if one is needed before surgery--please direct any questions or COVID symptoms to your surgeon      Questions or Concerns:    -For questions regarding the day of surgery please contact the Ambulatory Surgery Center at [phone].    -If you have health changes between today and your surgery please contact your surgeon.     For questions after surgery please call your surgeons office.

## 2023-11-29 ENCOUNTER — LAB (OUTPATIENT)
Dept: LAB | Facility: CLINIC | Age: 68
End: 2023-11-29
Payer: COMMERCIAL

## 2023-11-29 DIAGNOSIS — M1A.09X1 IDIOPATHIC CHRONIC GOUT, MULTIPLE SITES, WITH TOPHUS (TOPHI): ICD-10-CM

## 2023-11-29 DIAGNOSIS — C85.98 NON-HODGKIN LYMPHOMA OF LYMPH NODES OF MULTIPLE REGIONS, UNSPECIFIED NON-HODGKIN LYMPHOMA TYPE (H): ICD-10-CM

## 2023-11-29 DIAGNOSIS — I10 HTN (HYPERTENSION), BENIGN: ICD-10-CM

## 2023-11-29 DIAGNOSIS — D61.818 OTHER PANCYTOPENIA (H): ICD-10-CM

## 2023-11-29 DIAGNOSIS — Z01.818 PREOP EXAMINATION: ICD-10-CM

## 2023-11-29 LAB
ALT SERPL W P-5'-P-CCNC: 23 U/L (ref 0–70)
ANION GAP SERPL CALCULATED.3IONS-SCNC: 19 MMOL/L (ref 7–15)
BUN SERPL-MCNC: 23.5 MG/DL (ref 8–23)
CALCIUM SERPL-MCNC: 9.5 MG/DL (ref 8.8–10.2)
CHLORIDE SERPL-SCNC: 96 MMOL/L (ref 98–107)
CREAT SERPL-MCNC: 1.49 MG/DL (ref 0.67–1.17)
DEPRECATED HCO3 PLAS-SCNC: 18 MMOL/L (ref 22–29)
EGFRCR SERPLBLD CKD-EPI 2021: 50 ML/MIN/1.73M2
ERYTHROCYTE [DISTWIDTH] IN BLOOD BY AUTOMATED COUNT: 13.4 % (ref 10–15)
GLUCOSE SERPL-MCNC: 117 MG/DL (ref 70–99)
HCT VFR BLD AUTO: 38.3 % (ref 40–53)
HGB BLD-MCNC: 12.9 G/DL (ref 13.3–17.7)
MCH RBC QN AUTO: 31.1 PG (ref 26.5–33)
MCHC RBC AUTO-ENTMCNC: 33.7 G/DL (ref 31.5–36.5)
MCV RBC AUTO: 92 FL (ref 78–100)
PLATELET # BLD AUTO: 68 10E3/UL (ref 150–450)
POTASSIUM SERPL-SCNC: 4.5 MMOL/L (ref 3.4–5.3)
RBC # BLD AUTO: 4.15 10E6/UL (ref 4.4–5.9)
SODIUM SERPL-SCNC: 133 MMOL/L (ref 135–145)
URATE SERPL-MCNC: 5.5 MG/DL (ref 3.4–7)
WBC # BLD AUTO: 15.9 10E3/UL (ref 4–11)

## 2023-11-29 PROCEDURE — 80048 BASIC METABOLIC PNL TOTAL CA: CPT

## 2023-11-29 PROCEDURE — 84550 ASSAY OF BLOOD/URIC ACID: CPT

## 2023-11-29 PROCEDURE — 84460 ALANINE AMINO (ALT) (SGPT): CPT

## 2023-11-29 PROCEDURE — 36415 COLL VENOUS BLD VENIPUNCTURE: CPT

## 2023-11-29 PROCEDURE — 85027 COMPLETE CBC AUTOMATED: CPT

## 2023-11-29 NOTE — RESULT ENCOUNTER NOTE
Shena Holt,    Your test results are attached.  Your labs are okay for surgery.  Your creatinine (kidney function test) is elevated again and your sodium is stable.  Oncology will likely continue to monitor these after surgery.     Your hemoglobin is stable in the anemia range and your low platelets are also stable.         Layla Skelton DNP, RN, ANP-C

## 2023-12-04 ENCOUNTER — HOSPITAL ENCOUNTER (OUTPATIENT)
Facility: AMBULATORY SURGERY CENTER | Age: 68
Discharge: HOME | End: 2023-12-04
Attending: SURGERY
Payer: COMMERCIAL

## 2023-12-04 ENCOUNTER — ANESTHESIA (OUTPATIENT)
Dept: SURGERY | Facility: AMBULATORY SURGERY CENTER | Age: 68
End: 2023-12-04
Payer: COMMERCIAL

## 2023-12-04 VITALS
HEART RATE: 56 BPM | WEIGHT: 218 LBS | DIASTOLIC BLOOD PRESSURE: 68 MMHG | SYSTOLIC BLOOD PRESSURE: 118 MMHG | RESPIRATION RATE: 12 BRPM | TEMPERATURE: 97.9 F | HEIGHT: 73 IN | OXYGEN SATURATION: 97 % | BODY MASS INDEX: 28.89 KG/M2

## 2023-12-04 DIAGNOSIS — C85.98 NON-HODGKIN LYMPHOMA OF LYMPH NODES OF MULTIPLE REGIONS, UNSPECIFIED NON-HODGKIN LYMPHOMA TYPE (H): Primary | ICD-10-CM

## 2023-12-04 PROCEDURE — 38525 BIOPSY/REMOVAL LYMPH NODES: CPT | Mod: LT

## 2023-12-04 PROCEDURE — 88342 IMHCHEM/IMCYTCHM 1ST ANTB: CPT | Mod: 26 | Performed by: STUDENT IN AN ORGANIZED HEALTH CARE EDUCATION/TRAINING PROGRAM

## 2023-12-04 PROCEDURE — 88365 INSITU HYBRIDIZATION (FISH): CPT | Mod: 26 | Performed by: STUDENT IN AN ORGANIZED HEALTH CARE EDUCATION/TRAINING PROGRAM

## 2023-12-04 PROCEDURE — 88185 FLOWCYTOMETRY/TC ADD-ON: CPT | Performed by: SURGERY

## 2023-12-04 PROCEDURE — 88184 FLOWCYTOMETRY/ TC 1 MARKER: CPT | Performed by: PATHOLOGY

## 2023-12-04 PROCEDURE — 88275 CYTOGENETICS 100-300: CPT | Mod: XU | Performed by: SURGERY

## 2023-12-04 PROCEDURE — 99000 SPECIMEN HANDLING OFFICE-LAB: CPT | Performed by: PATHOLOGY

## 2023-12-04 PROCEDURE — 88369 M/PHMTRC ALYSISHQUANT/SEMIQ: CPT | Mod: 26 | Performed by: MEDICAL GENETICS

## 2023-12-04 PROCEDURE — 88341 IMHCHEM/IMCYTCHM EA ADD ANTB: CPT | Mod: 26 | Performed by: STUDENT IN AN ORGANIZED HEALTH CARE EDUCATION/TRAINING PROGRAM

## 2023-12-04 PROCEDURE — 88360 TUMOR IMMUNOHISTOCHEM/MANUAL: CPT | Mod: 26 | Performed by: STUDENT IN AN ORGANIZED HEALTH CARE EDUCATION/TRAINING PROGRAM

## 2023-12-04 PROCEDURE — 88364 INSITU HYBRIDIZATION (FISH): CPT | Mod: TC | Performed by: SURGERY

## 2023-12-04 PROCEDURE — 88368 INSITU HYBRIDIZATION MANUAL: CPT | Mod: 26 | Performed by: MEDICAL GENETICS

## 2023-12-04 PROCEDURE — 88307 TISSUE EXAM BY PATHOLOGIST: CPT | Mod: 26 | Performed by: STUDENT IN AN ORGANIZED HEALTH CARE EDUCATION/TRAINING PROGRAM

## 2023-12-04 PROCEDURE — 38525 BIOPSY/REMOVAL LYMPH NODES: CPT | Mod: LT | Performed by: SURGERY

## 2023-12-04 PROCEDURE — 88189 FLOWCYTOMETRY/READ 16 & >: CPT | Performed by: PATHOLOGY

## 2023-12-04 PROCEDURE — 88184 FLOWCYTOMETRY/ TC 1 MARKER: CPT | Performed by: SURGERY

## 2023-12-04 PROCEDURE — 88264 CHROMOSOME ANALYSIS 20-25: CPT | Performed by: SURGERY

## 2023-12-04 PROCEDURE — 88364 INSITU HYBRIDIZATION (FISH): CPT | Mod: 26 | Performed by: STUDENT IN AN ORGANIZED HEALTH CARE EDUCATION/TRAINING PROGRAM

## 2023-12-04 RX ORDER — BUPIVACAINE HYDROCHLORIDE 2.5 MG/ML
INJECTION, SOLUTION EPIDURAL; INFILTRATION; INTRACAUDAL PRN
Status: DISCONTINUED | OUTPATIENT
Start: 2023-12-04 | End: 2023-12-04 | Stop reason: HOSPADM

## 2023-12-04 RX ORDER — ONDANSETRON 4 MG/1
4 TABLET, ORALLY DISINTEGRATING ORAL EVERY 30 MIN PRN
Status: DISCONTINUED | OUTPATIENT
Start: 2023-12-04 | End: 2023-12-04 | Stop reason: HOSPADM

## 2023-12-04 RX ORDER — PROPOFOL 10 MG/ML
INJECTION, EMULSION INTRAVENOUS CONTINUOUS PRN
Status: DISCONTINUED | OUTPATIENT
Start: 2023-12-04 | End: 2023-12-04

## 2023-12-04 RX ORDER — OXYCODONE HYDROCHLORIDE 5 MG/1
5 TABLET ORAL
Status: DISCONTINUED | OUTPATIENT
Start: 2023-12-04 | End: 2023-12-05 | Stop reason: HOSPADM

## 2023-12-04 RX ORDER — ACETAMINOPHEN 325 MG/1
975 TABLET ORAL ONCE
Status: DISCONTINUED | OUTPATIENT
Start: 2023-12-04 | End: 2023-12-04 | Stop reason: HOSPADM

## 2023-12-04 RX ORDER — ACETAMINOPHEN 325 MG/1
975 TABLET ORAL ONCE
Status: COMPLETED | OUTPATIENT
Start: 2023-12-04 | End: 2023-12-04

## 2023-12-04 RX ORDER — ONDANSETRON 2 MG/ML
4 INJECTION INTRAMUSCULAR; INTRAVENOUS EVERY 30 MIN PRN
Status: DISCONTINUED | OUTPATIENT
Start: 2023-12-04 | End: 2023-12-05 | Stop reason: HOSPADM

## 2023-12-04 RX ORDER — CEFAZOLIN SODIUM 2 G/50ML
2 SOLUTION INTRAVENOUS
Status: COMPLETED | OUTPATIENT
Start: 2023-12-04 | End: 2023-12-04

## 2023-12-04 RX ORDER — HYDROMORPHONE HYDROCHLORIDE 1 MG/ML
0.4 INJECTION, SOLUTION INTRAMUSCULAR; INTRAVENOUS; SUBCUTANEOUS EVERY 5 MIN PRN
Status: DISCONTINUED | OUTPATIENT
Start: 2023-12-04 | End: 2023-12-04 | Stop reason: HOSPADM

## 2023-12-04 RX ORDER — OXYCODONE HYDROCHLORIDE 5 MG/1
10 TABLET ORAL
Status: DISCONTINUED | OUTPATIENT
Start: 2023-12-04 | End: 2023-12-05 | Stop reason: HOSPADM

## 2023-12-04 RX ORDER — SODIUM CHLORIDE, SODIUM LACTATE, POTASSIUM CHLORIDE, CALCIUM CHLORIDE 600; 310; 30; 20 MG/100ML; MG/100ML; MG/100ML; MG/100ML
INJECTION, SOLUTION INTRAVENOUS CONTINUOUS
Status: DISCONTINUED | OUTPATIENT
Start: 2023-12-04 | End: 2023-12-04 | Stop reason: HOSPADM

## 2023-12-04 RX ORDER — DEXAMETHASONE SODIUM PHOSPHATE 4 MG/ML
INJECTION, SOLUTION INTRA-ARTICULAR; INTRALESIONAL; INTRAMUSCULAR; INTRAVENOUS; SOFT TISSUE PRN
Status: DISCONTINUED | OUTPATIENT
Start: 2023-12-04 | End: 2023-12-04

## 2023-12-04 RX ORDER — FENTANYL CITRATE 50 UG/ML
50 INJECTION, SOLUTION INTRAMUSCULAR; INTRAVENOUS EVERY 5 MIN PRN
Status: DISCONTINUED | OUTPATIENT
Start: 2023-12-04 | End: 2023-12-04 | Stop reason: HOSPADM

## 2023-12-04 RX ORDER — CEFAZOLIN SODIUM 2 G/50ML
2 SOLUTION INTRAVENOUS SEE ADMIN INSTRUCTIONS
Status: DISCONTINUED | OUTPATIENT
Start: 2023-12-04 | End: 2023-12-04 | Stop reason: HOSPADM

## 2023-12-04 RX ORDER — ONDANSETRON 4 MG/1
4 TABLET, ORALLY DISINTEGRATING ORAL EVERY 30 MIN PRN
Status: DISCONTINUED | OUTPATIENT
Start: 2023-12-04 | End: 2023-12-05 | Stop reason: HOSPADM

## 2023-12-04 RX ORDER — ACETAMINOPHEN 325 MG/1
975 TABLET ORAL ONCE
Status: DISCONTINUED | OUTPATIENT
Start: 2023-12-04 | End: 2023-12-05 | Stop reason: HOSPADM

## 2023-12-04 RX ORDER — LIDOCAINE 40 MG/G
CREAM TOPICAL
Status: DISCONTINUED | OUTPATIENT
Start: 2023-12-04 | End: 2023-12-04 | Stop reason: HOSPADM

## 2023-12-04 RX ORDER — ONDANSETRON 2 MG/ML
INJECTION INTRAMUSCULAR; INTRAVENOUS PRN
Status: DISCONTINUED | OUTPATIENT
Start: 2023-12-04 | End: 2023-12-04

## 2023-12-04 RX ORDER — HYDROMORPHONE HYDROCHLORIDE 1 MG/ML
0.2 INJECTION, SOLUTION INTRAMUSCULAR; INTRAVENOUS; SUBCUTANEOUS EVERY 5 MIN PRN
Status: DISCONTINUED | OUTPATIENT
Start: 2023-12-04 | End: 2023-12-04 | Stop reason: HOSPADM

## 2023-12-04 RX ORDER — LIDOCAINE HYDROCHLORIDE 20 MG/ML
INJECTION, SOLUTION INFILTRATION; PERINEURAL PRN
Status: DISCONTINUED | OUTPATIENT
Start: 2023-12-04 | End: 2023-12-04

## 2023-12-04 RX ORDER — FENTANYL CITRATE 50 UG/ML
25 INJECTION, SOLUTION INTRAMUSCULAR; INTRAVENOUS EVERY 5 MIN PRN
Status: DISCONTINUED | OUTPATIENT
Start: 2023-12-04 | End: 2023-12-04 | Stop reason: HOSPADM

## 2023-12-04 RX ORDER — ONDANSETRON 2 MG/ML
4 INJECTION INTRAMUSCULAR; INTRAVENOUS EVERY 30 MIN PRN
Status: DISCONTINUED | OUTPATIENT
Start: 2023-12-04 | End: 2023-12-04 | Stop reason: HOSPADM

## 2023-12-04 RX ORDER — FENTANYL CITRATE 50 UG/ML
INJECTION, SOLUTION INTRAMUSCULAR; INTRAVENOUS PRN
Status: DISCONTINUED | OUTPATIENT
Start: 2023-12-04 | End: 2023-12-04

## 2023-12-04 RX ORDER — PROPOFOL 10 MG/ML
INJECTION, EMULSION INTRAVENOUS PRN
Status: DISCONTINUED | OUTPATIENT
Start: 2023-12-04 | End: 2023-12-04

## 2023-12-04 RX ADMIN — PROPOFOL 200 MG: 10 INJECTION, EMULSION INTRAVENOUS at 10:36

## 2023-12-04 RX ADMIN — FENTANYL CITRATE 25 MCG: 50 INJECTION, SOLUTION INTRAMUSCULAR; INTRAVENOUS at 10:36

## 2023-12-04 RX ADMIN — ACETAMINOPHEN 975 MG: 325 TABLET ORAL at 10:16

## 2023-12-04 RX ADMIN — CEFAZOLIN SODIUM 2 G: 2 SOLUTION INTRAVENOUS at 10:26

## 2023-12-04 RX ADMIN — PROPOFOL 100 MG: 10 INJECTION, EMULSION INTRAVENOUS at 11:05

## 2023-12-04 RX ADMIN — ONDANSETRON 4 MG: 2 INJECTION INTRAMUSCULAR; INTRAVENOUS at 10:59

## 2023-12-04 RX ADMIN — SODIUM CHLORIDE, SODIUM LACTATE, POTASSIUM CHLORIDE, CALCIUM CHLORIDE: 600; 310; 30; 20 INJECTION, SOLUTION INTRAVENOUS at 10:15

## 2023-12-04 RX ADMIN — PROPOFOL 150 MCG/KG/MIN: 10 INJECTION, EMULSION INTRAVENOUS at 10:34

## 2023-12-04 RX ADMIN — DEXAMETHASONE SODIUM PHOSPHATE 4 MG: 4 INJECTION, SOLUTION INTRA-ARTICULAR; INTRALESIONAL; INTRAMUSCULAR; INTRAVENOUS; SOFT TISSUE at 10:36

## 2023-12-04 RX ADMIN — FENTANYL CITRATE 25 MCG: 50 INJECTION, SOLUTION INTRAMUSCULAR; INTRAVENOUS at 10:45

## 2023-12-04 RX ADMIN — LIDOCAINE HYDROCHLORIDE 100 MG: 20 INJECTION, SOLUTION INFILTRATION; PERINEURAL at 10:36

## 2023-12-04 ASSESSMENT — COPD QUESTIONNAIRES: COPD: 0

## 2023-12-04 ASSESSMENT — LIFESTYLE VARIABLES: TOBACCO_USE: 0

## 2023-12-04 NOTE — ANESTHESIA CARE TRANSFER NOTE
Patient: Jonathon Santos    Procedure: Procedure(s):  BIOPSY, LYMPH NODE, AXILLARY RIGHT       Diagnosis: Non-Hodgkin lymphoma of lymph nodes of multiple regions, unspecified non-Hodgkin lymphoma type (H) [C85.98]  Diagnosis Additional Information: No value filed.    Anesthesia Type:   General     Note:    Oropharynx: oropharynx clear of all foreign objects and spontaneously breathing  Level of Consciousness: drowsy    Level of Supplemental Oxygen (L/min / FiO2): 3  Independent Airway: airway patency satisfactory and stable  Dentition: dentition unchanged  Vital Signs Stable: post-procedure vital signs reviewed and stable  Report to RN Given: handoff report given  Patient transferred to: Phase II    Handoff Report: Identifed the Patient, Identified the Reponsible Provider, Reviewed the pertinent medical history, Discussed the surgical course, Reviewed Intra-OP anesthesia mangement and issues during anesthesia, Set expectations for post-procedure period and Allowed opportunity for questions and acknowledgement of understanding      Vitals:  Vitals Value Taken Time   BP     Temp     Pulse 57 12/04/23 1117   Resp 20 12/04/23 1117   SpO2 98 % 12/04/23 1117   Vitals shown include unfiled device data.    Electronically Signed By: RADHA Diaz CRNA  December 4, 2023  11:18 AM

## 2023-12-04 NOTE — ANESTHESIA PROCEDURE NOTES
Airway         Procedure Start/Stop Times: 12/4/2023 10:35 AM  Staff -        Performed By: CRNA  Consent for Airway        Urgency: elective  Indications and Patient Condition       Indications for airway management: airway protection         Mask difficulty assessment: 0 - not attempted    Final Airway Details       Final airway type: supraglottic airway    Supraglottic Airway Details        Type: LMA       Brand: LMA Unique       LMA size: 5    Post intubation assessment        Placement verified by: capnometry, equal breath sounds and chest rise        Number of attempts at approach: 1       Number of other approaches attempted: 0       Secured with: tape       Ease of procedure: easy       Dentition: Intact    Medication(s) Administered   Medication Administration Time: 12/4/2023 10:35 AM

## 2023-12-04 NOTE — ANESTHESIA PREPROCEDURE EVALUATION
Anesthesia Pre-Procedure Evaluation    Patient: Jonathon Santos   MRN: [id number] : 1955        Procedure : Procedure(s):  BIOPSY, LYMPH NODE, AXILLARY RIGHT          Past Medical History:   Diagnosis Date    Anemia     Benign essential hypertension     Chronic kidney disease     Gout     Lymphoma (H)     Prostate cancer (H)     Thrombocytopenia (H24)       Past Surgical History:   Procedure Laterality Date    COLONOSCOPY      COLONOSCOPY N/A 2022    Procedure: COLONOSCOPY, WITH POLYPECTOMY AND BIOPSY;  Surgeon: Ward Bearden DO;  Location: [facility]    FOOT SURGERY      HC REVISE MEDIAN N/CARPAL TUNNEL SURG      Description: Neuroplasty Decompression Median Nerve At Carpal Tunnel;  Recorded: 2013;    INSERT SEED MARKER / MATRIX N/A 2021    Procedure: Transrectal ultrasound guided placement of fiducials and [employer];  Surgeon: Ferdinand Caban MD;  Location: [facility]    PROSTATE BIOPSY, NEEDLE, SATURATION SAMPLING      SPINE SURGERY      unspecified low back surgery      No Known Allergies   Social History     Tobacco Use    Smoking status: Never     Passive exposure: Never    Smokeless tobacco: Former     Types: Chew     Quit date:    Substance Use Topics    Alcohol use: Yes     Alcohol/week: 7.0 - 14.0 standard drinks of alcohol     Types: 7 - 14 Cans of beer per week     Comment: 1-2 beers daily      Wt Readings from Last 1 Encounters:   23 98.9 kg (218 lb)        Anesthesia Evaluation   Pt has had prior anesthetic. Type: General.    No history of anesthetic complications       ROS/MED HX  ENT/Pulmonary:  - neg pulmonary ROS  (-) tobacco use, asthma, COPD and sleep apnea   Neurologic:     (+)    peripheral neuropathy, - bilateral feet.                           Cardiovascular:     (+) Dyslipidemia hypertension- -   -  - -                                 Previous cardiac testing   Echo: Date: Results:    Stress Test:  Date:  Results:       The nuclear stress test  is negative for inducible myocardial ischemia or infarction.     The left ventricular ejection fraction at rest is greater than 70%.     There is no prior study for comparison.            ECG Summary    ECG Baseline electrocardiogram demonstrates sinus rhythm and nonspecific ST-T abnormalities.   The stress electrocardiogram was non-diagnostic due to baseline ST changes.  There were no arrhythmias during stress.      ECG Reviewed:  Date: Results:    Cath:  Date: Results:      METS/Exercise Tolerance: >4 METS    Hematologic: Comments: Pancytopenia, plt 68      Musculoskeletal: Comment: RA      GI/Hepatic:  - neg GI/hepatic ROS  (-) GERD   Renal/Genitourinary:     (+) renal disease, type: CRI, Pt does not require dialysis,           Endo:  - neg endo ROS  (-) Type I DM and Type II DM   Psychiatric/Substance Use:  - neg psychiatric ROS     Infectious Disease:  - neg infectious disease ROS     Malignancy: Comment: NHL, prostate cancer      Other:            Physical Exam    Airway        Mallampati: II   TM distance: > 3 FB   Neck ROM: full   Mouth opening: > 3 cm    Respiratory Devices and Support         Dental       (+) Minor Abnormalities - some fillings, tiny chips      Cardiovascular   cardiovascular exam normal          Pulmonary   pulmonary exam normal                OUTSIDE LABS:  CBC:   Lab Results   Component Value Date    WBC 15.9 (H) 11/29/2023    WBC 15.3 (H) 10/27/2023    HGB 12.9 (L) 11/29/2023    HGB 12.5 (L) 10/27/2023    HCT 38.3 (L) 11/29/2023    HCT 36.8 (L) 10/27/2023    PLT 68 (L) 11/29/2023    PLT 64 (L) 10/27/2023     BMP:   Lab Results   Component Value Date     (L) 11/29/2023     (L) 10/18/2023    POTASSIUM 4.5 11/29/2023    POTASSIUM 3.9 10/18/2023    CHLORIDE 96 (L) 11/29/2023    CHLORIDE 93 (L) 10/18/2023    CO2 18 (L) 11/29/2023    CO2 25 10/18/2023    BUN 23.5 (H) 11/29/2023    BUN 15.4 10/18/2023    CR 1.49 (H) 11/29/2023    CR 1.17 10/18/2023     (H) 11/29/2023    GLC  "115 (H) 10/18/2023     COAGS: No results found for: \"PTT\", \"INR\", \"FIBR\"  POC:   Lab Results   Component Value Date     (H) 04/30/2021     HEPATIC:   Lab Results   Component Value Date    ALBUMIN 4.5 10/18/2023    PROTTOTAL 7.3 10/18/2023    ALT 23 11/29/2023    AST 25 10/18/2023    ALKPHOS 100 10/18/2023    BILITOTAL 1.3 (H) 10/18/2023     OTHER:   Lab Results   Component Value Date    MISTY 9.5 11/29/2023    PHOS 3.6 10/18/2023    TSH 2.39 06/15/2022    CRP <2.9 06/15/2022    SED 21 (H) 06/15/2022       Anesthesia Plan    ASA Status:  3       Anesthesia Type: General.     - Airway: LMA   Induction: Intravenous.   Maintenance: Balanced.        Consents    Anesthesia Plan(s) and associated risks, benefits, and realistic alternatives discussed. Questions answered and patient/representative(s) expressed understanding.     - Discussed: Risks, Benefits and Alternatives for BOTH SEDATION and the PROCEDURE were discussed     - Discussed with:  Patient      - Extended Intubation/Ventilatory Support Discussed: Yes.      Use of blood products discussed: Yes.     - Discussed with: Patient.     Postoperative Care    Pain management: Multi-modal analgesia.   PONV prophylaxis: Ondansetron (or other 5HT-3), Dexamethasone or Solumedrol, Background Propofol Infusion     Comments:               Susan Santos MD    I have reviewed the pertinent notes and labs in the chart from the past 30 days and (re)examined the patient.  Any updates or changes from those notes are reflected in this note.     # Hyponatremia: Lowest Na = 133 mmol/L in last 30 days, will monitor as appropriate     # Anion Gap Metabolic Acidosis: Highest Anion Gap = 19 mmol/L in last 30 days, will monitor and treat as appropriate     # Thrombocytopenia: Lowest platelets = 68 in last 30 days, will monitor for bleeding  # Overweight: Estimated body mass index is 28.76 kg/m  as calculated from the following:    Height as of this encounter: 1.854 m (6' 1\").    Weight " as of this encounter: 98.9 kg (218 lb).

## 2023-12-04 NOTE — DISCHARGE INSTRUCTIONS
[facility]  Home Care Following Anesthesia  For 24 hours after surgery:  Get plenty of rest.  A responsible adult must stay with you for at least 24 hours after you leave the surgery center.  Do not drive or use heavy equipment.  If you have weakness or tingling, don't drive or use heavy equipment until this feeling goes away.   Do not drink alcohol.   Avoid strenuous or risky activities.  Ask for help when climbing stairs.  You may feel lightheaded.  IF so, sit for a few minutes before standing.  Have someone help you get up.   If you have nausea (feel sick to your stomach): Drink only clear liquids such as apple juice, ginger ale, broth or 7-Up.  Rest may also help.  Be sure to drink enough fluids.  Move to a regular diet as you feel able.   You may have a slight fever.  Call the doctor if your fever is over 100 F (37.7 C) (taken under the tongue) or lasts longer than 24 hours.  You may have a dry mouth, a sore throat, muscle aches or trouble sleeping. These should go away after 24 hours.  Do not make important or legal decisions.   It is recommended to avoid smoking.               Tips for taking pain medications  To get the best pain relief possible, remember these points:  Take pain medications as directed, before pain becomes severe.  Pain medication can upset your stomach: taking it with food may help.  Constipation is a common side effect of pain medication. Drink plenty of  fluids.  Eat foods high in fiber. Take a stool softener if recommended by your doctor or pharmacist.  Do not drink alcohol, drive or operate machinery while taking pain medications.  Ask about other ways to control pain, such as with heat, ice or relaxation.    Tylenol/Acetaminophen Consumption    If you feel your pain relief is insufficient, you may take Tylenol/Acetaminophen in addition to your narcotic pain medication.   Be careful not to exceed 4,000 mg of Tylenol/Acetaminophen in a 24 hour  period from all sources.  If you are taking extra strength Tylenol/acetaminophen (500 mg), the maximum dose is 8 tablets in 24 hours.  If you are taking regular strength acetaminophen (325 mg), the maximum dose is 12 tablets in 24 hours.    Call a doctor for any of the following:  Signs of infection (fever, growing tenderness at the surgery site, a large amount of drainage or bleeding, severe pain, foul-smelling drainage, redness, swelling).  It has been over 8 to 10 hours since surgery and you are still not able to urinate (pass water).  Headache for over 24 hours.  Numbness, tingling or weakness the day after surgery (if you had spinal anesthesia).  Signs of Covid-19 infection (temperature over 100 degrees, shortness of breath, cough, loss of taste/smell, generalized body aches, persistent headache, chills, sore throat, nausea/vomiting/diarrhea)  Your doctor is:       Dr. Manjinder Bright, [facility]: [phone]               Or dial [phone] and ask for the resident on call for:  [facility]  For emergency care, call the:  [facility]:  [phone] (TTY for hearing impaired: [phone])

## 2023-12-04 NOTE — ANESTHESIA POSTPROCEDURE EVALUATION
Patient: Jonathon Santos    Procedure: Procedure(s):  BIOPSY, LYMPH NODE, AXILLARY RIGHT       Anesthesia Type:  General    Note:  Disposition: Outpatient   Postop Pain Control: Uneventful            Sign Out: Well controlled pain   PONV: No   Neuro/Psych: Uneventful            Sign Out: Acceptable/Baseline neuro status   Airway/Respiratory: Uneventful            Sign Out: Acceptable/Baseline resp. status   CV/Hemodynamics: Uneventful            Sign Out: Acceptable CV status; No obvious hypovolemia; No obvious fluid overload   Other NRE: NONE   DID A NON-ROUTINE EVENT OCCUR? No           Last vitals:  Vitals Value Taken Time   /67 12/04/23 1130   Temp 36.6  C (97.8  F) 12/04/23 1130   Pulse 57 12/04/23 1130   Resp 18 12/04/23 1130   SpO2 95 % 12/04/23 1130       Electronically Signed By: Valente Donald MD  December 4, 2023  11:57 AM

## 2023-12-04 NOTE — OP NOTE
Preoperative Diagnosis: Lymphadenopathy    Postoperative Diagnosis: Same  Procedure: Right axillary excisional lymph node biopsy      Surgeons: Dr. Manjinder Bright and Dr. Jonathon Baxter.  Dr. Baxter assisted because there was no available surgical resident    Anesthesia: General  Indications for Surgery: Patient is a 68-year-old man with generalized lymphadenopathy.  He has a clinical suspicion for lymphoma.  An x-ray left biopsy was requested    Procedure in Detail: After informed consent the patient was brought the operating room and given a general anesthetic.  He was prepped and draped in the usual fashion.  A local anesthetic was administered to the right axilla.  A transverse axillary incision was made with a scalpel.  The Bovie cautery was used to incise the subcutaneous tissues.  The dissection proceeded through the clavipectoral fascia.  I identified a very large right axillary lymph node.  It measured approximately 3 and half centimeters in size.  It was completely excised.  Hemostasis was obtained with the Bovie cautery and with surgical clips.  The specimen was sent to pathology for lymphoma study.  The dermis was closed with interrupted 3-0 Vicryl suture and the skin was closed with a running 4-0 PDS subcuticular stitch.  Dermabond was placed and the patient was taken to the recovery room.      This procedure was not performed to treat breast cancer through axillary lymph node dissection      Manjinder Bright MD, MS  Surgical Oncology

## 2023-12-08 LAB
ADDITIONAL COMMENTS: NORMAL
INTERPRETATION: NORMAL
ISCN: NORMAL
METHODS: NORMAL

## 2023-12-11 ENCOUNTER — LAB (OUTPATIENT)
Dept: LAB | Facility: CLINIC | Age: 68
End: 2023-12-11
Attending: INTERNAL MEDICINE
Payer: COMMERCIAL

## 2023-12-11 DIAGNOSIS — G62.89 AXONAL SENSORIMOTOR NEUROPATHY: ICD-10-CM

## 2023-12-11 DIAGNOSIS — C85.80 MARGINAL ZONE LYMPHOMA (H): Primary | ICD-10-CM

## 2023-12-11 DIAGNOSIS — C61 PROSTATE CANCER (H): ICD-10-CM

## 2023-12-11 LAB
PSA SERPL DL<=0.01 NG/ML-MCNC: <0.01 NG/ML (ref 0–4.5)
VIT B12 SERPL-MCNC: 654 PG/ML (ref 232–1245)

## 2023-12-11 PROCEDURE — 84153 ASSAY OF PSA TOTAL: CPT | Performed by: NURSE PRACTITIONER

## 2023-12-11 PROCEDURE — 82607 VITAMIN B-12: CPT

## 2023-12-11 PROCEDURE — 84403 ASSAY OF TOTAL TESTOSTERONE: CPT | Performed by: NURSE PRACTITIONER

## 2023-12-11 PROCEDURE — 36415 COLL VENOUS BLD VENIPUNCTURE: CPT | Performed by: NURSE PRACTITIONER

## 2023-12-12 LAB
INTERPRETATION: NORMAL
INTERPRETATION: NORMAL
PATH REPORT.COMMENTS IMP SPEC: ABNORMAL
PATH REPORT.COMMENTS IMP SPEC: YES
PATH REPORT.FINAL DX SPEC: ABNORMAL
PATH REPORT.GROSS SPEC: ABNORMAL
PATH REPORT.MICROSCOPIC SPEC OTHER STN: ABNORMAL
PATH REPORT.RELEVANT HX SPEC: ABNORMAL
PHOTO IMAGE: ABNORMAL
TESTOST SERPL-MCNC: 200 NG/DL (ref 240–950)

## 2023-12-12 PROCEDURE — G0452 MOLECULAR PATHOLOGY INTERPR: HCPCS | Performed by: STUDENT IN AN ORGANIZED HEALTH CARE EDUCATION/TRAINING PROGRAM

## 2023-12-12 PROCEDURE — 81351 TP53 GENE FULL GENE SEQUENCE: CPT | Mod: GZ

## 2023-12-12 NOTE — PROGRESS NOTES
Virtual Visit Details    Type of service:  Video Visit   Video Start Time:  8:17  Video End Time:8:34 AM    Originating Location (pt. Location): Home    Distant Location (provider location):  On-site  Platform used for Video Visit: [facility] Medical Oncology Note       Date of visit: December 14, 2023  New Outpatient Clinic Note        Assessment:     High risk localized prostate cancer.  This is high risk because of the [place] Grade Group 5 disease. Originally at the time of diagnosis he had adenopathy concerning for stage IV prostate cancer,, but looking through the retrospectoscope, these nodes are likely a manifestation of his recently diagnosed low-grade lymphoma.   Status post maximal local therapy with maximal systemic therapy as well given adjuvantly.  Now, approaching 3 years from the time of diagnosis, with no evidence of recurrent disease by history, physical exam, or recent PSA. His chance of cure at this point, while not 100%, is pretty high.  New diagnosis of a low-grade lymphoma of unclear specific subtype.  I did reassure Jonathon today that the vast majority of patients with these types of lymphomas live with them, and die with them, not from them.  He is symptomatic from the NHL, so he will likely need treatment. He has excellent follow-up with Dr. Burciaga and will be seeing her later today to further discuss.        Plan:     No intervention by me  Continue follow-up with Dr. Burciaga to discuss the results of the lymph node biopsy and where they go from here regarding management  Return to clinic with us in 6 months with a PSA drawn just prior.  We will continue to see him at every 6 month intervals for a total of 5 years after his diagnosis.    Seen with Ashok Callaway MD, PGY-2      Geraldo Eduardo MD, MSc  Associate Profess disease.   Or of Medicine  [employer]  [facility]  [address] [place] [address]  [address]  [phone]    __________________________________________________________________    DIAGNOSES     cT2/3 high risk prostate cancer, diagnosed at prostate biopsy 12/21/20.  At the time of this diagnosis, pelvic adenopathy was seen on prostate MRI, with the assumtpion he had Stage IV prostate cancer. However, he was later diagnosed with a low grade lymphoma (see 2).  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  Jonathon had persistent neutropenia/thrombocytopenia, originally thought due to his prostate cancer treatment. 10/7/23 peripheral flow cytometry showed several populations of lambda-monotypic B-cells; however the predominant one was CD5/CD10 negative raising possibility of marginal zone lymphoma, lymphoplasmacytic, or rarely follicular lymphoma. BM biopsy showed  a 60% cellular marrow , with 40% involvement with a low grade lymphoma.  Surgical excisional biopsy was done 12/4. PET scan 10/18 showed significant splenomegaly (20 cm with SUVmax 6.7), mildly-avid lymphadenopathy above/below diaphragm, and diffuse bone marrow uptake.         History of Present Illness/therapy to date for prostate cancer:     Presents with PSA of 8.2, 10/2020 (was 7.25 a year earlier)  Prostate MRI 11/17/202 read out as PIRADS 5, with 2 suspicious lesions in the R mid and L apex, with capsular abutment for 15 mm. There was suspicious B obturator nodes, measuring up 19 mm, as well as B external iliac nodes.   Prostate biopsy 12/21/2020 showed a [place] 4+5 (Grade group 5) prostate cancer in 1 of 2 cores in the right mid, involving 75%. There was Keith 3+3 (Grade group 1) disease in the left apex, and left mid, and 3+4 (Grade group 2) in 1/2 cores in the right mid.  CT CAP 4/13/2021 shows axillary adenopathy. Bone scan showed no convincing evidence of metastatic disease.  Recommendation by Dr. Cisneros is ADT  x 2 years, RT to the nodes and prostate, and RT to the prostate.  Radiotherapy completed  7/15/2021  ADT/abiraterone 1/25/2021-6/29/2023.  Observation in the interim. Most recent PSA was undetectable 6/19/2023      Interval history:     Feels better off of ADT.  Has more muscle mass.  Doesn't have much of an appetite due to his spleen size.  No night sweats.  No new adenopathy    Past Medical History:   I have reviewed this patient's past medical history   Past Medical History:   Diagnosis Date    Anemia     Benign essential hypertension     Chronic kidney disease     Gout     Lymphoma (H)     Prostate cancer (H)     Thrombocytopenia (H24)           Past Surgical History:    I have reviewed this patient's past surgical history       Social History:   Tobacco, ETOH, and rec drugs reviewed and as noted below with the following exceptions:  Jonathon grew up in [place] and graduated from high school in 1972 from [employer].  He then went to the [employer] for a year and a half before joining the [employer], where he spent the next 2 years.  After discharge he went back to the  where he got his degree in accounting, ultimately obtaining a masters in finance.  He spent much of his career as a  for a company called [employer].  He retired in 2016.  He currently lives in [place] on 9 acres with a horse named Stevo, and a dog named Mayi, a chocolate lab.  He likes to fish and hunt on his property.  He is close with his sister and has friends.  He is  and has no children.          Family History:     Family History   Problem Relation Age of Onset    Hypertension Mother     Gout Mother     Hypertension Father     Diabetes Father     Hypertension Sister     Hypertension Brother     Hypertension Maternal Grandmother     Hypertension Maternal Grandfather     Diabetes Cousin     Deep Vein Thrombosis No family hx of     Anesthesia Reaction No family hx of             Medications:     Current Outpatient Medications   Medication Sig Dispense Refill    allopurinol (ZYLOPRIM) 100 MG  tablet Take 1 tablet (100 mg) by mouth daily (Patient taking differently: Take 100 mg by mouth daily (with lunch)) 90 tablet 0    cyanocobalamin (VITAMIN B-12) 1000 MCG SUBL sublingual tablet Place 1,000 mcg under the tongue daily (with lunch)      lisinopril-hydrochlorothiazide (ZESTORETIC) 20-12.5 MG tablet TAKE 2 TABLETS BY MOUTH  DAILY (Patient taking differently: Take 2 tablets by mouth every morning) 200 tablet 2    metoprolol succinate ER (TOPROL XL) 25 MG 24 hr tablet TAKE 1 TABLET BY MOUTH  DAILY WITH LUNCH (Patient taking differently: Take 25 mg by mouth daily (with lunch)) 100 tablet 2              Physical Exam:   There were no vitals taken for this visit.    No exam could be done today because this was a virtual visit.  Data:        Latest Reference Range & Units 12/11/23 11:50   PSA Tumor Marker 0.00 - 4.50 ng/mL <0.01   Testosterone Total 240 - 950 ng/dL 200 (L)   (L): Data is abnormally low        Recent Labs   Lab Test 11/29/23  1302 10/27/23  1219 10/18/23  1344 09/29/23  1442 09/07/23  1023 06/19/23  1111   WBC 15.9* 15.3* 9.1 7.6 7.7 4.9   NEUTROPHIL  --  9 25 23 15 25   HGB 12.9* 12.5* 13.3 13.0* 13.2* 12.8*   PLT 68* 64* 73* 63* 65* 69*     Recent Labs   Lab Test 11/29/23  1302 10/18/23  1344 09/07/23  1023 06/19/23  1111 02/28/23  1131 12/06/22  1335 11/08/22  1317   * 130* 138  --   --  139 140   POTASSIUM 4.5 3.9 4.7 4.4 4.1 3.6 3.6   CHLORIDE 96* 93* 104  --   --  101 101   CO2 18* 25 24  --   --  24 26   ANIONGAP 19* 12 10  --   --  14 13   BUN 23.5* 15.4 24.9*  --   --  21.1 22.0   CR 1.49* 1.17 1.38*  --   --  1.00 1.08   MISTY 9.5 9.3 9.6  --   --  9.9 9.9     Recent Labs   Lab Test 11/29/23  1302 10/18/23  1344 09/07/23  1023   PHOS  --  3.6  --    LDH  --  403*  --    URIC 5.5 4.2 5.6     Recent Labs   Lab Test 11/29/23  1302 10/18/23  1344 09/07/23  1023 12/06/22  1335   BILITOTAL  --  1.3* 0.9 1.0   ALKPHOS  --  100 87 66   ALT 23 16 19 32   AST  --  25 43 25   ALBUMIN  --  4.5  4.4 4.4   LDH  --  403*  --   --      @[facility](PSAtumormarker:7)    No results found for this or any previous visit (from the past 24 hour(s)).    Other Data     Prostate biopsy 12/21/2020    FINAL DIAGNOSIS:  A. Prostate biopsy, target 1, right mid peripheral zone, 10 o'clock:  - Prostatic adenocarcinoma, acinar type, Keith score 7 (3+4), with  approximately 5% of pattern 4  - Grade group 2  - Involves 1 of 2 cores (9 mm, 80%)    B. Prostate biopsy, left apex, target 2, 12:30 o'clock:  - Prostatic adenocarcinoma, acinar type, [place] score 6 (3+3)  - Grade group 1  - Involves 2 of 2 cores (3 mm, 20%; 3 mm, 15%)    C. Prostate biopsy, left base:  - Benign prostatic tissue    D. Prostate biopsy, left mid:  - Prostatic adenocarcinoma, acinar type, [place] score 6 (3+3)  - Grade group 1  - Involves 2 of 2 cores (4.5 mm, 35%; 2 mm, 15%)    E. Prostate biopsy, left apex:  - Prostatic adenocarcinoma, acinar type, Keith score 6 (3+3)  - Grade group 1  - Involves 2 of 2 cores (4 mm, 30%; 2 mm, 20%)    F. Prostate biopsy, right base:  - Benign prostatic tissue    G. Prostate biopsy, right mid:  - Prostatic adenocarcinoma, acinar type, [place] score 9 (4+5)  - Grade group 5  - Involves 1 of 2 cores (10 mm, 75%)    H. Prostate biopsy, right apex:  - Benign prostatic tissue    I. Prostate biopsy, left and right transitional zone, in same jar per RN.:  - Prostatic adenocarcinoma, acinar type, Keith score 7 (3+4), with  approximately 10% of pattern 4  - Grade group 2  - Involves 1 of 2 cores (5 mm, 40%)     PET SCAN 10/18/2023  IMPRESSION:     1. Findings suspicious for lymphoma involving lymph nodes above and below the diaphragm, the spleen, with additional possible diffuse marrow and bilateral lower cervical chain lymph node involvement.  2. Mildly FDG avid subsolid opacity in the infrahilar left lower lobe should be infectious/inflammatory. Consider short interval CT chest in 1-3 months to assess for resolution.  Labs,  imaging and treatment plan reviewed with patient. All questions answered.      Final Diagnosis   Bone marrow, posterior iliac crest, right decalcified trephine biopsy and touch imprint; right direct and concentrated aspirate smears; and peripheral blood smear:      Low grade B cell lymphoma with the following features:  - Hypercellular marrow for age (overall 60%) with trilineage hematopoiesis, no overt dysplasia, no increase in blasts, and 40% involvement by B cell lymphoma with nodular and diffuse growth pattern   - Peripheral blood showing slight normochromic normocytic anemia, moderate thrombocytopenia, and leukocytosis with neutropenia, monocytosis, and atypical lymphocytosis         60 minutes spent on the date of the encounter doing chart review, review of outside records, review of test results, interpretation of tests, patient visit, documentation, and discussion with other provider(s)

## 2023-12-14 ENCOUNTER — VIRTUAL VISIT (OUTPATIENT)
Dept: ONCOLOGY | Facility: CLINIC | Age: 68
End: 2023-12-14
Attending: INTERNAL MEDICINE
Payer: COMMERCIAL

## 2023-12-14 VITALS — HEIGHT: 73 IN | BODY MASS INDEX: 29.16 KG/M2 | WEIGHT: 220 LBS

## 2023-12-14 DIAGNOSIS — R16.1 SPLENOMEGALY: ICD-10-CM

## 2023-12-14 DIAGNOSIS — D61.818 OTHER PANCYTOPENIA (H): ICD-10-CM

## 2023-12-14 DIAGNOSIS — C83.07 SPLENIC MARGINAL ZONE B-CELL LYMPHOMA (H): Primary | ICD-10-CM

## 2023-12-14 DIAGNOSIS — C61 PROSTATE CANCER (H): Primary | ICD-10-CM

## 2023-12-14 PROCEDURE — 99215 OFFICE O/P EST HI 40 MIN: CPT | Mod: VID | Performed by: INTERNAL MEDICINE

## 2023-12-14 ASSESSMENT — PAIN SCALES - GENERAL
PAINLEVEL: NO PAIN (0)
PAINLEVEL: NO PAIN (0)

## 2023-12-14 NOTE — LETTER
12/14/2023         RE: Jonathon Santos  [id number] [address] [place] [id number]        Dear Colleague,    Thank you for referring your patient, Jonathon Santos, to the [facility]. Please see a copy of my visit note below.    Virtual Visit Details    Type of service:  Video Visit   Video Start Time:  8:17  Video End Time:8:34 AM    Originating Location (pt. Location): Home    Distant Location (provider location):  On-site  Platform used for Video Visit: [facility] Medical Oncology Note       Date of visit: December 14, 2023  New Outpatient Clinic Note        Assessment:     High risk localized prostate cancer.  This is high risk because of the [place] Grade Group 5 disease. Originally at the time of diagnosis he had adenopathy concerning for stage IV prostate cancer,, but looking through the retrospectoscope, these nodes are likely a manifestation of his recently diagnosed low-grade lymphoma.   Status post maximal local therapy with maximal systemic therapy as well given adjuvantly.  Now, approaching 3 years from the time of diagnosis, with no evidence of recurrent disease by history, physical exam, or recent PSA. His chance of cure at this point, while not 100%, is pretty high.  New diagnosis of a low-grade lymphoma of unclear specific subtype.  I did reassure Jonathon today that the vast majority of patients with these types of lymphomas live with them, and die with them, not from them.  He is symptomatic from the NHL, so he will likely need treatment. He has excellent follow-up with Dr. Burciaga and will be seeing her later today to further discuss.        Plan:     No intervention by me  Continue follow-up with Dr. Burciaga to discuss the results of the lymph node biopsy and where they go from here regarding management  Return to clinic with us in 6 months with a PSA drawn just prior.  We will continue to see him at every 6 month intervals for a total of 5 years after his  diagnosis.    Seen with Ashok Callaway MD, PGY-2      Geraldo Eduardo MD, MSc  Associate Profess disease.   Or of Medicine  [employer]  [facility]  [address] [place] [address]  [phone]    __________________________________________________________________    DIAGNOSES     cT2/3 high risk prostate cancer, diagnosed at prostate biopsy 12/21/20.  At the time of this diagnosis, pelvic adenopathy was seen on prostate MRI, with the assumtpion he had Stage IV prostate cancer. However, he was later diagnosed with a low grade lymphoma (see 2).  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  Jonathon had persistent neutropenia/thrombocytopenia, originally thought due to his prostate cancer treatment. 10/7/23 peripheral flow cytometry showed several populations of lambda-monotypic B-cells; however the predominant one was CD5/CD10 negative raising possibility of marginal zone lymphoma, lymphoplasmacytic, or rarely follicular lymphoma. BM biopsy showed  a 60% cellular marrow , with 40% involvement with a low grade lymphoma.  Surgical excisional biopsy was done 12/4. PET scan 10/18 showed significant splenomegaly (20 cm with SUVmax 6.7), mildly-avid lymphadenopathy above/below diaphragm, and diffuse bone marrow uptake.         History of Present Illness/therapy to date for prostate cancer:     Presents with PSA of 8.2, 10/2020 (was 7.25 a year earlier)  Prostate MRI 11/17/202 read out as PIRADS 5, with 2 suspicious lesions in the R mid and L apex, with capsular abutment for 15 mm. There was suspicious B obturator nodes, measuring up 19 mm, as well as B external iliac nodes.   Prostate biopsy 12/21/2020 showed a Keith 4+5 (Grade group 5) prostate cancer in 1 of 2 cores in the right mid, involving 75%. There was [place] 3+3 (Grade group 1) disease in the left apex, and left mid, and 3+4 (Grade group 2) in 1/2 cores in the right mid.  CT CAP 4/13/2021  shows axillary adenopathy. Bone scan showed no convincing evidence of metastatic disease.  Recommendation by Dr. Cisneros is ADT  x 2 years, RT to the nodes and prostate, and RT to the prostate.  Radiotherapy completed 7/15/2021  ADT/abiraterone 1/25/2021-6/29/2023.  Observation in the interim. Most recent PSA was undetectable 6/19/2023      Interval history:     Feels better off of ADT.  Has more muscle mass.  Doesn't have much of an appetite due to his spleen size.  No night sweats.  No new adenopathy    Past Medical History:   I have reviewed this patient's past medical history   Past Medical History:   Diagnosis Date    Anemia     Benign essential hypertension     Chronic kidney disease     Gout     Lymphoma (H)     Prostate cancer (H)     Thrombocytopenia (H24)           Past Surgical History:    I have reviewed this patient's past surgical history       Social History:   Tobacco, ETOH, and rec drugs reviewed and as noted below with the following exceptions:  Jonathon grew up in [place] and graduated from high school in 1972 from [employer].  He then went to the [employer] for a year and a half before joining the [employer], where he spent the next 2 years.  After discharge he went back to the  where he got his degree in accounting, ultimately obtaining a masters in finance.  He spent much of his career as a  for a company called [employer].  He retired in 2016.  He currently lives in [place] on 9 acres with a horse named Stevo, and a dog named Mayi, a chocolate lab.  He likes to fish and hunt on his property.  He is close with his sister and has friends.  He is  and has no children.          Family History:     Family History   Problem Relation Age of Onset    Hypertension Mother     Gout Mother     Hypertension Father     Diabetes Father     Hypertension Sister     Hypertension Brother     Hypertension Maternal Grandmother     Hypertension Maternal Grandfather     Diabetes  Cousin     Deep Vein Thrombosis No family hx of     Anesthesia Reaction No family hx of             Medications:     Current Outpatient Medications   Medication Sig Dispense Refill    allopurinol (ZYLOPRIM) 100 MG tablet Take 1 tablet (100 mg) by mouth daily (Patient taking differently: Take 100 mg by mouth daily (with lunch)) 90 tablet 0    cyanocobalamin (VITAMIN B-12) 1000 MCG SUBL sublingual tablet Place 1,000 mcg under the tongue daily (with lunch)      lisinopril-hydrochlorothiazide (ZESTORETIC) 20-12.5 MG tablet TAKE 2 TABLETS BY MOUTH  DAILY (Patient taking differently: Take 2 tablets by mouth every morning) 200 tablet 2    metoprolol succinate ER (TOPROL XL) 25 MG 24 hr tablet TAKE 1 TABLET BY MOUTH  DAILY WITH LUNCH (Patient taking differently: Take 25 mg by mouth daily (with lunch)) 100 tablet 2              Physical Exam:   There were no vitals taken for this visit.    No exam could be done today because this was a virtual visit.  Data:        Latest Reference Range & Units 12/11/23 11:50   PSA Tumor Marker 0.00 - 4.50 ng/mL <0.01   Testosterone Total 240 - 950 ng/dL 200 (L)   (L): Data is abnormally low        Recent Labs   Lab Test 11/29/23  1302 10/27/23  1219 10/18/23  1344 09/29/23  1442 09/07/23  1023 06/19/23  1111   WBC 15.9* 15.3* 9.1 7.6 7.7 4.9   NEUTROPHIL  --  9 25 23 15 25   HGB 12.9* 12.5* 13.3 13.0* 13.2* 12.8*   PLT 68* 64* 73* 63* 65* 69*     Recent Labs   Lab Test 11/29/23  1302 10/18/23  1344 09/07/23  1023 06/19/23  1111 02/28/23  1131 12/06/22  1335 11/08/22  1317   * 130* 138  --   --  139 140   POTASSIUM 4.5 3.9 4.7 4.4 4.1 3.6 3.6   CHLORIDE 96* 93* 104  --   --  101 101   CO2 18* 25 24  --   --  24 26   ANIONGAP 19* 12 10  --   --  14 13   BUN 23.5* 15.4 24.9*  --   --  21.1 22.0   CR 1.49* 1.17 1.38*  --   --  1.00 1.08   MISTY 9.5 9.3 9.6  --   --  9.9 9.9     Recent Labs   Lab Test 11/29/23  1302 10/18/23  1344 09/07/23  1023   PHOS  --  3.6  --    LDH  --  403*  --     URIC 5.5 4.2 5.6     Recent Labs   Lab Test 11/29/23  1302 10/18/23  1344 09/07/23  1023 12/06/22  1335   BILITOTAL  --  1.3* 0.9 1.0   ALKPHOS  --  100 87 66   ALT 23 16 19 32   AST  --  25 43 25   ALBUMIN  --  4.5 4.4 4.4   LDH  --  403*  --   --      @[facility](PSAtumormarker:7)    No results found for this or any previous visit (from the past 24 hour(s)).    Other Data     Prostate biopsy 12/21/2020    FINAL DIAGNOSIS:  A. Prostate biopsy, target 1, right mid peripheral zone, 10 o'clock:  - Prostatic adenocarcinoma, acinar type, Keith score 7 (3+4), with  approximately 5% of pattern 4  - Grade group 2  - Involves 1 of 2 cores (9 mm, 80%)    B. Prostate biopsy, left apex, target 2, 12:30 o'clock:  - Prostatic adenocarcinoma, acinar type, Keith score 6 (3+3)  - Grade group 1  - Involves 2 of 2 cores (3 mm, 20%; 3 mm, 15%)    C. Prostate biopsy, left base:  - Benign prostatic tissue    D. Prostate biopsy, left mid:  - Prostatic adenocarcinoma, acinar type, Gilford score 6 (3+3)  - Grade group 1  - Involves 2 of 2 cores (4.5 mm, 35%; 2 mm, 15%)    E. Prostate biopsy, left apex:  - Prostatic adenocarcinoma, acinar type, Gilford score 6 (3+3)  - Grade group 1  - Involves 2 of 2 cores (4 mm, 30%; 2 mm, 20%)    F. Prostate biopsy, right base:  - Benign prostatic tissue    G. Prostate biopsy, right mid:  - Prostatic adenocarcinoma, acinar type, Keith score 9 (4+5)  - Grade group 5  - Involves 1 of 2 cores (10 mm, 75%)    H. Prostate biopsy, right apex:  - Benign prostatic tissue    I. Prostate biopsy, left and right transitional zone, in same jar per RN.:  - Prostatic adenocarcinoma, acinar type, Keith score 7 (3+4), with  approximately 10% of pattern 4  - Grade group 2  - Involves 1 of 2 cores (5 mm, 40%)     PET SCAN 10/18/2023  IMPRESSION:     1. Findings suspicious for lymphoma involving lymph nodes above and below the diaphragm, the spleen, with additional possible diffuse marrow and bilateral lower cervical  chain lymph node involvement.  2. Mildly FDG avid subsolid opacity in the infrahilar left lower lobe should be infectious/inflammatory. Consider short interval CT chest in 1-3 months to assess for resolution.  Labs, imaging and treatment plan reviewed with patient. All questions answered.      Final Diagnosis   Bone marrow, posterior iliac crest, right decalcified trephine biopsy and touch imprint; right direct and concentrated aspirate smears; and peripheral blood smear:      Low grade B cell lymphoma with the following features:  - Hypercellular marrow for age (overall 60%) with trilineage hematopoiesis, no overt dysplasia, no increase in blasts, and 40% involvement by B cell lymphoma with nodular and diffuse growth pattern   - Peripheral blood showing slight normochromic normocytic anemia, moderate thrombocytopenia, and leukocytosis with neutropenia, monocytosis, and atypical lymphocytosis         60 minutes spent on the date of the encounter doing chart review, review of outside records, review of test results, interpretation of tests, patient visit, documentation, and discussion with other provider(s)

## 2023-12-14 NOTE — NURSING NOTE
Is the patient currently in the state of [place]? YES    Visit mode:VIDEO    If the visit is dropped, the patient can be reconnected by: VIDEO VISIT: Send to e-mail at: [email or web address]    Will anyone else be joining the visit? NO  (If patient encounters technical issues they should call [phone]    How would you like to obtain your AVS? MyChart    Are changes needed to the allergy or medication list? No    Reason for visit: STALIN CHILDERS

## 2023-12-14 NOTE — PROGRESS NOTES
RE: Plan of Care    Dear  Pcp Not In System    Thank you for referring Aurelia Pascal. The following information reflects my assessment and plan of care.           Plan of Care 21   Effective from: 2021  Effective to: 2021    Plan ID: [id number]           Participants     Name Type Comments Contact Info    Bebeto Romo MD PCP - General  [phone]    David Sanders, PT Physical Therapist      Ronnie Boone DPM Referring Provider  [phone]           Aurelia Pascal MRN:[id number] (:1961 59 year old F)            Evaluation     Author: David Sanders PT Status: Sign when Signing Visit Last edited: 2021  3:12 PM         Referred by: Pcp Not In System; Medical Diagnosis (from order):    Diagnosis Information      Diagnosis    905.4 (ICD-9-CM) - S82.841S (ICD-10-CM) - Closed bimalleolar fracture of right ankle, sequela                Initial Evaluation    Visit:  1   Treatment Diagnosis: right: ankle symptoms with increased pain/symptoms, impaired range of motion, impaired strength, impaired gait  Next referring provider appointment: 2021    Date of onset: 3/24/2021  Date of surgery: 3/24/2021  Chart reviewed at time of initial evaluation (relevant co-morbidities, allergies, tests and medications listed): Patient denies other medical problems.  Diagnostic Tests: X-ray: reviewed.      SUBJECTIVE                                                                                                             Patient reports that she broke her right ankle 2021 when her dog ran into her leg. She had ORIF for right tibia/fibula fracture that day.  She has been immobilized since that time.  She expresses concern about ability to bear weight, a fear of falling, and difficulty negotiating stairs.  Pain / Symptoms:  Pain rating (out of 10): Current: 0 ; Best: 0; Worst: 4Location: Right lateral and medial ankle, right knee   Quality / Description: shooting.  Alleviating  Virtual Visit Details  Type of service:  Video Visit   Video Start Time:  4:30PM  Video End Time: 4:50PM    Originating Location (pt. Location): Home    Distant Location (provider location):  On-site  Platform used for Video Visit: 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 Peripheral flow and subsequent bone marrow biopsy 10/27/23 showed several populations of clonal B-cells but the predominant one is CD5/P07-cfmegtct low-grade B-cell lymphoma comprising about 40% of the bone marrow, but excisional LN biopsy recommended for further subclassification. There was another small CD5+ population compatible with monoclonal B-cell lymphocytosis. NGS negative for MYD88 mutation. Axillary LN biopsy 12/4/23 most consistent with CD5+ marginal zone lymphoma, splenic vs monik. Staging PET showed significant splenomegaly (20 cm with SUVmax 6.7), mildly-avid lymphadenopathy above/below diaphragm, and diffuse bone marrow uptake. .     Treatment history:  Rituximab pending    INTERVAL HISTORY:  Jonathon is seen today to follow up on axillary LN biopsy results. Doing fairly well overall. Biopsy site healing well. Has noticed more LUQ discomfort and early satiety since last visit. Otherwise no fever/chills, night sweats, weight loss, SOB, chest pain, N/V, or diarrhea. Some fatigue but doing all normal activities.     PAST MEDICAL HISTORY:  Locally advanced prostate cancer ([place] 4+5=9) dx 12/2020 s/p radiation 5/2021-7/2021 and 2 years of abiraterone/prednisone ending 6/2023  Hypertension  Hepatic steatosis   Peripheral neuropathy, possibly 2/2 B12 deficiency  Gout     FAMILY HISTORY:  Mother had lymphoma and lung cancer.       SOCIAL HISTORY:  Never smoker. Drinks 2-3 cans of beer daily. Smokes marijuana daily.   Lives in [place], [place] by himself.   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     I have reviewed and updated the following:  Past Medical History:   Diagnosis Date    Anemia     Benign essential hypertension     Chronic kidney disease     Gout     Lymphoma (H)     Prostate cancer (H)     Thrombocytopenia (H24)       No Known Allergies    Current Outpatient Medications:     allopurinol (ZYLOPRIM) 100 MG tablet, Take 1 tablet (100 mg) by mouth daily (Patient taking differently: Take 100 mg by mouth daily (with lunch)), Disp: 90 tablet, Rfl: 0    cyanocobalamin (VITAMIN B-12) 1000 MCG SUBL sublingual tablet, Place 1,000 mcg under the tongue daily (with lunch), Disp: , Rfl:     lisinopril-hydrochlorothiazide (ZESTORETIC) 20-12.5 MG tablet, TAKE 2 TABLETS BY MOUTH  DAILY (Patient taking differently: Take 2 tablets by mouth every morning), Disp: 200 tablet, Rfl: 2    metoprolol succinate ER (TOPROL XL) 25 MG 24 hr tablet, TAKE 1 TABLET BY MOUTH  DAILY WITH LUNCH (Patient taking differently: Take 25 mg by mouth daily (with lunch)), Disp: 100 tablet, Rfl: 2    REVIEW OF SYSTEMS:  12-point ROS reviewed and negative other than that mentioned in HPI.     Objective   ECOG PS: 0     PHYSICAL EXAM:  **Limited given video visit**  General: Patient appears well in no acute distress.   Skin: No rashes or lesions on visualized skin  Eyes: EOMI, no erythema, sclera icterus or discharge noted  Resp: Appears to be breathing comfortably without accessory muscle usage, speaking in full sentences, no cough  MSK: Appears to have normal range of motion based on visualized movements  Neurologic: No apparent tremors, facial movements symmetric  Psych: Affect bright, alert and oriented     LABS:  I reviewed the following labs:  Lab Results   Component Value Date    WBC 15.9 (H) 11/29/2023    HGB 12.9 (L) 11/29/2023    HCT 38.3 (L)  Factors: rest, avoiding movement in involved area.   Function:   Limitations / Exacerbation Factors: house/yard work, standing tasks, driving/riding in a vehicle, walking and standing, community distances, household distances, stairs  Prior Level of Function: declining function, therefore referred to therapy. no limitation in involved extremity. no assistive device,  Patient Goals: decreased pain and independence with ADLs/IADLs    Prior treatment: no therapies  Discharged from hospital, home health, or skilled nursing facility in last 30 days: no    Home Environment:   Patient lives with significant other  Type of home: multiple level home  Assistance available: consistent  Feels safe at home/work/school.  2 or more falls or an unexplained fall with injury in the last year:  No    OBJECTIVE                                                                                                                     Observed Gait:   Weight bearing: Right: as tolerated     Range of Motion (ROM)   (degrees unless noted; active unless noted; norms in ( ); negative=lacking to 0, positive=beyond 0)    Ankle:    - Dorsiflexion (20):        • Left: 8        • Right: -8     - Plantar Flexion (40-50):        • Left: 66        • Right: 24    - Inversion (30-35):        • Left: 22        • Right: 9    - Eversion (15-20):        • Left: 9        • Right: 2    Strength  (out of 5 unless noted, standard test position unless noted, lbs tested with hand held dynamometer)   Ankle:    - Dorsiflexion:        • Left: 5        • Right: 4    - Inversion:        • Left: 5         • Right: 4-    - Eversion:        • Left: 5         • Right: 4-        Outcome Measures:   Lower Extremity Functional Scale: LEFS Calculated Total: 25 (0=extreme difficulty; 80=no difficulty) see flowsheet for additional documentation  TREATMENT                                                                                                                  Therapeutic  11/29/2023    MCV 92 11/29/2023    PLT 68 (L) 11/29/2023     Lab Results   Component Value Date     (L) 11/29/2023    POTASSIUM 4.5 11/29/2023    CR 1.49 (H) 11/29/2023    BUN 23.5 (H) 11/29/2023    CHLORIDE 96 (L) 11/29/2023    MISTY 9.5 11/29/2023     (H) 11/29/2023      Lab Results   Component Value Date    ALT 23 11/29/2023    AST 25 10/18/2023    ALKPHOS 100 10/18/2023    BILITOTAL 1.3 (H) 10/18/2023      Lab Results   Component Value Date     (H) 10/18/2023    URIC 5.5 11/29/2023 12/4/23 R axillary LN biopsy:  Lymph node, right axillary, excision:  - Involved by partially CD5-positive marginal zone lymphoma  - See comment    Comment:  Flow cytometry analysis on concurrent specimen ([id number]) showed 3 abnormal B-cell populations:  - CD5 negative lambda monotypic B cells (59%)  - CD5 positive lambda monotypic B cells (19%)  - CD5 positive kappa monotypic B cells (0.4%)  There was no aberrant immunophenotype on T cells by flow cytometry.     The morphologic and immunophenotypic findings support the diagnosis of marginal zone lymphoma (MZL) and the differential diagnosis includes splenic MZL versus monik MZL versus MALT lymphoma. Of note, a small subset of neoplastic cells express CD5; this finding is described in both the splenic and monik marginal zone.  The diagnosis of MALT lymphoma is less likely given that generalized monik involvement is rare in subtype (fluorescent in situ hybridization (FISH) related to MALT lymphoma (such as BIRC3 and MALT1) are in progress on bone marrow specimen and will be reported separately).      The definitive diagnosis of splenic marginal zone lymphoma requires a splenectomy specimen; however, the overall clinical findings of patient including splenomegaly, lymphocytosis in the blood, the pattern of bone marrow involvement, and the IgD positivity in the tumor cells (as described in the revised 4th Ed of WHO) are most compatible with splenic marginal zone  Activity:  Passive range of motion right talocrural joint  Passive range of motion right subtalar joint  Posterior talar glide grade II    Neuromuscular Re-Education:  Ankle circles  Ankle alphabet  Gastroc stretch with towel 3x30  Weight shifting anterior posterior in parallel bars in staggered stance  Lateral weight shifting in parallel bars    Home Exercise Program: Access Code: [email or web address]  URL: [email or web address]  Date: 05/27/2021  Prepared by: David Sanders    Exercises  Seated Ankle Alphabet - 3-6 x daily - 7 x weekly - 1 sets - 1 reps  Seated Ankle Circles - 3-6 x daily - 7 x weekly - 1 sets - 10 reps  Long Sitting Calf Stretch with Strap - 3 x daily - 7 x weekly - 1 sets - 3 reps - 30 hold       ASSESSMENT                                                                                                           59 year old female patient has reported functional limitations listed above impacted by signs and symptoms consistent with below   • Involved:       - right ankle   • Symptoms/impairments: increased pain/symptoms, impaired range of motion, impaired strength and impaired gait  Patient reports having open reduction internal fixation for right tibia and fibular fractures sustained on March 24 when her dog ran into her leg.  She has been immobilized since that time.  She presents with impaired right ankle and subtalar joint mobility, motor function, range of motion, and muscle performance associated with late effects of surgery and immobilization.  Her range of motion is quite limited at evaluation.  She expresses a fear of falling and concerns about safely resuming normal activity.    Prognosis: patient will benefit from skilled therapy  Rehabilitative potential is: good  Predicted patient presentation: Low (stable) - Patient comorbidities and complexities, as defined above, will have little effect on progress for prescribed plan of care.  Patient Education:   Who will be  lymphoma.      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 Peripheral flow cytometry and subsequent bone marrow biopsy showed several populations of clonal B-cells but the predominant one was CD5/X34-pmzzxhro low-grade B-cell lymphoma comprising about 40% of the bone marrow. There is another small CD5+ population compatible with monoclonal B-cell lymphocytosis. MYD88 NGS is negative. His PET showed significant splenomegaly (20 cm with SUVmax 6.7), mildly-avid lymphadenopathy above/below diaphragm, and diffuse bone marrow uptake. Given that Hemepath was unable to narrow the subtype of lymphoma from the bone marrow biopsy, we pursued excisional biopsy of the R axillary lymph node, which is now back consistent with partially CD5+ marginal zone lymphoma, with splenic subtype favored given his whole clinical presentation.     We reviewed the overall diagnosis of splenic marginal zone lymphoma as a type of indolent non-Hodgkin B-cell lymphoma. We discussed that treatment is indicated due to his degree of thrombocytopenia and symptoms of LUQ discomfort/early satiety. I recommend first-line treatment with rituximab monotherapy - weekly x 4 weeks with potential for maintenance every 2 months x 4 more doses, with expected response rates ~60-70%. Will obtain first PET-CT after the 4 weekly doses. There is potential to add chemotherapy or targeted therapy if he is not responding. Splenectomy would also be a later line option but not address the lymph node or bone marrow disease. We reviewed potential side effects including rituximab infusion reaction the first cycle, fatigue, and risk of infections. Will schedule first infusion at [facility] given high risk of infusion reaction but will try to schedule additional doses at [facility] per patient preference.     #Hx of prostate cancer  Dx 12/2020, locally advanced  receiving education: patient  Are they ready to learn: yes  Preferred learning style: written, verbal, demonstration and video  Barriers to learning: no barriers apparent at this time  Results of above outlined education: Verbalizes understanding and Demonstrates understanding      PLAN                                                                                                                         The following skilled interventions to be implemented to achieve goals listed below:  Gait Training (86492)  Manual Therapy (66952)  Neuromuscular Re-Education (15424)  Therapeutic Activity (06084)  Therapeutic Exercise (63233)    Frequency / Duration: 2 times per week tapering as patient progresses for an estimated total of 12 visits for 6 weeks    Patient involved in and agreed to plan of care and goals.  Patient given attendance policy at time of initial evaluation.    GOALS                                                                                                                           Long Term Goals: to be met by end of plan of care  1. Lower Extremity Functional Scale: Patient will complete form to reflect an improved raw score to greater than or equal to 43/80 to indicate patient reported improvement in function/disability/impairment (minimal detectable change: 9 points).  2. Patient will demonstrate ability to negotiate level and unlevel surfaces at variable velocities, including change of direction without increased pain or instability to return to age appropriate and community activities at prior level of function.  3. Patient will ascend and descend using reciprocal pattern for safe home access      Therapy procedure time and total treatment time can be found documented on the Time Entry flowsheet         Updated Participants     Name Type Comments Contact Info    Bebeto Romo MD PCP - General  [phone]    Signature pending    David Sanders, PT Physical Therapist          Ronnie  ([place] 4+5=9) disease s/p radiation 5/2021-7/2021 and 2 years of abiraterone/prednisone ending 6/2023  - Transferred care from Dr. Cisneros to Dr. Eduardo. On surveillance.     #Ppx  Up to date on flu and pneumonia shots. Could use COVID booster and Shingrix series.      PLAN:  - Start rituximab weekly x 4 doses  - PET and RTC to see me 1 month after last rituximab dose    Total of 40 minutes on patient visit, reviewing records, interpreting test results, placing orders, and documentation on the day of service.    Vannesa Burciaga MD  Attending Physician, [facility]    AHLEY Boone DPM Referring Provider  [phone]                Please complete the attached form to indicate your approval of the plan of care upon receipt and fax signed form to the fax number below.  Insurance compliance requires your approval be on file.  Should you have any questions, feel free to contact me.     David Sanders, PT  [facility] [address] [facility]  [address] [facility] [address]  Phone: [phone]  Fax: [phone]                RE: Plan of Care for Aurelia Pascal, YOB: 1961     I certify the need for these services, furnished under this plan of treatment and while under my care.  I agree with the plan of care as stated and request that therapy proceed.        __________________________________________________________________________________  MD Signature         Date   Time

## 2023-12-14 NOTE — NURSING NOTE
Is the patient currently in the state of [place]? YES    Visit mode:VIDEO    If the visit is dropped, the patient can be reconnected by: VIDEO VISIT: Text to cell phone:   Telephone Information:   Mobile [phone]       Will anyone else be joining the visit? NO  (If patient encounters technical issues they should call [phone]    How would you like to obtain your AVS? MyChart    Are changes needed to the allergy or medication list? Pt stated no changes to allergies and Pt stated no med changes    Reason for visit: STALIN Mckenna VVF

## 2023-12-14 NOTE — LETTER
12/14/2023         RE: Jonathon Santos  [address] [address] [place] [id number]        Dear Colleague,    Thank you for referring your patient, Jonathon Santos, to the [facility]. Please see a copy of my visit note below.    Virtual Visit Details  Type of service:  Video Visit   Video Start Time:  4:30PM  Video End Time: 4:50PM    Originating Location (pt. 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 Peripheral flow and subsequent bone marrow biopsy 10/27/23 showed several populations of clonal B-cells but the predominant one is CD5/I08-[id number] low-grade B-cell lymphoma comprising about 40% of the bone marrow, but excisional LN biopsy recommended for further subclassification. There was another small CD5+ population compatible with monoclonal B-cell lymphocytosis. NGS negative for MYD88 mutation. Axillary LN biopsy 12/4/23 most consistent with CD5+ marginal zone lymphoma, splenic vs monik. Staging PET showed significant splenomegaly (20 cm with SUVmax 6.7), mildly-avid lymphadenopathy above/below diaphragm, and diffuse bone marrow uptake. .     Treatment history:  Rituximab pending    INTERVAL HISTORY:  Jonathon is seen today to follow up on axillary LN biopsy results. Doing fairly well overall. Biopsy site healing well. Has noticed more LUQ discomfort and early satiety since last visit. Otherwise no fever/chills, night sweats, weight loss, SOB, chest pain, N/V, or diarrhea. Some fatigue but doing all normal activities.     PAST MEDICAL HISTORY:  Locally advanced prostate cancer  ([place] 4+5=9) dx 12/2020 s/p radiation 5/2021-7/2021 and 2 years of abiraterone/prednisone ending 6/2023  Hypertension  Hepatic steatosis   Peripheral neuropathy, possibly 2/2 B12 deficiency  Gout     FAMILY HISTORY:  Mother had lymphoma and lung cancer.      SOCIAL HISTORY:  Never smoker. Drinks 2-3 cans of beer daily. Smokes marijuana daily.   Lives in [place], [place] by himself.   Retired, previously worked for [employer]s office.     I have reviewed and updated the following:  Past Medical History:   Diagnosis Date    Anemia     Benign essential hypertension     Chronic kidney disease     Gout     Lymphoma (H)     Prostate cancer (H)     Thrombocytopenia (H24)       No Known Allergies    Current Outpatient Medications:     allopurinol (ZYLOPRIM) 100 MG tablet, Take 1 tablet (100 mg) by mouth daily (Patient taking differently: Take 100 mg by mouth daily (with lunch)), Disp: 90 tablet, Rfl: 0    cyanocobalamin (VITAMIN B-12) 1000 MCG SUBL sublingual tablet, Place 1,000 mcg under the tongue daily (with lunch), Disp: , Rfl:     lisinopril-hydrochlorothiazide (ZESTORETIC) 20-12.5 MG tablet, TAKE 2 TABLETS BY MOUTH  DAILY (Patient taking differently: Take 2 tablets by mouth every morning), Disp: 200 tablet, Rfl: 2    metoprolol succinate ER (TOPROL XL) 25 MG 24 hr tablet, TAKE 1 TABLET BY MOUTH  DAILY WITH LUNCH (Patient taking differently: Take 25 mg by mouth daily (with lunch)), Disp: 100 tablet, Rfl: 2    REVIEW OF SYSTEMS:  12-point ROS reviewed and negative other than that mentioned in HPI.     Objective  ECOG PS: 0     PHYSICAL EXAM:  **Limited given video visit**  General: Patient appears well in no acute distress.   Skin: No rashes or lesions on visualized skin  Eyes: EOMI, no erythema, sclera icterus or discharge noted  Resp: Appears to be breathing comfortably without accessory muscle usage, speaking in full sentences, no cough  MSK: Appears to have normal range of motion based on visualized  movements  Neurologic: No apparent tremors, facial movements symmetric  Psych: Affect bright, alert and oriented     LABS:  I reviewed the following labs:  Lab Results   Component Value Date    WBC 15.9 (H) 11/29/2023    HGB 12.9 (L) 11/29/2023    HCT 38.3 (L) 11/29/2023    MCV 92 11/29/2023    PLT 68 (L) 11/29/2023     Lab Results   Component Value Date     (L) 11/29/2023    POTASSIUM 4.5 11/29/2023    CR 1.49 (H) 11/29/2023    BUN 23.5 (H) 11/29/2023    CHLORIDE 96 (L) 11/29/2023    MISTY 9.5 11/29/2023     (H) 11/29/2023      Lab Results   Component Value Date    ALT 23 11/29/2023    AST 25 10/18/2023    ALKPHOS 100 10/18/2023    BILITOTAL 1.3 (H) 10/18/2023      Lab Results   Component Value Date     (H) 10/18/2023    URIC 5.5 11/29/2023 12/4/23 R axillary LN biopsy:  Lymph node, right axillary, excision:  - Involved by partially CD5-positive marginal zone lymphoma  - See comment    Comment:  Flow cytometry analysis on concurrent specimen ([id number]) showed 3 abnormal B-cell populations:  - CD5 negative lambda monotypic B cells (59%)  - CD5 positive lambda monotypic B cells (19%)  - CD5 positive kappa monotypic B cells (0.4%)  There was no aberrant immunophenotype on T cells by flow cytometry.     The morphologic and immunophenotypic findings support the diagnosis of marginal zone lymphoma (MZL) and the differential diagnosis includes splenic MZL versus monik MZL versus MALT lymphoma. Of note, a small subset of neoplastic cells express CD5; this finding is described in both the splenic and monik marginal zone.  The diagnosis of MALT lymphoma is less likely given that generalized monik involvement is rare in subtype (fluorescent in situ hybridization (FISH) related to MALT lymphoma (such as BIRC3 and MALT1) are in progress on bone marrow specimen and will be reported separately).      The definitive diagnosis of splenic marginal zone lymphoma requires a splenectomy specimen; however, the  overall clinical findings of patient including splenomegaly, lymphocytosis in the blood, the pattern of bone marrow involvement, and the IgD positivity in the tumor cells (as described in the revised 4th Ed of WHO) are most compatible with splenic marginal zone lymphoma.      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 Peripheral flow cytometry and subsequent bone marrow biopsy showed several populations of clonal B-cells but the predominant one was CD5/[id number]-[id number] low-grade B-cell lymphoma comprising about 40% of the bone marrow. There is another small CD5+ population compatible with monoclonal B-cell lymphocytosis. MYD88 NGS is negative. His PET showed significant splenomegaly (20 cm with SUVmax 6.7), mildly-avid lymphadenopathy above/below diaphragm, and diffuse bone marrow uptake. Given that Hemepath was unable to narrow the subtype of lymphoma from the bone marrow biopsy, we pursued excisional biopsy of the R axillary lymph node, which is now back consistent with partially CD5+ marginal zone lymphoma, with splenic subtype favored given his whole clinical presentation.     We reviewed the overall diagnosis of splenic marginal zone lymphoma as a type of indolent non-Hodgkin B-cell lymphoma. We discussed that treatment is indicated due to his degree of thrombocytopenia and symptoms of LUQ discomfort/early satiety. I recommend first-line treatment with rituximab monotherapy - weekly x 4 weeks with potential for maintenance every 2 months x 4 more doses, with expected response rates ~60-70%. Will obtain first PET-CT after the 4 weekly doses. There is potential to add chemotherapy or targeted therapy if he is not responding. Splenectomy would also be a later line option but not address the lymph node or bone marrow disease. We reviewed potential side effects including rituximab infusion reaction the  first cycle, fatigue, and risk of infections. Will schedule first infusion at [facility] given high risk of infusion reaction but will try to schedule additional doses at [facility] per patient preference.     #Hx of prostate cancer  Dx 12/2020, locally advanced ([place] 4+5=9) disease s/p radiation 5/2021-7/2021 and 2 years of abiraterone/prednisone ending 6/2023  - Transferred care from Dr. Cisneros to Dr. Eduardo. On surveillance.     #Ppx  Up to date on flu and pneumonia shots. Could use COVID booster and Shingrix series.      PLAN:  - Start rituximab weekly x 4 doses  - PET and RTC to see me 1 month after last rituximab dose    Total of 40 minutes on patient visit, reviewing records, interpreting test results, placing orders, and documentation on the day of service.    Vannesa Burciaga MD  Attending Physician, [facility]

## 2023-12-15 ENCOUNTER — ONCOLOGY VISIT (OUTPATIENT)
Dept: ONCOLOGY | Facility: CLINIC | Age: 68
End: 2023-12-15
Attending: SURGERY
Payer: COMMERCIAL

## 2023-12-15 VITALS
BODY MASS INDEX: 28.37 KG/M2 | DIASTOLIC BLOOD PRESSURE: 73 MMHG | SYSTOLIC BLOOD PRESSURE: 113 MMHG | RESPIRATION RATE: 16 BRPM | OXYGEN SATURATION: 97 % | WEIGHT: 215 LBS | TEMPERATURE: 98.6 F | HEART RATE: 78 BPM

## 2023-12-15 DIAGNOSIS — C83.07 SPLENIC MARGINAL ZONE B-CELL LYMPHOMA (H): Primary | ICD-10-CM

## 2023-12-15 ASSESSMENT — PAIN SCALES - GENERAL: PAINLEVEL: NO PAIN (0)

## 2023-12-15 NOTE — LETTER
12/15/2023         RE: Jonathon Santos  [address] [address] [place] [phone]        Dear Colleague,    Thank you for referring your patient, Jonathon Santos, to the [facility]. Please see a copy of my visit note below.    Postop check after ax lymph node bx  No complications  Path: marginal zone lymphoma  Incision healing well  Follow-up prn.         Sincerely,        Manjinder Bright MD

## 2023-12-15 NOTE — NURSING NOTE
"Oncology Rooming Note    December 15, 2023 9:21 AM   Jonathon Santos is a 68 year old male who presents for:    Chief Complaint   Patient presents with    Oncology Clinic Visit     Splenic marginal zone b-cell lymphoma     Initial Vitals: /73 (BP Location: Right arm, Patient Position: Sitting, Cuff Size: Adult Regular)   Pulse 78   Temp 98.6  F (37  C) (Oral)   Resp 16   Wt 97.5 kg (215 lb)   SpO2 97%   BMI 28.37 kg/m   Estimated body mass index is 28.37 kg/m  as calculated from the following:    Height as of 12/14/23: 1.854 m (6' 1\").    Weight as of this encounter: 97.5 kg (215 lb). Body surface area is 2.24 meters squared.  No Pain (0) Comment: Data Unavailable   No LMP for male patient.  Allergies reviewed: Yes  Medications reviewed: Yes    Medications: Medication refills not needed today.  Pharmacy name entered into [employer]:    [facility] - [place], [place] - [address]  [facility] - [place], [place] - [address] [place] [address]    Clinical concerns: none.       Hugo Muir"

## 2023-12-15 NOTE — PROGRESS NOTES
Postop check after ax lymph node bx  No complications  Path: marginal zone lymphoma  Incision healing well  Follow-up prn.

## 2023-12-20 LAB
CULTURE HARVEST COMPLETE DATE: NORMAL
CULTURE HARVEST COMPLETE DATE: NORMAL
INTERPRETATION: NORMAL

## 2023-12-27 ENCOUNTER — TELEPHONE (OUTPATIENT)
Dept: INFUSION THERAPY | Facility: CLINIC | Age: 68
End: 2023-12-27
Payer: COMMERCIAL

## 2024-01-01 NOTE — LETTER
2021         RE: Jonathon Santos  [address] [address] [place] [phone]        Dear Colleague,    Thank you for referring your patient, Jonathon Santos, to the [facility]. Please see a copy of my visit note below.    [facility]  SPECIALIZING IN BREAKTHROUGHS  Radiation Oncology    On Treatment Visit Note      Jonathon Santos      Date: 2021   MRN: [id number]   : 1955  Diagnosis: Prostate cancer      Reason for Visit:  On Radiation Treatment Visit     Treatment Summary to Date  Treatment Site: pelvis Current Dose: 7740/7920 cGy Fractions: 40/41     Chemotherapy  Chemo concurrent with radx?: No    Subjective:   Doing okay. No acute trouble. Very mild GI bother. Using imodium 1-1.5 tablet every other day. Some anal irritation from frequency of bowel movement. Mild  bother. Slightly reduced urinary flow. Energy adequate.       Nursing ROS:   Nutrition Alteration  Diet Type: Patient's Preference           Cardiovascular  Respiratory effort: 1 - Normal - without distress     Genitourinary  Urinary Status: 0 - Normal  Dysuria: 0 - None     Pain Assessment  0-10 Pain Scale: 0      Objective:   BP (!) 147/94   Pulse 78   Resp 18   Wt 90.3 kg (199 lb)   SpO2 97%   BMI 26.26 kg/m     NAD    Labs:  CBC RESULTS:   Recent Labs   Lab Test 21  1116   WBC 10.3   RBC 4.42   HGB 15.0   HCT 43.2   MCV 98   MCH 33.9*   MCHC 34.7   RDW 13.5        ELECTROLYTES:  Recent Labs   Lab Test 21  1116   *   POTASSIUM 3.6   CHLORIDE 96   MISTY 8.5   CO2 25   BUN 21   CR 1.26*   *       Assessment:   Tom is a 66 year old male with newly diagnosed prostate cancer, pre-treatment PSA 8.18, highest GS was 4+5=9. Staging evaluation noted suspicious pelvic and Jazzy nodes concerning for regional vs M1 disease spread. He is undergoing definitive RT + ADT plus Abiraterone.     Tolerating radiation therapy well.  All questions and concerns addressed.    Plan:   1. Continue  current therapy. EOT tomorrow. RTC in 1 month.  2. GI bother. Imodium PRN.   3.  bother. No ibuprofen given low platelet count. Flomax sent to pharmacy, patient has not tried yet.    4. Anal irritation. Baby wipes, Sitz baths.       Mosaiq chart and setup information reviewed  Ports checked         Educational Topic Discussed  Education Instructions: reivewed potential SE to expect during radiation      Davion Francois MD           None

## 2024-01-02 ENCOUNTER — PATIENT OUTREACH (OUTPATIENT)
Dept: ONCOLOGY | Facility: CLINIC | Age: 69
End: 2024-01-02
Payer: COMMERCIAL

## 2024-01-02 NOTE — PROGRESS NOTES
[facility]: Cancer Care Follow-Up Note                                    Discussion with Patient:                                                      Writer called Jonathon to discuss Rituximab education.         Goals          General     Monitoring (pt-stated)      Notes - Note created  1/2/2024 12:54 PM by Stacy Mariscal RN     Goal Statement: I will use my clinic and care team resources as directed.   Date Goal set: 1/2/2024  Barriers: disease burden  Strengths: support  Date to Achieve By: ongoing  Patient expressed understanding of goal:  Yes   Action steps to achieve this goal:  I will contact triage with new, worsening, or uncontrolled symptoms.   I will contact triage with temperature over 100.4  I will call with difficulties of scheduling and/or transportation.  I will request needed refills when there are 7 days of medication remaining.   I will not send urgent or symptomatic messages through [employer].  I will contact scheduling to arrange or make changes in my appointments.                Dates of Treatment:                                                      Infusion given in last 28 days       None            Assessment:                                                        Plan of Care Education Review:   Assessment completed with:: Patient    Plan of Care Education   Yearly learning assessment completed?: Yes (see Education tab)  Diagnosis:: Marginal zone lymphoma  Does patient understand diagnosis?: Yes  Tx plan/regimen:: Rituximab  Does patient understand treatment plan/regimen?: Yes  Preparing for treatment:: Reviewed treatment preparation information with patient (vascular access, day of chemo, visitor policy, what to bring, etc.)  Vascular access education provided for:: Peripheral IV  Side effect education:: Fatigue;Immune-mediated effects;Infection (reaction risk)  Safety/self care at home reviewed with patient:: Yes  Coping - concerns/fears reviewed with patient:: Yes  Plan of Care::  "AGATHA follow-up appointment;Lab appointment;Treatment schedule  When to call provider:: Bleeding;Increased shortness of breath;New/worsening pain;Shaking chills;Temperature >100.4F;Uncontrolled nausea/vomiting;Uncontrolled diarrhea/constipation  Reasons for deferring treatment reviewed with patient:: Yes    Evaluation of Learning  Patient Education Provided: Yes  Readiness:: Acceptance  Method:: Booklet/Handout;Explanation  Response:: Verbalizes understanding           Intervention/Education provided during outreach:                                                       Writer spoke with Jonathon to discuss chemotherapy and resources available at the [facility]. Provided Jonathon with [employer] printout(s) on Rituximab. Reviewed administration, side effects and ongoing symptom management by APPs in clinic.  Highlighted steps to expect when getting treatment (check in, labs, pre-medications, infusion).     Reviewed when to contact triage team and provided info sheet on \"When to Call For Help\", as well as triage contact information. Request Jonathon not send symptomatic MyChart messages.     Answered all Jonathon's questions. Encouraged him to reach out with any follow up questions or concerns.      Patient to follow up as scheduled at next appt  Patient to call/MyChart message with updates  Confirmed patient has clinic and triage numbers    Signature:  Stacy Mariscal RN  "

## 2024-01-04 DIAGNOSIS — M1A.09X1 IDIOPATHIC CHRONIC GOUT, MULTIPLE SITES, WITH TOPHUS (TOPHI): ICD-10-CM

## 2024-01-04 RX ORDER — ALLOPURINOL 100 MG/1
100 TABLET ORAL DAILY
Qty: 90 TABLET | Refills: 1 | Status: SHIPPED | OUTPATIENT
Start: 2024-01-04 | End: 2024-02-16

## 2024-01-04 NOTE — TELEPHONE ENCOUNTER
[facility] refill    Zyloprim tab 100mg    Ov 3/24/23  Next ov 3/5/24    Last lab 11/29/23  No lab follow-up for Jennifer?

## 2024-01-04 NOTE — TELEPHONE ENCOUNTER
Called and verified dose with patient. He is taking 100mg daily. Will continue on that dose. Allopurinol refilled.    Jennifer Mars, PAC

## 2024-01-06 ENCOUNTER — DOCUMENTATION ONLY (OUTPATIENT)
Dept: ONCOLOGY | Facility: CLINIC | Age: 69
End: 2024-01-06
Payer: COMMERCIAL

## 2024-01-11 ENCOUNTER — VIRTUAL VISIT (OUTPATIENT)
Dept: ONCOLOGY | Facility: CLINIC | Age: 69
End: 2024-01-11
Attending: INTERNAL MEDICINE
Payer: COMMERCIAL

## 2024-01-11 VITALS — BODY MASS INDEX: 28.49 KG/M2 | WEIGHT: 215 LBS | HEIGHT: 73 IN

## 2024-01-11 DIAGNOSIS — D69.6 THROMBOCYTOPENIA (H): Primary | ICD-10-CM

## 2024-01-11 DIAGNOSIS — C83.07 SPLENIC MARGINAL ZONE B-CELL LYMPHOMA (H): Primary | ICD-10-CM

## 2024-01-11 DIAGNOSIS — C83.07 SPLENIC MARGINAL ZONE B-CELL LYMPHOMA (H): ICD-10-CM

## 2024-01-11 PROCEDURE — 99215 OFFICE O/P EST HI 40 MIN: CPT | Mod: 95 | Performed by: NURSE PRACTITIONER

## 2024-01-11 PROCEDURE — 99417 PROLNG OP E/M EACH 15 MIN: CPT | Performed by: NURSE PRACTITIONER

## 2024-01-11 RX ORDER — DIPHENHYDRAMINE HCL 25 MG
50 CAPSULE ORAL ONCE
Status: CANCELLED
Start: 2024-02-09

## 2024-01-11 RX ORDER — DIPHENHYDRAMINE HYDROCHLORIDE 50 MG/ML
50 INJECTION INTRAMUSCULAR; INTRAVENOUS
Status: CANCELLED
Start: 2024-02-16

## 2024-01-11 RX ORDER — ALBUTEROL SULFATE 90 UG/1
1-2 AEROSOL, METERED RESPIRATORY (INHALATION)
Status: CANCELLED
Start: 2024-02-02

## 2024-01-11 RX ORDER — METHYLPREDNISOLONE SODIUM SUCCINATE 125 MG/2ML
125 INJECTION, POWDER, LYOPHILIZED, FOR SOLUTION INTRAMUSCULAR; INTRAVENOUS
Status: CANCELLED
Start: 2024-02-16

## 2024-01-11 RX ORDER — ALBUTEROL SULFATE 0.83 MG/ML
2.5 SOLUTION RESPIRATORY (INHALATION)
Status: CANCELLED | OUTPATIENT
Start: 2024-02-16

## 2024-01-11 RX ORDER — ALBUTEROL SULFATE 0.83 MG/ML
2.5 SOLUTION RESPIRATORY (INHALATION)
Status: CANCELLED | OUTPATIENT
Start: 2024-02-09

## 2024-01-11 RX ORDER — EPINEPHRINE 1 MG/ML
0.3 INJECTION, SOLUTION INTRAMUSCULAR; SUBCUTANEOUS EVERY 5 MIN PRN
Status: CANCELLED | OUTPATIENT
Start: 2024-01-26

## 2024-01-11 RX ORDER — DIPHENHYDRAMINE HYDROCHLORIDE 50 MG/ML
50 INJECTION INTRAMUSCULAR; INTRAVENOUS
Status: CANCELLED
Start: 2024-02-02

## 2024-01-11 RX ORDER — METHYLPREDNISOLONE SODIUM SUCCINATE 125 MG/2ML
125 INJECTION, POWDER, LYOPHILIZED, FOR SOLUTION INTRAMUSCULAR; INTRAVENOUS
Status: CANCELLED
Start: 2024-01-26

## 2024-01-11 RX ORDER — ALBUTEROL SULFATE 0.83 MG/ML
2.5 SOLUTION RESPIRATORY (INHALATION)
Status: CANCELLED | OUTPATIENT
Start: 2024-02-02

## 2024-01-11 RX ORDER — DIPHENHYDRAMINE HYDROCHLORIDE 50 MG/ML
50 INJECTION INTRAMUSCULAR; INTRAVENOUS
Status: CANCELLED
Start: 2024-02-09

## 2024-01-11 RX ORDER — METHYLPREDNISOLONE SODIUM SUCCINATE 125 MG/2ML
125 INJECTION, POWDER, LYOPHILIZED, FOR SOLUTION INTRAMUSCULAR; INTRAVENOUS
Status: CANCELLED
Start: 2024-02-09

## 2024-01-11 RX ORDER — HEPARIN SODIUM (PORCINE) LOCK FLUSH IV SOLN 100 UNIT/ML 100 UNIT/ML
5 SOLUTION INTRAVENOUS
Status: CANCELLED | OUTPATIENT
Start: 2024-02-02

## 2024-01-11 RX ORDER — MEPERIDINE HYDROCHLORIDE 25 MG/ML
25 INJECTION INTRAMUSCULAR; INTRAVENOUS; SUBCUTANEOUS EVERY 30 MIN PRN
Status: CANCELLED | OUTPATIENT
Start: 2024-02-16

## 2024-01-11 RX ORDER — ALBUTEROL SULFATE 90 UG/1
1-2 AEROSOL, METERED RESPIRATORY (INHALATION)
Status: CANCELLED
Start: 2024-02-09

## 2024-01-11 RX ORDER — DIPHENHYDRAMINE HCL 25 MG
50 CAPSULE ORAL ONCE
Status: CANCELLED
Start: 2024-02-16

## 2024-01-11 RX ORDER — EPINEPHRINE 1 MG/ML
0.3 INJECTION, SOLUTION INTRAMUSCULAR; SUBCUTANEOUS EVERY 5 MIN PRN
Status: CANCELLED | OUTPATIENT
Start: 2024-02-02

## 2024-01-11 RX ORDER — HEPARIN SODIUM,PORCINE 10 UNIT/ML
5-20 VIAL (ML) INTRAVENOUS DAILY PRN
Status: CANCELLED | OUTPATIENT
Start: 2024-01-26

## 2024-01-11 RX ORDER — EPINEPHRINE 1 MG/ML
0.3 INJECTION, SOLUTION INTRAMUSCULAR; SUBCUTANEOUS EVERY 5 MIN PRN
Status: CANCELLED | OUTPATIENT
Start: 2024-02-09

## 2024-01-11 RX ORDER — MEPERIDINE HYDROCHLORIDE 25 MG/ML
25 INJECTION INTRAMUSCULAR; INTRAVENOUS; SUBCUTANEOUS EVERY 30 MIN PRN
Status: CANCELLED | OUTPATIENT
Start: 2024-02-09

## 2024-01-11 RX ORDER — LORAZEPAM 2 MG/ML
0.5 INJECTION INTRAMUSCULAR EVERY 4 HOURS PRN
Status: CANCELLED | OUTPATIENT
Start: 2024-02-16

## 2024-01-11 RX ORDER — HEPARIN SODIUM (PORCINE) LOCK FLUSH IV SOLN 100 UNIT/ML 100 UNIT/ML
5 SOLUTION INTRAVENOUS
Status: CANCELLED | OUTPATIENT
Start: 2024-02-16

## 2024-01-11 RX ORDER — ALBUTEROL SULFATE 90 UG/1
1-2 AEROSOL, METERED RESPIRATORY (INHALATION)
Status: CANCELLED
Start: 2024-01-26

## 2024-01-11 RX ORDER — ALBUTEROL SULFATE 90 UG/1
1-2 AEROSOL, METERED RESPIRATORY (INHALATION)
Status: CANCELLED
Start: 2024-02-16

## 2024-01-11 RX ORDER — HEPARIN SODIUM,PORCINE 10 UNIT/ML
5-20 VIAL (ML) INTRAVENOUS DAILY PRN
Status: CANCELLED | OUTPATIENT
Start: 2024-02-02

## 2024-01-11 RX ORDER — LORAZEPAM 2 MG/ML
0.5 INJECTION INTRAMUSCULAR EVERY 4 HOURS PRN
Status: CANCELLED | OUTPATIENT
Start: 2024-02-09

## 2024-01-11 RX ORDER — DIPHENHYDRAMINE HCL 25 MG
50 CAPSULE ORAL ONCE
Status: CANCELLED
Start: 2024-01-26

## 2024-01-11 RX ORDER — ACETAMINOPHEN 325 MG/1
650 TABLET ORAL ONCE
Status: CANCELLED
Start: 2024-02-09

## 2024-01-11 RX ORDER — HEPARIN SODIUM,PORCINE 10 UNIT/ML
5-20 VIAL (ML) INTRAVENOUS DAILY PRN
Status: CANCELLED | OUTPATIENT
Start: 2024-02-16

## 2024-01-11 RX ORDER — HEPARIN SODIUM,PORCINE 10 UNIT/ML
5-20 VIAL (ML) INTRAVENOUS DAILY PRN
Status: CANCELLED | OUTPATIENT
Start: 2024-02-09

## 2024-01-11 RX ORDER — LORAZEPAM 2 MG/ML
0.5 INJECTION INTRAMUSCULAR EVERY 4 HOURS PRN
Status: CANCELLED | OUTPATIENT
Start: 2024-01-26

## 2024-01-11 RX ORDER — HEPARIN SODIUM (PORCINE) LOCK FLUSH IV SOLN 100 UNIT/ML 100 UNIT/ML
5 SOLUTION INTRAVENOUS
Status: CANCELLED | OUTPATIENT
Start: 2024-02-09

## 2024-01-11 RX ORDER — ACETAMINOPHEN 325 MG/1
650 TABLET ORAL ONCE
Status: CANCELLED
Start: 2024-02-02

## 2024-01-11 RX ORDER — ACETAMINOPHEN 325 MG/1
650 TABLET ORAL ONCE
Status: CANCELLED
Start: 2024-01-26

## 2024-01-11 RX ORDER — MEPERIDINE HYDROCHLORIDE 25 MG/ML
25 INJECTION INTRAMUSCULAR; INTRAVENOUS; SUBCUTANEOUS
Status: CANCELLED
Start: 2024-01-26

## 2024-01-11 RX ORDER — MEPERIDINE HYDROCHLORIDE 25 MG/ML
25 INJECTION INTRAMUSCULAR; INTRAVENOUS; SUBCUTANEOUS EVERY 30 MIN PRN
Status: CANCELLED | OUTPATIENT
Start: 2024-02-02

## 2024-01-11 RX ORDER — MEPERIDINE HYDROCHLORIDE 25 MG/ML
25 INJECTION INTRAMUSCULAR; INTRAVENOUS; SUBCUTANEOUS EVERY 30 MIN PRN
Status: CANCELLED | OUTPATIENT
Start: 2024-01-26

## 2024-01-11 RX ORDER — DIPHENHYDRAMINE HCL 25 MG
50 CAPSULE ORAL ONCE
Status: CANCELLED
Start: 2024-02-02

## 2024-01-11 RX ORDER — DIPHENHYDRAMINE HYDROCHLORIDE 50 MG/ML
50 INJECTION INTRAMUSCULAR; INTRAVENOUS
Status: CANCELLED
Start: 2024-01-26

## 2024-01-11 RX ORDER — HEPARIN SODIUM (PORCINE) LOCK FLUSH IV SOLN 100 UNIT/ML 100 UNIT/ML
5 SOLUTION INTRAVENOUS
Status: CANCELLED | OUTPATIENT
Start: 2024-01-26

## 2024-01-11 RX ORDER — MEPERIDINE HYDROCHLORIDE 25 MG/ML
25 INJECTION INTRAMUSCULAR; INTRAVENOUS; SUBCUTANEOUS
Status: CANCELLED
Start: 2024-02-02

## 2024-01-11 RX ORDER — LORAZEPAM 2 MG/ML
0.5 INJECTION INTRAMUSCULAR EVERY 4 HOURS PRN
Status: CANCELLED | OUTPATIENT
Start: 2024-02-02

## 2024-01-11 RX ORDER — MEPERIDINE HYDROCHLORIDE 25 MG/ML
25 INJECTION INTRAMUSCULAR; INTRAVENOUS; SUBCUTANEOUS
Status: CANCELLED
Start: 2024-02-09

## 2024-01-11 RX ORDER — ACETAMINOPHEN 325 MG/1
650 TABLET ORAL ONCE
Status: CANCELLED
Start: 2024-02-16

## 2024-01-11 RX ORDER — METHYLPREDNISOLONE SODIUM SUCCINATE 125 MG/2ML
125 INJECTION, POWDER, LYOPHILIZED, FOR SOLUTION INTRAMUSCULAR; INTRAVENOUS
Status: CANCELLED
Start: 2024-02-02

## 2024-01-11 RX ORDER — EPINEPHRINE 1 MG/ML
0.3 INJECTION, SOLUTION INTRAMUSCULAR; SUBCUTANEOUS EVERY 5 MIN PRN
Status: CANCELLED | OUTPATIENT
Start: 2024-02-16

## 2024-01-11 RX ORDER — ALBUTEROL SULFATE 0.83 MG/ML
2.5 SOLUTION RESPIRATORY (INHALATION)
Status: CANCELLED | OUTPATIENT
Start: 2024-01-26

## 2024-01-11 RX ORDER — MEPERIDINE HYDROCHLORIDE 25 MG/ML
25 INJECTION INTRAMUSCULAR; INTRAVENOUS; SUBCUTANEOUS
Status: CANCELLED
Start: 2024-02-16

## 2024-01-11 ASSESSMENT — PAIN SCALES - GENERAL: PAINLEVEL: NO PAIN (0)

## 2024-01-11 NOTE — NURSING NOTE
Patient confirms medications and allergies are accurate via patients echeck in completion, and or denies any changes since last reviewed/verified.       Is the patient currently in the state of [place]? YES    Visit mode:VIDEO    If the visit is dropped, the patient can be reconnected by: VIDEO VISIT: Text to cell phone:   Telephone Information:   Mobile [phone]       Will anyone else be joining the visit? NO  (If patient encounters technical issues they should call [phone]    How would you like to obtain your AVS? MyChart    Are changes needed to the allergy or medication list? No    Reason for visit: RECHECK    Bianca CHILDERS

## 2024-01-11 NOTE — PROGRESS NOTES
Virtual Visit Details  Type of service:  Video Visit   Video Start Time:  13:30PM  Video End Time: 14:30 PM    Originating Location (pt. 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 Peripheral flow and subsequent bone marrow biopsy 10/27/23 showed several populations of clonal B-cells but the predominant one is CD5/V59-bvirmqkc low-grade B-cell lymphoma comprising about 40% of the bone marrow, but excisional LN biopsy recommended for further subclassification. There was another small CD5+ population compatible with monoclonal B-cell lymphocytosis. NGS negative for MYD88 mutation. Axillary LN biopsy 12/4/23 most consistent with CD5+ marginal zone lymphoma, splenic vs monik. Staging PET showed significant splenomegaly (20 cm with SUVmax 6.7), mildly-avid lymphadenopathy above/below diaphragm, and diffuse bone marrow uptake. .     Treatment history:  Rituximab pending    INTERVAL HISTORY:  Anxiety regarding Rituxan therapy side effects have prevented him from starting therapy.  Worsening fatigue  Fatigue occurred with prostate cancer therapy   Interfering with caring for his animals (has dog and horses)  Splenomegaly discomfort/LUQ discomfort and early satiety  Sleeps well at night.  Naps during day  Otherwise no fever/chills, night sweats, weight loss, SOB, chest pain, N/V, or diarrhea.     PAST MEDICAL HISTORY:  Locally advanced prostate cancer ([place] 4+5=9) dx 12/2020 s/p radiation 5/2021-7/2021 and 2 years of abiraterone/prednisone ending 6/2023  Hypertension  Hepatic steatosis   Peripheral  neuropathy, possibly 2/2 B12 deficiency  Gout     FAMILY HISTORY:  Mother had lymphoma and lung cancer.      SOCIAL HISTORY:  Never smoker. Drinks 2-3 cans of beer daily. Smokes marijuana daily.   Lives in [place], [place] by himself.   Retired, previously worked for [employer]s office.   Has a dog and a horse    I have reviewed and updated the following:  Past Medical History:   Diagnosis Date    Anemia     Benign essential hypertension     Chronic kidney disease     Gout     Lymphoma (H)     Prostate cancer (H)     Thrombocytopenia (H24)       No Known Allergies    Current Outpatient Medications:     allopurinol (ZYLOPRIM) 100 MG tablet, Take 1 tablet (100 mg) by mouth daily, Disp: 90 tablet, Rfl: 1    cyanocobalamin (VITAMIN B-12) 1000 MCG SUBL sublingual tablet, Place 1,000 mcg under the tongue daily (with lunch), Disp: , Rfl:     lisinopril-hydrochlorothiazide (ZESTORETIC) 20-12.5 MG tablet, TAKE 2 TABLETS BY MOUTH  DAILY (Patient taking differently: Take 2 tablets by mouth every morning), Disp: 200 tablet, Rfl: 2    metoprolol succinate ER (TOPROL XL) 25 MG 24 hr tablet, TAKE 1 TABLET BY MOUTH  DAILY WITH LUNCH (Patient taking differently: Take 25 mg by mouth daily (with lunch)), Disp: 100 tablet, Rfl: 2    REVIEW OF SYSTEMS:  12-point ROS reviewed and negative other than that mentioned in HPI.     Objective     ECOG PS: 0     PHYSICAL EXAM:  **Limited given video visit**  General: Patient appears well in no acute distress.   Skin: No rashes or lesions on visualized skin  Eyes: EOMI, no erythema, sclera icterus or discharge noted  Resp: Appears to be breathing comfortably without accessory muscle usage, speaking in full sentences, no cough  MSK: Appears to have normal range of motion based on visualized movements  Neurologic: No apparent tremors, facial movements symmetric  Psych: Affect bright, alert and oriented     LABS:  I reviewed the following labs:  Lab Results   Component Value Date    WBC 15.9  (H) 11/29/2023    HGB 12.9 (L) 11/29/2023    HCT 38.3 (L) 11/29/2023    MCV 92 11/29/2023    PLT 68 (L) 11/29/2023     Lab Results   Component Value Date     (L) 11/29/2023    POTASSIUM 4.5 11/29/2023    CR 1.49 (H) 11/29/2023    BUN 23.5 (H) 11/29/2023    CHLORIDE 96 (L) 11/29/2023    MISTY 9.5 11/29/2023     (H) 11/29/2023      Lab Results   Component Value Date    ALT 23 11/29/2023    AST 25 10/18/2023    ALKPHOS 100 10/18/2023    BILITOTAL 1.3 (H) 10/18/2023      Lab Results   Component Value Date     (H) 10/18/2023    URIC 5.5 11/29/2023 12/4/23 R axillary LN biopsy:  Lymph node, right axillary, excision:  - Involved by partially CD5-positive marginal zone lymphoma  - See comment    Comment:  Flow cytometry analysis on concurrent specimen ([id number]) showed 3 abnormal B-cell populations:  - CD5 negative lambda monotypic B cells (59%)  - CD5 positive lambda monotypic B cells (19%)  - CD5 positive kappa monotypic B cells (0.4%)  There was no aberrant immunophenotype on T cells by flow cytometry.     The morphologic and immunophenotypic findings support the diagnosis of marginal zone lymphoma (MZL) and the differential diagnosis includes splenic MZL versus monik MZL versus MALT lymphoma. Of note, a small subset of neoplastic cells express CD5; this finding is described in both the splenic and monik marginal zone.  The diagnosis of MALT lymphoma is less likely given that generalized monik involvement is rare in subtype (fluorescent in situ hybridization (FISH) related to MALT lymphoma (such as BIRC3 and MALT1) are in progress on bone marrow specimen and will be reported separately).      The definitive diagnosis of splenic marginal zone lymphoma requires a splenectomy specimen; however, the overall clinical findings of patient including splenomegaly, lymphocytosis in the blood, the pattern of bone marrow involvement, and the IgD positivity in the tumor cells (as described in the revised 4th  Ed of WHO) are most compatible with splenic marginal zone lymphoma.      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   Peripheral flow cytometry and subsequent bone marrow biopsy showed several populations of clonal B-cells but the predominant one was CD5/S28-fcsbjcwc low-grade B-cell lymphoma comprising about 40% of the bone marrow. There is another small CD5+ population compatible with monoclonal B-cell lymphocytosis. MYD88 NGS is negative.   PET showed significant splenomegaly (20 cm with SUVmax 6.7), mildly-avid lymphadenopathy above/below diaphragm, and diffuse bone marrow uptake. Given that Hemepath was unable to narrow the subtype of lymphoma from the bone marrow biopsy, we pursued excisional biopsy of the R axillary lymph node, which is now back consistent with partially CD5+ marginal zone lymphoma, with splenic subtype favored given his whole clinical presentation.   Reviewed the overall diagnosis of splenic marginal zone lymphoma as a type of indolent non-Hodgkin B-cell lymphoma. We discussed that treatment is indicated due to his degree of thrombocytopenia and symptoms of LUQ discomfort/early satiety.   Dr. Burciaga recommendation: first-line treatment with rituximab monotherapy - weekly x 4 weeks with potential for maintenance every 2 months x 4 more doses, with expected response rates ~60-70%.   Will obtain first PET-CT after the 4 weekly doses. There is potential to add chemotherapy or targeted therapy if he is not responding.   Splenectomy would also be a later line option but not address the lymph node or bone marrow disease.   1/11/24: Has yet to start therapy due to anxiety, fatigue,  potential snow storm. I reiterated that the fatigue may be from his underlying diagnosis.  Regarding side effects, I reviewed the  rituximab infusion reaction the first cycle and the protocol we have in place for management. Also  reviewed the fatigue and the risk of infections.   Need repeat labs as it has been over a month. He was in agreement.  Jonathon was due to start therapy twice now and he cancelled due to anxiety. He endorsed starting 1/26/24.  Currently scheduled at [facility].  Will reschedule location to [facility] due to high risk of infusion reaction and AGATHA presence at the [facility]. Considerchanging schedule additional doses at [facility] per patient preference.     Hx of prostate cancer  Dx 12/2020, locally advanced ([place] 4+5=9) disease s/p radiation 5/2021-7/2021 and 2 years of abiraterone/prednisone ending 6/2023  Transferred care from Dr. Cisneros to Dr. Eduardo.   On surveillance.     Cancer related prophylaxis  Up to date on flu and pneumonia shots.  Could use COVID booster and Shingrix series.  Allopurinol for TLS prophylaxis.    Plan from today's clinical encounter  Start rituximab weekly x 4 doses  PET and RTC with Dr. Burciaga 1 month last rituximab dose  Labs week if 1/16/24 followed by virtual AGATHA visit  Change location of first dose Rituxan to [facility] 1/26/24     60 minutes spent on the date of the encounter doing chart review, review of test results, interpretation of tests, patient visit, documentation, and discussion with other provider(s)     Romina GARCIA, ANP-BC, AOCNP  [facility] [address] [address] [phone] (pager)

## 2024-01-11 NOTE — LETTER
1/11/2024         RE: Jonathon Santos  [id number] [address] [place] [id number]        Dear Colleague,    Thank you for referring your patient, Jonathon Santos, to the [facility]. Please see a copy of my visit note below.    Virtual Visit Details  Type of service:  Video Visit   Video Start Time:  13:30PM  Video End Time: 14:30 PM    Originating Location (pt. 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 Peripheral flow and subsequent bone marrow biopsy 10/27/23 showed several populations of clonal B-cells but the predominant one is CD5/K02-udnpkslq low-grade B-cell lymphoma comprising about 40% of the bone marrow, but excisional LN biopsy recommended for further subclassification. There was another small CD5+ population compatible with monoclonal B-cell lymphocytosis. NGS negative for MYD88 mutation. Axillary LN biopsy 12/4/23 most consistent with CD5+ marginal zone lymphoma, splenic vs monik. Staging PET showed significant splenomegaly (20 cm with SUVmax 6.7), mildly-avid lymphadenopathy above/below diaphragm, and diffuse bone marrow uptake. .     Treatment history:  Rituximab pending    INTERVAL HISTORY:  Anxiety regarding Rituxan therapy side effects have prevented him from starting therapy.  Worsening fatigue  Fatigue occurred with prostate cancer therapy   Interfering with caring for his animals (has dog and horses)  Splenomegaly discomfort/LUQ discomfort and early satiety  Sleeps well at night.  Naps during day  Otherwise no fever/chills, night sweats, weight loss, SOB,  chest pain, N/V, or diarrhea.     PAST MEDICAL HISTORY:  Locally advanced prostate cancer ([place] 4+5=9) dx 12/2020 s/p radiation 5/2021-7/2021 and 2 years of abiraterone/prednisone ending 6/2023  Hypertension  Hepatic steatosis   Peripheral neuropathy, possibly 2/2 B12 deficiency  Gout     FAMILY HISTORY:  Mother had lymphoma and lung cancer.      SOCIAL HISTORY:  Never smoker. Drinks 2-3 cans of beer daily. Smokes marijuana daily.   Lives in [place], [place] by himself.   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   Has a dog and a horse    I have reviewed and updated the following:  Past Medical History:   Diagnosis Date    Anemia     Benign essential hypertension     Chronic kidney disease     Gout     Lymphoma (H)     Prostate cancer (H)     Thrombocytopenia (H24)       No Known Allergies    Current Outpatient Medications:     allopurinol (ZYLOPRIM) 100 MG tablet, Take 1 tablet (100 mg) by mouth daily, Disp: 90 tablet, Rfl: 1    cyanocobalamin (VITAMIN B-12) 1000 MCG SUBL sublingual tablet, Place 1,000 mcg under the tongue daily (with lunch), Disp: , Rfl:     lisinopril-hydrochlorothiazide (ZESTORETIC) 20-12.5 MG tablet, TAKE 2 TABLETS BY MOUTH  DAILY (Patient taking differently: Take 2 tablets by mouth every morning), Disp: 200 tablet, Rfl: 2    metoprolol succinate ER (TOPROL XL) 25 MG 24 hr tablet, TAKE 1 TABLET BY MOUTH  DAILY WITH LUNCH (Patient taking differently: Take 25 mg by mouth daily (with lunch)), Disp: 100 tablet, Rfl: 2    REVIEW OF SYSTEMS:  12-point ROS reviewed and negative other than that mentioned in HPI.     Objective    ECOG PS: 0     PHYSICAL EXAM:  **Limited given video visit**  General: Patient appears well in no acute distress.   Skin: No rashes or lesions on visualized skin  Eyes: EOMI, no erythema, sclera icterus or discharge noted  Resp: Appears to be breathing comfortably without accessory muscle usage, speaking in full sentences, no cough  MSK: Appears to have  normal range of motion based on visualized movements  Neurologic: No apparent tremors, facial movements symmetric  Psych: Affect bright, alert and oriented     LABS:  I reviewed the following labs:  Lab Results   Component Value Date    WBC 15.9 (H) 11/29/2023    HGB 12.9 (L) 11/29/2023    HCT 38.3 (L) 11/29/2023    MCV 92 11/29/2023    PLT 68 (L) 11/29/2023     Lab Results   Component Value Date     (L) 11/29/2023    POTASSIUM 4.5 11/29/2023    CR 1.49 (H) 11/29/2023    BUN 23.5 (H) 11/29/2023    CHLORIDE 96 (L) 11/29/2023    MISTY 9.5 11/29/2023     (H) 11/29/2023      Lab Results   Component Value Date    ALT 23 11/29/2023    AST 25 10/18/2023    ALKPHOS 100 10/18/2023    BILITOTAL 1.3 (H) 10/18/2023      Lab Results   Component Value Date     (H) 10/18/2023    URIC 5.5 11/29/2023 12/4/23 R axillary LN biopsy:  Lymph node, right axillary, excision:  - Involved by partially CD5-positive marginal zone lymphoma  - See comment    Comment:  Flow cytometry analysis on concurrent specimen ([id number]) showed 3 abnormal B-cell populations:  - CD5 negative lambda monotypic B cells (59%)  - CD5 positive lambda monotypic B cells (19%)  - CD5 positive kappa monotypic B cells (0.4%)  There was no aberrant immunophenotype on T cells by flow cytometry.     The morphologic and immunophenotypic findings support the diagnosis of marginal zone lymphoma (MZL) and the differential diagnosis includes splenic MZL versus monik MZL versus MALT lymphoma. Of note, a small subset of neoplastic cells express CD5; this finding is described in both the splenic and monik marginal zone.  The diagnosis of MALT lymphoma is less likely given that generalized monik involvement is rare in subtype (fluorescent in situ hybridization (FISH) related to MALT lymphoma (such as BIRC3 and MALT1) are in progress on bone marrow specimen and will be reported separately).      The definitive diagnosis of splenic marginal zone lymphoma  requires a splenectomy specimen; however, the overall clinical findings of patient including splenomegaly, lymphocytosis in the blood, the pattern of bone marrow involvement, and the IgD positivity in the tumor cells (as described in the revised 4th Ed of WHO) are most compatible with splenic marginal zone lymphoma.      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   Peripheral flow cytometry and subsequent bone marrow biopsy showed several populations of clonal B-cells but the predominant one was CD5/N54-ggjzrsxz low-grade B-cell lymphoma comprising about 40% of the bone marrow. There is another small CD5+ population compatible with monoclonal B-cell lymphocytosis. MYD88 NGS is negative.   PET showed significant splenomegaly (20 cm with SUVmax 6.7), mildly-avid lymphadenopathy above/below diaphragm, and diffuse bone marrow uptake. Given that Hemepath was unable to narrow the subtype of lymphoma from the bone marrow biopsy, we pursued excisional biopsy of the R axillary lymph node, which is now back consistent with partially CD5+ marginal zone lymphoma, with splenic subtype favored given his whole clinical presentation.   Reviewed the overall diagnosis of splenic marginal zone lymphoma as a type of indolent non-Hodgkin B-cell lymphoma. We discussed that treatment is indicated due to his degree of thrombocytopenia and symptoms of LUQ discomfort/early satiety.   Dr. Burciaga recommendation: first-line treatment with rituximab monotherapy - weekly x 4 weeks with potential for maintenance every 2 months x 4 more doses, with expected response rates ~60-70%.   Will obtain first PET-CT after the 4 weekly doses. There is potential to add chemotherapy or targeted therapy if he is not responding.   Splenectomy would also be a later line option but not address the lymph node or bone marrow disease.   1/11/24: Has yet to start therapy due to  anxiety, fatigue,  potential snow storm. I reiterated that the fatigue may be from his underlying diagnosis.  Regarding side effects, I reviewed the  rituximab infusion reaction the first cycle and the protocol we have in place for management. Also reviewed the fatigue and the risk of infections.   Need repeat labs as it has been over a month. He was in agreement.  Jonathon was due to start therapy twice now and he cancelled due to anxiety. He endorsed starting 1/26/24.  Currently scheduled at [facility].  Will reschedule location to [facility] due to high risk of infusion reaction and AGATHA presence at the [facility]. Considerchanging schedule additional doses at [facility] per patient preference.     Hx of prostate cancer  Dx 12/2020, locally advanced ([place] 4+5=9) disease s/p radiation 5/2021-7/2021 and 2 years of abiraterone/prednisone ending 6/2023  Transferred care from Dr. Cisneros to Dr. Eduardo.   On surveillance.     Cancer related prophylaxis  Up to date on flu and pneumonia shots.  Could use COVID booster and Shingrix series.  Allopurinol for TLS prophylaxis.    Plan from today's clinical encounter  Start rituximab weekly x 4 doses  PET and RTC with Dr. Burciaga 1 month last rituximab dose  Labs week if 1/16/24 followed by virtual AGATHA visit  Change location of first dose Rituxan to [facility] 1/26/24     60 minutes spent on the date of the encounter doing chart review, review of test results, interpretation of tests, patient visit, documentation, and discussion with other provider(s)     Romina GARCIA, ANP-BC, AOCNP  [facility] [address] [id number] [phone] (pager)

## 2024-01-24 ENCOUNTER — MYC MEDICAL ADVICE (OUTPATIENT)
Dept: ONCOLOGY | Facility: CLINIC | Age: 69
End: 2024-01-24

## 2024-01-24 ENCOUNTER — INFUSION THERAPY VISIT (OUTPATIENT)
Dept: INFUSION THERAPY | Facility: CLINIC | Age: 69
End: 2024-01-24
Attending: INTERNAL MEDICINE
Payer: COMMERCIAL

## 2024-01-24 ENCOUNTER — NURSE TRIAGE (OUTPATIENT)
Dept: ONCOLOGY | Facility: CLINIC | Age: 69
End: 2024-01-24

## 2024-01-24 ENCOUNTER — LAB (OUTPATIENT)
Dept: LAB | Facility: CLINIC | Age: 69
End: 2024-01-24
Payer: COMMERCIAL

## 2024-01-24 ENCOUNTER — PATIENT OUTREACH (OUTPATIENT)
Dept: ONCOLOGY | Facility: CLINIC | Age: 69
End: 2024-01-24

## 2024-01-24 VITALS
TEMPERATURE: 97.5 F | RESPIRATION RATE: 14 BRPM | DIASTOLIC BLOOD PRESSURE: 69 MMHG | HEART RATE: 74 BPM | SYSTOLIC BLOOD PRESSURE: 104 MMHG

## 2024-01-24 DIAGNOSIS — E79.0 HYPERURICEMIA: Primary | ICD-10-CM

## 2024-01-24 DIAGNOSIS — C83.07 SPLENIC MARGINAL ZONE B-CELL LYMPHOMA (H): ICD-10-CM

## 2024-01-24 DIAGNOSIS — C61 PROSTATE CANCER (H): ICD-10-CM

## 2024-01-24 DIAGNOSIS — G62.89 AXONAL SENSORIMOTOR NEUROPATHY: ICD-10-CM

## 2024-01-24 LAB
PSA SERPL DL<=0.01 NG/ML-MCNC: <0.01 NG/ML (ref 0–4.5)
VIT B12 SERPL-MCNC: 913 PG/ML (ref 232–1245)

## 2024-01-24 PROCEDURE — 82607 VITAMIN B-12: CPT

## 2024-01-24 PROCEDURE — 84403 ASSAY OF TOTAL TESTOSTERONE: CPT | Performed by: NURSE PRACTITIONER

## 2024-01-24 PROCEDURE — 258N000003 HC RX IP 258 OP 636: Performed by: INTERNAL MEDICINE

## 2024-01-24 PROCEDURE — 250N000011 HC RX IP 250 OP 636: Mod: JZ | Performed by: INTERNAL MEDICINE

## 2024-01-24 PROCEDURE — 96365 THER/PROPH/DIAG IV INF INIT: CPT

## 2024-01-24 PROCEDURE — 84153 ASSAY OF PSA TOTAL: CPT | Performed by: NURSE PRACTITIONER

## 2024-01-24 PROCEDURE — 96361 HYDRATE IV INFUSION ADD-ON: CPT

## 2024-01-24 RX ORDER — HEPARIN SODIUM (PORCINE) LOCK FLUSH IV SOLN 100 UNIT/ML 100 UNIT/ML
5 SOLUTION INTRAVENOUS
OUTPATIENT
Start: 2024-01-24

## 2024-01-24 RX ORDER — HEPARIN SODIUM (PORCINE) LOCK FLUSH IV SOLN 100 UNIT/ML 100 UNIT/ML
5 SOLUTION INTRAVENOUS
Status: CANCELLED | OUTPATIENT
Start: 2024-01-24

## 2024-01-24 RX ORDER — HEPARIN SODIUM,PORCINE 10 UNIT/ML
5-20 VIAL (ML) INTRAVENOUS DAILY PRN
Status: CANCELLED | OUTPATIENT
Start: 2024-01-24

## 2024-01-24 RX ORDER — HEPARIN SODIUM,PORCINE 10 UNIT/ML
5-20 VIAL (ML) INTRAVENOUS DAILY PRN
OUTPATIENT
Start: 2024-01-24

## 2024-01-24 RX ORDER — PREDNISONE 50 MG/1
100 TABLET ORAL DAILY
Qty: 10 TABLET | Refills: 0 | Status: SHIPPED | OUTPATIENT
Start: 2024-01-25 | End: 2024-01-30

## 2024-01-24 RX ADMIN — SODIUM CHLORIDE 1000 ML: 9 INJECTION, SOLUTION INTRAVENOUS at 14:17

## 2024-01-24 RX ADMIN — SODIUM CHLORIDE 6 MG: 9 INJECTION, SOLUTION INTRAVENOUS at 14:54

## 2024-01-24 ASSESSMENT — PAIN SCALES - GENERAL: PAINLEVEL: NO PAIN (0)

## 2024-01-24 NOTE — PROGRESS NOTES
[facility]/Voicemail    Clinical Data: Care Coordinator Outreach    Outreach attempted x 1.  Left message on patient's voicemail with call back information and requested return call.    Plan: Care Coordinator will try to reach patient again later today.    Writer called patient to review labs, uric acid elevated. Allopurinol previously marked as taking, called to verify. If not taking allopurinol should start and push oral fluids.   If he is currently taking the allopurinol he will need IVF and rasburicase.   Requested patient return phone call.

## 2024-01-24 NOTE — PROGRESS NOTES
Infusion Nursing Note:  Jonathon Santos presents today for IVFs & Elitek.    Patient seen by provider today: No   present during visit today: Not Applicable.    Note: N/A.      Intravenous Access:  Peripheral IV placed.    Treatment Conditions:  Results reviewed, labs MET treatment parameters, ok to proceed with treatment.      Post Infusion Assessment:  Patient tolerated infusion without incident.  Blood return noted pre and post infusion.  Site patent and intact, free from redness, edema or discomfort.  No evidence of extravasations.  Access discontinued per protocol.       Discharge Plan:   Patient discharged in stable condition accompanied by: self.  Departure Mode: Ambulatory.      Kraey Rg RN

## 2024-01-24 NOTE — TELEPHONE ENCOUNTER
DATE/TIME OF CALL RECEIVED FROM LAB:  01/24/24 at 10:12 AM   LAB TEST:  preliminary WBC 30.3  Last LAB VALUE:  11/29/23 WBC 15.9  PROVIDER NOTIFIED?: Yes  PROVIDER NAME: Romina Howard  DATE/TIME LAB VALUE REPORTED TO PROVIDER: 1/24/2024   MECHANISM OF PROVIDER NOTIFICATION:  Secure message  PROVIDER RESPONSE:   1030 Romina acknowledging critical lab. Nothing further needed from triage.

## 2024-01-24 NOTE — PROGRESS NOTES
[facility]: Cancer Care Follow-Up Note                                    Discussion with Patient:                                                      David returned phone call to writer. Writer discussed elevated uric acid levels and treatment for this.       Goals          General     Monitoring (pt-stated)      Notes - Note created  1/2/2024 12:54 PM by Stacy Mariscal, RN     Goal Statement: I will use my clinic and care team resources as directed.   Date Goal set: 1/2/2024  Barriers: disease burden  Strengths: support  Date to Achieve By: ongoing  Patient expressed understanding of goal:  Yes   Action steps to achieve this goal:  I will contact triage with new, worsening, or uncontrolled symptoms.   I will contact triage with temperature over 100.4  I will call with difficulties of scheduling and/or transportation.  I will request needed refills when there are 7 days of medication remaining.   I will not send urgent or symptomatic messages through [employer].  I will contact scheduling to arrange or make changes in my appointments.                Dates of Treatment:                                                      Infusion given in last 28 days       None            Assessment:                                                      David stated he is taking the allopurinol every day as scheduled. Elevated uric acid levels noted on labs this morning.     Intervention/Education provided during outreach:                                                       Educated David about cause of elevated uric acid and treatment for this.   [facility] stated they could take David for an appointment today 1/24/24 at 2pm, david stated that he could make it to that appointment. [place] notified he would arrive for appt today at 2pm.   David will also start 5 day course of prednisone tomorrow morning, reviewed reasoning for prescription and potential side effects with David. Dr. Burciaga notified and will send script to  [facility] for Jonathon to  today.     Patient to follow up as scheduled at next appt  Patient to call/[employer] message with updates  Confirmed patient has clinic and triage numbers    Signature:  Stacy Mariscal RN

## 2024-01-24 NOTE — PROGRESS NOTES
Jonathon's first rituximab infusion appt got delayed to 1/26/24 because of a snow storm a few weeks ago. His pre-infusion labs are concerning for disease acceleration including uric acid of 9.4, LDH up to 645, Cr 1.6, WBC 30, Hgb 10.3, platelets 60.     RNCC confirmed he is already taking allopurinol 100 mg daily (renally-adjusted), so we will do the following:  - 1L IVF and 6 mg rasburicase at [facility] this afternoon  - Prednisone 100 mg daily x 5 days for tumor debulking starting tomorrow morning  - AGATHA appt as already scheduled tomorrow  - Recheck labs including uric acid before rituximab infusion appt on 1/26/24    Vannesa Burciaga MD  Attending Physician, [facility]

## 2024-01-25 ENCOUNTER — VIRTUAL VISIT (OUTPATIENT)
Dept: ONCOLOGY | Facility: CLINIC | Age: 69
End: 2024-01-25
Attending: INTERNAL MEDICINE
Payer: COMMERCIAL

## 2024-01-25 VITALS — HEIGHT: 73 IN | BODY MASS INDEX: 27.83 KG/M2 | WEIGHT: 210 LBS

## 2024-01-25 DIAGNOSIS — C83.07 SPLENIC MARGINAL ZONE B-CELL LYMPHOMA (H): Primary | ICD-10-CM

## 2024-01-25 DIAGNOSIS — D69.6 THROMBOCYTOPENIA (H): ICD-10-CM

## 2024-01-25 PROCEDURE — 99214 OFFICE O/P EST MOD 30 MIN: CPT | Mod: 95 | Performed by: NURSE PRACTITIONER

## 2024-01-25 ASSESSMENT — PAIN SCALES - GENERAL: PAINLEVEL: NO PAIN (0)

## 2024-01-25 NOTE — NURSING NOTE
Is the patient currently in the state of [place]? YES    Visit mode:VIDEO    If the visit is dropped, the patient can be reconnected by: VIDEO VISIT: Text to cell phone:   Telephone Information:   Mobile [phone]       Will anyone else be joining the visit? NO  (If patient encounters technical issues they should call [phone]    How would you like to obtain your AVS? MyChart    Are changes needed to the allergy or medication list? Pt stated no changes to allergies and Pt stated no med changes    Reason for visit: STALIN Dee LPN

## 2024-01-25 NOTE — PROGRESS NOTES
Virtual Visit Details  Type of service:  Video Visit   Video Start Time:  2:30PM  Video End Time: 3: PM    Originating Location (pt. 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 Peripheral flow and subsequent bone marrow biopsy 10/27/23 showed several populations of clonal B-cells but the predominant one is CD5/G49-vvgkvnio low-grade B-cell lymphoma comprising about 40% of the bone marrow, but excisional LN biopsy recommended for further subclassification. There was another small CD5+ population compatible with monoclonal B-cell lymphocytosis. NGS negative for MYD88 mutation. Axillary LN biopsy 12/4/23 most consistent with CD5+ marginal zone lymphoma, splenic vs monik. Staging PET showed significant splenomegaly (20 cm with SUVmax 6.7), mildly-avid lymphadenopathy above/below diaphragm, and diffuse bone marrow uptake. .     Treatment history:  Rituximab pending    INTERVAL HISTORY:  Lightheaded and fatigue  Splenomegaly discomfort/LUQ discomfort and early satiety  Anxiety regarding Rituxan therapy side effects have prevented him from starting therapy.  Worsening fatigue  Fatigue occurred with prostate cancer therapy   Interfering with caring for his animals (has dog and horses)  Sleeps well at night.  Naps during day  Otherwise no fever/chills, night sweats, weight loss, SOB, chest pain, N/V, or diarrhea.     PAST MEDICAL HISTORY:  Locally advanced prostate cancer ([place] 4+5=9) dx 12/2020 s/p radiation 5/2021-7/2021 and 2 years of abiraterone/prednisone ending 6/2023  Hypertension  Hepatic steatosis    Peripheral neuropathy, possibly 2/2 B12 deficiency  Gout     FAMILY HISTORY:  Mother had lymphoma and lung cancer.      SOCIAL HISTORY:  Never smoker. Drinks 2-3 cans of beer daily. Smokes marijuana daily.   Lives in [place], [place] by himself.   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   Has a dog and a horse    I have reviewed and updated the following:  Past Medical History:   Diagnosis Date    Anemia     Benign essential hypertension     Chronic kidney disease     Gout     Lymphoma (H)     Prostate cancer (H)     Thrombocytopenia (H24)       No Known Allergies    Current Outpatient Medications:     allopurinol (ZYLOPRIM) 100 MG tablet, Take 1 tablet (100 mg) by mouth daily, Disp: 90 tablet, Rfl: 1    cyanocobalamin (VITAMIN B-12) 1000 MCG SUBL sublingual tablet, Place 1,000 mcg under the tongue daily (with lunch), Disp: , Rfl:     lisinopril-hydrochlorothiazide (ZESTORETIC) 20-12.5 MG tablet, TAKE 2 TABLETS BY MOUTH  DAILY (Patient taking differently: Take 2 tablets by mouth every morning), Disp: 200 tablet, Rfl: 2    metoprolol succinate ER (TOPROL XL) 25 MG 24 hr tablet, TAKE 1 TABLET BY MOUTH  DAILY WITH LUNCH (Patient taking differently: Take 25 mg by mouth daily (with lunch)), Disp: 100 tablet, Rfl: 2    predniSONE (DELTASONE) 50 MG tablet, Take 2 tablets (100 mg) by mouth daily for 5 days, Disp: 10 tablet, Rfl: 0  No current facility-administered medications for this visit.    REVIEW OF SYSTEMS:  12-point ROS reviewed and negative other than that mentioned in HPI.     Objective     ECOG PS: 0     PHYSICAL EXAM:  **Limited given video visit**  General: Patient appears well in no acute distress.   Skin: No rashes or lesions on visualized skin  Eyes: EOMI, no erythema, sclera icterus or discharge noted  Resp: Appears to be breathing comfortably without accessory muscle usage, speaking in full sentences, no cough  MSK: Appears to have normal range of motion based on visualized  movements  Neurologic: No apparent tremors, facial movements symmetric  Psych: Affect bright, alert and oriented     Most Recent 3 CBC's:  Recent Labs   Lab Test 01/24/24  0944 11/29/23  1302 10/27/23  1219 02/10/22  1056 01/14/22  1113 12/16/21  1043 10/13/21  1056   WBC 30.1* 15.9* 15.3*   < > 4.2 4.4 3.0*   HGB 10.3* 12.9* 12.5*   < > 12.9* 13.4 12.3*   MCV 91 92 92   < > 101* 98 101*   PLT 60* 68* 64*   < > 87* 100* 108*   ANEUTAUTO  --   --   --   --  2.7 3.1 2.0    < > = values in this interval not displayed.     Most Recent 3 BMP's:  Recent Labs   Lab Test 01/24/24  0944 11/29/23  1302 10/18/23  1344 10/18/23  1153 09/07/23  1023    133* 130*  --  138   POTASSIUM 4.9 4.5 3.9  --  4.7   CHLORIDE 100 96* 93*  --  104   CO2 23 18* 25  --  24   BUN 34.0* 23.5* 15.4  --  24.9*   CR 1.60* 1.49* 1.17  --  1.38*   ANIONGAP 12 19* 12  --  10   MISTY 9.4 9.5 9.3  --  9.6   * 117* 115*   < > 128*   PROTTOTAL 7.5  --  7.3  --  7.3   ALBUMIN 4.2  --  4.5  --  4.4    < > = values in this interval not displayed.    Most Recent 3 LFT's:  Recent Labs   Lab Test 01/24/24  0944 11/29/23  1302 10/18/23  1344 09/07/23  1023   AST 47*  --  25 43   ALT 32 23 16 19   ALKPHOS 110  --  100 87   BILITOTAL 0.8  --  1.3* 0.9    Most Recent 2 TSH and T4:  Recent Labs   Lab Test 06/15/22  1119   TSH 2.39     I reviewed the above labs today.    12/4/23 R axillary LN biopsy:  Lymph node, right axillary, excision:  - Involved by partially CD5-positive marginal zone lymphoma  - See comment    Comment:  Flow cytometry analysis on concurrent specimen ([id number]) showed 3 abnormal B-cell populations:  - CD5 negative lambda monotypic B cells (59%)  - CD5 positive lambda monotypic B cells (19%)  - CD5 positive kappa monotypic B cells (0.4%)  There was no aberrant immunophenotype on T cells by flow cytometry.     The morphologic and immunophenotypic findings support the diagnosis of marginal zone lymphoma (MZL) and the differential  diagnosis includes splenic MZL versus monik MZL versus MALT lymphoma. Of note, a small subset of neoplastic cells express CD5; this finding is described in both the splenic and monik marginal zone.  The diagnosis of MALT lymphoma is less likely given that generalized monik involvement is rare in subtype (fluorescent in situ hybridization (FISH) related to MALT lymphoma (such as BIRC3 and MALT1) are in progress on bone marrow specimen and will be reported separately).      The definitive diagnosis of splenic marginal zone lymphoma requires a splenectomy specimen; however, the overall clinical findings of patient including splenomegaly, lymphocytosis in the blood, the pattern of bone marrow involvement, and the IgD positivity in the tumor cells (as described in the revised 4th Ed of WHO) are most compatible with splenic marginal zone lymphoma.      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   Peripheral flow cytometry and subsequent bone marrow biopsy showed several populations of clonal B-cells but the predominant one was CD5/K31-tdbbhdhb low-grade B-cell lymphoma comprising about 40% of the bone marrow. There is another small CD5+ population compatible with monoclonal B-cell lymphocytosis. MYD88 NGS is negative.   PET showed significant splenomegaly (20 cm with SUVmax 6.7), mildly-avid lymphadenopathy above/below diaphragm, and diffuse bone marrow uptake. Given that Hemepath was unable to narrow the subtype of lymphoma from the bone marrow biopsy, we pursued excisional biopsy of the R axillary lymph node, which is now back consistent with partially CD5+ marginal zone lymphoma, with splenic subtype favored given his whole clinical presentation.   Reviewed the overall diagnosis of splenic marginal zone lymphoma as a type of indolent non-Hodgkin B-cell lymphoma. We discussed that treatment is indicated due to his degree of  thrombocytopenia and symptoms of LUQ discomfort/early satiety.   Dr. Burciaga recommendation: first-line treatment with rituximab monotherapy - weekly x 4 weeks with potential for maintenance every 2 months x 4 more doses, with expected response rates ~60-70%.   Will obtain first PET-CT after the 4 weekly doses. There is potential to add chemotherapy or targeted therapy if he is not responding.   Splenectomy would also be a later line option but not address the lymph node or bone marrow disease.   1/11/24: Has yet to start therapy due to anxiety, fatigue,  potential snow storm. I reiterated that the fatigue may be from his underlying diagnosis.  Regarding side effects, I reviewed the  rituximab infusion reaction the first cycle and the protocol we have in place for management. Also reviewed the fatigue and the risk of infections.   Need repeat labs as it has been over a month. He was in agreement.  Jonathon was due to start therapy twice now and he cancelled due to anxiety. He endorsed starting 1/26/24.  Currently scheduled at [facility].  Will reschedule location to [facility] due to high risk of infusion reaction and AGATHA presence at the [facility]. Considerchanging schedule additional doses at [facility] per patient preference.     Hx of prostate cancer  Dx 12/2020, locally advanced ([place] 4+5=9) disease s/p radiation 5/2021-7/2021 and 2 years of abiraterone/prednisone ending 6/2023  Transferred care from Dr. Cisneros to Dr. Eduardo.   On surveillance.     Cancer related prophylaxis  Up to date on flu and pneumonia shots.  Could use COVID booster and Shingrix series.  Allopurinol for TLS prophylaxis.    Plan from today's clinical encounter  Start rituximab weekly x 4 doses starting tomorrow  PET and RTC with Dr. Burciaga 1 month last rituximab dose  Change location of first dose Rituxan to [facility] 1/26/24     31 minutes spent on the date of the encounter doing chart review, review of test results, interpretation of tests, patient  visit, documentation, and discussion with other provider(s)     Romina GARCIA, ANP-BC, AOCNP  [facility] [address] [address] [phone] (pager)

## 2024-01-25 NOTE — LETTER
1/25/2024         RE: Jonathon Santos  [id number] [address] [place] [id number]        Dear Colleague,    Thank you for referring your patient, Jonathon Santos, to the [facility]. Please see a copy of my visit note below.    Virtual Visit Details  Type of service:  Video Visit   Video Start Time:  2:30PM  Video End Time: 3: PM    Originating Location (pt. 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 Peripheral flow and subsequent bone marrow biopsy 10/27/23 showed several populations of clonal B-cells but the predominant one is CD5/K36-jdmhrujs low-grade B-cell lymphoma comprising about 40% of the bone marrow, but excisional LN biopsy recommended for further subclassification. There was another small CD5+ population compatible with monoclonal B-cell lymphocytosis. NGS negative for MYD88 mutation. Axillary LN biopsy 12/4/23 most consistent with CD5+ marginal zone lymphoma, splenic vs monik. Staging PET showed significant splenomegaly (20 cm with SUVmax 6.7), mildly-avid lymphadenopathy above/below diaphragm, and diffuse bone marrow uptake. .     Treatment history:  Rituximab pending    INTERVAL HISTORY:  Lightheaded and fatigue  Splenomegaly discomfort/LUQ discomfort and early satiety  Anxiety regarding Rituxan therapy side effects have prevented him from starting therapy.  Worsening fatigue  Fatigue occurred with prostate cancer therapy   Interfering with caring for his animals (has dog and horses)  Sleeps well at night.  Naps during day  Otherwise no fever/chills, night sweats,  weight loss, SOB, chest pain, N/V, or diarrhea.     PAST MEDICAL HISTORY:  Locally advanced prostate cancer ([place] 4+5=9) dx 12/2020 s/p radiation 5/2021-7/2021 and 2 years of abiraterone/prednisone ending 6/2023  Hypertension  Hepatic steatosis   Peripheral neuropathy, possibly 2/2 B12 deficiency  Gout     FAMILY HISTORY:  Mother had lymphoma and lung cancer.      SOCIAL HISTORY:  Never smoker. Drinks 2-3 cans of beer daily. Smokes marijuana daily.   Lives in [place], [place] by himself.   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   Has a dog and a horse    I have reviewed and updated the following:  Past Medical History:   Diagnosis Date    Anemia     Benign essential hypertension     Chronic kidney disease     Gout     Lymphoma (H)     Prostate cancer (H)     Thrombocytopenia (H24)       No Known Allergies    Current Outpatient Medications:     allopurinol (ZYLOPRIM) 100 MG tablet, Take 1 tablet (100 mg) by mouth daily, Disp: 90 tablet, Rfl: 1    cyanocobalamin (VITAMIN B-12) 1000 MCG SUBL sublingual tablet, Place 1,000 mcg under the tongue daily (with lunch), Disp: , Rfl:     lisinopril-hydrochlorothiazide (ZESTORETIC) 20-12.5 MG tablet, TAKE 2 TABLETS BY MOUTH  DAILY (Patient taking differently: Take 2 tablets by mouth every morning), Disp: 200 tablet, Rfl: 2    metoprolol succinate ER (TOPROL XL) 25 MG 24 hr tablet, TAKE 1 TABLET BY MOUTH  DAILY WITH LUNCH (Patient taking differently: Take 25 mg by mouth daily (with lunch)), Disp: 100 tablet, Rfl: 2    predniSONE (DELTASONE) 50 MG tablet, Take 2 tablets (100 mg) by mouth daily for 5 days, Disp: 10 tablet, Rfl: 0  No current facility-administered medications for this visit.    REVIEW OF SYSTEMS:  12-point ROS reviewed and negative other than that mentioned in HPI.     Objective    ECOG PS: 0     PHYSICAL EXAM:  **Limited given video visit**  General: Patient appears well in no acute distress.   Skin: No rashes or lesions on visualized  skin  Eyes: EOMI, no erythema, sclera icterus or discharge noted  Resp: Appears to be breathing comfortably without accessory muscle usage, speaking in full sentences, no cough  MSK: Appears to have normal range of motion based on visualized movements  Neurologic: No apparent tremors, facial movements symmetric  Psych: Affect bright, alert and oriented     Most Recent 3 CBC's:  Recent Labs   Lab Test 01/24/24  0944 11/29/23  1302 10/27/23  1219 02/10/22  1056 01/14/22  1113 12/16/21  1043 10/13/21  1056   WBC 30.1* 15.9* 15.3*   < > 4.2 4.4 3.0*   HGB 10.3* 12.9* 12.5*   < > 12.9* 13.4 12.3*   MCV 91 92 92   < > 101* 98 101*   PLT 60* 68* 64*   < > 87* 100* 108*   ANEUTAUTO  --   --   --   --  2.7 3.1 2.0    < > = values in this interval not displayed.     Most Recent 3 BMP's:  Recent Labs   Lab Test 01/24/24  0944 11/29/23  1302 10/18/23  1344 10/18/23  1153 09/07/23  1023    133* 130*  --  138   POTASSIUM 4.9 4.5 3.9  --  4.7   CHLORIDE 100 96* 93*  --  104   CO2 23 18* 25  --  24   BUN 34.0* 23.5* 15.4  --  24.9*   CR 1.60* 1.49* 1.17  --  1.38*   ANIONGAP 12 19* 12  --  10   MISTY 9.4 9.5 9.3  --  9.6   * 117* 115*   < > 128*   PROTTOTAL 7.5  --  7.3  --  7.3   ALBUMIN 4.2  --  4.5  --  4.4    < > = values in this interval not displayed.    Most Recent 3 LFT's:  Recent Labs   Lab Test 01/24/24  0944 11/29/23  1302 10/18/23  1344 09/07/23  1023   AST 47*  --  25 43   ALT 32 23 16 19   ALKPHOS 110  --  100 87   BILITOTAL 0.8  --  1.3* 0.9    Most Recent 2 TSH and T4:  Recent Labs   Lab Test 06/15/22  1119   TSH 2.39     I reviewed the above labs today.    12/4/23 R axillary LN biopsy:  Lymph node, right axillary, excision:  - Involved by partially CD5-positive marginal zone lymphoma  - See comment    Comment:  Flow cytometry analysis on concurrent specimen ([id number]) showed 3 abnormal B-cell populations:  - CD5 negative lambda monotypic B cells (59%)  - CD5 positive lambda monotypic B cells (19%)  -  CD5 positive kappa monotypic B cells (0.4%)  There was no aberrant immunophenotype on T cells by flow cytometry.     The morphologic and immunophenotypic findings support the diagnosis of marginal zone lymphoma (MZL) and the differential diagnosis includes splenic MZL versus monik MZL versus MALT lymphoma. Of note, a small subset of neoplastic cells express CD5; this finding is described in both the splenic and monik marginal zone.  The diagnosis of MALT lymphoma is less likely given that generalized monik involvement is rare in subtype (fluorescent in situ hybridization (FISH) related to MALT lymphoma (such as BIRC3 and MALT1) are in progress on bone marrow specimen and will be reported separately).      The definitive diagnosis of splenic marginal zone lymphoma requires a splenectomy specimen; however, the overall clinical findings of patient including splenomegaly, lymphocytosis in the blood, the pattern of bone marrow involvement, and the IgD positivity in the tumor cells (as described in the revised 4th Ed of WHO) are most compatible with splenic marginal zone lymphoma.      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   Peripheral flow cytometry and subsequent bone marrow biopsy showed several populations of clonal B-cells but the predominant one was CD5/P64-psdcsryn low-grade B-cell lymphoma comprising about 40% of the bone marrow. There is another small CD5+ population compatible with monoclonal B-cell lymphocytosis. MYD88 NGS is negative.   PET showed significant splenomegaly (20 cm with SUVmax 6.7), mildly-avid lymphadenopathy above/below diaphragm, and diffuse bone marrow uptake. Given that Hemepath was unable to narrow the subtype of lymphoma from the bone marrow biopsy, we pursued excisional biopsy of the R axillary lymph node, which is now back consistent with partially CD5+ marginal zone lymphoma, with splenic  subtype favored given his whole clinical presentation.   Reviewed the overall diagnosis of splenic marginal zone lymphoma as a type of indolent non-Hodgkin B-cell lymphoma. We discussed that treatment is indicated due to his degree of thrombocytopenia and symptoms of LUQ discomfort/early satiety.   Dr. Burciaga recommendation: first-line treatment with rituximab monotherapy - weekly x 4 weeks with potential for maintenance every 2 months x 4 more doses, with expected response rates ~60-70%.   Will obtain first PET-CT after the 4 weekly doses. There is potential to add chemotherapy or targeted therapy if he is not responding.   Splenectomy would also be a later line option but not address the lymph node or bone marrow disease.   1/11/24: Has yet to start therapy due to anxiety, fatigue,  potential snow storm. I reiterated that the fatigue may be from his underlying diagnosis.  Regarding side effects, I reviewed the  rituximab infusion reaction the first cycle and the protocol we have in place for management. Also reviewed the fatigue and the risk of infections.   Need repeat labs as it has been over a month. He was in agreement.  Jonathon was due to start therapy twice now and he cancelled due to anxiety. He endorsed starting 1/26/24.  Currently scheduled at [facility].  Will reschedule location to [facility] due to high risk of infusion reaction and AGATHA presence at the [facility]. Considerchanging schedule additional doses at [facility] per patient preference.     Hx of prostate cancer  Dx 12/2020, locally advanced ([place] 4+5=9) disease s/p radiation 5/2021-7/2021 and 2 years of abiraterone/prednisone ending 6/2023  Transferred care from Dr. Cisneros to Dr. Eduardo.   On surveillance.     Cancer related prophylaxis  Up to date on flu and pneumonia shots.  Could use COVID booster and Shingrix series.  Allopurinol for TLS prophylaxis.    Plan from today's clinical encounter  Start rituximab weekly x 4 doses starting tomorrow  PET  and RTC with Dr. Burciaga 1 month last rituximab dose  Change [place] of first dose [place] [facility] [place]     31 minutes spent on the date of the encounter doing chart review, review of test results, interpretation of tests, patient visit, documentation, and discussion with other provider(s)     Romina GARCIA, ANP-BC, AOCNP  [facility] [address] [address] [phone] (pager)

## 2024-01-26 ENCOUNTER — INFUSION THERAPY VISIT (OUTPATIENT)
Dept: ONCOLOGY | Facility: CLINIC | Age: 69
End: 2024-01-26
Attending: INTERNAL MEDICINE
Payer: COMMERCIAL

## 2024-01-26 ENCOUNTER — LAB (OUTPATIENT)
Dept: LAB | Facility: CLINIC | Age: 69
End: 2024-01-26
Attending: INTERNAL MEDICINE
Payer: COMMERCIAL

## 2024-01-26 VITALS
WEIGHT: 206.5 LBS | RESPIRATION RATE: 16 BRPM | HEART RATE: 72 BPM | BODY MASS INDEX: 27.24 KG/M2 | OXYGEN SATURATION: 100 % | DIASTOLIC BLOOD PRESSURE: 75 MMHG | TEMPERATURE: 97.5 F | SYSTOLIC BLOOD PRESSURE: 130 MMHG

## 2024-01-26 DIAGNOSIS — C83.07 SPLENIC MARGINAL ZONE B-CELL LYMPHOMA (H): Primary | ICD-10-CM

## 2024-01-26 DIAGNOSIS — M1A.09X1 IDIOPATHIC CHRONIC GOUT, MULTIPLE SITES, WITH TOPHUS (TOPHI): ICD-10-CM

## 2024-01-26 DIAGNOSIS — E79.0 HYPERURICEMIA: ICD-10-CM

## 2024-01-26 DIAGNOSIS — Z11.59 ENCOUNTER FOR SCREENING FOR OTHER VIRAL DISEASES: ICD-10-CM

## 2024-01-26 LAB
ALBUMIN SERPL BCG-MCNC: 4 G/DL (ref 3.5–5.2)
ALP SERPL-CCNC: 95 U/L (ref 40–150)
ALT SERPL W P-5'-P-CCNC: 28 U/L (ref 0–70)
ANION GAP SERPL CALCULATED.3IONS-SCNC: 12 MMOL/L (ref 7–15)
AST SERPL W P-5'-P-CCNC: 36 U/L (ref 0–45)
BASOPHILS # BLD AUTO: ABNORMAL 10*3/UL
BASOPHILS # BLD MANUAL: 0 10E3/UL (ref 0–0.2)
BASOPHILS NFR BLD AUTO: ABNORMAL %
BASOPHILS NFR BLD MANUAL: 0 %
BILIRUB SERPL-MCNC: 0.8 MG/DL
BUN SERPL-MCNC: 33.8 MG/DL (ref 8–23)
CALCIUM SERPL-MCNC: 9.2 MG/DL (ref 8.8–10.2)
CHLORIDE SERPL-SCNC: 101 MMOL/L (ref 98–107)
CREAT SERPL-MCNC: 1.39 MG/DL (ref 0.67–1.17)
DEPRECATED HCO3 PLAS-SCNC: 20 MMOL/L (ref 22–29)
EGFRCR SERPLBLD CKD-EPI 2021: 55 ML/MIN/1.73M2
EOSINOPHIL # BLD AUTO: ABNORMAL 10*3/UL
EOSINOPHIL # BLD MANUAL: 0 10E3/UL (ref 0–0.7)
EOSINOPHIL NFR BLD AUTO: ABNORMAL %
EOSINOPHIL NFR BLD MANUAL: 0 %
ERYTHROCYTE [DISTWIDTH] IN BLOOD BY AUTOMATED COUNT: 14.9 % (ref 10–15)
GLUCOSE SERPL-MCNC: 213 MG/DL (ref 70–99)
HBV CORE AB SERPL QL IA: NONREACTIVE
HBV SURFACE AB SERPL IA-ACNC: <3.5 M[IU]/ML
HBV SURFACE AB SERPL IA-ACNC: NONREACTIVE M[IU]/ML
HBV SURFACE AG SERPL QL IA: NONREACTIVE
HCT VFR BLD AUTO: 30.9 % (ref 40–53)
HGB BLD-MCNC: 10.2 G/DL (ref 13.3–17.7)
HOLD SPECIMEN: NORMAL
HOLD SPECIMEN: NORMAL
IMM GRANULOCYTES # BLD: ABNORMAL 10*3/UL
IMM GRANULOCYTES NFR BLD: ABNORMAL %
LDH SERPL L TO P-CCNC: 595 U/L (ref 0–250)
LYMPHOCYTES # BLD AUTO: ABNORMAL 10*3/UL
LYMPHOCYTES # BLD MANUAL: 21.6 10E3/UL (ref 0.8–5.3)
LYMPHOCYTES NFR BLD AUTO: ABNORMAL %
LYMPHOCYTES NFR BLD MANUAL: 77 %
MCH RBC QN AUTO: 28.9 PG (ref 26.5–33)
MCHC RBC AUTO-ENTMCNC: 33 G/DL (ref 31.5–36.5)
MCV RBC AUTO: 88 FL (ref 78–100)
MONOCYTES # BLD AUTO: ABNORMAL 10*3/UL
MONOCYTES # BLD MANUAL: 2.8 10E3/UL (ref 0–1.3)
MONOCYTES NFR BLD AUTO: ABNORMAL %
MONOCYTES NFR BLD MANUAL: 10 %
NEUTROPHILS # BLD AUTO: ABNORMAL 10*3/UL
NEUTROPHILS # BLD MANUAL: 3.7 10E3/UL (ref 1.6–8.3)
NEUTROPHILS NFR BLD AUTO: ABNORMAL %
NEUTROPHILS NFR BLD MANUAL: 13 %
NRBC # BLD AUTO: 0 10E3/UL
NRBC BLD AUTO-RTO: 0 /100
PLAT MORPH BLD: ABNORMAL
PLATELET # BLD AUTO: 60 10E3/UL (ref 150–450)
POLYCHROMASIA BLD QL SMEAR: SLIGHT
POTASSIUM SERPL-SCNC: 4.5 MMOL/L (ref 3.4–5.3)
PROT SERPL-MCNC: 7.2 G/DL (ref 6.4–8.3)
RBC # BLD AUTO: 3.53 10E6/UL (ref 4.4–5.9)
RBC MORPH BLD: ABNORMAL
SODIUM SERPL-SCNC: 133 MMOL/L (ref 135–145)
TESTOST SERPL-MCNC: 189 NG/DL (ref 240–950)
URATE SERPL-MCNC: 0.9 MG/DL (ref 3.4–7)
WBC # BLD AUTO: 28.1 10E3/UL (ref 4–11)

## 2024-01-26 PROCEDURE — 96415 CHEMO IV INFUSION ADDL HR: CPT

## 2024-01-26 PROCEDURE — 84550 ASSAY OF BLOOD/URIC ACID: CPT

## 2024-01-26 PROCEDURE — G0463 HOSPITAL OUTPT CLINIC VISIT: HCPCS | Mod: 25

## 2024-01-26 PROCEDURE — 36415 COLL VENOUS BLD VENIPUNCTURE: CPT

## 2024-01-26 PROCEDURE — 258N000003 HC RX IP 258 OP 636: Performed by: INTERNAL MEDICINE

## 2024-01-26 PROCEDURE — 96413 CHEMO IV INFUSION 1 HR: CPT

## 2024-01-26 PROCEDURE — 85007 BL SMEAR W/DIFF WBC COUNT: CPT

## 2024-01-26 PROCEDURE — 83615 LACTATE (LD) (LDH) ENZYME: CPT

## 2024-01-26 PROCEDURE — 250N000011 HC RX IP 250 OP 636: Performed by: INTERNAL MEDICINE

## 2024-01-26 PROCEDURE — 85027 COMPLETE CBC AUTOMATED: CPT

## 2024-01-26 PROCEDURE — 84450 TRANSFERASE (AST) (SGOT): CPT

## 2024-01-26 PROCEDURE — 87340 HEPATITIS B SURFACE AG IA: CPT

## 2024-01-26 PROCEDURE — 86706 HEP B SURFACE ANTIBODY: CPT

## 2024-01-26 PROCEDURE — 86704 HEP B CORE ANTIBODY TOTAL: CPT

## 2024-01-26 PROCEDURE — 250N000013 HC RX MED GY IP 250 OP 250 PS 637: Performed by: INTERNAL MEDICINE

## 2024-01-26 PROCEDURE — 96375 TX/PRO/DX INJ NEW DRUG ADDON: CPT

## 2024-01-26 RX ORDER — MEPERIDINE HYDROCHLORIDE 25 MG/ML
25 INJECTION INTRAMUSCULAR; INTRAVENOUS; SUBCUTANEOUS EVERY 30 MIN PRN
Status: DISCONTINUED | OUTPATIENT
Start: 2024-01-26 | End: 2024-01-26 | Stop reason: HOSPADM

## 2024-01-26 RX ORDER — DIPHENHYDRAMINE HYDROCHLORIDE 50 MG/ML
50 INJECTION INTRAMUSCULAR; INTRAVENOUS
Status: COMPLETED | OUTPATIENT
Start: 2024-01-26 | End: 2024-01-26

## 2024-01-26 RX ORDER — MEPERIDINE HYDROCHLORIDE 25 MG/ML
25 INJECTION INTRAMUSCULAR; INTRAVENOUS; SUBCUTANEOUS
Status: DISCONTINUED | OUTPATIENT
Start: 2024-01-26 | End: 2024-01-26 | Stop reason: HOSPADM

## 2024-01-26 RX ORDER — EPINEPHRINE 1 MG/ML
0.3 INJECTION, SOLUTION, CONCENTRATE INTRAVENOUS EVERY 5 MIN PRN
Status: DISCONTINUED | OUTPATIENT
Start: 2024-01-26 | End: 2024-01-26 | Stop reason: HOSPADM

## 2024-01-26 RX ORDER — METHYLPREDNISOLONE SODIUM SUCCINATE 125 MG/2ML
125 INJECTION, POWDER, LYOPHILIZED, FOR SOLUTION INTRAMUSCULAR; INTRAVENOUS
Status: DISCONTINUED | OUTPATIENT
Start: 2024-01-26 | End: 2024-01-26 | Stop reason: HOSPADM

## 2024-01-26 RX ORDER — ACETAMINOPHEN 325 MG/1
650 TABLET ORAL ONCE
Status: COMPLETED | OUTPATIENT
Start: 2024-01-26 | End: 2024-01-26

## 2024-01-26 RX ORDER — DIPHENHYDRAMINE HCL 25 MG
50 CAPSULE ORAL ONCE
Status: COMPLETED | OUTPATIENT
Start: 2024-01-26 | End: 2024-01-26

## 2024-01-26 RX ADMIN — RITUXIMAB-ABBS 900 MG: 10 INJECTION, SOLUTION INTRAVENOUS at 08:55

## 2024-01-26 RX ADMIN — SODIUM CHLORIDE 250 ML: 9 INJECTION, SOLUTION INTRAVENOUS at 08:55

## 2024-01-26 RX ADMIN — ACETAMINOPHEN 650 MG: 325 TABLET ORAL at 08:19

## 2024-01-26 RX ADMIN — DIPHENHYDRAMINE HYDROCHLORIDE 50 MG: 25 CAPSULE ORAL at 08:19

## 2024-01-26 RX ADMIN — METHYLPREDNISOLONE SODIUM SUCCINATE 125 MG: 125 INJECTION, POWDER, FOR SOLUTION INTRAMUSCULAR; INTRAVENOUS at 09:41

## 2024-01-26 RX ADMIN — FAMOTIDINE 20 MG: 10 INJECTION, SOLUTION INTRAVENOUS at 09:38

## 2024-01-26 RX ADMIN — DIPHENHYDRAMINE HYDROCHLORIDE 50 MG: 50 INJECTION INTRAMUSCULAR; INTRAVENOUS at 09:37

## 2024-01-26 ASSESSMENT — PAIN SCALES - GENERAL: PAINLEVEL: NO PAIN (0)

## 2024-01-26 NOTE — PROGRESS NOTES
Infusion Nursing Note:  Jonathon Santos presents today for C1D1 Rituxan.    Patient seen by provider today: No, Romina Howard NP   present during visit today: Not Applicable.    Note: First time getting chemotherapy today.Chemotherapy teaching, side effects, and schedule reviewed with patient. Pt instructed to call triage (or MD on call if after hours/weekends) with chills/temp >=100.5. Pt stated understanding of plan.     30 minutes into Rituxan infusion at 100ml/hr rate,  patient complaint of non radiating chest tightness and shortness of breath with PS 5/10. VS stable. Denies heart palpitation and abdominal discomfort. IV Rituxan stopped. Hypersensitivity medication given.     Patient complaint of feeling hard to breath breathing with Oxygen saturation of low 80's. Oxygen delivered at 5lpm via nasal cannula with oxygen at 100%. Oxygen off with saturation of %.     Symptoms subsided. IV Rituxan restarted at 50ml/hr, as per protocol. Seen by Romina Howard at bedside.         Intravenous Access:  Peripheral IV placed.    Treatment Conditions:  Lab Results   Component Value Date    HGB 10.2 (L) 01/26/2024    WBC 28.1 (H) 01/26/2024    ANEU 3.7 01/26/2024    ANEUTAUTO 2.7 01/14/2022    PLT 60 (L) 01/26/2024        Lab Results   Component Value Date     (L) 01/26/2024    POTASSIUM 4.5 01/26/2024    CR 1.39 (H) 01/26/2024    MISTY 9.2 01/26/2024    BILITOTAL 0.8 01/26/2024    ALBUMIN 4.0 01/26/2024    ALT 28 01/26/2024    AST 36 01/26/2024       Results reviewed, labs MET treatment parameters, ok to proceed with treatment.    TORB: 1/26/24/Romina Howard NP/Katie Elizalde RN/ Uric Acid 0.9, to proceed with treatment as planned. Please draw Hepatitis prior to infusion.       Post Infusion Assessment:  Patient tolerated infusion poorly due to : Hypersensitivity: Did patient have a hypersensitivity reaction? : Yes  Drug or Product name: Rituxan  Were pre-meds administered?: Yes  What pre-meds were  administered?: Diphenhydramine (Benadryl);Acetaminophen (Tylenol)  First or Subsequent treatment: First time receiving  Rate of infusion when patient had hypersensitivity reaction: 100 ml/hr  Time the hypersensitivity reaction was first recognized: 0932  Symptoms observed or reported (select all that apply): Chest tightness  Interventions/treatment following reaction: Infusion stopped;Hypersensitivity medications administered  What hypersensitivity medications were administered?: DiphendydrAMINE (benadryl);Methylprednisolone;Famotidine(Pepcid)  Name of provider notified: Romina Howard NP  Time provider notified: 0939  Type of notification (select all that apply): Paged/Phone     Blood return noted pre and post infusion.  Site patent and intact, free from redness, edema or discomfort.  No evidence of extravasations.  Access discontinued per protocol.       Discharge Plan:   Patient declined prescription refills.  Discharge instructions reviewed with: Patient.  Patient and/or family verbalized understanding of discharge instructions and all questions answered.  Copy of AVS reviewed with patient and/or family.  Patient will return 2/2 for next appointment at [place].  Patient discharged in stable condition accompanied by: self.  Departure Mode: Ambulatory.      MARIELA BERRY RN

## 2024-01-26 NOTE — NURSING NOTE
Chief Complaint   Patient presents with    Blood Draw     Labs drawn via PIV by RN in lab.  VS taken       Labs drawn from PIV placed by RN. Line flushed with saline. Vitals taken. Pt checked in for appointment(s).    Senait Gonsales RN

## 2024-01-29 ENCOUNTER — PATIENT OUTREACH (OUTPATIENT)
Dept: ONCOLOGY | Facility: CLINIC | Age: 69
End: 2024-01-29
Payer: COMMERCIAL

## 2024-01-29 NOTE — PROGRESS NOTES
[facility]: Cancer Care Follow-Up Note                                    Discussion with Patient:                                                      Writer called Jonathon to check in post Rituximab treatment on 1/26/24. Jonathon stated he was doing quite well.      Goals          General     Monitoring (pt-stated)      Notes - Note created  1/2/2024 12:54 PM by Stacy Mariscal RN     Goal Statement: I will use my clinic and care team resources as directed.   Date Goal set: 1/2/2024  Barriers: disease burden  Strengths: support  Date to Achieve By: ongoing  Patient expressed understanding of goal:  Yes   Action steps to achieve this goal:  I will contact triage with new, worsening, or uncontrolled symptoms.   I will contact triage with temperature over 100.4  I will call with difficulties of scheduling and/or transportation.  I will request needed refills when there are 7 days of medication remaining.   I will not send urgent or symptomatic messages through [email or web address].  I will contact scheduling to arrange or make changes in my appointments.                Dates of Treatment:                                                      Infusion given in last 28 days       Administered MAR Action Medication Dose Rate Visit    01/24/2024 14:54 New Bag Rasburicase (ELITEK) 6 mg in sodium chloride 0.9 % 50 mL 6 mg   Infusion Therapy Visit on 01/24/2024 in [facility]    01/26/2024 08:55 New Bag riTUXimab-abbs (TRUXIMA) 900 mg in sodium chloride 0.9 % 900 mL infusion 900 mg   Infusion Therapy Visit on 01/26/2024 in [facility]    01/26/2024 10:24 Restarted riTUXimab-abbs (TRUXIMA) 900 mg in sodium chloride 0.9 % 900 mL infusion     Infusion Therapy Visit on 01/26/2024 in [facility]            Assessment:                                                      Jonathon states that he is doing well, denies any symptoms or concerns.Jonathon states that is  feeling much better overall after treatment on Friday.      RNCC Short Symptom Review:     Assessment completed with:: Patient    General/Short Assessment  Does the patient have all their medications?: Yes  Is the patient experiencing any new or worsening symptoms?: No  Discussion with patient: Answered patient's questions and addressed concerns;Reviewed how and when to contact clinic;Reviewed patient's future appointments    Pain  Patient Reported Pain?: No    Patient Coping  Accepting    Clinic Utilization  Patient Aware of Next Appointment?: Yes    Other Patient Concerns  Other Patient Reported Concerns: No    Intervention/Education provided during outreach:                                                       Jonathon will reach out if he has any other questions or concerns.     Patient to follow up as scheduled at next appt  Patient to call/[employer] message with updates  Confirmed patient has clinic and triage numbers    Signature:  Stacy Mariscal RN

## 2024-02-02 ENCOUNTER — INFUSION THERAPY VISIT (OUTPATIENT)
Dept: INFUSION THERAPY | Facility: CLINIC | Age: 69
End: 2024-02-02
Attending: INTERNAL MEDICINE
Payer: COMMERCIAL

## 2024-02-02 ENCOUNTER — LAB (OUTPATIENT)
Dept: LAB | Facility: CLINIC | Age: 69
End: 2024-02-02
Attending: INTERNAL MEDICINE
Payer: COMMERCIAL

## 2024-02-02 VITALS
SYSTOLIC BLOOD PRESSURE: 119 MMHG | TEMPERATURE: 98.1 F | WEIGHT: 207.8 LBS | BODY MASS INDEX: 27.42 KG/M2 | DIASTOLIC BLOOD PRESSURE: 70 MMHG | HEART RATE: 94 BPM

## 2024-02-02 DIAGNOSIS — C83.07 SPLENIC MARGINAL ZONE B-CELL LYMPHOMA (H): Primary | ICD-10-CM

## 2024-02-02 DIAGNOSIS — C61 PROSTATE CANCER (H): Primary | ICD-10-CM

## 2024-02-02 LAB
ALBUMIN SERPL BCG-MCNC: 3.5 G/DL (ref 3.5–5.2)
ALP SERPL-CCNC: 90 U/L (ref 40–150)
ALT SERPL W P-5'-P-CCNC: 49 U/L (ref 0–70)
ANION GAP SERPL CALCULATED.3IONS-SCNC: 11 MMOL/L (ref 7–15)
AST SERPL W P-5'-P-CCNC: 35 U/L (ref 0–45)
BASOPHILS # BLD AUTO: ABNORMAL 10*3/UL
BASOPHILS # BLD MANUAL: 0 10E3/UL (ref 0–0.2)
BASOPHILS # BLD MANUAL: 0 10E3/UL (ref 0–0.2)
BASOPHILS NFR BLD AUTO: ABNORMAL %
BASOPHILS NFR BLD MANUAL: 0 %
BASOPHILS NFR BLD MANUAL: 0 %
BILIRUB SERPL-MCNC: 1.3 MG/DL
BUN SERPL-MCNC: 20.8 MG/DL (ref 8–23)
CALCIUM SERPL-MCNC: 8.7 MG/DL (ref 8.8–10.2)
CHLORIDE SERPL-SCNC: 101 MMOL/L (ref 98–107)
CREAT SERPL-MCNC: 1.08 MG/DL (ref 0.67–1.17)
DEPRECATED HCO3 PLAS-SCNC: 24 MMOL/L (ref 22–29)
EGFRCR SERPLBLD CKD-EPI 2021: 74 ML/MIN/1.73M2
EOSINOPHIL # BLD AUTO: ABNORMAL 10*3/UL
EOSINOPHIL # BLD MANUAL: 0 10E3/UL (ref 0–0.7)
EOSINOPHIL # BLD MANUAL: 0 10E3/UL (ref 0–0.7)
EOSINOPHIL NFR BLD AUTO: ABNORMAL %
EOSINOPHIL NFR BLD MANUAL: 0 %
EOSINOPHIL NFR BLD MANUAL: 0 %
ERYTHROCYTE [DISTWIDTH] IN BLOOD BY AUTOMATED COUNT: 15.6 % (ref 10–15)
GLUCOSE SERPL-MCNC: 130 MG/DL (ref 70–99)
HCT VFR BLD AUTO: 29.8 % (ref 40–53)
HGB BLD-MCNC: 9.4 G/DL (ref 13.3–17.7)
HOLD SPECIMEN: NORMAL
IMM GRANULOCYTES # BLD: ABNORMAL 10*3/UL
IMM GRANULOCYTES NFR BLD: ABNORMAL %
LDH SERPL L TO P-CCNC: 416 U/L (ref 0–250)
LYMPHOCYTES # BLD AUTO: ABNORMAL 10*3/UL
LYMPHOCYTES # BLD MANUAL: 3.6 10E3/UL (ref 0.8–5.3)
LYMPHOCYTES # BLD MANUAL: 3.9 10E3/UL (ref 0.8–5.3)
LYMPHOCYTES NFR BLD AUTO: ABNORMAL %
LYMPHOCYTES NFR BLD MANUAL: 24 %
LYMPHOCYTES NFR BLD MANUAL: 25 %
MCH RBC QN AUTO: 28.9 PG (ref 26.5–33)
MCHC RBC AUTO-ENTMCNC: 31.5 G/DL (ref 31.5–36.5)
MCV RBC AUTO: 92 FL (ref 78–100)
MONOCYTES # BLD AUTO: ABNORMAL 10*3/UL
MONOCYTES # BLD MANUAL: 10.6 10E3/UL (ref 0–1.3)
MONOCYTES # BLD MANUAL: 10.7 10E3/UL (ref 0–1.3)
MONOCYTES NFR BLD AUTO: ABNORMAL %
MONOCYTES NFR BLD MANUAL: 68 %
MONOCYTES NFR BLD MANUAL: 70 %
NEUTROPHILS # BLD AUTO: ABNORMAL 10*3/UL
NEUTROPHILS # BLD MANUAL: 0.9 10E3/UL (ref 1.6–8.3)
NEUTROPHILS # BLD MANUAL: 1.1 10E3/UL (ref 1.6–8.3)
NEUTROPHILS NFR BLD AUTO: ABNORMAL %
NEUTROPHILS NFR BLD MANUAL: 6 %
NEUTROPHILS NFR BLD MANUAL: 7 %
NRBC # BLD AUTO: 0 10E3/UL
NRBC BLD AUTO-RTO: 0 /100
PLAT MORPH BLD: ABNORMAL
PLAT MORPH BLD: ABNORMAL
PLATELET # BLD AUTO: 55 10E3/UL (ref 150–450)
POTASSIUM SERPL-SCNC: 4.2 MMOL/L (ref 3.4–5.3)
PROT SERPL-MCNC: 6.4 G/DL (ref 6.4–8.3)
RBC # BLD AUTO: 3.25 10E6/UL (ref 4.4–5.9)
RBC MORPH BLD: ABNORMAL
RBC MORPH BLD: ABNORMAL
SODIUM SERPL-SCNC: 136 MMOL/L (ref 135–145)
URATE SERPL-MCNC: 7.3 MG/DL (ref 3.4–7)
WBC # BLD AUTO: 15.2 10E3/UL (ref 4–11)

## 2024-02-02 PROCEDURE — 250N000011 HC RX IP 250 OP 636: Performed by: INTERNAL MEDICINE

## 2024-02-02 PROCEDURE — 84550 ASSAY OF BLOOD/URIC ACID: CPT | Performed by: INTERNAL MEDICINE

## 2024-02-02 PROCEDURE — 96413 CHEMO IV INFUSION 1 HR: CPT

## 2024-02-02 PROCEDURE — 250N000013 HC RX MED GY IP 250 OP 250 PS 637: Performed by: INTERNAL MEDICINE

## 2024-02-02 PROCEDURE — 96375 TX/PRO/DX INJ NEW DRUG ADDON: CPT

## 2024-02-02 PROCEDURE — 96415 CHEMO IV INFUSION ADDL HR: CPT

## 2024-02-02 PROCEDURE — 85027 COMPLETE CBC AUTOMATED: CPT | Performed by: INTERNAL MEDICINE

## 2024-02-02 PROCEDURE — 83615 LACTATE (LD) (LDH) ENZYME: CPT | Performed by: INTERNAL MEDICINE

## 2024-02-02 PROCEDURE — 85007 BL SMEAR W/DIFF WBC COUNT: CPT | Performed by: INTERNAL MEDICINE

## 2024-02-02 PROCEDURE — 80053 COMPREHEN METABOLIC PANEL: CPT | Performed by: INTERNAL MEDICINE

## 2024-02-02 PROCEDURE — 36415 COLL VENOUS BLD VENIPUNCTURE: CPT | Performed by: INTERNAL MEDICINE

## 2024-02-02 PROCEDURE — 258N000003 HC RX IP 258 OP 636: Performed by: INTERNAL MEDICINE

## 2024-02-02 PROCEDURE — 36415 COLL VENOUS BLD VENIPUNCTURE: CPT

## 2024-02-02 RX ORDER — ACETAMINOPHEN 325 MG/1
650 TABLET ORAL ONCE
Status: COMPLETED | OUTPATIENT
Start: 2024-02-02 | End: 2024-02-02

## 2024-02-02 RX ADMIN — DIPHENHYDRAMINE HYDROCHLORIDE 50 MG: 50 INJECTION, SOLUTION INTRAMUSCULAR; INTRAVENOUS at 10:24

## 2024-02-02 RX ADMIN — SODIUM CHLORIDE 250 ML: 9 INJECTION, SOLUTION INTRAVENOUS at 10:23

## 2024-02-02 RX ADMIN — ACETAMINOPHEN 650 MG: 325 TABLET, FILM COATED ORAL at 10:24

## 2024-02-02 RX ADMIN — RITUXIMAB-ABBS 900 MG: 10 INJECTION, SOLUTION INTRAVENOUS at 10:55

## 2024-02-02 RX ADMIN — FAMOTIDINE 20 MG: 10 INJECTION INTRAVENOUS at 10:37

## 2024-02-02 NOTE — PROGRESS NOTES
Infusion Nursing Note:  Jonathon Santos presents today for C1D8 Truxima.    Patient seen by provider today: No   present during visit today: Not Applicable.    Note: N/A.      Intravenous Access:  Peripheral IV placed.    Treatment Conditions:  Results reviewed, labs did NOT meet treatment parameters: plts = 47,000 today. Per Romina Tejada NP it is okay to proceed with tx today.      Post Infusion Assessment:  Patient tolerated infusion without incident.  Blood return noted pre and post infusion.  Site patent and intact, free from redness, edema or discomfort.  No evidence of extravasations.  Access discontinued per protocol.       Discharge Plan:   Patient discharged in stable condition accompanied by: self.  Departure Mode: Ambulatory.      Trupti Best RN

## 2024-02-07 ENCOUNTER — PATIENT OUTREACH (OUTPATIENT)
Dept: ONCOLOGY | Facility: CLINIC | Age: 69
End: 2024-02-07
Payer: COMMERCIAL

## 2024-02-07 NOTE — PROGRESS NOTES
[facility]/Voicemail    Clinical Data: Care Coordinator Outreach    Outreach attempted x 1.  Left message on patient's voicemail with call back information and requested return call.    Plan: Care Coordinator will try to reach patient again in 1-2 business days.    Writer called Jonathon to see if he would be open to going to [facility] for his Rituximab infusion as he preferred to cancel his infusion appointment at [place].

## 2024-02-09 ENCOUNTER — LAB (OUTPATIENT)
Dept: INFUSION THERAPY | Facility: CLINIC | Age: 69
End: 2024-02-09
Attending: INTERNAL MEDICINE
Payer: COMMERCIAL

## 2024-02-09 VITALS
DIASTOLIC BLOOD PRESSURE: 60 MMHG | BODY MASS INDEX: 27.11 KG/M2 | OXYGEN SATURATION: 100 % | HEART RATE: 84 BPM | SYSTOLIC BLOOD PRESSURE: 124 MMHG | TEMPERATURE: 98 F | WEIGHT: 205.5 LBS | RESPIRATION RATE: 14 BRPM

## 2024-02-09 DIAGNOSIS — C83.07 SPLENIC MARGINAL ZONE B-CELL LYMPHOMA (H): Primary | ICD-10-CM

## 2024-02-09 DIAGNOSIS — C61 PROSTATE CANCER (H): ICD-10-CM

## 2024-02-09 LAB
BASOPHILS # BLD AUTO: ABNORMAL 10*3/UL
BASOPHILS # BLD MANUAL: 0 10E3/UL (ref 0–0.2)
BASOPHILS NFR BLD AUTO: ABNORMAL %
BASOPHILS NFR BLD MANUAL: 0 %
EOSINOPHIL # BLD AUTO: ABNORMAL 10*3/UL
EOSINOPHIL # BLD MANUAL: 0 10E3/UL (ref 0–0.7)
EOSINOPHIL NFR BLD AUTO: ABNORMAL %
EOSINOPHIL NFR BLD MANUAL: 0 %
ERYTHROCYTE [DISTWIDTH] IN BLOOD BY AUTOMATED COUNT: 15.5 % (ref 10–15)
HCT VFR BLD AUTO: 27.1 % (ref 40–53)
HGB BLD-MCNC: 8.8 G/DL (ref 13.3–17.7)
IMM GRANULOCYTES # BLD: ABNORMAL 10*3/UL
IMM GRANULOCYTES NFR BLD: ABNORMAL %
LDH SERPL L TO P-CCNC: 444 U/L (ref 0–250)
LYMPHOCYTES # BLD AUTO: ABNORMAL 10*3/UL
LYMPHOCYTES # BLD MANUAL: 4.3 10E3/UL (ref 0.8–5.3)
LYMPHOCYTES NFR BLD AUTO: ABNORMAL %
LYMPHOCYTES NFR BLD MANUAL: 66 %
MCH RBC QN AUTO: 28.7 PG (ref 26.5–33)
MCHC RBC AUTO-ENTMCNC: 32.5 G/DL (ref 31.5–36.5)
MCV RBC AUTO: 88 FL (ref 78–100)
MONOCYTES # BLD AUTO: ABNORMAL 10*3/UL
MONOCYTES # BLD MANUAL: 0.5 10E3/UL (ref 0–1.3)
MONOCYTES NFR BLD AUTO: ABNORMAL %
MONOCYTES NFR BLD MANUAL: 8 %
NEUTROPHILS # BLD AUTO: ABNORMAL 10*3/UL
NEUTROPHILS # BLD MANUAL: 1.7 10E3/UL (ref 1.6–8.3)
NEUTROPHILS NFR BLD AUTO: ABNORMAL %
NEUTROPHILS NFR BLD MANUAL: 26 %
NRBC # BLD AUTO: 0 10E3/UL
NRBC BLD AUTO-RTO: 0 /100
PLAT MORPH BLD: ABNORMAL
PLATELET # BLD AUTO: 52 10E3/UL (ref 150–450)
RBC # BLD AUTO: 3.07 10E6/UL (ref 4.4–5.9)
RBC MORPH BLD: ABNORMAL
URATE SERPL-MCNC: 8.1 MG/DL (ref 3.4–7)
VARIANT LYMPHS BLD QL SMEAR: PRESENT
WBC # BLD AUTO: 6.5 10E3/UL (ref 4–11)

## 2024-02-09 PROCEDURE — 36415 COLL VENOUS BLD VENIPUNCTURE: CPT

## 2024-02-09 PROCEDURE — 250N000013 HC RX MED GY IP 250 OP 250 PS 637: Performed by: INTERNAL MEDICINE

## 2024-02-09 PROCEDURE — 258N000003 HC RX IP 258 OP 636: Performed by: INTERNAL MEDICINE

## 2024-02-09 PROCEDURE — 99207 PR NO CHARGE LOS: CPT

## 2024-02-09 PROCEDURE — 96376 TX/PRO/DX INJ SAME DRUG ADON: CPT

## 2024-02-09 PROCEDURE — 84550 ASSAY OF BLOOD/URIC ACID: CPT

## 2024-02-09 PROCEDURE — 250N000011 HC RX IP 250 OP 636: Performed by: INTERNAL MEDICINE

## 2024-02-09 PROCEDURE — 83615 LACTATE (LD) (LDH) ENZYME: CPT

## 2024-02-09 PROCEDURE — 96413 CHEMO IV INFUSION 1 HR: CPT

## 2024-02-09 PROCEDURE — 96375 TX/PRO/DX INJ NEW DRUG ADDON: CPT

## 2024-02-09 PROCEDURE — 85041 AUTOMATED RBC COUNT: CPT | Performed by: INTERNAL MEDICINE

## 2024-02-09 PROCEDURE — 85007 BL SMEAR W/DIFF WBC COUNT: CPT | Performed by: INTERNAL MEDICINE

## 2024-02-09 PROCEDURE — 96415 CHEMO IV INFUSION ADDL HR: CPT

## 2024-02-09 RX ORDER — DIPHENHYDRAMINE HYDROCHLORIDE 50 MG/ML
50 INJECTION INTRAMUSCULAR; INTRAVENOUS
Status: COMPLETED | OUTPATIENT
Start: 2024-02-09 | End: 2024-02-09

## 2024-02-09 RX ORDER — HEPARIN SODIUM (PORCINE) LOCK FLUSH IV SOLN 100 UNIT/ML 100 UNIT/ML
5 SOLUTION INTRAVENOUS
Status: DISCONTINUED | OUTPATIENT
Start: 2024-02-09 | End: 2024-02-09 | Stop reason: HOSPADM

## 2024-02-09 RX ORDER — ACETAMINOPHEN 325 MG/1
650 TABLET ORAL ONCE
Status: COMPLETED | OUTPATIENT
Start: 2024-02-09 | End: 2024-02-09

## 2024-02-09 RX ADMIN — FAMOTIDINE 20 MG: 10 INJECTION, SOLUTION INTRAVENOUS at 11:49

## 2024-02-09 RX ADMIN — SODIUM CHLORIDE 250 ML: 9 INJECTION, SOLUTION INTRAVENOUS at 10:05

## 2024-02-09 RX ADMIN — ACETAMINOPHEN 650 MG: 325 TABLET ORAL at 10:02

## 2024-02-09 RX ADMIN — DIPHENHYDRAMINE HYDROCHLORIDE 50 MG: 50 INJECTION, SOLUTION INTRAMUSCULAR; INTRAVENOUS at 10:06

## 2024-02-09 RX ADMIN — DIPHENHYDRAMINE HYDROCHLORIDE 25 MG: 50 INJECTION, SOLUTION INTRAMUSCULAR; INTRAVENOUS at 11:53

## 2024-02-09 RX ADMIN — RITUXIMAB-ABBS 900 MG: 10 INJECTION, SOLUTION INTRAVENOUS at 10:33

## 2024-02-09 NOTE — PROGRESS NOTES
Labs drawn with peripheral IV start.  Did not draw Dr Eduardo labs as they appear to be due June 2024.    Conchis Middleton RN on 2/9/2024 at 2:33 PM

## 2024-02-09 NOTE — PROGRESS NOTES
1146: Feeling flushed, upper chest pressure, tickle in throat. Rituxan was infusing for 3 mins at 300 ml/hr. Rituxan stopped and NS started.     VS: /67, HR 66, O2 98    1150: noted to be clearing throat, denies SOB, chest pressure continued.     VS: /73 HR 66 O2 100    1154: tickle in throat continues, chest pressure slightly improved. Feeling of facial flushing improving.     1156: VS /72, HR 65, O2 100

## 2024-02-16 ENCOUNTER — INFUSION THERAPY VISIT (OUTPATIENT)
Dept: ONCOLOGY | Facility: CLINIC | Age: 69
End: 2024-02-16
Attending: INTERNAL MEDICINE
Payer: COMMERCIAL

## 2024-02-16 ENCOUNTER — APPOINTMENT (OUTPATIENT)
Dept: LAB | Facility: CLINIC | Age: 69
End: 2024-02-16
Attending: INTERNAL MEDICINE
Payer: COMMERCIAL

## 2024-02-16 VITALS
BODY MASS INDEX: 26.53 KG/M2 | HEIGHT: 73 IN | WEIGHT: 200.2 LBS | DIASTOLIC BLOOD PRESSURE: 84 MMHG | OXYGEN SATURATION: 100 % | SYSTOLIC BLOOD PRESSURE: 130 MMHG | RESPIRATION RATE: 18 BRPM | HEART RATE: 70 BPM | TEMPERATURE: 97 F

## 2024-02-16 DIAGNOSIS — C83.07 SPLENIC MARGINAL ZONE B-CELL LYMPHOMA (H): Primary | ICD-10-CM

## 2024-02-16 DIAGNOSIS — M1A.09X1 IDIOPATHIC CHRONIC GOUT, MULTIPLE SITES, WITH TOPHUS (TOPHI): ICD-10-CM

## 2024-02-16 LAB
ALBUMIN SERPL BCG-MCNC: 4.2 G/DL (ref 3.5–5.2)
ALP SERPL-CCNC: 81 U/L (ref 40–150)
ALT SERPL W P-5'-P-CCNC: 47 U/L (ref 0–70)
ANION GAP SERPL CALCULATED.3IONS-SCNC: 13 MMOL/L (ref 7–15)
AST SERPL W P-5'-P-CCNC: 41 U/L (ref 0–45)
BASOPHILS # BLD AUTO: ABNORMAL 10*3/UL
BASOPHILS # BLD MANUAL: 0 10E3/UL (ref 0–0.2)
BASOPHILS NFR BLD AUTO: ABNORMAL %
BASOPHILS NFR BLD MANUAL: 1 %
BILIRUB SERPL-MCNC: 1 MG/DL
BUN SERPL-MCNC: 19.2 MG/DL (ref 8–23)
CALCIUM SERPL-MCNC: 9.4 MG/DL (ref 8.8–10.2)
CHLORIDE SERPL-SCNC: 97 MMOL/L (ref 98–107)
CREAT SERPL-MCNC: 1.16 MG/DL (ref 0.67–1.17)
DACRYOCYTES BLD QL SMEAR: SLIGHT
DEPRECATED HCO3 PLAS-SCNC: 23 MMOL/L (ref 22–29)
EGFRCR SERPLBLD CKD-EPI 2021: 68 ML/MIN/1.73M2
EOSINOPHIL # BLD AUTO: ABNORMAL 10*3/UL
EOSINOPHIL # BLD MANUAL: 0 10E3/UL (ref 0–0.7)
EOSINOPHIL NFR BLD AUTO: ABNORMAL %
EOSINOPHIL NFR BLD MANUAL: 1 %
ERYTHROCYTE [DISTWIDTH] IN BLOOD BY AUTOMATED COUNT: 15.6 % (ref 10–15)
GLUCOSE SERPL-MCNC: 134 MG/DL (ref 70–99)
HCT VFR BLD AUTO: 29.2 % (ref 40–53)
HGB BLD-MCNC: 9.6 G/DL (ref 13.3–17.7)
IMM GRANULOCYTES # BLD: ABNORMAL 10*3/UL
IMM GRANULOCYTES NFR BLD: ABNORMAL %
LDH SERPL L TO P-CCNC: 380 U/L (ref 0–250)
LYMPHOCYTES # BLD AUTO: ABNORMAL 10*3/UL
LYMPHOCYTES # BLD MANUAL: 2.5 10E3/UL (ref 0.8–5.3)
LYMPHOCYTES NFR BLD AUTO: ABNORMAL %
LYMPHOCYTES NFR BLD MANUAL: 68 %
MCH RBC QN AUTO: 28.1 PG (ref 26.5–33)
MCHC RBC AUTO-ENTMCNC: 32.9 G/DL (ref 31.5–36.5)
MCV RBC AUTO: 85 FL (ref 78–100)
MONOCYTES # BLD AUTO: ABNORMAL 10*3/UL
MONOCYTES # BLD MANUAL: 0.1 10E3/UL (ref 0–1.3)
MONOCYTES NFR BLD AUTO: ABNORMAL %
MONOCYTES NFR BLD MANUAL: 2 %
NEUTROPHILS # BLD AUTO: ABNORMAL 10*3/UL
NEUTROPHILS # BLD MANUAL: 1 10E3/UL (ref 1.6–8.3)
NEUTROPHILS NFR BLD AUTO: ABNORMAL %
NEUTROPHILS NFR BLD MANUAL: 28 %
NRBC # BLD AUTO: 0 10E3/UL
NRBC BLD AUTO-RTO: 0 /100
PLAT MORPH BLD: ABNORMAL
PLATELET # BLD AUTO: 80 10E3/UL (ref 150–450)
POTASSIUM SERPL-SCNC: 4.2 MMOL/L (ref 3.4–5.3)
PROT SERPL-MCNC: 7 G/DL (ref 6.4–8.3)
PSA SERPL DL<=0.01 NG/ML-MCNC: <0.01 NG/ML (ref 0–4.5)
RBC # BLD AUTO: 3.42 10E6/UL (ref 4.4–5.9)
RBC MORPH BLD: ABNORMAL
SODIUM SERPL-SCNC: 133 MMOL/L (ref 135–145)
URATE SERPL-MCNC: 8.3 MG/DL (ref 3.4–7)
WBC # BLD AUTO: 3.7 10E3/UL (ref 4–11)

## 2024-02-16 PROCEDURE — 96415 CHEMO IV INFUSION ADDL HR: CPT

## 2024-02-16 PROCEDURE — 250N000013 HC RX MED GY IP 250 OP 250 PS 637: Performed by: INTERNAL MEDICINE

## 2024-02-16 PROCEDURE — 96413 CHEMO IV INFUSION 1 HR: CPT

## 2024-02-16 PROCEDURE — 84550 ASSAY OF BLOOD/URIC ACID: CPT

## 2024-02-16 PROCEDURE — 84403 ASSAY OF TOTAL TESTOSTERONE: CPT | Performed by: INTERNAL MEDICINE

## 2024-02-16 PROCEDURE — 258N000003 HC RX IP 258 OP 636: Performed by: INTERNAL MEDICINE

## 2024-02-16 PROCEDURE — 96375 TX/PRO/DX INJ NEW DRUG ADDON: CPT

## 2024-02-16 PROCEDURE — 84153 ASSAY OF PSA TOTAL: CPT

## 2024-02-16 PROCEDURE — 80053 COMPREHEN METABOLIC PANEL: CPT

## 2024-02-16 PROCEDURE — 36415 COLL VENOUS BLD VENIPUNCTURE: CPT

## 2024-02-16 PROCEDURE — 85027 COMPLETE CBC AUTOMATED: CPT | Performed by: INTERNAL MEDICINE

## 2024-02-16 PROCEDURE — 250N000011 HC RX IP 250 OP 636: Performed by: INTERNAL MEDICINE

## 2024-02-16 PROCEDURE — 83615 LACTATE (LD) (LDH) ENZYME: CPT

## 2024-02-16 PROCEDURE — 85007 BL SMEAR W/DIFF WBC COUNT: CPT | Performed by: INTERNAL MEDICINE

## 2024-02-16 RX ORDER — ALLOPURINOL 100 MG/1
300 TABLET ORAL DAILY
COMMUNITY
Start: 2024-02-16 | End: 2024-02-19

## 2024-02-16 RX ORDER — ACETAMINOPHEN 325 MG/1
650 TABLET ORAL ONCE
Status: COMPLETED | OUTPATIENT
Start: 2024-02-16 | End: 2024-02-16

## 2024-02-16 RX ORDER — DIPHENHYDRAMINE HCL 25 MG
25 CAPSULE ORAL ONCE
Status: COMPLETED | OUTPATIENT
Start: 2024-02-16 | End: 2024-02-16

## 2024-02-16 RX ORDER — DIPHENHYDRAMINE HCL 25 MG
50 CAPSULE ORAL ONCE
Status: COMPLETED | OUTPATIENT
Start: 2024-02-16 | End: 2024-02-16

## 2024-02-16 RX ADMIN — ACETAMINOPHEN 650 MG: 325 TABLET ORAL at 10:50

## 2024-02-16 RX ADMIN — RITUXIMAB-ABBS 800 MG: 10 INJECTION, SOLUTION INTRAVENOUS at 09:28

## 2024-02-16 RX ADMIN — FAMOTIDINE 20 MG: 10 INJECTION, SOLUTION INTRAVENOUS at 08:48

## 2024-02-16 RX ADMIN — SODIUM CHLORIDE 250 ML: 9 INJECTION, SOLUTION INTRAVENOUS at 08:46

## 2024-02-16 RX ADMIN — DIPHENHYDRAMINE HYDROCHLORIDE 50 MG: 25 CAPSULE ORAL at 08:34

## 2024-02-16 RX ADMIN — DIPHENHYDRAMINE HYDROCHLORIDE 25 MG: 25 CAPSULE ORAL at 10:50

## 2024-02-16 RX ADMIN — ACETAMINOPHEN 650 MG: 325 TABLET ORAL at 08:34

## 2024-02-16 ASSESSMENT — PAIN SCALES - GENERAL: PAINLEVEL: NO PAIN (0)

## 2024-02-16 NOTE — PROGRESS NOTES
"Infusion Nursing Note:  Jonathon Santos presents today for Day 22 Cycle 1 Rituxan   Patient seen by provider today: No   present during visit today: Not Applicable.    Note: Patient presents to infusion with the following:  Hx of Rituxan reaction. 1st dose: Reaction but able to finish. 2nd dose: no reaction (50mls/hour titration) however patient states he felt \"horrible\" afterwards do to severe fatigue. Pt also states he didn't eat for about \"18 hours\" pre/during infusion and that he didn't feel comfortable at the infusion facility. 3rd dose: reaction at 300mls/hour rate but was able to finish with restart.  Uric Acid 8.3 today. Pt confirms he's taking Allopurinol per med list/orders  Intermittent Bloody stools. Patient states last Saturday he had a \"pretty bad\" bloody stool. Pt denies abdominal discomfort, states stools are on the more firm side, and denies notable blood clots in stool. Pt is aware to take a photo of next bloody stool so we can have a visual representation  ANC of 1.0. no s/s of infection such as fever, sore throat, shortness of breath, chest pain, chills, body aches, urinary concerns.   5lb weight loss in 1 week. Pt denies issues or changes in nutrition intake  Patient denies acute discomfort and states no further acute complaints or concerns needing to be addressed today.     Dr. Burciaga made aware of the above assessment  TORB. 0820. 2/16/24. Dr. Burciaga. Reji Hare RN. Go ahead with Rituxan with ANC of 1.0. Give pepcid with pre-med. Give 650mg tylenol and 25mg of Benadryl half way through. Start infusion at 50mls/hour and once at 100mls/hour titration, ok to increase by 100mls/hour every 30 minutes. Schedule labs and AGATHA visit for next week.     Pt voices understanding of the recommendations above. Pt aware of appts for next week.    Rituxan given per the following  100mls/hour x14mls to flush the line  50mls/hour x30 minutes  100mls/hour x30 minutes  150mls/hour x30 minutes  200mls/hour " x30 minutes  300mls/hour x30 minutes  400mls/hour xremainder of infusion    Intravenous Access:  Peripheral IV placed.    Treatment Conditions:  Lab Results   Component Value Date    HGB 9.6 (L) 02/16/2024    WBC 3.7 (L) 02/16/2024    ANEU 1.0 (L) 02/16/2024    ANEUTAUTO 2.7 01/14/2022    PLT 80 (L) 02/16/2024        Lab Results   Component Value Date     (L) 02/16/2024    POTASSIUM 4.2 02/16/2024    CR 1.16 02/16/2024    MISTY 9.4 02/16/2024    BILITOTAL 1.0 02/16/2024    ALBUMIN 4.2 02/16/2024    ALT 47 02/16/2024    AST 41 02/16/2024         Post Infusion Assessment:  Patient tolerated infusion without incident.  Blood return noted pre and post infusion.  Site patent and intact, free from redness, edema or discomfort.  No evidence of extravasations.  Access discontinued per protocol.       Discharge Plan:   Patient declined prescription refills.  Discharge instructions reviewed with: Patient.  Copy of AVS reviewed with patient and/or family.  Patient will return  for next appointment.  AVS to patient via [employer].  Patient will return 2/21 for next appointment.   Patient discharged in stable condition accompanied by: self.  Departure Mode: Ambulatory.      Reji Hare RN

## 2024-02-16 NOTE — PATIENT INSTRUCTIONS
[facility] Triage and after hours / weekends / holidays:  [phone]    Please call the triage or after hours line if you experience a temperature greater than or equal to 100.4, shaking chills, have uncontrolled nausea, vomiting and/or diarrhea, dizziness, shortness of breath, chest pain, bleeding, unexplained bruising, or if you have any other new/concerning symptoms, questions or concerns.      If you are having any concerning symptoms or wish to speak to a provider before your next infusion visit, please call triage to notify them so we can adequately serve you.     If you need a refill on a narcotic prescription or other medication, please call before your infusion appointment.                 February 2024 Sunday Monday Tuesday Wednesday Thursday Friday Saturday                       1     2    LAB   8:50 AM   (10 min.)   [facility]    INFUSION 5 HR (300 MIN)   9:30 AM   (300 min.)   [place] CANCER INFUSION NURSE   [facility] 3       4     5     6     7     8     9    LAB CENTRAL   8:30 AM   (15 min.)   [facility] FAST TRACK LAB   [facility]    INFUSION 5 HR (300 MIN)   9:15 AM   (300 min.)   [facility] BAY 9 INFUSION   [facility] 10       11     12     13     14     15     16    LAB PERIPHERAL   6:30 AM   (15 min.)   [facility] LAB DRAW   [facility]    ONC INFUSION 5 HR (300 MIN)   7:00 AM   (300 min.)    ONC INFUSION NURSE   [facility] 17       18     19     20    LAB  11:45 AM   (15 min.)   [facility] 21    RETURN CCSL  11:45 AM   (45 min.)   Romina Howard APRN CNP   [facility] 22     23     24       25     26     27     28     29                           March 2024 Sunday Monday Tuesday Wednesday Thursday Friday Saturday                             1     2       3     4     5    RETURN  10:45 AM   (30 min.)   Jennifer Mars PA-C   [facility] 6     7     8     9       10     11    RETURN   9:25 AM   (20 min.)   Pinky Nick MD   [facility] [place] 12     13     14    PET ONCOLOGY WHOLE BODY   8:00 AM   (45 min.)   [id number]   [facility] 15     16       17     18     19     20     21  Happy Birthday!     22    LAB PERIPHERAL  12:45 PM   (15 min.)    [facility] DRAW   [facility]    RETURN CCSL   1:00 PM   (30 min.)   Vannesa Burciaga MD   [facility] 23       24     25     26     27     28     29     30       31                                                   Lab Results:  Recent Results (from the past 12 hour(s))   Uric acid    Collection Time: 02/16/24  6:51 AM   Result Value Ref Range    Uric Acid 8.3 (H) 3.4 - 7.0 mg/dL   Lactate Dehydrogenase    Collection Time: 02/16/24  6:51 AM   Result Value Ref Range    Lactate Dehydrogenase 380 (H) 0 - 250 U/L   Comprehensive metabolic panel    Collection Time: 02/16/24  6:52 AM   Result Value Ref Range    Sodium 133 (L) 135 - 145 mmol/L    Potassium 4.2 3.4 - 5.3 mmol/L    Carbon Dioxide (CO2) 23 22 - 29 mmol/L    Anion Gap 13 7 - 15 mmol/L    Urea Nitrogen 19.2 8.0 - 23.0 mg/dL    Creatinine 1.16 0.67 - 1.17 mg/dL    GFR Estimate 68 >60 mL/min/1.73m2    Calcium 9.4 8.8 - 10.2 mg/dL    Chloride 97 (L) 98 - 107 mmol/L    Glucose 134 (H) 70 - 99 mg/dL    Alkaline Phosphatase 81 40 - 150 U/L    AST 41 0 - 45 U/L    ALT 47 0 - 70 U/L    Protein Total 7.0 6.4 - 8.3 g/dL    Albumin 4.2 3.5 - 5.2 g/dL    Bilirubin Total 1.0 <=1.2 mg/dL   PSA tumor marker    Collection Time: 02/16/24  6:52 AM   Result Value Ref Range    PSA Tumor Marker <0.01 0.00 - 4.50 ng/mL   CBC with platelets and differential    Collection Time: 02/16/24  6:52 AM   Result Value Ref Range    WBC Count  3.7 (L) 4.0 - 11.0 10e3/uL    RBC Count 3.42 (L) 4.40 - 5.90 10e6/uL    Hemoglobin 9.6 (L) 13.3 - 17.7 g/dL    Hematocrit 29.2 (L) 40.0 - 53.0 %    MCV 85 78 - 100 fL    MCH 28.1 26.5 - 33.0 pg    MCHC 32.9 31.5 - 36.5 g/dL    RDW 15.6 (H) 10.0 - 15.0 %    Platelet Count 80 (L) 150 - 450 10e3/uL    % Neutrophils      % Lymphocytes      % Monocytes      % Eosinophils      % Basophils      % Immature Granulocytes      NRBCs per 100 WBC 0 <1 /100    Absolute Neutrophils      Absolute Lymphocytes      Absolute Monocytes      Absolute Eosinophils      Absolute Basophils      Absolute Immature Granulocytes      Absolute NRBCs 0.0 10e3/uL   Manual Differential    Collection Time: 02/16/24  6:52 AM   Result Value Ref Range    % Neutrophils 28 %    % Lymphocytes 68 %    % Monocytes 2 %    % Eosinophils 1 %    % Basophils 1 %    Absolute Neutrophils 1.0 (L) 1.6 - 8.3 10e3/uL    Absolute Lymphocytes 2.5 0.8 - 5.3 10e3/uL    Absolute Monocytes 0.1 0.0 - 1.3 10e3/uL    Absolute Eosinophils 0.0 0.0 - 0.7 10e3/uL    Absolute Basophils 0.0 0.0 - 0.2 10e3/uL    RBC Morphology Confirmed RBC Indices     Platelet Assessment  Automated Count Confirmed. Platelet morphology is normal.     Automated Count Confirmed. Platelet morphology is normal.    Teardrop Cells Slight (A) None Seen

## 2024-02-16 NOTE — NURSING NOTE
Chief Complaint   Patient presents with    Blood Draw     Labs drawn with PIV start by RN. Pt tolerated well. Vitals taken. Patient checked into next appointment.       Darling Reyes RN

## 2024-02-17 LAB — TESTOST SERPL-MCNC: 257 NG/DL (ref 240–950)

## 2024-02-19 DIAGNOSIS — M1A.09X1 IDIOPATHIC CHRONIC GOUT, MULTIPLE SITES, WITH TOPHUS (TOPHI): ICD-10-CM

## 2024-02-19 RX ORDER — ALLOPURINOL 300 MG/1
300 TABLET ORAL DAILY
Qty: 30 TABLET | Refills: 3 | Status: SHIPPED | OUTPATIENT
Start: 2024-02-19 | End: 2024-03-05

## 2024-02-20 ENCOUNTER — LAB (OUTPATIENT)
Dept: LAB | Facility: CLINIC | Age: 69
End: 2024-02-20
Payer: COMMERCIAL

## 2024-02-20 DIAGNOSIS — E79.0 HYPERURICEMIA: ICD-10-CM

## 2024-02-20 DIAGNOSIS — C83.07 SPLENIC MARGINAL ZONE B-CELL LYMPHOMA (H): ICD-10-CM

## 2024-02-20 LAB
ACANTHOCYTES BLD QL SMEAR: NORMAL
ALBUMIN SERPL BCG-MCNC: 4.2 G/DL (ref 3.5–5.2)
ALP SERPL-CCNC: 84 U/L (ref 40–150)
ALT SERPL W P-5'-P-CCNC: 35 U/L (ref 0–70)
ANION GAP SERPL CALCULATED.3IONS-SCNC: 11 MMOL/L (ref 7–15)
AST SERPL W P-5'-P-CCNC: 29 U/L (ref 0–45)
AUER BODIES BLD QL SMEAR: NORMAL
BASO STIPL BLD QL SMEAR: NORMAL
BASOPHILS # BLD AUTO: 0 10E3/UL (ref 0–0.2)
BASOPHILS NFR BLD AUTO: 1 %
BILIRUB SERPL-MCNC: 1.1 MG/DL
BITE CELLS BLD QL SMEAR: NORMAL
BLISTER CELLS BLD QL SMEAR: NORMAL
BUN SERPL-MCNC: 17.6 MG/DL (ref 8–23)
BURR CELLS BLD QL SMEAR: NORMAL
CALCIUM SERPL-MCNC: 9.3 MG/DL (ref 8.8–10.2)
CHLORIDE SERPL-SCNC: 96 MMOL/L (ref 98–107)
CREAT SERPL-MCNC: 1.24 MG/DL (ref 0.67–1.17)
DACRYOCYTES BLD QL SMEAR: NORMAL
DEPRECATED HCO3 PLAS-SCNC: 24 MMOL/L (ref 22–29)
EGFRCR SERPLBLD CKD-EPI 2021: 63 ML/MIN/1.73M2
ELLIPTOCYTES BLD QL SMEAR: NORMAL
EOSINOPHIL # BLD AUTO: 0.1 10E3/UL (ref 0–0.7)
EOSINOPHIL NFR BLD AUTO: 2 %
ERYTHROCYTE [DISTWIDTH] IN BLOOD BY AUTOMATED COUNT: 16 % (ref 10–15)
FRAGMENTS BLD QL SMEAR: NORMAL
GLUCOSE SERPL-MCNC: 120 MG/DL (ref 70–99)
HCT VFR BLD AUTO: 29.6 % (ref 40–53)
HGB BLD-MCNC: 9.9 G/DL (ref 13.3–17.7)
HGB C CRYSTALS: NORMAL
HOWELL-JOLLY BOD BLD QL SMEAR: NORMAL
IMM GRANULOCYTES # BLD: 0 10E3/UL
IMM GRANULOCYTES NFR BLD: 0 %
LDH SERPL L TO P-CCNC: 294 U/L (ref 0–250)
LYMPHOCYTES # BLD AUTO: 1.1 10E3/UL (ref 0.8–5.3)
LYMPHOCYTES NFR BLD AUTO: 44 %
MCH RBC QN AUTO: 29.1 PG (ref 26.5–33)
MCHC RBC AUTO-ENTMCNC: 33.4 G/DL (ref 31.5–36.5)
MCV RBC AUTO: 87 FL (ref 78–100)
MONOCYTES # BLD AUTO: 0.3 10E3/UL (ref 0–1.3)
MONOCYTES NFR BLD AUTO: 11 %
NEUTROPHILS # BLD AUTO: 1.1 10E3/UL (ref 1.6–8.3)
NEUTROPHILS NFR BLD AUTO: 42 %
NEUTS HYPERSEG BLD QL SMEAR: NORMAL
NRBC # BLD AUTO: 0 10E3/UL
NRBC BLD AUTO-RTO: 0 /100
PLAT MORPH BLD: NORMAL
PLATELET # BLD AUTO: 103 10E3/UL (ref 150–450)
POLYCHROMASIA BLD QL SMEAR: NORMAL
POTASSIUM SERPL-SCNC: 4.1 MMOL/L (ref 3.4–5.3)
PROT SERPL-MCNC: 6.7 G/DL (ref 6.4–8.3)
RBC # BLD AUTO: 3.4 10E6/UL (ref 4.4–5.9)
RBC AGGLUT BLD QL: NORMAL
RBC MORPH BLD: NORMAL
ROULEAUX BLD QL SMEAR: NORMAL
SICKLE CELLS BLD QL SMEAR: NORMAL
SMUDGE CELLS BLD QL SMEAR: NORMAL
SODIUM SERPL-SCNC: 131 MMOL/L (ref 135–145)
SPHEROCYTES BLD QL SMEAR: NORMAL
STOMATOCYTES BLD QL SMEAR: NORMAL
TARGETS BLD QL SMEAR: NORMAL
TOXIC GRANULES BLD QL SMEAR: NORMAL
URATE SERPL-MCNC: 7.8 MG/DL (ref 3.4–7)
VARIANT LYMPHS BLD QL SMEAR: NORMAL
WBC # BLD AUTO: 2.5 10E3/UL (ref 4–11)

## 2024-02-20 PROCEDURE — 85025 COMPLETE CBC W/AUTO DIFF WBC: CPT | Performed by: INTERNAL MEDICINE

## 2024-02-20 PROCEDURE — 80053 COMPREHEN METABOLIC PANEL: CPT | Performed by: INTERNAL MEDICINE

## 2024-02-20 PROCEDURE — 83615 LACTATE (LD) (LDH) ENZYME: CPT | Performed by: INTERNAL MEDICINE

## 2024-02-20 PROCEDURE — 36415 COLL VENOUS BLD VENIPUNCTURE: CPT | Performed by: INTERNAL MEDICINE

## 2024-02-20 PROCEDURE — 84100 ASSAY OF PHOSPHORUS: CPT

## 2024-02-20 PROCEDURE — 84550 ASSAY OF BLOOD/URIC ACID: CPT | Performed by: INTERNAL MEDICINE

## 2024-02-21 ENCOUNTER — VIRTUAL VISIT (OUTPATIENT)
Dept: ONCOLOGY | Facility: CLINIC | Age: 69
End: 2024-02-21
Attending: NURSE PRACTITIONER
Payer: COMMERCIAL

## 2024-02-21 VITALS — BODY MASS INDEX: 26.51 KG/M2 | HEIGHT: 73 IN | WEIGHT: 200 LBS

## 2024-02-21 DIAGNOSIS — E79.0 HYPERURICEMIA: ICD-10-CM

## 2024-02-21 DIAGNOSIS — R79.89 INCREASE IN SERUM CREATININE FROM PRIOR MEASUREMENT: ICD-10-CM

## 2024-02-21 DIAGNOSIS — C83.07 SPLENIC MARGINAL ZONE B-CELL LYMPHOMA (H): Primary | ICD-10-CM

## 2024-02-21 LAB — PHOSPHATE SERPL-MCNC: 4.1 MG/DL (ref 2.5–4.5)

## 2024-02-21 PROCEDURE — 99214 OFFICE O/P EST MOD 30 MIN: CPT | Mod: 95 | Performed by: NURSE PRACTITIONER

## 2024-02-21 ASSESSMENT — PAIN SCALES - GENERAL: PAINLEVEL: NO PAIN (0)

## 2024-02-21 NOTE — LETTER
2/21/2024         RE: Jonathon Santos  [address] [address] [place] [id number]        Dear Colleague,    Thank you for referring your patient, Jonathon Santos, to the [facility]. Please see a copy of my visit note below.    Virtual Visit Details    Type of service:  Video Visit   Video Start Time: 12:06 PM  Video End Time:12:33 PM    Originating Location (pt. 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 Peripheral flow and subsequent bone marrow biopsy 10/27/23 showed several populations of clonal B-cells but the predominant one is CD5/I45-mxzehtoh low-grade B-cell lymphoma comprising about 40% of the bone marrow, but excisional LN biopsy recommended for further subclassification. There was another small CD5+ population compatible with monoclonal B-cell lymphocytosis. NGS negative for MYD88 mutation. Axillary LN biopsy 12/4/23 most consistent with CD5+ marginal zone lymphoma, splenic vs monik. Staging PET showed significant splenomegaly (20 cm with SUVmax 6.7), mildly-avid lymphadenopathy above/below diaphragm, and diffuse bone marrow uptake. .     Treatment history:  Rituximab 375 mg/m2 IV weekly 1/25, 2/2, 2/9, 2/16/2024    INTERVAL HISTORY:  Completed 4 doses of Rituxan.  Increased in energy to the point where he is planning activities.  For example, writing a letter to local government, pruning trees.  Describes this as more energy compared to prior to receiving therapy where he felt low on energy. After first infusion, has felt energy  2nd infusion felt uncomfortable, which he describes  as GI upset, lethargic, resolved quickly.  Splenomegaly discomfort/LUQ discomfort:  Subjectively feels there is improvement in size (smaller) as he is able to sleep better and early satiety.  Has not started Allopurinol 300 mg po daily.  Looking to start today.  Otherwise no fever/chills, night sweats, weight loss, SOB, chest pain, N/V, or diarrhea.     PAST MEDICAL HISTORY:  Locally advanced prostate cancer ([place] 4+5=9) dx 12/2020 s/p radiation 5/2021-7/2021 and 2 years of abiraterone/prednisone ending 6/2023  Hypertension  Hepatic steatosis   Peripheral neuropathy, possibly 2/2 B12 deficiency  Gout     FAMILY HISTORY:  Mother had lymphoma and lung cancer.      SOCIAL HISTORY:  Never smoker. Drinks 2-3 cans of beer daily. Smokes marijuana daily.   Lives in [place], [place] by himself.   Retired, previously worked for [employer]/'s office.   Has a dog and a horse    I have reviewed and updated the following:  Past Medical History:   Diagnosis Date    Anemia     Benign essential hypertension     Chronic kidney disease     Gout     Lymphoma (H)     Prostate cancer (H)     Thrombocytopenia (H24)       No Known Allergies    Current Outpatient Medications:     allopurinol (ZYLOPRIM) 300 MG tablet, Take 1 tablet (300 mg) by mouth daily, Disp: 30 tablet, Rfl: 3    cyanocobalamin (VITAMIN B-12) 1000 MCG SUBL sublingual tablet, Place 1,000 mcg under the tongue daily (with lunch), Disp: , Rfl:     lisinopril-hydrochlorothiazide (ZESTORETIC) 20-12.5 MG tablet, TAKE 2 TABLETS BY MOUTH  DAILY (Patient taking differently: Take 1 tablet by mouth every morning), Disp: 200 tablet, Rfl: 2    metoprolol succinate ER (TOPROL XL) 25 MG 24 hr tablet, TAKE 1 TABLET BY MOUTH  DAILY WITH LUNCH (Patient taking differently: Take 25 mg by mouth daily (with lunch)), Disp: 100 tablet, Rfl: 2    REVIEW OF SYSTEMS:  12-point ROS reviewed and negative other than that mentioned in HPI.     Objective    ECOG PS: 0     PHYSICAL  EXAM:  **Limited given video visit**  General: Patient appears well in no acute distress.   Skin: No rashes or lesions on visualized skin  Eyes: EOMI, no erythema, sclera icterus or discharge noted  Resp: Appears to be breathing comfortably without accessory muscle usage, speaking in full sentences, no cough  MSK: Appears to have normal range of motion based on visualized movements  Neurologic: No apparent tremors, facial movements symmetric  Psych: Affect bright, alert and oriented     Most Recent 3 CBC's:  Recent Labs   Lab Test 02/20/24  1152 02/16/24  0652 02/09/24  0907 02/10/22  1056 01/14/22  1113 12/16/21  1043   WBC 2.5* 3.7* 6.5   < > 4.2 4.4   HGB 9.9* 9.6* 8.8*   < > 12.9* 13.4   MCV 87 85 88   < > 101* 98   * 80* 52*   < > 87* 100*   ANEUTAUTO 1.1*  --   --   --  2.7 3.1    < > = values in this interval not displayed.     Most Recent 3 BMP's:  Recent Labs   Lab Test 02/20/24  1152 02/16/24  0652 02/02/24  0847   * 133* 136   POTASSIUM 4.1 4.2 4.2   CHLORIDE 96* 97* 101   CO2 24 23 24   BUN 17.6 19.2 20.8   CR 1.24* 1.16 1.08   ANIONGAP 11 13 11   MISTY 9.3 9.4 8.7*   * 134* 130*   PROTTOTAL 6.7 7.0 6.4   ALBUMIN 4.2 4.2 3.5    Most Recent 3 LFT's:  Recent Labs   Lab Test 02/20/24  1152 02/16/24  0652 02/02/24  0847   AST 29 41 35   ALT 35 47 49   ALKPHOS 84 81 90   BILITOTAL 1.1 1.0 1.3*    Most Recent 2 TSH and T4:  Recent Labs   Lab Test 06/15/22  1119   TSH 2.39     I reviewed the above labs today.    12/4/23 R axillary LN biopsy:  Lymph node, right axillary, excision:  - Involved by partially CD5-positive marginal zone lymphoma  - See comment    Comment:  Flow cytometry analysis on concurrent specimen ([id number]) showed 3 abnormal B-cell populations:  - CD5 negative lambda monotypic B cells (59%)  - CD5 positive lambda monotypic B cells (19%)  - CD5 positive kappa monotypic B cells (0.4%)  There was no aberrant immunophenotype on T cells by flow cytometry.     The morphologic and  immunophenotypic findings support the diagnosis of marginal zone lymphoma (MZL) and the differential diagnosis includes splenic MZL versus monik MZL versus MALT lymphoma. Of note, a small subset of neoplastic cells express CD5; this finding is described in both the splenic and monik marginal zone.  The diagnosis of MALT lymphoma is less likely given that generalized monik involvement is rare in subtype (fluorescent in situ hybridization (FISH) related to MALT lymphoma (such as BIRC3 and MALT1) are in progress on bone marrow specimen and will be reported separately).      The definitive diagnosis of splenic marginal zone lymphoma requires a splenectomy specimen; however, the overall clinical findings of patient including splenomegaly, lymphocytosis in the blood, the pattern of bone marrow involvement, and the IgD positivity in the tumor cells (as described in the revised 4th Ed of WHO) are most compatible with splenic marginal zone lymphoma.      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   Peripheral flow cytometry and subsequent bone marrow biopsy showed several populations of clonal B-cells but the predominant one was CD5/W63-ogtkqsrf low-grade B-cell lymphoma comprising about 40% of the bone marrow. There is another small CD5+ population compatible with monoclonal B-cell lymphocytosis. MYD88 NGS is negative.   PET showed significant splenomegaly (20 cm with SUVmax 6.7), mildly-avid lymphadenopathy above/below diaphragm, and diffuse bone marrow uptake. Given that Hemepath was unable to narrow the subtype of lymphoma from the bone marrow biopsy, we pursued excisional biopsy of the R axillary lymph node, which is now back consistent with partially CD5+ marginal zone lymphoma, with splenic subtype favored given his whole clinical presentation.   Reviewed the overall diagnosis of splenic marginal zone lymphoma as a type of  indolent non-Hodgkin B-cell lymphoma. We discussed that treatment is indicated due to his degree of thrombocytopenia and symptoms of LUQ discomfort/early satiety.   Dr. Burciaga recommendation: first-line treatment with rituximab monotherapy - weekly x 4 weeks with potential for maintenance every 2 months x 4 more doses, with expected response rates ~60-70%.   Will obtain first PET-CT after the 4 weekly doses. There is potential to add chemotherapy or targeted therapy if he is not responding.   Splenectomy would also be a later line option but not address the lymph node or bone marrow disease.   1/11/24: Has yet to start therapy due to anxiety, fatigue,  potential snow storm. I reiterated that the fatigue may be from his underlying diagnosis.  Regarding side effects, I reviewed the  rituximab infusion reaction the first cycle and the protocol we have in place for management. Also reviewed the fatigue and the risk of infections.   Need repeat labs as it has been over a month. He was in agreement.  Jonathon was due to start therapy twice now and he cancelled due to anxiety. He endorsed starting 1/26/24.  Currently scheduled at [facility].  Will reschedule location to [facility] due to high risk of infusion reaction and AGATHA presence at the [facility]. Considerchanging schedule additional doses at [facility] per patient preference.   2/21/24:  S/P 4 weekly doses of Rituxan.  Feels well. Plts normalizing and feels as if he spleen has decreased in size.    Hx of prostate cancer  Dx 12/2020, locally advanced ([place] 4+5=9) disease s/p radiation 5/2021-7/2021 and 2 years of abiraterone/prednisone ending 6/2023  Transferred care from Dr. Cisneros to Dr. Eduardo.   On surveillance.     Hyperuricemia  Gout History  No concern for TLS  Allopurinol 300 mg po daily    Cancer related prophylaxis  Up to date on flu and pneumonia shots.  Could use COVID booster and Shingrix series.    Plan from today's clinical encounter  Recheck labs 2/26/24 at  [facility]:  CBC with Diff, CMP, Uric Acid, Phos, LDH to assess WBC normalization, creatinine.  PET scheduled for 3/14/24  Follow up with Dr. Burciaga 3/22/24    39 minutes spent on the date of the encounter doing chart review, review of test results, interpretation of tests, patient visit, documentation, and discussion with other provider(s)     Romina GARCIA, ANP-BC, AOCNP  [facility] [address] [id number] [phone] (pager)

## 2024-02-21 NOTE — NURSING NOTE
Is the patient currently in the state of [place]? YES    Visit mode:VIDEO    If the visit is dropped, the patient can be reconnected by: VIDEO VISIT: Text to cell phone:   Telephone Information:   Mobile [phone]       Will anyone else be joining the visit? NO  (If patient encounters technical issues they should call [phone])    How would you like to obtain your AVS? MyChart    Are changes needed to the allergy or medication list? Pt stated no med changes    Reason for visit: RECHECK    No other vitals to report per pt    Darling Frye VVF

## 2024-02-21 NOTE — PROGRESS NOTES
Virtual Visit Details    Type of service:  Video Visit   Video Start Time: 12:06 PM  Video End Time:12:33 PM    Originating Location (pt. 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 Peripheral flow and subsequent bone marrow biopsy 10/27/23 showed several populations of clonal B-cells but the predominant one is CD5/I48-gilircpe low-grade B-cell lymphoma comprising about 40% of the bone marrow, but excisional LN biopsy recommended for further subclassification. There was another small CD5+ population compatible with monoclonal B-cell lymphocytosis. NGS negative for MYD88 mutation. Axillary LN biopsy 12/4/23 most consistent with CD5+ marginal zone lymphoma, splenic vs monik. Staging PET showed significant splenomegaly (20 cm with SUVmax 6.7), mildly-avid lymphadenopathy above/below diaphragm, and diffuse bone marrow uptake. .     Treatment history:  Rituximab 375 mg/m2 IV weekly 1/25, 2/2, 2/9, 2/16/2024    INTERVAL HISTORY:  Completed 4 doses of Rituxan.  Increased in energy to the point where he is planning activities.  For example, writing a letter to local government, pruning trees.  Describes this as more energy compared to prior to receiving therapy where he felt low on energy. After first infusion, has felt energy  2nd infusion felt uncomfortable, which he describes as GI upset, lethargic, resolved quickly.  Splenomegaly discomfort/LUQ discomfort:  Subjectively feels there is improvement in size (smaller) as he is able to sleep better and early satiety.  Has not started Allopurinol 300 mg po daily.  Looking to  start today.  Otherwise no fever/chills, night sweats, weight loss, SOB, chest pain, N/V, or diarrhea.     PAST MEDICAL HISTORY:  Locally advanced prostate cancer ([place] 4+5=9) dx 12/2020 s/p radiation 5/2021-7/2021 and 2 years of abiraterone/prednisone ending 6/2023  Hypertension  Hepatic steatosis   Peripheral neuropathy, possibly 2/2 B12 deficiency  Gout     FAMILY HISTORY:  Mother had lymphoma and lung cancer.      SOCIAL HISTORY:  Never smoker. Drinks 2-3 cans of beer daily. Smokes marijuana daily.   Lives in [place], [place] by himself.   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   Has a dog and a horse    I have reviewed and updated the following:  Past Medical History:   Diagnosis Date    Anemia     Benign essential hypertension     Chronic kidney disease     Gout     Lymphoma (H)     Prostate cancer (H)     Thrombocytopenia (H24)       No Known Allergies    Current Outpatient Medications:     allopurinol (ZYLOPRIM) 300 MG tablet, Take 1 tablet (300 mg) by mouth daily, Disp: 30 tablet, Rfl: 3    cyanocobalamin (VITAMIN B-12) 1000 MCG SUBL sublingual tablet, Place 1,000 mcg under the tongue daily (with lunch), Disp: , Rfl:     lisinopril-hydrochlorothiazide (ZESTORETIC) 20-12.5 MG tablet, TAKE 2 TABLETS BY MOUTH  DAILY (Patient taking differently: Take 1 tablet by mouth every morning), Disp: 200 tablet, Rfl: 2    metoprolol succinate ER (TOPROL XL) 25 MG 24 hr tablet, TAKE 1 TABLET BY MOUTH  DAILY WITH LUNCH (Patient taking differently: Take 25 mg by mouth daily (with lunch)), Disp: 100 tablet, Rfl: 2    REVIEW OF SYSTEMS:  12-point ROS reviewed and negative other than that mentioned in HPI.     Objective     ECOG PS: 0     PHYSICAL EXAM:  **Limited given video visit**  General: Patient appears well in no acute distress.   Skin: No rashes or lesions on visualized skin  Eyes: EOMI, no erythema, sclera icterus or discharge noted  Resp: Appears to be breathing comfortably without accessory  muscle usage, speaking in full sentences, no cough  MSK: Appears to have normal range of motion based on visualized movements  Neurologic: No apparent tremors, facial movements symmetric  Psych: Affect bright, alert and oriented     Most Recent 3 CBC's:  Recent Labs   Lab Test 02/20/24  1152 02/16/24  0652 02/09/24  0907 02/10/22  1056 01/14/22  1113 12/16/21  1043   WBC 2.5* 3.7* 6.5   < > 4.2 4.4   HGB 9.9* 9.6* 8.8*   < > 12.9* 13.4   MCV 87 85 88   < > 101* 98   * 80* 52*   < > 87* 100*   ANEUTAUTO 1.1*  --   --   --  2.7 3.1    < > = values in this interval not displayed.     Most Recent 3 BMP's:  Recent Labs   Lab Test 02/20/24  1152 02/16/24  0652 02/02/24  0847   * 133* 136   POTASSIUM 4.1 4.2 4.2   CHLORIDE 96* 97* 101   CO2 24 23 24   BUN 17.6 19.2 20.8   CR 1.24* 1.16 1.08   ANIONGAP 11 13 11   MISTY 9.3 9.4 8.7*   * 134* 130*   PROTTOTAL 6.7 7.0 6.4   ALBUMIN 4.2 4.2 3.5    Most Recent 3 LFT's:  Recent Labs   Lab Test 02/20/24  1152 02/16/24  0652 02/02/24  0847   AST 29 41 35   ALT 35 47 49   ALKPHOS 84 81 90   BILITOTAL 1.1 1.0 1.3*    Most Recent 2 TSH and T4:  Recent Labs   Lab Test 06/15/22  1119   TSH 2.39     I reviewed the above labs today.    12/4/23 R axillary LN biopsy:  Lymph node, right axillary, excision:  - Involved by partially CD5-positive marginal zone lymphoma  - See comment    Comment:  Flow cytometry analysis on concurrent specimen ([id number]) showed 3 abnormal B-cell populations:  - CD5 negative lambda monotypic B cells (59%)  - CD5 positive lambda monotypic B cells (19%)  - CD5 positive kappa monotypic B cells (0.4%)  There was no aberrant immunophenotype on T cells by flow cytometry.     The morphologic and immunophenotypic findings support the diagnosis of marginal zone lymphoma (MZL) and the differential diagnosis includes splenic MZL versus monik MZL versus MALT lymphoma. Of note, a small subset of neoplastic cells express CD5; this finding is described in  both the splenic and monik marginal zone.  The diagnosis of MALT lymphoma is less likely given that generalized monik involvement is rare in subtype (fluorescent in situ hybridization (FISH) related to MALT lymphoma (such as BIRC3 and MALT1) are in progress on bone marrow specimen and will be reported separately).      The definitive diagnosis of splenic marginal zone lymphoma requires a splenectomy specimen; however, the overall clinical findings of patient including splenomegaly, lymphocytosis in the blood, the pattern of bone marrow involvement, and the IgD positivity in the tumor cells (as described in the revised 4th Ed of WHO) are most compatible with splenic marginal zone lymphoma.      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   Peripheral flow cytometry and subsequent bone marrow biopsy showed several populations of clonal B-cells but the predominant one was CD5/Y18-mbqjewgs low-grade B-cell lymphoma comprising about 40% of the bone marrow. There is another small CD5+ population compatible with monoclonal B-cell lymphocytosis. MYD88 NGS is negative.   PET showed significant splenomegaly (20 cm with SUVmax 6.7), mildly-avid lymphadenopathy above/below diaphragm, and diffuse bone marrow uptake. Given that Hemepath was unable to narrow the subtype of lymphoma from the bone marrow biopsy, we pursued excisional biopsy of the R axillary lymph node, which is now back consistent with partially CD5+ marginal zone lymphoma, with splenic subtype favored given his whole clinical presentation.   Reviewed the overall diagnosis of splenic marginal zone lymphoma as a type of indolent non-Hodgkin B-cell lymphoma. We discussed that treatment is indicated due to his degree of thrombocytopenia and symptoms of LUQ discomfort/early satiety.   Dr. Burciaga recommendation: first-line treatment with rituximab monotherapy - weekly x 4 weeks with  potential for maintenance every 2 months x 4 more doses, with expected response rates ~60-70%.   Will obtain first PET-CT after the 4 weekly doses. There is potential to add chemotherapy or targeted therapy if he is not responding.   Splenectomy would also be a later line option but not address the lymph node or bone marrow disease.   1/11/24: Has yet to start therapy due to anxiety, fatigue,  potential snow storm. I reiterated that the fatigue may be from his underlying diagnosis.  Regarding side effects, I reviewed the  rituximab infusion reaction the first cycle and the protocol we have in place for management. Also reviewed the fatigue and the risk of infections.   Need repeat labs as it has been over a month. He was in agreement.  Jonathon was due to start therapy twice now and he cancelled due to anxiety. He endorsed starting 1/26/24.  Currently scheduled at [facility].  Will reschedule location to [facility] due to high risk of infusion reaction and AGATHA presence at the [facility]. Considerchanging schedule additional doses at [facility] per patient preference.   2/21/24:  S/P 4 weekly doses of Rituxan.  Feels well. Plts normalizing and feels as if he spleen has decreased in size.    Hx of prostate cancer  Dx 12/2020, locally advanced ([place] 4+5=9) disease s/p radiation 5/2021-7/2021 and 2 years of abiraterone/prednisone ending 6/2023  Transferred care from Dr. Cisneros to Dr. Eduardo.   On surveillance.     Hyperuricemia  Gout History  No concern for TLS  Allopurinol 300 mg po daily    Cancer related prophylaxis  Up to date on flu and pneumonia shots.  Could use COVID booster and Shingrix series.    Plan from today's clinical encounter  Recheck labs 2/26/24 at [facility]:  CBC with Diff, CMP, Uric Acid, Phos, LDH to assess WBC normalization, creatinine.  PET scheduled for 3/14/24  Follow up with Dr. Burciaga 3/22/24    39 minutes spent on the date of the encounter doing chart review, review of test results,  interpretation of tests, patient visit, documentation, and discussion with other provider(s)     Romina GARCIA, ANP-BC, AOCNP  [facility] [address] [address] [phone] (pager)

## 2024-02-26 ENCOUNTER — LAB (OUTPATIENT)
Dept: LAB | Facility: CLINIC | Age: 69
End: 2024-02-26
Payer: COMMERCIAL

## 2024-02-26 DIAGNOSIS — E78.2 MIXED HYPERLIPIDEMIA: ICD-10-CM

## 2024-02-26 DIAGNOSIS — E79.0 HYPERURICEMIA: ICD-10-CM

## 2024-02-26 DIAGNOSIS — C83.07 SPLENIC MARGINAL ZONE B-CELL LYMPHOMA (H): ICD-10-CM

## 2024-02-26 DIAGNOSIS — Z13.220 SCREENING FOR HYPERLIPIDEMIA: Primary | ICD-10-CM

## 2024-02-26 LAB
ALBUMIN SERPL BCG-MCNC: 4.4 G/DL (ref 3.5–5.2)
ALP SERPL-CCNC: 74 U/L (ref 40–150)
ALT SERPL W P-5'-P-CCNC: 35 U/L (ref 0–70)
ANION GAP SERPL CALCULATED.3IONS-SCNC: 12 MMOL/L (ref 7–15)
AST SERPL W P-5'-P-CCNC: 26 U/L (ref 0–45)
BASOPHILS # BLD AUTO: 0 10E3/UL (ref 0–0.2)
BASOPHILS NFR BLD AUTO: 1 %
BILIRUB SERPL-MCNC: 1.3 MG/DL
BUN SERPL-MCNC: 22.4 MG/DL (ref 8–23)
CALCIUM SERPL-MCNC: 9.5 MG/DL (ref 8.8–10.2)
CHLORIDE SERPL-SCNC: 99 MMOL/L (ref 98–107)
CHOLEST SERPL-MCNC: 160 MG/DL
CREAT SERPL-MCNC: 1.22 MG/DL (ref 0.67–1.17)
DEPRECATED HCO3 PLAS-SCNC: 24 MMOL/L (ref 22–29)
EGFRCR SERPLBLD CKD-EPI 2021: 64 ML/MIN/1.73M2
EOSINOPHIL # BLD AUTO: 0 10E3/UL (ref 0–0.7)
EOSINOPHIL NFR BLD AUTO: 1 %
ERYTHROCYTE [DISTWIDTH] IN BLOOD BY AUTOMATED COUNT: 17.2 % (ref 10–15)
FASTING STATUS PATIENT QL REPORTED: YES
GLUCOSE SERPL-MCNC: 113 MG/DL (ref 70–99)
HCT VFR BLD AUTO: 31 % (ref 40–53)
HDLC SERPL-MCNC: 37 MG/DL
HGB BLD-MCNC: 10.4 G/DL (ref 13.3–17.7)
IMM GRANULOCYTES # BLD: 0 10E3/UL
IMM GRANULOCYTES NFR BLD: 0 %
LDH SERPL L TO P-CCNC: 188 U/L (ref 0–250)
LDLC SERPL CALC-MCNC: 83 MG/DL
LYMPHOCYTES # BLD AUTO: 1.3 10E3/UL (ref 0.8–5.3)
LYMPHOCYTES NFR BLD AUTO: 39 %
MCH RBC QN AUTO: 29.6 PG (ref 26.5–33)
MCHC RBC AUTO-ENTMCNC: 33.5 G/DL (ref 31.5–36.5)
MCV RBC AUTO: 88 FL (ref 78–100)
MONOCYTES # BLD AUTO: 0.2 10E3/UL (ref 0–1.3)
MONOCYTES NFR BLD AUTO: 5 %
NEUTROPHILS # BLD AUTO: 1.8 10E3/UL (ref 1.6–8.3)
NEUTROPHILS NFR BLD AUTO: 54 %
NONHDLC SERPL-MCNC: 123 MG/DL
NRBC # BLD AUTO: 0 10E3/UL
NRBC BLD AUTO-RTO: 0 /100
PLATELET # BLD AUTO: 70 10E3/UL (ref 150–450)
POTASSIUM SERPL-SCNC: 4.3 MMOL/L (ref 3.4–5.3)
PROT SERPL-MCNC: 6.8 G/DL (ref 6.4–8.3)
RBC # BLD AUTO: 3.51 10E6/UL (ref 4.4–5.9)
SODIUM SERPL-SCNC: 135 MMOL/L (ref 135–145)
TRIGL SERPL-MCNC: 199 MG/DL
URATE SERPL-MCNC: 6.9 MG/DL (ref 3.4–7)
WBC # BLD AUTO: 3.3 10E3/UL (ref 4–11)

## 2024-02-26 PROCEDURE — 80053 COMPREHEN METABOLIC PANEL: CPT | Performed by: INTERNAL MEDICINE

## 2024-02-26 PROCEDURE — 85025 COMPLETE CBC W/AUTO DIFF WBC: CPT | Performed by: INTERNAL MEDICINE

## 2024-02-26 PROCEDURE — 80061 LIPID PANEL: CPT

## 2024-02-26 PROCEDURE — 36415 COLL VENOUS BLD VENIPUNCTURE: CPT | Performed by: INTERNAL MEDICINE

## 2024-02-26 PROCEDURE — 84550 ASSAY OF BLOOD/URIC ACID: CPT | Performed by: INTERNAL MEDICINE

## 2024-02-26 PROCEDURE — 83615 LACTATE (LD) (LDH) ENZYME: CPT | Performed by: INTERNAL MEDICINE

## 2024-02-28 NOTE — PROGRESS NOTES
Rheumatology Clinic Visit  [facility]  DEVON Linda     Jonathon Santos MRN# [id number]   YOB: 1955 Age: 68 year old   Date of Visit: 3/05/2024  Primary care provider: Jonathon Gonzales          Assessment and Plan:     1.  Idiopathic chronic gout  2.  On allopurinol    Patient presents today for follow-up regarding his chronic idiopathic gout with tophi.  He has not had any flares.  His oncologist did increase his allopurinol dose to 300 mg.  He started taking this dose 3 weeks ago.  He still has some tophi over the left elbow, this appears to be stable.  At this time I recommend that he continue on the 300 mg daily.  It would be great if we could get his uric acid to less than 6, less than 5 is ideal.  He does have standing orders for his uric acid to be checked.  He will follow-up with me in 6 months, sooner if needed.  Encouraged him to reach out if he does have any gout flares.        Plan:     Schedule follow-up with Jennifer Mars PA-C in 6 months, okay to be video visit.   Labs: uric acid monthly   Medication recommendations:   Continue Allopurinol 300mg daily    DEVON Linda  Rheumatology         History of Present Illness:   Jonathon Santos presents for evaluation of gout.     Interval history March 5, 2024:  He's been on 300mg daily of Allopurinol. He has been on this dose for 3 weeks and is tolerating it well. He has not had any flares since his last visit.     Interval history March 24, 2023:  He reports that he has not had any flares with his gout. He is taking Allopurinol 150mg daily. He states that he has not been diligent about taking it. He states that he has been better about taking it more daily since his uric acid was elevated.     HPI from consult on 9/20/2022:  Patient saw Dr. Mcpherson on April 26, 2021.  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  He does have a history of prostate cancer.  Reports of  significant side effects from allopurinol.  Had been on Uloric..  Plan was to continue on 40 mg daily.    He was initially diagnosed with RA. He then saw Dr. Wang and was told he does not have RA and was diagnosed with gout. This was 3-4 years ago. He is currently on Zytiga and was getting reactions. So he switched to Uloric from Allopurinol. This did not help the reactions. He would like to go back on Allopurinol. He has not had any flares. He is not interested in taking more medication. He does have peripheral neuropathy likely from Chemo. He will be done with it at the end of December.     His middle PIPs have been affected by the gout.          Review of Systems:     Constitutional: negative  Skin: negative  Eyes: negative  Ears/Nose/Throat: negative  Respiratory: No shortness of breath, dyspnea on exertion, cough, or hemoptysis  Cardiovascular: negative  Gastrointestinal: negative  Genitourinary: negative  Musculoskeletal: as above  Neurologic: negative  Psychiatric: negative  Hematologic/Lymphatic/Immunologic: negative  Endocrine: negative         Active Problem List:     Patient Active Problem List    Diagnosis Date Noted    Hyperuricemia 01/24/2024     Priority: Medium    Thrombocytopenia (H24) 01/11/2024     Priority: Medium    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     Priority: Medium    Non-Hodgkin lymphoma of lymph nodes of multiple regions, unspecified non-Hodgkin lymphoma type (H) 11/09/2023     Priority: Medium    Other pancytopenia (H) 06/08/2023     Priority: Medium    Rheumatoid arthritis involving multiple sites with positive rheumatoid factor (H) 06/08/2023     Priority: Medium    Regional lymph node metastasis present (H) 01/18/2021     Priority: Medium    Prostate cancer (H) 12/21/2020     Priority: Medium    Mixed hyperlipidemia 09/24/2019     Priority: Medium    Uncontrolled hypertension 09/24/2019     Priority: Medium    Inflammatory arthritis 09/17/2019     Priority: Medium      June '12, +CRP and sed rate.  serologies neg.  Knee tap pos. for rheumatoid   factor.      Renal insufficiency syndrome 08/05/2016     Priority: Medium     Creatinine 1.4 in 2012      Gout 07/30/2012     Priority: Medium     Uric acid normal after HCTZ stopped.  Knee tap pos for uric acid crystals June '12.    Uric acid normal after HCTZ stopped.  Knee tap pos for uric acid crystals June '12.              Past Medical History:     Past Medical History:   Diagnosis Date    Anemia     Benign essential hypertension     Chronic kidney disease     Gout     Lymphoma (H)     Prostate cancer (H)     Thrombocytopenia (H24)      Past Surgical History:   Procedure Laterality Date    BIOPSY LYMPH NODE AXILLA Right 12/4/2023    Procedure: BIOPSY, LYMPH NODE, AXILLARY RIGHT;  Surgeon: Manjinder Bright MD;  Location: [facility] OR    COLONOSCOPY      COLONOSCOPY N/A 7/29/2022    Procedure: COLONOSCOPY, WITH POLYPECTOMY AND BIOPSY;  Surgeon: Ward Bearden DO;  Location: [facility]    FOOT SURGERY      HC REVISE MEDIAN N/CARPAL TUNNEL SURG      Description: Neuroplasty Decompression Median Nerve At Carpal Tunnel;  Recorded: 05/20/2013;    INSERT SEED MARKER / MATRIX N/A 4/30/2021    Procedure: Transrectal ultrasound guided placement of fiducials and SpaceOar;  Surgeon: Ferdinand Caban MD;  Location: [facility]    PROSTATE BIOPSY, NEEDLE, SATURATION SAMPLING      SPINE SURGERY      unspecified low back surgery            Social History:     Social History     Socioeconomic History    Marital status: Single     Spouse name: Not on file    Number of children: 2    Years of education: Not on file    Highest education level: Not on file   Occupational History    Occupation: retired    Tobacco Use    Smoking status: Never     Passive exposure: Never    Smokeless tobacco: Former     Types: Chew     Quit date: 2011   Substance and Sexual Activity    Alcohol use: Yes     Alcohol/week: 7.0 - 14.0 standard drinks  "of alcohol     Types: 7 - 14 Cans of beer per week     Comment: 1-2 beers daily    Drug use: Never    Sexual activity: Not Currently   Other Topics Concern    Not on file   Social History Narrative    Not on file     Social Determinants of Health     Financial Resource Strain: Not on file   Food Insecurity: Not on file   Transportation Needs: Not on file   Physical Activity: Not on file   Stress: Not on file   Social Connections: Not on file   Interpersonal Safety: Unknown (9/8/2023)    Humiliation, Afraid, Rape, and Kick questionnaire     Fear of Current or Ex-Partner: No     Emotionally Abused: No     Physically Abused: Not on file     Sexually Abused: Not on file   Housing Stability: Not on file          Family History:     Family History   Problem Relation Age of Onset    Hypertension Mother     Gout Mother     Hypertension Father     Diabetes Father     Hypertension Sister     Hypertension Brother     Hypertension Maternal Grandmother     Hypertension Maternal Grandfather     Diabetes Cousin     Deep Vein Thrombosis No family hx of     Anesthesia Reaction No family hx of             Allergies:   No Known Allergies         Medications:     Current Outpatient Medications   Medication Sig Dispense Refill    allopurinol (ZYLOPRIM) 300 MG tablet Take 1 tablet (300 mg) by mouth daily 30 tablet 3    cyanocobalamin (VITAMIN B-12) 1000 MCG SUBL sublingual tablet Place 1,000 mcg under the tongue daily (with lunch)      lisinopril-hydrochlorothiazide (ZESTORETIC) 20-12.5 MG tablet TAKE 2 TABLETS BY MOUTH  DAILY (Patient taking differently: Take 1 tablet by mouth every morning) 200 tablet 2    metoprolol succinate ER (TOPROL XL) 25 MG 24 hr tablet TAKE 1 TABLET BY MOUTH  DAILY WITH LUNCH (Patient taking differently: Take 25 mg by mouth daily (with lunch)) 100 tablet 2            Physical Exam:   Blood pressure 131/78, pulse 59, resp. rate 12, height 1.848 m (6' 0.74\"), weight 96.1 kg (211 lb 12.8 oz), SpO2 100%.  Wt " Readings from Last 6 Encounters:   24 90.7 kg (200 lb)   24 90.8 kg (200 lb 3.2 oz)   24 93.2 kg (205 lb 8 oz)   24 94.3 kg (207 lb 12.8 oz)   24 93.7 kg (206 lb 8 oz)   24 95.3 kg (210 lb)     Constitutional: well-developed, appearing stated age; cooperative  Eyes: nl  conjunctiva, sclera  ENT: nl external ears, nose, hearing, lips,   Resp: No shortness of breath with normal conversation  MSK: Small tophi over the left olecranon   Psych: nl judgement, orientation, memory, affect.           Data:   Imagin2017 xray hands    FINDINGS: No evidence of fracture or contusion in either hand or wrist.   Moderate degenerative osteoarthritic changes at the left first CMC joint.   Scattered mild degenerative osteoarthritic changes at the PIP and DIP   joints. No evidence of erosive changes.    2017 xray foot  FINDINGS: Periarticular erosive changes involving the medial aspect of the   left first metatarsal head likely secondary to gout. There are also some   periarticular erosive changes involving the left and right fifth metatarsal    heads with associated soft tissue swelling.      Laboratory:  2012  Moderate intracellular crystals present consistent with uric acid of the left knee synovial fluid    2022  Uric acid 7.3    2022  Creatinine 1.16, GFR 69  Albumin 4.6  ALT 32, AST 28  Vitamin B12 338  White blood cell count 4.6, hemoglobin 12.7, platelet count 111    2022  Creatinine 1.00, GFR 82  ALT 32, AST 25  Albumin 4.4  Uric acid 5.3  White blood cell count 4.8, hemoglobin 12.6, platelet count 102    2023  Uric acid 9.1    2024  Uric acid 7.8    2024  Uric acid 6.9  White blood cell count 3.3, hemoglobin 10.4, platelet count 70  Creatinine 1.22, GFR 64  Albumin 4.4  ALT 35, AST 26

## 2024-03-05 ENCOUNTER — OFFICE VISIT (OUTPATIENT)
Dept: RHEUMATOLOGY | Facility: CLINIC | Age: 69
End: 2024-03-05
Payer: COMMERCIAL

## 2024-03-05 VITALS
DIASTOLIC BLOOD PRESSURE: 78 MMHG | RESPIRATION RATE: 12 BRPM | BODY MASS INDEX: 28.07 KG/M2 | WEIGHT: 211.8 LBS | HEART RATE: 59 BPM | SYSTOLIC BLOOD PRESSURE: 131 MMHG | HEIGHT: 73 IN | OXYGEN SATURATION: 100 %

## 2024-03-05 DIAGNOSIS — Z79.899 ON ALLOPURINOL THERAPY: ICD-10-CM

## 2024-03-05 DIAGNOSIS — M1A.09X1 IDIOPATHIC CHRONIC GOUT, MULTIPLE SITES, WITH TOPHUS (TOPHI): Primary | ICD-10-CM

## 2024-03-05 PROCEDURE — 99213 OFFICE O/P EST LOW 20 MIN: CPT | Performed by: PHYSICIAN ASSISTANT

## 2024-03-05 RX ORDER — ALLOPURINOL 300 MG/1
300 TABLET ORAL DAILY
Qty: 90 TABLET | Refills: 1 | Status: SHIPPED | OUTPATIENT
Start: 2024-03-05 | End: 2024-03-22

## 2024-03-05 RX ORDER — ALLOPURINOL 300 MG/1
300 TABLET ORAL DAILY
Qty: 30 TABLET | Refills: 3 | Status: CANCELLED | OUTPATIENT
Start: 2024-03-05

## 2024-03-05 ASSESSMENT — PAIN SCALES - GENERAL: PAINLEVEL: NO PAIN (0)

## 2024-03-05 NOTE — PATIENT INSTRUCTIONS
After Visit Instructions:     Thank you for coming to [facility] for your care. It is my goal to partner with you to help you reach your optimal state of health.       Plan:     Schedule follow-up with Jennifer Mars PA-C in 6 months, okay to be video visit.   Labs: uric acid monthly   Medication recommendations:   Continue Allopurinol 300mg daily      Jennifer Mars PA-C  [facility]  [facility]    Contact information: [facility]  Clinic Number:  [phone]  Please call or send a [employer] message with any questions about your care

## 2024-03-12 ENCOUNTER — PATIENT OUTREACH (OUTPATIENT)
Dept: GASTROENTEROLOGY | Facility: CLINIC | Age: 69
End: 2024-03-12
Payer: COMMERCIAL

## 2024-03-12 DIAGNOSIS — I10 UNCONTROLLED HYPERTENSION: ICD-10-CM

## 2024-03-12 RX ORDER — METOPROLOL SUCCINATE 25 MG/1
TABLET, EXTENDED RELEASE ORAL
Qty: 100 TABLET | Refills: 2 | Status: SHIPPED | OUTPATIENT
Start: 2024-03-12

## 2024-03-14 ENCOUNTER — HOSPITAL ENCOUNTER (OUTPATIENT)
Dept: PET IMAGING | Facility: CLINIC | Age: 69
Discharge: HOME | End: 2024-03-14
Attending: INTERNAL MEDICINE | Admitting: INTERNAL MEDICINE
Payer: COMMERCIAL

## 2024-03-14 DIAGNOSIS — C83.07 SPLENIC MARGINAL ZONE B-CELL LYMPHOMA (H): ICD-10-CM

## 2024-03-14 PROCEDURE — A9552 F18 FDG: HCPCS | Performed by: INTERNAL MEDICINE

## 2024-03-14 PROCEDURE — 343N000001 HC RX 343: Performed by: INTERNAL MEDICINE

## 2024-03-14 PROCEDURE — 78816 PET IMAGE W/CT FULL BODY: CPT | Mod: PS

## 2024-03-14 RX ADMIN — FLUDEOXYGLUCOSE F-18 12.1 MILLICURIE: 500 INJECTION, SOLUTION INTRAVENOUS at 08:21

## 2024-03-22 ENCOUNTER — APPOINTMENT (OUTPATIENT)
Dept: LAB | Facility: CLINIC | Age: 69
End: 2024-03-22
Attending: INTERNAL MEDICINE
Payer: COMMERCIAL

## 2024-03-22 ENCOUNTER — ONCOLOGY VISIT (OUTPATIENT)
Dept: ONCOLOGY | Facility: CLINIC | Age: 69
End: 2024-03-22
Attending: INTERNAL MEDICINE
Payer: COMMERCIAL

## 2024-03-22 VITALS
RESPIRATION RATE: 18 BRPM | BODY MASS INDEX: 28.48 KG/M2 | DIASTOLIC BLOOD PRESSURE: 88 MMHG | WEIGHT: 214.3 LBS | TEMPERATURE: 97.7 F | OXYGEN SATURATION: 99 % | SYSTOLIC BLOOD PRESSURE: 149 MMHG | HEART RATE: 71 BPM

## 2024-03-22 DIAGNOSIS — M1A.09X1 IDIOPATHIC CHRONIC GOUT, MULTIPLE SITES, WITH TOPHUS (TOPHI): ICD-10-CM

## 2024-03-22 DIAGNOSIS — E80.6 HYPERBILIRUBINEMIA: ICD-10-CM

## 2024-03-22 DIAGNOSIS — C83.07 SPLENIC MARGINAL ZONE B-CELL LYMPHOMA (H): Primary | ICD-10-CM

## 2024-03-22 LAB
ALBUMIN SERPL BCG-MCNC: 4.6 G/DL (ref 3.5–5.2)
ALP SERPL-CCNC: 66 U/L (ref 40–150)
ALT SERPL W P-5'-P-CCNC: 25 U/L (ref 0–70)
ANION GAP SERPL CALCULATED.3IONS-SCNC: 11 MMOL/L (ref 7–15)
AST SERPL W P-5'-P-CCNC: 28 U/L (ref 0–45)
BASOPHILS # BLD AUTO: 0.1 10E3/UL (ref 0–0.2)
BASOPHILS NFR BLD AUTO: 1 %
BILIRUB DIRECT SERPL-MCNC: 0.35 MG/DL (ref 0–0.3)
BILIRUB SERPL-MCNC: 1.4 MG/DL
BUN SERPL-MCNC: 16.9 MG/DL (ref 8–23)
CALCIUM SERPL-MCNC: 9.6 MG/DL (ref 8.8–10.2)
CHLORIDE SERPL-SCNC: 100 MMOL/L (ref 98–107)
CREAT SERPL-MCNC: 1.12 MG/DL (ref 0.67–1.17)
DEPRECATED HCO3 PLAS-SCNC: 24 MMOL/L (ref 22–29)
EGFRCR SERPLBLD CKD-EPI 2021: 71 ML/MIN/1.73M2
EOSINOPHIL # BLD AUTO: 0.2 10E3/UL (ref 0–0.7)
EOSINOPHIL NFR BLD AUTO: 5 %
ERYTHROCYTE [DISTWIDTH] IN BLOOD BY AUTOMATED COUNT: 16.6 % (ref 10–15)
GLUCOSE SERPL-MCNC: 121 MG/DL (ref 70–99)
HCT VFR BLD AUTO: 36.6 % (ref 40–53)
HGB BLD-MCNC: 12.8 G/DL (ref 13.3–17.7)
IMM GRANULOCYTES # BLD: 0 10E3/UL
IMM GRANULOCYTES NFR BLD: 0 %
LDH SERPL L TO P-CCNC: 171 U/L (ref 0–250)
LYMPHOCYTES # BLD AUTO: 0.6 10E3/UL (ref 0.8–5.3)
LYMPHOCYTES NFR BLD AUTO: 17 %
MCH RBC QN AUTO: 32 PG (ref 26.5–33)
MCHC RBC AUTO-ENTMCNC: 35 G/DL (ref 31.5–36.5)
MCV RBC AUTO: 92 FL (ref 78–100)
MONOCYTES # BLD AUTO: 0.2 10E3/UL (ref 0–1.3)
MONOCYTES NFR BLD AUTO: 6 %
NEUTROPHILS # BLD AUTO: 2.6 10E3/UL (ref 1.6–8.3)
NEUTROPHILS NFR BLD AUTO: 71 %
NRBC # BLD AUTO: 0 10E3/UL
NRBC BLD AUTO-RTO: 0 /100
PLATELET # BLD AUTO: 82 10E3/UL (ref 150–450)
POTASSIUM SERPL-SCNC: 4 MMOL/L (ref 3.4–5.3)
PROT SERPL-MCNC: 7.1 G/DL (ref 6.4–8.3)
RBC # BLD AUTO: 4 10E6/UL (ref 4.4–5.9)
SODIUM SERPL-SCNC: 135 MMOL/L (ref 135–145)
URATE SERPL-MCNC: 5.9 MG/DL (ref 3.4–7)
WBC # BLD AUTO: 3.8 10E3/UL (ref 4–11)

## 2024-03-22 PROCEDURE — 85025 COMPLETE CBC W/AUTO DIFF WBC: CPT | Performed by: INTERNAL MEDICINE

## 2024-03-22 PROCEDURE — 99214 OFFICE O/P EST MOD 30 MIN: CPT | Performed by: INTERNAL MEDICINE

## 2024-03-22 PROCEDURE — 83615 LACTATE (LD) (LDH) ENZYME: CPT | Performed by: INTERNAL MEDICINE

## 2024-03-22 PROCEDURE — 36415 COLL VENOUS BLD VENIPUNCTURE: CPT | Performed by: INTERNAL MEDICINE

## 2024-03-22 PROCEDURE — G0463 HOSPITAL OUTPT CLINIC VISIT: HCPCS | Performed by: INTERNAL MEDICINE

## 2024-03-22 PROCEDURE — 84550 ASSAY OF BLOOD/URIC ACID: CPT | Performed by: INTERNAL MEDICINE

## 2024-03-22 PROCEDURE — G2211 COMPLEX E/M VISIT ADD ON: HCPCS | Performed by: INTERNAL MEDICINE

## 2024-03-22 PROCEDURE — 82248 BILIRUBIN DIRECT: CPT | Performed by: INTERNAL MEDICINE

## 2024-03-22 PROCEDURE — 80053 COMPREHEN METABOLIC PANEL: CPT | Performed by: INTERNAL MEDICINE

## 2024-03-22 ASSESSMENT — PAIN SCALES - GENERAL: PAINLEVEL: NO PAIN (0)

## 2024-03-22 NOTE — PROGRESS NOTES
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 Peripheral flow and subsequent bone marrow biopsy 10/27/23 showed several populations of clonal B-cells but the predominant one is CD5/Y04-[id number] low-grade B-cell lymphoma comprising about 40% of the bone marrow, but excisional LN biopsy recommended for further subclassification. There was another small CD5+ population compatible with monoclonal B-cell lymphocytosis. NGS negative for MYD88 mutation. Axillary LN biopsy 12/4/23 most consistent with CD5+ marginal zone lymphoma, splenic vs monik. Staging PET showed significant splenomegaly (20 cm with SUVmax 6.7), mildly-avid lymphadenopathy above/below diaphragm, and diffuse bone marrow uptake. .     Treatment history:  1/25/24 to 2/16/2024: Rituximab 375 mg/m2 IV weekly x 4 doses (indication for starting treatment was thrombocytopenia and early satiety). PET with CMR    INTERVAL HISTORY:  Jonathon is here today for EOT follow-up. Doing quite well overall, has much more energy and less LUQ discomfort than before. Denies any fever/chills, night sweats, recent infections, SOB, chest pain, N/V, or diarrhea.     PAST MEDICAL HISTORY:  Locally advanced prostate cancer ([place] 4+5=9) dx 12/2020 s/p radiation 5/2021-7/2021 and 2 years of abiraterone/prednisone ending 6/2023  Hypertension  Hepatic steatosis   Peripheral neuropathy, possibly 2/2 B12 deficiency  Gout     FAMILY HISTORY:  Mother had lymphoma and lung cancer.      SOCIAL HISTORY:  Never smoker. Drinks 2-3 cans of beer daily. Smokes marijuana daily.   Lives in [place], [place] by himself.   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   Has a dog and a horse    I have reviewed and  updated the following:  Past Medical History:   Diagnosis Date    Anemia     Benign essential hypertension     Chronic kidney disease     Gout     Lymphoma (H)     Prostate cancer (H)     Thrombocytopenia (H24)       No Known Allergies    Current Outpatient Medications:     allopurinol (ZYLOPRIM) 300 MG tablet, Take 1 tablet (300 mg) by mouth daily, Disp: 90 tablet, Rfl: 1    cyanocobalamin (VITAMIN B-12) 1000 MCG SUBL sublingual tablet, Place 1,000 mcg under the tongue daily (with lunch), Disp: , Rfl:     lisinopril-hydrochlorothiazide (ZESTORETIC) 20-12.5 MG tablet, TAKE 2 TABLETS BY MOUTH  DAILY (Patient taking differently: Take 1 tablet by mouth every morning), Disp: 200 tablet, Rfl: 2    metoprolol succinate ER (TOPROL XL) 25 MG 24 hr tablet, TAKE 1 TABLET BY MOUTH DAILY  WITH LUNCH, Disp: 100 tablet, Rfl: 2    REVIEW OF SYSTEMS:  10-point ROS reviewed and negative other than that mentioned in HPI.     Objective   VITALS:  BP (!) 149/88 (BP Location: Right arm, Patient Position: Sitting, Cuff Size: Adult Large)   Pulse 71   Temp 97.7  F (36.5  C) (Oral)   Resp 18   Wt 97.2 kg (214 lb 4.8 oz)   SpO2 99%   BMI 28.48 kg/m       ECOG PS: 0     PHYSICAL EXAM:  General: Awake, alert, in no acute distress.   HEENT: Normocephalic, atraumatic. No scleral icterus.   CV: Regular rate and rhythm. No murmurs, rubs, or gallops appreciated.  Resp: Good inspiratory effort, lungs clear to auscultation bilaterally.  GI: Abdomen soft, nontender, nondistended.   Ext: No peripheral edema bilaterally.  Neuro: CN II-XII grossly intact. No focal deficits appreciated.  Skin: No rash, unusual bruising or prominent lesions.  Psych: Pleasant, normal affect.     Most Recent 3 CBC's:  Recent Labs   Lab Test 03/22/24  1302 02/26/24  1149 02/20/24  1152   WBC 3.8* 3.3* 2.5*   HGB 12.8* 10.4* 9.9*   MCV 92 88 87   PLT 82* 70* 103*   ANEUTAUTO 2.6 1.8 1.1*     Most Recent 3 BMP's:  Recent Labs   Lab Test 02/26/24  1149 02/20/24  1151  02/16/24  0652    131* 133*   POTASSIUM 4.3 4.1 4.2   CHLORIDE 99 96* 97*   CO2 24 24 23   BUN 22.4 17.6 19.2   CR 1.22* 1.24* 1.16   ANIONGAP 12 11 13   MISTY 9.5 9.3 9.4   * 120* 134*   PROTTOTAL 6.8 6.7 7.0   ALBUMIN 4.4 4.2 4.2    Most Recent 3 LFT's:  Recent Labs   Lab Test 02/26/24  1149 02/20/24  1152 02/16/24  0652   AST 26 29 41   ALT 35 35 47   ALKPHOS 74 84 81   BILITOTAL 1.3* 1.1 1.0    Most Recent 2 TSH and T4:  Recent Labs   Lab Test 06/15/22  1119   TSH 2.39     I reviewed the above labs today.    3/14/24 PET:  IMPRESSION:  Improving splenomegaly with decreased FDG uptake. Persistent FDG uptake within a right axillary lymph node which is similar to that of liver. Additional lymph nodes above and below the diaphragm no longer demonstrate FDG avidity greater than that of   blood pool. Findings are most compatible with a near complete response to therapy. Lindsey 3.    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 Peripheral flow cytometry and subsequent bone marrow biopsy showed several populations of clonal B-cells but the predominant one was CD5/J41-jmonceac low-grade B-cell lymphoma comprising about 40% of the bone marrow. There is another small CD5+ population compatible with monoclonal B-cell lymphocytosis. MYD88 NGS is negative. His PET showed significant splenomegaly (20 cm with SUVmax 6.7), mildly-avid lymphadenopathy above/below diaphragm, and diffuse bone marrow uptake. Given that Hemepath was unable to narrow the subtype of lymphoma from the bone marrow biopsy, we pursued excisional biopsy of the R axillary lymph node, which showed partially CD5+ marginal zone lymphoma, with splenic subtype favored given his whole clinical presentation. Treatment was indicated due to his degree of thrombocytopenia and symptoms of LUQ discomfort/early satiety.   I recommended  first-line treatment with rituximab monotherapy - weekly x 4 weeks with potential for consolidation every 2 months x 4 more doses, with expected response rates ~60-70%.   Will obtain first PET-CT after the 4 weekly doses. There is potential to add chemotherapy or targeted therapy if he is not responding.   Splenectomy would also be a later line option but would not address the lymph node or bone marrow disease.   Completed 4 weekly doses of rituximab 1/25/24 to 2/16/24. Tolerated fairly well other than rituximab infusion reaction first dose (SOB/chest tightness) and nausea second dose.   3/14/24 PET shows CMR ([place] 3, only one R axillary node remaining) and decreased splenomegaly (20.8 -> 16.3 cm). Clinically responding as well with Hgb up to 12.8 and platelets up to .  However spleen is still quite enlarged with thrombocytopenia. Thus we discussed continuing rituximab q2 months for 4 more doses to see if we can shrink the spleen further and improve blood counts. He is agreeable to proceed.     Hx of prostate cancer  Dx 12/2020, locally advanced ([place] 4+5=9) disease s/p radiation 5/2021-7/2021 and 2 years of abiraterone/prednisone ending 6/2023.  Transferred care from Dr. Cisneros to Dr. Eduardo.   On surveillance.     Hyperuricemia  Gout History  Continue allopurinol.    Cancer related prophylaxis  Continue acyclovir for HSV ppx.   Up to date on flu and pneumonia shots. Could use Shingrix series when he completes rituximab.       PLAN:  - Consolidative rituximab q2 months starting ~4/26/24    Total of 30 minutes on patient visit, reviewing records, interpreting test results, placing orders, and documentation on the day of service.    The longitudinal plan of care for the diagnosis(es)/condition(s) as documented were addressed during this visit. Due to the added complexity in care, I will continue to support Jonathon in the subsequent management and with ongoing continuity of care.     Vannesa Burciaga MD  Attending  Physician, [facility]

## 2024-03-22 NOTE — NURSING NOTE
"Oncology Rooming Note    March 22, 2024 1:30 PM   Jonathon Santos is a 69 year old male who presents for:    Chief Complaint   Patient presents with    Blood Draw     Labs drawn via  by RN. VS taken.    Oncology Clinic Visit     Splenic marginal zone b-cell lymphoma     Initial Vitals: BP (!) 149/88 (BP Location: Right arm, Patient Position: Sitting, Cuff Size: Adult Large)   Pulse 71   Temp 97.7  F (36.5  C) (Oral)   Resp 18   Wt 97.2 kg (214 lb 4.8 oz)   SpO2 99%   BMI 28.48 kg/m   Estimated body mass index is 28.48 kg/m  as calculated from the following:    Height as of 3/5/24: 1.848 m (6' 0.74\").    Weight as of this encounter: 97.2 kg (214 lb 4.8 oz). Body surface area is 2.23 meters squared.  No Pain (0) Comment: Data Unavailable   No LMP for male patient.  Allergies reviewed: Yes  Medications reviewed: Yes    Medications: Medication refills not needed today.  Pharmacy name entered into Nol[facility]:    [facility] - [place], [place] - [address] [place] [address] [facility]  [facility] - [place], [place] - [address] [place] [address]    Frailty Screening:   Is the patient here for a new oncology consult visit in cancer care? 2. No      Clinical concerns: None       Yasmine Werner CMA            "

## 2024-03-22 NOTE — NURSING NOTE
Chief Complaint   Patient presents with    Blood Draw     Labs drawn via  by RN. VS taken.     Labs collected from venipuncture by RN. Vitals taken. Checked in for appointment(s).     Rohini Mahoney RN

## 2024-03-22 NOTE — LETTER
3/22/2024         RE: Jonathon Santos  [id number] [address] [facility] [id number]        Dear Colleague,    Thank you for referring your patient, Jonathon Santos, to the [facility]. Please see a copy of my visit note below.    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 Peripheral flow and subsequent bone marrow biopsy 10/27/23 showed several populations of clonal B-cells but the predominant one is CD5/I67-mxneftbn low-grade B-cell lymphoma comprising about 40% of the bone marrow, but excisional LN biopsy recommended for further subclassification. There was another small CD5+ population compatible with monoclonal B-cell lymphocytosis. NGS negative for MYD88 mutation. Axillary LN biopsy 12/4/23 most consistent with CD5+ marginal zone lymphoma, splenic vs monik. Staging PET showed significant splenomegaly (20 cm with SUVmax 6.7), mildly-avid lymphadenopathy above/below diaphragm, and diffuse bone marrow uptake. .     Treatment history:  Rituximab 375 mg/m2 IV weekly 1/25, 2/2, 2/9, 2/16/2024    INTERVAL HISTORY:  Completed 4 doses of Rituxan.  Increased in energy to the point where he is planning activities.  For example, writing a letter to local government, pruning trees.  Describes this as more energy compared to prior to receiving therapy where he felt low on energy. After first infusion, has felt energy  2nd infusion felt uncomfortable, which he describes as GI upset, lethargic, resolved quickly.  Splenomegaly discomfort/LUQ discomfort:  Subjectively feels there is improvement in size (smaller) as he is able to sleep better and early satiety.  Has not started Allopurinol 300 mg po daily.  Looking to  start today.  Otherwise no fever/chills, night sweats, weight loss, SOB, chest pain, N/V, or diarrhea.     PAST MEDICAL HISTORY:  Locally advanced prostate cancer (Bernard 4+5=9) dx 12/2020 s/p radiation 5/2021-7/2021 and 2 years of abiraterone/prednisone ending 6/2023  Hypertension  Hepatic steatosis   Peripheral neuropathy, possibly 2/2 B12 deficiency  Gout     FAMILY HISTORY:  Mother had lymphoma and lung cancer.      SOCIAL HISTORY:  Never smoker. Drinks 2-3 cans of beer daily. Smokes marijuana daily.   Lives in [place], [place] by himself.   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   Has a dog and a horse    I have reviewed and updated the following:  Past Medical History:   Diagnosis Date    Anemia     Benign essential hypertension     Chronic kidney disease     Gout     Lymphoma (H)     Prostate cancer (H)     Thrombocytopenia (H24)       No Known Allergies    Current Outpatient Medications:     allopurinol (ZYLOPRIM) 300 MG tablet, Take 1 tablet (300 mg) by mouth daily, Disp: 90 tablet, Rfl: 1    cyanocobalamin (VITAMIN B-12) 1000 MCG SUBL sublingual tablet, Place 1,000 mcg under the tongue daily (with lunch), Disp: , Rfl:     lisinopril-hydrochlorothiazide (ZESTORETIC) 20-12.5 MG tablet, TAKE 2 TABLETS BY MOUTH  DAILY (Patient taking differently: Take 1 tablet by mouth every morning), Disp: 200 tablet, Rfl: 2    metoprolol succinate ER (TOPROL XL) 25 MG 24 hr tablet, TAKE 1 TABLET BY MOUTH DAILY  WITH LUNCH, Disp: 100 tablet, Rfl: 2    REVIEW OF SYSTEMS:  12-point ROS reviewed and negative other than that mentioned in HPI.     Objective    ECOG PS: 0     PHYSICAL EXAM:  **Limited given video visit**  General: Patient appears well in no acute distress.   Skin: No rashes or lesions on visualized skin  Eyes: EOMI, no erythema, sclera icterus or discharge noted  Resp: Appears to be breathing comfortably without accessory muscle usage, speaking in full sentences, no cough  MSK: Appears to have  normal range of motion based on visualized movements  Neurologic: No apparent tremors, facial movements symmetric  Psych: Affect bright, alert and oriented     Most Recent 3 CBC's:  Recent Labs   Lab Test 03/22/24  1302 02/26/24  1149 02/20/24  1152   WBC 3.8* 3.3* 2.5*   HGB 12.8* 10.4* 9.9*   MCV 92 88 87   PLT 82* 70* 103*   ANEUTAUTO 2.6 1.8 1.1*     Most Recent 3 BMP's:  Recent Labs   Lab Test 02/26/24  1149 02/20/24  1152 02/16/24  0652    131* 133*   POTASSIUM 4.3 4.1 4.2   CHLORIDE 99 96* 97*   CO2 24 24 23   BUN 22.4 17.6 19.2   CR 1.22* 1.24* 1.16   ANIONGAP 12 11 13   MISTY 9.5 9.3 9.4   * 120* 134*   PROTTOTAL 6.8 6.7 7.0   ALBUMIN 4.4 4.2 4.2    Most Recent 3 LFT's:  Recent Labs   Lab Test 02/26/24  1149 02/20/24  1152 02/16/24  0652   AST 26 29 41   ALT 35 35 47   ALKPHOS 74 84 81   BILITOTAL 1.3* 1.1 1.0    Most Recent 2 TSH and T4:  Recent Labs   Lab Test 06/15/22  1119   TSH 2.39     I reviewed the above labs today.    12/4/23 R axillary LN biopsy:  Lymph node, right axillary, excision:  - Involved by partially CD5-positive marginal zone lymphoma  - See comment    Comment:  Flow cytometry analysis on concurrent specimen ([id number]) showed 3 abnormal B-cell populations:  - CD5 negative lambda monotypic B cells (59%)  - CD5 positive lambda monotypic B cells (19%)  - CD5 positive kappa monotypic B cells (0.4%)  There was no aberrant immunophenotype on T cells by flow cytometry.     The morphologic and immunophenotypic findings support the diagnosis of marginal zone lymphoma (MZL) and the differential diagnosis includes splenic MZL versus monik MZL versus MALT lymphoma. Of note, a small subset of neoplastic cells express CD5; this finding is described in both the splenic and monik marginal zone.  The diagnosis of MALT lymphoma is less likely given that generalized monik involvement is rare in subtype (fluorescent in situ hybridization (FISH) related to MALT lymphoma (such as BIRC3 and MALT1)  are in progress on bone marrow specimen and will be reported separately).      The definitive diagnosis of splenic marginal zone lymphoma requires a splenectomy specimen; however, the overall clinical findings of patient including splenomegaly, lymphocytosis in the blood, the pattern of bone marrow involvement, and the IgD positivity in the tumor cells (as described in the revised 4th Ed of WHO) are most compatible with splenic marginal zone lymphoma.      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   Peripheral flow cytometry and subsequent bone marrow biopsy showed several populations of clonal B-cells but the predominant one was CD5/B77-vcrondzm low-grade B-cell lymphoma comprising about 40% of the bone marrow. There is another small CD5+ population compatible with monoclonal B-cell lymphocytosis. MYD88 NGS is negative.   PET showed significant splenomegaly (20 cm with SUVmax 6.7), mildly-avid lymphadenopathy above/below diaphragm, and diffuse bone marrow uptake. Given that Hemepath was unable to narrow the subtype of lymphoma from the bone marrow biopsy, we pursued excisional biopsy of the R axillary lymph node, which is now back consistent with partially CD5+ marginal zone lymphoma, with splenic subtype favored given his whole clinical presentation.   Reviewed the overall diagnosis of splenic marginal zone lymphoma as a type of indolent non-Hodgkin B-cell lymphoma. We discussed that treatment is indicated due to his degree of thrombocytopenia and symptoms of LUQ discomfort/early satiety.   Dr. Burciaga recommendation: first-line treatment with rituximab monotherapy - weekly x 4 weeks with potential for maintenance every 2 months x 4 more doses, with expected response rates ~60-70%.   Will obtain first PET-CT after the 4 weekly doses. There is potential to add chemotherapy or targeted therapy if he is not responding.    Splenectomy would also be a later line option but not address the lymph node or bone marrow disease.   1/11/24: Has yet to start therapy due to anxiety, fatigue,  potential snow storm. I reiterated that the fatigue may be from his underlying diagnosis.  Regarding side effects, I reviewed the  rituximab infusion reaction the first cycle and the protocol we have in place for management. Also reviewed the fatigue and the risk of infections.   Need repeat labs as it has been over a month. He was in agreement.  Jonathon was due to start therapy twice now and he cancelled due to anxiety. He endorsed starting 1/26/24.  Currently scheduled at [facility].  Will reschedule location to [facility] due to high risk of infusion reaction and AGATHA presence at the [facility]. Considerchanging schedule additional doses at [facility] per patient preference.   2/21/24:  S/P 4 weekly doses of Rituxan.  Feels well. Plts normalizing and feels as if he spleen has decreased in size.    Hx of prostate cancer  Dx 12/2020, locally advanced (Keith 4+5=9) disease s/p radiation 5/2021-7/2021 and 2 years of abiraterone/prednisone ending 6/2023  Transferred care from Dr. Cisneros to Dr. Eduardo.   On surveillance.     Hyperuricemia  Gout History  Stop allopurinol given normal uric acid today, will reassess next visit    Cancer related prophylaxis  Up to date on flu and pneumonia shots.  Could use COVID booster and Shingrix series.    PLAN:  - Consolidative rituximab q2 months starting ~4/26/24    Total of ___ minutes on patient visit, reviewing records, interpreting test results, placing orders, and documentation on the day of service.    The longitudinal plan of care for the diagnosis(es)/condition(s) as documented were addressed during this visit. Due to the added complexity in care, I will continue to support Jonathon in the subsequent management and with ongoing continuity of care.     Vannesa Burciaga MD  Attending Physician, [facility]

## 2024-04-02 ENCOUNTER — OFFICE VISIT (OUTPATIENT)
Dept: NEUROLOGY | Facility: CLINIC | Age: 69
End: 2024-04-02
Payer: COMMERCIAL

## 2024-04-02 VITALS
HEART RATE: 61 BPM | WEIGHT: 214 LBS | DIASTOLIC BLOOD PRESSURE: 77 MMHG | SYSTOLIC BLOOD PRESSURE: 123 MMHG | BODY MASS INDEX: 28.36 KG/M2 | HEIGHT: 73 IN

## 2024-04-02 DIAGNOSIS — Z79.899 ENCOUNTER FOR LONG-TERM (CURRENT) USE OF MEDICATIONS: ICD-10-CM

## 2024-04-02 DIAGNOSIS — C61 PROSTATE CANCER (H): ICD-10-CM

## 2024-04-02 DIAGNOSIS — E53.8 VITAMIN B12 DEFICIENCY (NON ANEMIC): ICD-10-CM

## 2024-04-02 DIAGNOSIS — G62.89 AXONAL SENSORIMOTOR NEUROPATHY: Primary | ICD-10-CM

## 2024-04-02 PROCEDURE — 99213 OFFICE O/P EST LOW 20 MIN: CPT | Performed by: PSYCHIATRY & NEUROLOGY

## 2024-04-02 RX ORDER — ALLOPURINOL 100 MG/1
100 TABLET ORAL DAILY
COMMUNITY
End: 2024-09-19

## 2024-04-02 NOTE — LETTER
4/2/2024         RE: Jonathon Santos  [address] [address] [place] [id number]        Dear Colleague,    Thank you for referring your patient, Jonathon Santos, to the [facility] [place]. Please see a copy of my visit note below.    ESTABLISHED PATIENT NEUROLOGY NOTE    DATE OF VISIT: 4/2/2024  MRN: [id number]  PATIENT NAME: Jonathon Santos  YOB: 1955    Chief Complaint   Patient presents with     NEUROPATHY     Patient states he still has numbness in his feet.     SUBJECTIVE:                                                      HISTORY OF PRESENT ILLNESS:  Jonathon is here for follow up regarding peripheral neuropathy.  Jonathon has significant history of prostate cancer, s/p chemotherapy and radiation.  Previous EMG revealed length-dependent axonal sensorimotor neuropathy.  He has history of low B12 which we have been supplementing.  The rest of his labs have been largely unremarkable.  1 other variable has been alcohol intake which we have discussed to be cut back.  When we met about 6 months ago he felt like maybe he was having some progression further proximally into the feet.  Plan was to continue to monitor the B12 and follow-up again in 6 months.  B12 level was 913 in January of this year.    Jonathon says that he feels like he actually might be getting a little bit more feeling in the feet lately.  He continues to take 1000mcg B12 supplement daily.     He is now doing Rituximab infusions for lymphoma. He also is on allopurinol for gout.    He says he has adapted to the balance issues.  No recent falls.    He asks about what to expect.  He also asks if there could be some tingling and pain involved if the nerves are improving.  He denies weakness.    CURRENT MEDICATIONS:   Current Outpatient Medications   Medication Sig Dispense Refill     allopurinol (ZYLOPRIM) 100 MG tablet Take 100 mg by mouth daily       cyanocobalamin (VITAMIN B-12) 1000 MCG SUBL sublingual tablet Place 1,000  "mcg under the tongue daily (with lunch)       lisinopril-hydrochlorothiazide (ZESTORETIC) 20-12.5 MG tablet TAKE 2 TABLETS BY MOUTH  DAILY (Patient taking differently: Take 1 tablet by mouth every morning) 200 tablet 2     metoprolol succinate ER (TOPROL XL) 25 MG 24 hr tablet TAKE 1 TABLET BY MOUTH DAILY  WITH LUNCH 100 tablet 2     No current facility-administered medications for this visit.       RECENT DIAGNOSTIC STUDIES:   Labs: No results found for any visits on 04/02/24.      REVIEW OF SYSTEMS:                                                      10-point review of systems is negative except as mentioned above in HPI.    EXAM:                                                      Physical Exam:   Vitals: /77   Pulse 61   Ht 1.854 m (6' 1\")   Wt 97.1 kg (214 lb)   BMI 28.23 kg/m    BMI= Body mass index is 28.23 kg/m .  GENERAL: NAD.  HEENT: NC/AT.  CV: RRR. S1, S2.   NECK: No bruits.  PULM: Non-labored breathing.   EXTR: Hallux valgus deformity Left great toe.  Focused Neurologic:  MENTAL STATUS: Alert, attentive. Speech is fluent. Normal comprehension. Normal concentration. Adequate fund of knowledge.   CRANIAL NERVES:  Facial movement normal. EOM full.  Hearing intact to conversation. Trapezius strength intact.   MOTOR: 5/5 in proximal and distal muscle groups of lower extremities. Tone and bulk normal.   DTRs: Babinski down-going bilaterally.   SENSATION: Intact pinprick in the feet, this is an improvement. Intact proprioception at great toes. Vibration: 2-3 seconds at both ankles.   COORDINATION: No observed tremor today. Normal fine motor movements.  STATION AND GAIT: Romberg positive. Casual gait is cautious but otherwise normal.  He stands on the right foot alone without problem, left foot is more difficult for him.  Left hand-dominant.    ASSESSMENT and PLAN:                                                      Assessment:    ICD-10-CM    1. Axonal sensorimotor neuropathy  G62.89       2. " Vitamin B12 deficiency (non anemic)  E53.8       3. Encounter for long-term (current) use of medications  Z79.899       4. Prostate cancer (H)  C61           Mr. Santos is a pleasant 69-year-old man here for follow-up regarding peripheral neuropathy, possibly related to cancer treatments.  He also has history of B12 deficiency but this has been in good range lately with supplementation.  He is noticing some possible improvement of the sensation in his feet which is fantastic.  We will continue to monitor.  I would like to see Jonathon again in about 6 months.  He understands and agrees with the plan.    Plan:  -- Continue the oral B12 supplement at the current dose.  -- Follow-up in neurology again in 6 months.  We will recheck your B12 level at that time.  Do let me know if symptoms worsen in the interim.    Total Time: 20 minutes were spent with the patient and in chart review/documentation (as described above in Assessment and Plan)/coordinating the care on date of service.    Pinky Thompson MD  Neurology    Dragon software used in the dictation of this note.                    Again, thank you for allowing me to participate in the care of your patient.        Sincerely,        Pinky Thompson MD

## 2024-04-02 NOTE — NURSING NOTE
Chief Complaint   Patient presents with    NEUROPATHY     Patient states he still has numbness in his feet.     Maria Guadalupe Kurtz on 4/2/2024 at 12:54 PM

## 2024-04-02 NOTE — PROGRESS NOTES
ESTABLISHED PATIENT NEUROLOGY NOTE    DATE OF VISIT: 4/2/2024  MRN: [id number]  PATIENT NAME: Jonathon Santos  YOB: 1955    Chief Complaint   Patient presents with    NEUROPATHY     Patient states he still has numbness in his feet.     SUBJECTIVE:                                                      HISTORY OF PRESENT ILLNESS:  Jonathon is here for follow up regarding peripheral neuropathy.  Jonathon has significant history of prostate cancer, s/p chemotherapy and radiation.  Previous EMG revealed length-dependent axonal sensorimotor neuropathy.  He has history of low B12 which we have been supplementing.  The rest of his labs have been largely unremarkable.  1 other variable has been alcohol intake which we have discussed to be cut back.  When we met about 6 months ago he felt like maybe he was having some progression further proximally into the feet.  Plan was to continue to monitor the B12 and follow-up again in 6 months.  B12 level was 913 in January of this year.    Jonathon says that he feels like he actually might be getting a little bit more feeling in the feet lately.  He continues to take 1000mcg B12 supplement daily.     He is now doing Rituximab infusions for lymphoma. He also is on allopurinol for gout.    He says he has adapted to the balance issues.  No recent falls.    He asks about what to expect.  He also asks if there could be some tingling and pain involved if the nerves are improving.  He denies weakness.    CURRENT MEDICATIONS:   Current Outpatient Medications   Medication Sig Dispense Refill    allopurinol (ZYLOPRIM) 100 MG tablet Take 100 mg by mouth daily      cyanocobalamin (VITAMIN B-12) 1000 MCG SUBL sublingual tablet Place 1,000 mcg under the tongue daily (with lunch)      lisinopril-hydrochlorothiazide (ZESTORETIC) 20-12.5 MG tablet TAKE 2 TABLETS BY MOUTH  DAILY (Patient taking differently: Take 1 tablet by mouth every morning) 200 tablet 2    metoprolol succinate ER (TOPROL XL) 25  "MG 24 hr tablet TAKE 1 TABLET BY MOUTH DAILY  WITH LUNCH 100 tablet 2     No current facility-administered medications for this visit.       RECENT DIAGNOSTIC STUDIES:   Labs: No results found for any visits on 04/02/24.      REVIEW OF SYSTEMS:                                                      10-point review of systems is negative except as mentioned above in HPI.    EXAM:                                                      Physical Exam:   Vitals: /77   Pulse 61   Ht 1.854 m (6' 1\")   Wt 97.1 kg (214 lb)   BMI 28.23 kg/m    BMI= Body mass index is 28.23 kg/m .  GENERAL: NAD.  HEENT: NC/AT.  CV: RRR. S1, S2.   NECK: No bruits.  PULM: Non-labored breathing.   EXTR: Hallux valgus deformity Left great toe.  Focused Neurologic:  MENTAL STATUS: Alert, attentive. Speech is fluent. Normal comprehension. Normal concentration. Adequate fund of knowledge.   CRANIAL NERVES:  Facial movement normal. EOM full.  Hearing intact to conversation. Trapezius strength intact.   MOTOR: 5/5 in proximal and distal muscle groups of lower extremities. Tone and bulk normal.   DTRs: Babinski down-going bilaterally.   SENSATION: Intact pinprick in the feet, this is an improvement. Intact proprioception at great toes. Vibration: 2-3 seconds at both ankles.   COORDINATION: No observed tremor today. Normal fine motor movements.  STATION AND GAIT: Romberg positive. Casual gait is cautious but otherwise normal.  He stands on the right foot alone without problem, left foot is more difficult for him.  Left hand-dominant.    ASSESSMENT and PLAN:                                                      Assessment:    ICD-10-CM    1. Axonal sensorimotor neuropathy  G62.89       2. Vitamin B12 deficiency (non anemic)  E53.8       3. Encounter for long-term (current) use of medications  Z79.899       4. Prostate cancer (H)  C61           Mr. Santos is a pleasant 69-year-old man here for follow-up regarding peripheral neuropathy, possibly " related to cancer treatments.  He also has history of B12 deficiency but this has been in good range lately with supplementation.  He is noticing some possible improvement of the sensation in his feet which is fantastic.  We will continue to monitor.  I would like to see Jonathon again in about 6 months.  He understands and agrees with the plan.    Plan:  -- Continue the oral B12 supplement at the current dose.  -- Follow-up in neurology again in 6 months.  We will recheck your B12 level at that time.  Do let me know if symptoms worsen in the interim.    Total Time: 20 minutes were spent with the patient and in chart review/documentation (as described above in Assessment and Plan)/coordinating the care on date of service.    Pinky Thompson MD  Neurology    Dragon software used in the dictation of this note.

## 2024-04-02 NOTE — PATIENT INSTRUCTIONS
Plan:  -- Continue the oral B12 supplement at the current dose.  -- Follow-up in neurology again in 6 months.  We will recheck your B12 level at that time.  Do let me know if symptoms worsen in the interim.

## 2024-04-26 ENCOUNTER — APPOINTMENT (OUTPATIENT)
Dept: LAB | Facility: CLINIC | Age: 69
End: 2024-04-26
Attending: INTERNAL MEDICINE
Payer: COMMERCIAL

## 2024-04-26 ENCOUNTER — ONCOLOGY VISIT (OUTPATIENT)
Dept: ONCOLOGY | Facility: CLINIC | Age: 69
End: 2024-04-26
Attending: INTERNAL MEDICINE
Payer: COMMERCIAL

## 2024-04-26 VITALS
SYSTOLIC BLOOD PRESSURE: 165 MMHG | DIASTOLIC BLOOD PRESSURE: 94 MMHG | RESPIRATION RATE: 16 BRPM | OXYGEN SATURATION: 98 % | WEIGHT: 209.2 LBS | HEART RATE: 74 BPM | BODY MASS INDEX: 27.6 KG/M2 | TEMPERATURE: 98.1 F

## 2024-04-26 VITALS
HEART RATE: 85 BPM | DIASTOLIC BLOOD PRESSURE: 75 MMHG | SYSTOLIC BLOOD PRESSURE: 113 MMHG | OXYGEN SATURATION: 98 % | RESPIRATION RATE: 16 BRPM

## 2024-04-26 DIAGNOSIS — C83.07 SPLENIC MARGINAL ZONE B-CELL LYMPHOMA (H): Primary | ICD-10-CM

## 2024-04-26 DIAGNOSIS — C61 PROSTATE CANCER (H): ICD-10-CM

## 2024-04-26 LAB
ALBUMIN SERPL BCG-MCNC: 4.7 G/DL (ref 3.5–5.2)
ALP SERPL-CCNC: 70 U/L (ref 40–150)
ALT SERPL W P-5'-P-CCNC: 31 U/L (ref 0–70)
ANION GAP SERPL CALCULATED.3IONS-SCNC: 15 MMOL/L (ref 7–15)
AST SERPL W P-5'-P-CCNC: 53 U/L (ref 0–45)
BASOPHILS # BLD AUTO: 0.1 10E3/UL (ref 0–0.2)
BASOPHILS NFR BLD AUTO: 1 %
BILIRUB SERPL-MCNC: 1.6 MG/DL
BUN SERPL-MCNC: 24.3 MG/DL (ref 8–23)
CALCIUM SERPL-MCNC: 9.4 MG/DL (ref 8.8–10.2)
CHLORIDE SERPL-SCNC: 104 MMOL/L (ref 98–107)
CREAT SERPL-MCNC: 1.08 MG/DL (ref 0.67–1.17)
DEPRECATED HCO3 PLAS-SCNC: 21 MMOL/L (ref 22–29)
EGFRCR SERPLBLD CKD-EPI 2021: 74 ML/MIN/1.73M2
EOSINOPHIL # BLD AUTO: 0.1 10E3/UL (ref 0–0.7)
EOSINOPHIL NFR BLD AUTO: 3 %
ERYTHROCYTE [DISTWIDTH] IN BLOOD BY AUTOMATED COUNT: 13 % (ref 10–15)
GLUCOSE SERPL-MCNC: 112 MG/DL (ref 70–99)
HCT VFR BLD AUTO: 40.8 % (ref 40–53)
HGB BLD-MCNC: 14.5 G/DL (ref 13.3–17.7)
IMM GRANULOCYTES # BLD: 0 10E3/UL
IMM GRANULOCYTES NFR BLD: 1 %
LDH SERPL L TO P-CCNC: 231 U/L (ref 0–250)
LYMPHOCYTES # BLD AUTO: 1.1 10E3/UL (ref 0.8–5.3)
LYMPHOCYTES NFR BLD AUTO: 22 %
MCH RBC QN AUTO: 32.3 PG (ref 26.5–33)
MCHC RBC AUTO-ENTMCNC: 35.5 G/DL (ref 31.5–36.5)
MCV RBC AUTO: 91 FL (ref 78–100)
MONOCYTES # BLD AUTO: 0.4 10E3/UL (ref 0–1.3)
MONOCYTES NFR BLD AUTO: 7 %
NEUTROPHILS # BLD AUTO: 3.5 10E3/UL (ref 1.6–8.3)
NEUTROPHILS NFR BLD AUTO: 66 %
NRBC # BLD AUTO: 0 10E3/UL
NRBC BLD AUTO-RTO: 0 /100
PLATELET # BLD AUTO: 75 10E3/UL (ref 150–450)
POTASSIUM SERPL-SCNC: 4.3 MMOL/L (ref 3.4–5.3)
PROT SERPL-MCNC: 7.4 G/DL (ref 6.4–8.3)
PSA SERPL DL<=0.01 NG/ML-MCNC: 0.02 NG/ML (ref 0–6.5)
RBC # BLD AUTO: 4.49 10E6/UL (ref 4.4–5.9)
SODIUM SERPL-SCNC: 140 MMOL/L (ref 135–145)
URATE SERPL-MCNC: 6.6 MG/DL (ref 3.4–7)
WBC # BLD AUTO: 5.1 10E3/UL (ref 4–11)

## 2024-04-26 PROCEDURE — 99213 OFFICE O/P EST LOW 20 MIN: CPT | Performed by: REGISTERED NURSE

## 2024-04-26 PROCEDURE — 84153 ASSAY OF PSA TOTAL: CPT | Performed by: REGISTERED NURSE

## 2024-04-26 PROCEDURE — G0463 HOSPITAL OUTPT CLINIC VISIT: HCPCS | Mod: 25 | Performed by: REGISTERED NURSE

## 2024-04-26 PROCEDURE — 83615 LACTATE (LD) (LDH) ENZYME: CPT

## 2024-04-26 PROCEDURE — 82040 ASSAY OF SERUM ALBUMIN: CPT

## 2024-04-26 PROCEDURE — 96376 TX/PRO/DX INJ SAME DRUG ADON: CPT

## 2024-04-26 PROCEDURE — 258N000003 HC RX IP 258 OP 636: Performed by: REGISTERED NURSE

## 2024-04-26 PROCEDURE — G2211 COMPLEX E/M VISIT ADD ON: HCPCS | Performed by: REGISTERED NURSE

## 2024-04-26 PROCEDURE — 250N000013 HC RX MED GY IP 250 OP 250 PS 637: Performed by: REGISTERED NURSE

## 2024-04-26 PROCEDURE — 96375 TX/PRO/DX INJ NEW DRUG ADDON: CPT

## 2024-04-26 PROCEDURE — 250N000011 HC RX IP 250 OP 636: Performed by: REGISTERED NURSE

## 2024-04-26 PROCEDURE — 84550 ASSAY OF BLOOD/URIC ACID: CPT

## 2024-04-26 PROCEDURE — 85025 COMPLETE CBC W/AUTO DIFF WBC: CPT | Performed by: INTERNAL MEDICINE

## 2024-04-26 PROCEDURE — 36415 COLL VENOUS BLD VENIPUNCTURE: CPT | Performed by: INTERNAL MEDICINE

## 2024-04-26 PROCEDURE — 96415 CHEMO IV INFUSION ADDL HR: CPT

## 2024-04-26 PROCEDURE — 96413 CHEMO IV INFUSION 1 HR: CPT

## 2024-04-26 RX ORDER — ACETAMINOPHEN 325 MG/1
650 TABLET ORAL ONCE
Status: CANCELLED | OUTPATIENT
Start: 2024-04-26

## 2024-04-26 RX ORDER — MEPERIDINE HYDROCHLORIDE 25 MG/ML
25 INJECTION INTRAMUSCULAR; INTRAVENOUS; SUBCUTANEOUS EVERY 30 MIN PRN
Status: CANCELLED | OUTPATIENT
Start: 2024-04-26

## 2024-04-26 RX ORDER — DIPHENHYDRAMINE HYDROCHLORIDE 50 MG/ML
50 INJECTION INTRAMUSCULAR; INTRAVENOUS
Status: COMPLETED | OUTPATIENT
Start: 2024-04-26 | End: 2024-04-26

## 2024-04-26 RX ORDER — HEPARIN SODIUM (PORCINE) LOCK FLUSH IV SOLN 100 UNIT/ML 100 UNIT/ML
5 SOLUTION INTRAVENOUS
Status: CANCELLED | OUTPATIENT
Start: 2024-04-26

## 2024-04-26 RX ORDER — ALBUTEROL SULFATE 0.83 MG/ML
2.5 SOLUTION RESPIRATORY (INHALATION)
Status: DISCONTINUED | OUTPATIENT
Start: 2024-04-26 | End: 2024-04-26 | Stop reason: HOSPADM

## 2024-04-26 RX ORDER — DIPHENHYDRAMINE HCL 25 MG
50 CAPSULE ORAL
Status: COMPLETED | OUTPATIENT
Start: 2024-04-26 | End: 2024-04-26

## 2024-04-26 RX ORDER — METHYLPREDNISOLONE SODIUM SUCCINATE 125 MG/2ML
125 INJECTION, POWDER, LYOPHILIZED, FOR SOLUTION INTRAMUSCULAR; INTRAVENOUS
Status: CANCELLED
Start: 2024-04-26

## 2024-04-26 RX ORDER — ALBUTEROL SULFATE 90 UG/1
1-2 AEROSOL, METERED RESPIRATORY (INHALATION)
Status: DISCONTINUED | OUTPATIENT
Start: 2024-04-26 | End: 2024-04-26 | Stop reason: HOSPADM

## 2024-04-26 RX ORDER — DIPHENHYDRAMINE HYDROCHLORIDE 50 MG/ML
50 INJECTION INTRAMUSCULAR; INTRAVENOUS
Status: CANCELLED | OUTPATIENT
Start: 2024-04-26 | End: 2024-04-26

## 2024-04-26 RX ORDER — METHYLPREDNISOLONE SODIUM SUCCINATE 125 MG/2ML
125 INJECTION, POWDER, LYOPHILIZED, FOR SOLUTION INTRAMUSCULAR; INTRAVENOUS
Status: CANCELLED | OUTPATIENT
Start: 2024-04-26

## 2024-04-26 RX ORDER — EPINEPHRINE 1 MG/ML
0.3 INJECTION, SOLUTION INTRAMUSCULAR; SUBCUTANEOUS EVERY 5 MIN PRN
Status: CANCELLED | OUTPATIENT
Start: 2024-04-26

## 2024-04-26 RX ORDER — HEPARIN SODIUM,PORCINE 10 UNIT/ML
5-20 VIAL (ML) INTRAVENOUS DAILY PRN
Status: CANCELLED | OUTPATIENT
Start: 2024-04-26

## 2024-04-26 RX ORDER — ALBUTEROL SULFATE 90 UG/1
1-2 AEROSOL, METERED RESPIRATORY (INHALATION)
Status: CANCELLED
Start: 2024-04-26

## 2024-04-26 RX ORDER — METHYLPREDNISOLONE SODIUM SUCCINATE 125 MG/2ML
125 INJECTION, POWDER, LYOPHILIZED, FOR SOLUTION INTRAMUSCULAR; INTRAVENOUS
Status: DISCONTINUED | OUTPATIENT
Start: 2024-04-26 | End: 2024-04-26 | Stop reason: HOSPADM

## 2024-04-26 RX ORDER — LORAZEPAM 2 MG/ML
0.5 INJECTION INTRAMUSCULAR EVERY 4 HOURS PRN
Status: CANCELLED | OUTPATIENT
Start: 2024-04-26

## 2024-04-26 RX ORDER — ALBUTEROL SULFATE 0.83 MG/ML
2.5 SOLUTION RESPIRATORY (INHALATION)
Status: CANCELLED | OUTPATIENT
Start: 2024-04-26

## 2024-04-26 RX ORDER — EPINEPHRINE 1 MG/ML
0.3 INJECTION, SOLUTION, CONCENTRATE INTRAVENOUS EVERY 5 MIN PRN
Status: DISCONTINUED | OUTPATIENT
Start: 2024-04-26 | End: 2024-04-26 | Stop reason: HOSPADM

## 2024-04-26 RX ORDER — DIPHENHYDRAMINE HCL 25 MG
50 CAPSULE ORAL ONCE
Status: CANCELLED | OUTPATIENT
Start: 2024-04-26

## 2024-04-26 RX ORDER — ACETAMINOPHEN 325 MG/1
650 TABLET ORAL
Status: COMPLETED | OUTPATIENT
Start: 2024-04-26 | End: 2024-04-26

## 2024-04-26 RX ORDER — MEPERIDINE HYDROCHLORIDE 25 MG/ML
25 INJECTION INTRAMUSCULAR; INTRAVENOUS; SUBCUTANEOUS
Status: CANCELLED | OUTPATIENT
Start: 2024-04-26

## 2024-04-26 RX ORDER — MEPERIDINE HYDROCHLORIDE 25 MG/ML
25 INJECTION INTRAMUSCULAR; INTRAVENOUS; SUBCUTANEOUS EVERY 30 MIN PRN
Status: DISCONTINUED | OUTPATIENT
Start: 2024-04-26 | End: 2024-04-26 | Stop reason: HOSPADM

## 2024-04-26 RX ADMIN — RITUXIMAB-ABBS 800 MG: 10 INJECTION, SOLUTION INTRAVENOUS at 10:10

## 2024-04-26 RX ADMIN — DIPHENHYDRAMINE HYDROCHLORIDE 50 MG: 50 INJECTION INTRAMUSCULAR; INTRAVENOUS at 12:09

## 2024-04-26 RX ADMIN — FAMOTIDINE 20 MG: 10 INJECTION INTRAVENOUS at 09:13

## 2024-04-26 RX ADMIN — ACETAMINOPHEN 650 MG: 325 TABLET ORAL at 12:13

## 2024-04-26 RX ADMIN — SODIUM CHLORIDE 250 ML: 9 INJECTION, SOLUTION INTRAVENOUS at 09:10

## 2024-04-26 RX ADMIN — FAMOTIDINE 20 MG: 10 INJECTION INTRAVENOUS at 12:10

## 2024-04-26 RX ADMIN — DIPHENHYDRAMINE HYDROCHLORIDE 50 MG: 25 CAPSULE ORAL at 09:06

## 2024-04-26 RX ADMIN — ACETAMINOPHEN 650 MG: 325 TABLET ORAL at 09:06

## 2024-04-26 ASSESSMENT — PAIN SCALES - GENERAL: PAINLEVEL: NO PAIN (0)

## 2024-04-26 NOTE — NURSING NOTE
"Oncology Rooming Note    April 26, 2024 7:05 AM   Jonathon Santos is a 69 year old male who presents for:    Chief Complaint   Patient presents with    Oncology Clinic Visit     Splenic marginal zone B-cell lymphoma    Blood Draw     Labs drawn via PIV By RN in lab. VS taken.      Initial Vitals: BP (!) 165/94   Pulse 74   Temp 98.1  F (36.7  C) (Oral)   Resp 16   Wt 94.9 kg (209 lb 3.2 oz)   SpO2 98%   BMI 27.60 kg/m   Estimated body mass index is 27.6 kg/m  as calculated from the following:    Height as of 4/2/24: 1.854 m (6' 1\").    Weight as of this encounter: 94.9 kg (209 lb 3.2 oz). Body surface area is 2.21 meters squared.  No Pain (0) Comment: Data Unavailable   No LMP for male patient.  Allergies reviewed: Yes  Medications reviewed: Yes    Medications: Medication refills not needed today.  Pharmacy name entered into [employer]:    [facility] - [place], [place] - [address]   [facility] - [place], [place] - [address] [place] [address]    Frailty Screening:   Is the patient here for a new oncology consult visit in cancer care? 2. No      Clinical concerns: none       Savana James              "

## 2024-04-26 NOTE — PROGRESS NOTES
Infusion Nursing Note:  Jonathon Santos presents today for Cycle 2 Day 1 Rituxan.    Patient seen by provider today: Yes: Patito Merino NP saw patient prior to infusion   present during visit today: Not Applicable.    Note:  Written Communication with Patito Merino NP/Vita Patino RN on 4/26/24  Repeat Tylenol and 25 mg Benadryl mid Rituxan infusion.  Update future cycles as well.    Pre Rituxan:  650 mg Tylenol po  50 mg Benadryl po  20 mg Pepcid IV      Rituxan Rates:  50mls/hour x30 minutes  100mls/hour x30 minutes  150mls/hour x30 minutes  200mls/hour x30 minutes--REACTED    As this author was about to give patient mid Tylenol and Benadryl, patient complained of dizziness, sweats, and chills.  Rituxan stopped.  IV Bendaryl, IV Pepcid, and mid dose of po Tylenol.  Patito Merino NP update.  BP improved.  Patient felt still a little dizzy, but improved.  BP stable.  Disucssed with Patito Merino NP.    TORB Patito Merino NP/Vita Patino RN on 4/26/24  OK to restart Rituxan at half the rate at reaction.  Increase every 30 minutes by 50 mL/hr to a maximum rate of 400 mL/hr    Due to volume, Rituxan finished at 350 mL/hr.  Patient denied dizziness, sweating, or chills at time of discharge.    Intravenous Access:  Peripheral IV placed in lab.    Treatment Conditions:   Latest Reference Range & Units 04/26/24 06:58   Sodium 135 - 145 mmol/L 140   Potassium 3.4 - 5.3 mmol/L 4.3   Chloride 98 - 107 mmol/L 104   Carbon Dioxide (CO2) 22 - 29 mmol/L 21 (L)   Urea Nitrogen 8.0 - 23.0 mg/dL 24.3 (H)   Creatinine 0.67 - 1.17 mg/dL 1.08   GFR Estimate >60 mL/min/1.73m2 74   Calcium 8.8 - 10.2 mg/dL 9.4   Anion Gap 7 - 15 mmol/L 15   Albumin 3.5 - 5.2 g/dL 4.7   Protein Total 6.4 - 8.3 g/dL 7.4   Alkaline Phosphatase 40 - 150 U/L 70   ALT 0 - 70 U/L 31   AST 0 - 45 U/L 53 (H)   Bilirubin Total <=1.2 mg/dL 1.6 (H)   Glucose 70 - 99 mg/dL 112 (H)   Lactate Dehydrogenase 0 - 250 U/L 231   Uric Acid 3.4 - 7.0  mg/dL 6.6   WBC 4.0 - 11.0 10e3/uL 5.1   Hemoglobin 13.3 - 17.7 g/dL 14.5   Hematocrit 40.0 - 53.0 % 40.8   Platelet Count 150 - 450 10e3/uL 75 (L)   RBC Count 4.40 - 5.90 10e6/uL 4.49   MCV 78 - 100 fL 91   MCH 26.5 - 33.0 pg 32.3   MCHC 31.5 - 36.5 g/dL 35.5   RDW 10.0 - 15.0 % 13.0   % Neutrophils % 66   % Lymphocytes % 22   % Monocytes % 7   % Eosinophils % 3   % Basophils % 1   Absolute Basophils 0.0 - 0.2 10e3/uL 0.1   Absolute Eosinophils 0.0 - 0.7 10e3/uL 0.1   Absolute Immature Granulocytes <=0.4 10e3/uL 0.0   Absolute Lymphocytes 0.8 - 5.3 10e3/uL 1.1   Absolute Monocytes 0.0 - 1.3 10e3/uL 0.4   % Immature Granulocytes % 1   Absolute Neutrophils 1.6 - 8.3 10e3/uL 3.5   Absolute NRBCs 10e3/uL 0.0   NRBCs per 100 WBC <1 /100 0     Results reviewed, labs MET treatment parameters, ok to proceed with treatment.    Post Infusion Assessment:  Blood return noted pre and post infusion.  Site patent and intact, free from redness, edema or discomfort.  No evidence of extravasations.  Access discontinued per protocol.       Discharge Plan:   Patient declined prescription refills.  Discharge instructions reviewed with: Patient.  Patient and/or family verbalized understanding of discharge instructions and all questions answered.  Copy of AVS reviewed with patient and/or family.  Patient will return 6/21/24 for next infusion appointment.  Patient discharged in stable condition accompanied by: self.  Departure Mode: Ambulatory.      Vita Patino RN

## 2024-04-26 NOTE — PATIENT INSTRUCTIONS
Contact Numbers  [facility]: [phone] (for symptom and scheduling needs)    Please call the [facility] Triage line if you experience a temperature greater than or equal to 100.4, shaking chills, have uncontrolled nausea, vomiting and/or diarrhea, dizziness, shortness of breath, chest pain, bleeding, unexplained bruising, or if you have any other new/concerning symptoms, questions or concerns.     If you are having any concerning symptoms or wish to speak to a provider before your next infusion visit, please call your care triage to notify them so we can adequately serve you.     If you need a refill on a narcotic prescription or other medication, please call triage before your infusion appointment.

## 2024-04-26 NOTE — PROGRESS NOTES
[facility]  FOLLOW-UP VISIT NOTE  Apr 26, 2024  Subjective   REASON FOR VISIT: F/u marginal zone lymphoma    ONCOLOGIC SUMMARY:  Diagnosis:  Likely splenic marginal zone lymphoma dx 12/2023, presenting with progressive thrombocytopenia x several years (initially attributed to his prostate ca treatment). Peripheral flow and subsequent bone marrow biopsy 10/27/23 showed several populations of clonal B-cells but the predominant one is CD5/E85-nzkhtefa low-grade B-cell lymphoma comprising about 40% of the bone marrow, but excisional LN biopsy recommended for further subclassification. There was another small CD5+ population compatible with monoclonal B-cell lymphocytosis. NGS negative for MYD88 mutation. Axillary LN biopsy 12/4/23 most consistent with CD5+ marginal zone lymphoma, splenic vs monik. Staging PET showed significant splenomegaly (20 cm with SUVmax 6.7), mildly-avid lymphadenopathy above/below diaphragm, and diffuse bone marrow uptake. .     Treatment history:  1/25/24 to 2/16/2024: Rituximab 375 mg/m2 IV weekly x 4 doses (indication for starting treatment was thrombocytopenia and early satiety). PET with CMR    INTERVAL HISTORY:  Jonathon is here today for follow-up prior to his first of 4 every 2 months Rituxan infusions.  He feels well overall.  Appetite and energy are stable.  He has loose stool about every 48 hours, no other bowel movements in between.  Denies abdominal pain.  Denies any fever/chills, night sweats, recent infections, SOB, or chest pain,.     PAST MEDICAL HISTORY:  Locally advanced prostate cancer ([place] 4+5=9) dx 12/2020 s/p radiation 5/2021-7/2021 and 2 years of abiraterone/prednisone ending 6/2023  Hypertension  Hepatic steatosis   Peripheral neuropathy, possibly 2/2 B12 deficiency  Gout     FAMILY HISTORY:  Mother had lymphoma and lung cancer.      SOCIAL HISTORY:  Never smoker. Drinks 2-3 cans of beer daily. Smokes marijuana daily.   Lives in [place], [place] by himself.    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   Has a dog and a horse    I have reviewed and updated the following:  Past Medical History:   Diagnosis Date    Anemia     Benign essential hypertension     Chronic kidney disease     Gout     Lymphoma (H)     Prostate cancer (H)     Thrombocytopenia (H24)       No Known Allergies    Current Outpatient Medications:     allopurinol (ZYLOPRIM) 100 MG tablet, Take 100 mg by mouth daily, Disp: , Rfl:     cyanocobalamin (VITAMIN B-12) 1000 MCG SUBL sublingual tablet, Place 1,000 mcg under the tongue daily (with lunch), Disp: , Rfl:     lisinopril-hydrochlorothiazide (ZESTORETIC) 20-12.5 MG tablet, TAKE 2 TABLETS BY MOUTH  DAILY (Patient taking differently: Take 1 tablet by mouth every morning), Disp: 200 tablet, Rfl: 2    metoprolol succinate ER (TOPROL XL) 25 MG 24 hr tablet, TAKE 1 TABLET BY MOUTH DAILY  WITH LUNCH, Disp: 100 tablet, Rfl: 2    REVIEW OF SYSTEMS:  10-point ROS reviewed and negative other than that mentioned in HPI.     Objective   VITALS:  BP (!) 165/94   Pulse 74   Temp 98.1  F (36.7  C) (Oral)   Resp 16   Wt 94.9 kg (209 lb 3.2 oz)   SpO2 98%   BMI 27.60 kg/m       ECOG PS: 0     PHYSICAL EXAM:  General: Awake, alert, in no acute distress.   HEENT: Normocephalic, atraumatic. No scleral icterus.   CV: Regular rate and rhythm. No murmurs, rubs, or gallops appreciated.  Resp: Good inspiratory effort, lungs clear to auscultation bilaterally.  GI: Abdomen soft, nontender, nondistended.   Ext: No peripheral edema bilaterally.  Neuro: CN II-XII grossly intact. No focal deficits appreciated.  Skin: No rash, unusual bruising or prominent lesions.  Psych: Pleasant, normal affect.     Most Recent 3 CBC's:  Recent Labs   Lab Test 04/26/24  0658 03/22/24  1302 02/26/24  1149   WBC 5.1 3.8* 3.3*   HGB 14.5 12.8* 10.4*   MCV 91 92 88   PLT 75* 82* 70*   ANEUTAUTO 3.5 2.6 1.8     Most Recent 3 BMP's:  Recent Labs   Lab Test 03/22/24  1302  02/26/24  1149 02/20/24  1152    135 131*   POTASSIUM 4.0 4.3 4.1   CHLORIDE 100 99 96*   CO2 24 24 24   BUN 16.9 22.4 17.6   CR 1.12 1.22* 1.24*   ANIONGAP 11 12 11   MISTY 9.6 9.5 9.3   * 113* 120*   PROTTOTAL 7.1 6.8 6.7   ALBUMIN 4.6 4.4 4.2    Most Recent 3 LFT's:  Recent Labs   Lab Test 03/22/24  1302 02/26/24  1149 02/20/24  1152   AST 28 26 29   ALT 25 35 35   ALKPHOS 66 74 84   BILITOTAL 1.4* 1.3* 1.1    Most Recent 2 TSH and T4:  Recent Labs   Lab Test 06/15/22  1119   TSH 2.39     I reviewed the above labs today.      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 Peripheral flow cytometry and subsequent bone marrow biopsy showed several populations of clonal B-cells but the predominant one was CD5/H54-ugjtnatx low-grade B-cell lymphoma comprising about 40% of the bone marrow. There is another small CD5+ population compatible with monoclonal B-cell lymphocytosis. MYD88 NGS is negative. His PET showed significant splenomegaly (20 cm with SUVmax 6.7), mildly-avid lymphadenopathy above/below diaphragm, and diffuse bone marrow uptake. Given that Hemepath was unable to narrow the subtype of lymphoma from the bone marrow biopsy, we pursued excisional biopsy of the R axillary lymph node, which showed partially CD5+ marginal zone lymphoma, with splenic subtype favored given his whole clinical presentation. Treatment was indicated due to his degree of thrombocytopenia and symptoms of LUQ discomfort/early satiety.   Recommended first-line treatment with rituximab monotherapy - weekly x 4 weeks with potential for consolidation every 2 months x 4 more doses, with expected response rates ~60-70%.   Splenectomy would also be a later line option but would not address the lymph node or bone marrow disease.   Completed 4 weekly doses of rituximab 1/25/24 to 2/16/24. Tolerated fairly well  other than rituximab infusion reaction first dose (SOB/chest tightness) and nausea second dose.   3/14/24 PET shows CMR ([place] 3, only one R axillary node remaining) and decreased splenomegaly (20.8 -> 16.3 cm). Clinically responding as well with Hgb up to 12.8 and platelets up to .  However spleen is still quite enlarged with thrombocytopenia. Thus we will continue rituximab q2 months for 4 more doses to see if we can shrink the spleen further and improve blood counts. He is agreeable to proceed.   Will get his first of 4 consolidation doses (q2 months) today 04/26/24     Hx of prostate cancer  Dx 12/2020, locally advanced ([place] 4+5=9) disease s/p radiation 5/2021-7/2021 and 2 years of abiraterone/prednisone ending 6/2023.  Transferred care from Dr. Cisneros to Dr. Eduardo.   On surveillance.     Hyperuricemia  Gout History  Continue allopurinol.    Cancer related prophylaxis  Continue acyclovir for HSV ppx.   Up to date on flu and pneumonia shots. Could use Shingrix series when he completes rituximab.       PLAN:  - Consolidative rituximab q2 months starting today 4/26/24    21 minutes spent on the date of the encounter doing chart review, review of test results, interpretation of tests, patient visit, and documentation     The longitudinal plan of care for the diagnosis(es)/condition(s) as documented were addressed during this visit. Due to the added complexity in care, I will continue to support Jonathon in the subsequent management and with ongoing continuity of care.    RADHA Napoles [facility] [address] [id number] [phone]

## 2024-04-26 NOTE — NURSING NOTE
Chief Complaint   Patient presents with    Oncology Clinic Visit     Splenic marginal zone B-cell lymphoma    Blood Draw     Labs drawn via PIV By RN in lab. VS taken.      Labs drawn via peripheral IV. Patient requested PSA be collected. Vital signs taken. Checked into next appointment.   Esperanza Castrejon RN

## 2024-04-26 NOTE — LETTER
4/26/2024         RE: Jonathon Santos  [id number] [address] [place] [id number]        Dear Colleague,    Thank you for referring your patient, Jonathon Santos, to the [facility]. Please see a copy of my visit note below.    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 Peripheral flow and subsequent bone marrow biopsy 10/27/23 showed several populations of clonal B-cells but the predominant one is CD5/U95-fyfqbwxt low-grade B-cell lymphoma comprising about 40% of the bone marrow, but excisional LN biopsy recommended for further subclassification. There was another small CD5+ population compatible with monoclonal B-cell lymphocytosis. NGS negative for MYD88 mutation. Axillary LN biopsy 12/4/23 most consistent with CD5+ marginal zone lymphoma, splenic vs monik. Staging PET showed significant splenomegaly (20 cm with SUVmax 6.7), mildly-avid lymphadenopathy above/below diaphragm, and diffuse bone marrow uptake. .     Treatment history:  1/25/24 to 2/16/2024: Rituximab 375 mg/m2 IV weekly x 4 doses (indication for starting treatment was thrombocytopenia and early satiety). PET with CMR    INTERVAL HISTORY:  Jonathon is here today for follow-up prior to his first of 4 every 2 months Rituxan infusions.  He feels well overall.  Appetite and energy are stable.  He has loose stool about every 48 hours, no other bowel movements in between.  Denies abdominal pain.  Denies any fever/chills, night sweats, recent infections, SOB, or chest pain,.     PAST MEDICAL HISTORY:  Locally advanced prostate cancer ([place] 4+5=9) dx 12/2020 s/p radiation 5/2021-7/2021 and 2 years of abiraterone/prednisone ending 6/2023  Hypertension  Hepatic  steatosis   Peripheral neuropathy, possibly 2/2 B12 deficiency  Gout     FAMILY HISTORY:  Mother had lymphoma and lung cancer.      SOCIAL HISTORY:  Never smoker. Drinks 2-3 cans of beer daily. Smokes marijuana daily.   Lives in [place], [place] by himself.   Retired, previously worked for [employer]s office.   Has a dog and a horse    I have reviewed and updated the following:  Past Medical History:   Diagnosis Date    Anemia     Benign essential hypertension     Chronic kidney disease     Gout     Lymphoma (H)     Prostate cancer (H)     Thrombocytopenia (H24)       No Known Allergies    Current Outpatient Medications:     allopurinol (ZYLOPRIM) 100 MG tablet, Take 100 mg by mouth daily, Disp: , Rfl:     cyanocobalamin (VITAMIN B-12) 1000 MCG SUBL sublingual tablet, Place 1,000 mcg under the tongue daily (with lunch), Disp: , Rfl:     lisinopril-hydrochlorothiazide (ZESTORETIC) 20-12.5 MG tablet, TAKE 2 TABLETS BY MOUTH  DAILY (Patient taking differently: Take 1 tablet by mouth every morning), Disp: 200 tablet, Rfl: 2    metoprolol succinate ER (TOPROL XL) 25 MG 24 hr tablet, TAKE 1 TABLET BY MOUTH DAILY  WITH LUNCH, Disp: 100 tablet, Rfl: 2    REVIEW OF SYSTEMS:  10-point ROS reviewed and negative other than that mentioned in HPI.     Objective  VITALS:  BP (!) 165/94   Pulse 74   Temp 98.1  F (36.7  C) (Oral)   Resp 16   Wt 94.9 kg (209 lb 3.2 oz)   SpO2 98%   BMI 27.60 kg/m       ECOG PS: 0     PHYSICAL EXAM:  General: Awake, alert, in no acute distress.   HEENT: Normocephalic, atraumatic. No scleral icterus.   CV: Regular rate and rhythm. No murmurs, rubs, or gallops appreciated.  Resp: Good inspiratory effort, lungs clear to auscultation bilaterally.  GI: Abdomen soft, nontender, nondistended.   Ext: No peripheral edema bilaterally.  Neuro: CN II-XII grossly intact. No focal deficits appreciated.  Skin: No rash, unusual bruising or prominent lesions.  Psych: Pleasant, normal affect.     Most  Recent 3 CBC's:  Recent Labs   Lab Test 04/26/24  0658 03/22/24  1302 02/26/24  1149   WBC 5.1 3.8* 3.3*   HGB 14.5 12.8* 10.4*   MCV 91 92 88   PLT 75* 82* 70*   ANEUTAUTO 3.5 2.6 1.8     Most Recent 3 BMP's:  Recent Labs   Lab Test 03/22/24  1302 02/26/24  1149 02/20/24  1152    135 131*   POTASSIUM 4.0 4.3 4.1   CHLORIDE 100 99 96*   CO2 24 24 24   BUN 16.9 22.4 17.6   CR 1.12 1.22* 1.24*   ANIONGAP 11 12 11   MISTY 9.6 9.5 9.3   * 113* 120*   PROTTOTAL 7.1 6.8 6.7   ALBUMIN 4.6 4.4 4.2    Most Recent 3 LFT's:  Recent Labs   Lab Test 03/22/24  1302 02/26/24  1149 02/20/24  1152   AST 28 26 29   ALT 25 35 35   ALKPHOS 66 74 84   BILITOTAL 1.4* 1.3* 1.1    Most Recent 2 TSH and T4:  Recent Labs   Lab Test 06/15/22  1119   TSH 2.39     I reviewed the above labs today.      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 Peripheral flow cytometry and subsequent bone marrow biopsy showed several populations of clonal B-cells but the predominant one was CD5/V28-iqjwqmhs low-grade B-cell lymphoma comprising about 40% of the bone marrow. There is another small CD5+ population compatible with monoclonal B-cell lymphocytosis. MYD88 NGS is negative. His PET showed significant splenomegaly (20 cm with SUVmax 6.7), mildly-avid lymphadenopathy above/below diaphragm, and diffuse bone marrow uptake. Given that Hemepath was unable to narrow the subtype of lymphoma from the bone marrow biopsy, we pursued excisional biopsy of the R axillary lymph node, which showed partially CD5+ marginal zone lymphoma, with splenic subtype favored given his whole clinical presentation. Treatment was indicated due to his degree of thrombocytopenia and symptoms of LUQ discomfort/early satiety.   Recommended first-line treatment with rituximab monotherapy - weekly x 4 weeks with potential for consolidation every 2 months x  4 more doses, with expected response rates ~60-70%.   Splenectomy would also be a later line option but would not address the lymph node or bone marrow disease.   Completed 4 weekly doses of rituximab 1/25/24 to 2/16/24. Tolerated fairly well other than rituximab infusion reaction first dose (SOB/chest tightness) and nausea second dose.   3/14/24 PET shows CMR ([place] 3, only one R axillary node remaining) and decreased splenomegaly (20.8 -> 16.3 cm). Clinically responding as well with Hgb up to 12.8 and platelets up to .  However spleen is still quite enlarged with thrombocytopenia. Thus we will continue rituximab q2 months for 4 more doses to see if we can shrink the spleen further and improve blood counts. He is agreeable to proceed.   Will get his first of 4 consolidation doses (q2 months) today 04/26/24     Hx of prostate cancer  Dx 12/2020, locally advanced ([place] 4+5=9) disease s/p radiation 5/2021-7/2021 and 2 years of abiraterone/prednisone ending 6/2023.  Transferred care from Dr. Cisneros to Dr. Eduardo.   On surveillance.     Hyperuricemia  Gout History  Continue allopurinol.    Cancer related prophylaxis  Continue acyclovir for HSV ppx.   Up to date on flu and pneumonia shots. Could use Shingrix series when he completes rituximab.       PLAN:  - Consolidative rituximab q2 months starting today 4/26/24    21 minutes spent on the date of the encounter doing chart review, review of test results, interpretation of tests, patient visit, and documentation     The longitudinal plan of care for the diagnosis(es)/condition(s) as documented were addressed during this visit. Due to the added complexity in care, I will continue to support Jonathon in the subsequent management and with ongoing continuity of care.    RADHA Napoles [facility] [address] [address] [phone]

## 2024-05-09 ENCOUNTER — PATIENT OUTREACH (OUTPATIENT)
Dept: CARE COORDINATION | Facility: CLINIC | Age: 69
End: 2024-05-09
Payer: COMMERCIAL

## 2024-05-10 DIAGNOSIS — I10 UNCONTROLLED HYPERTENSION: ICD-10-CM

## 2024-05-13 RX ORDER — LISINOPRIL AND HYDROCHLOROTHIAZIDE 12.5; 2 MG/1; MG/1
2 TABLET ORAL DAILY
Qty: 200 TABLET | Refills: 0 | Status: SHIPPED | OUTPATIENT
Start: 2024-05-13 | End: 2024-07-18

## 2024-05-23 ENCOUNTER — PATIENT OUTREACH (OUTPATIENT)
Dept: CARE COORDINATION | Facility: CLINIC | Age: 69
End: 2024-05-23
Payer: COMMERCIAL

## 2024-06-02 NOTE — PROGRESS NOTES
Virtual Visit Details    Type of service:  Video Visit   Video Start Time:  11:35  Video End Time:11:58 AM    Originating Location (pt. Location): Home    Distant Location (provider location):  On-site  Platform used for Video Visit: [facility] Medical Oncology Note  Date of visit: Maribell 3, 2024  New Outpatient Clinic Note           Assessment:      High risk localized prostate cancer.  This is high risk because of the [place] Grade Group 5 disease. Originally at the time of diagnosis he had adenopathy concerning for stage IV prostate cancer, but looking through the retrospectoscope, these nodes are likely a manifestation of his recently diagnosed low-grade lymphoma.   Status post maximal local therapy with maximal systemic therapy as well given adjuvantly.  Now, one year from the completion of his treatment, and 3 and a half years from the time of diagnosis, with no evidence of recurrent disease by history, physical exam, or recent PSA. His chance of cure at this point, while not 100%, is pretty high.  Recent diagnosis of a low-grade lymphoma (possible splenic MZL).  He has had excellent therapy with a nice resolution of his splenomegaly.   He has excellent follow-up with Dr. Burciaga who has been doing a wonderful job in management.             Plan:      No intervention by me  Continue follow-up with Dr. Burciaga for his lymphoma.  Return to clinic with us in 6 months with a PSA drawn just prior.  We will continue to see him at every 6 month intervals for a total of 5 years after his diagnosis.        Geraldo Eduardo MD, MSc  Associate Profess disease.   Or of Medicine  [employer] [facility]  [facility]  [address] [place] [id number]  [phone]     __________________________________________________________________     DIAGNOSES      cT2/3 high risk prostate cancer, diagnosed at prostate biopsy 12/21/20.  At the time of this diagnosis, pelvic adenopathy was seen  on prostate MRI, with the assumption he had Stage IV prostate cancer. However, he was later diagnosed with a low grade lymphoma (see 2).  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  Jonathon had persistent neutropenia/thrombocytopenia, originally thought due to his prostate cancer treatment. 10/7/23 peripheral flow cytometry showed several populations of lambda-monotypic B-cells; however the predominant one was CD5/CD10 negative raising possibility of marginal zone lymphoma, lymphoplasmacytic, or rarely follicular lymphoma. BM biopsy showed  a 60% cellular marrow , with 40% involvement with a low grade lymphoma.  Surgical excisional biopsy was done 12/4. PET scan 10/18 showed significant splenomegaly (20 cm with SUVmax 6.7), mildly-avid lymphadenopathy above/below diaphragm, and diffuse bone marrow uptake.            History of Present Illness/therapy to date for prostate cancer:      Presented with PSA of 8.2, 10/2020 (was 7.25 a year earlier)  Prostate MRI 11/17/202 read out as PIRADS 5, with 2 suspicious lesions in the R mid and L apex, with capsular abutment for 15 mm. There was suspicious B obturator nodes, measuring up 19 mm, as well as B external iliac nodes.   Prostate biopsy 12/21/2020 showed a Keith 4+5 (Grade group 5) prostate cancer in 1 of 2 cores in the right mid, involving 75%. There was [place] 3+3 (Grade group 1) disease in the left apex, and left mid, and 3+4 (Grade group 2) in 1/2 cores in the right mid.  CT CAP 4/13/2021 shows axillary adenopathy. Bone scan showed no convincing evidence of metastatic disease.  Recommendation by Dr. Cisneros is ADT  x 2 years, RT to the nodes and prostate, and RT to the prostate.  Radiotherapy completed 7/15/2021  ADT/abiraterone 1/25/2021-6/29/2023.  Observation in the interim. Most recent PSA was undetectable 6/19/2023        Interval history:      Feels better off of ADT.  Remains on  rituximab. Having some infusion reactions.  Has more energy since Rituxan.  Noticed his PSA is now measurable.  Has more muscle mass.  Spleen has shrunk down. Can no longer feel it.  No night sweats.  No new adenopathy     Past Medical History:   I have reviewed this patient's past medical history   Past Medical History        Past Medical History:   Diagnosis Date    Anemia      Benign essential hypertension      Chronic kidney disease      Gout      Lymphoma (H)      Prostate cancer (H)      Thrombocytopenia (H24)                 Past Surgical History:    I have reviewed this patient's past surgical history         Social History:   Tobacco, ETOH, and rec drugs reviewed and as noted below with the following exceptions:  Jonathon grew up in [place] and graduated from high school in 1972 from [employer].  He then went to the [employer] for a year and a half before joining the [employer], where he spent the next 2 years.  After discharge he went back to the  where he got his degree in accounting, ultimately obtaining a masters in finance.  He spent much of his career as a  for a company called [employer].  He retired in 2016.  He currently lives in [place] on 9 acres with a horse named Stevo, and a dog named Mayi, a chocolate lab.  He likes to fish and hunt on his property.  He is close with his sister and has friends.  He is  and has no children.              Family History:      Family History         Family History   Problem Relation Age of Onset    Hypertension Mother      Gout Mother      Hypertension Father      Diabetes Father      Hypertension Sister      Hypertension Brother      Hypertension Maternal Grandmother      Hypertension Maternal Grandfather      Diabetes Cousin      Deep Vein Thrombosis No family hx of      Anesthesia Reaction No family hx of                    Medications:      Current Outpatient Prescriptions          Current Outpatient Medications    Medication Sig Dispense Refill    allopurinol (ZYLOPRIM) 100 MG tablet Take 1 tablet (100 mg) by mouth daily (Patient taking differently: Take 100 mg by mouth daily (with lunch)) 90 tablet 0    cyanocobalamin (VITAMIN B-12) 1000 MCG SUBL sublingual tablet Place 1,000 mcg under the tongue daily (with lunch)        lisinopril-hydrochlorothiazide (ZESTORETIC) 20-12.5 MG tablet TAKE 2 TABLETS BY MOUTH  DAILY (Patient taking differently: Take 2 tablets by mouth every morning) 200 tablet 2    metoprolol succinate ER (TOPROL XL) 25 MG 24 hr tablet TAKE 1 TABLET BY MOUTH  DAILY WITH LUNCH (Patient taking differently: Take 25 mg by mouth daily (with lunch)) 100 tablet 2                     Physical Exam:   There were no vitals taken for this visit.    No exam could be done today because this was a virtual visit.  Data:          Latest Reference Range & Units 02/16/24 06:52 04/26/24 06:58   PSA Tumor Marker 0.00 - 6.50 ng/mL <0.01 0.02   Testosterone Total 240 - 950 ng/dL 257      CBC/CMP without concerning findings     No results found for this or any previous visit (from the past 24 hour(s)).     Other Data      Prostate biopsy 12/21/2020    FINAL DIAGNOSIS:  A. Prostate biopsy, target 1, right mid peripheral zone, 10 o'clock:  - Prostatic adenocarcinoma, acinar type, Keith score 7 (3+4), with  approximately 5% of pattern 4  - Grade group 2  - Involves 1 of 2 cores (9 mm, 80%)    B. Prostate biopsy, left apex, target 2, 12:30 o'clock:  - Prostatic adenocarcinoma, acinar type, Keith score 6 (3+3)  - Grade group 1  - Involves 2 of 2 cores (3 mm, 20%; 3 mm, 15%)    C. Prostate biopsy, left base:  - Benign prostatic tissue    D. Prostate biopsy, left mid:  - Prostatic adenocarcinoma, acinar type, [place] score 6 (3+3)  - Grade group 1  - Involves 2 of 2 cores (4.5 mm, 35%; 2 mm, 15%)    E. Prostate biopsy, left apex:  - Prostatic adenocarcinoma, acinar type, [place] score 6 (3+3)  - Grade group 1  - Involves 2 of 2 cores  (4 mm, 30%; 2 mm, 20%)    F. Prostate biopsy, right base:  - Benign prostatic tissue    G. Prostate biopsy, right mid:  - Prostatic adenocarcinoma, acinar type, [place] score 9 (4+5)  - Grade group 5  - Involves 1 of 2 cores (10 mm, 75%)    H. Prostate biopsy, right apex:  - Benign prostatic tissue    I. Prostate biopsy, left and right transitional zone, in same jar per RN.:  - Prostatic adenocarcinoma, acinar type, [place] score 7 (3+4), with  approximately 10% of pattern 4  - Grade group 2  - Involves 1 of 2 cores (5 mm, 40%)      PET SCAN 10/18/2023  IMPRESSION:     1. Findings suspicious for lymphoma involving lymph nodes above and below the diaphragm, the spleen, with additional possible diffuse marrow and bilateral lower cervical chain lymph node involvement.  2. Mildly FDG avid subsolid opacity in the infrahilar left lower lobe should be infectious/inflammatory. Consider short interval CT chest in 1-3 months to assess for resolution.  Labs, imaging and treatment plan reviewed with patient. All questions answered.        Final Diagnosis   Bone marrow, posterior iliac crest, right decalcified trephine biopsy and touch imprint; right direct and concentrated aspirate smears; and peripheral blood smear:      Low grade B cell lymphoma with the following features:  - Hypercellular marrow for age (overall 60%) with trilineage hematopoiesis, no overt dysplasia, no increase in blasts, and 40% involvement by B cell lymphoma with nodular and diffuse growth pattern   - Peripheral blood showing slight normochromic normocytic anemia, moderate thrombocytopenia, and leukocytosis with neutropenia, monocytosis, and atypical lymphocytosis            30 minutes spent on the date of the encounter doing chart review, review of outside records, review of test results, interpretation of tests, patient visit, documentation, and discussion with other provider(s)

## 2024-06-03 ENCOUNTER — VIRTUAL VISIT (OUTPATIENT)
Dept: ONCOLOGY | Facility: CLINIC | Age: 69
End: 2024-06-03
Attending: INTERNAL MEDICINE
Payer: COMMERCIAL

## 2024-06-03 VITALS — WEIGHT: 210 LBS | BODY MASS INDEX: 26.95 KG/M2 | HEIGHT: 74 IN

## 2024-06-03 DIAGNOSIS — C61 PROSTATE CANCER (H): ICD-10-CM

## 2024-06-03 DIAGNOSIS — C83.07 SPLENIC MARGINAL ZONE B-CELL LYMPHOMA (H): Primary | ICD-10-CM

## 2024-06-03 PROCEDURE — 99214 OFFICE O/P EST MOD 30 MIN: CPT | Mod: 95 | Performed by: INTERNAL MEDICINE

## 2024-06-03 ASSESSMENT — PAIN SCALES - GENERAL: PAINLEVEL: NO PAIN (0)

## 2024-06-03 NOTE — LETTER
6/3/2024         RE: Jonathon Santos  [id number] [address] [place] [id number]        Dear Colleague,    Thank you for referring your patient, Jonathon Santos, to the [facility]. Please see a copy of my visit note below.    Virtual Visit Details    Type of service:  Video Visit   Video Start Time:  11:35  Video End Time:11:58 AM    Originating Location (pt. Location): Home    Distant Location (provider location):  On-site  Platform used for Video Visit: [facility] Medical Oncology Note  Date of visit: Mraibell 3, 2024  New Outpatient Clinic Note           Assessment:      High risk localized prostate cancer.  This is high risk because of the [place] Grade Group 5 disease. Originally at the time of diagnosis he had adenopathy concerning for stage IV prostate cancer, but looking through the retrospectoscope, these nodes are likely a manifestation of his recently diagnosed low-grade lymphoma.   Status post maximal local therapy with maximal systemic therapy as well given adjuvantly.  Now, one year from the completion of his treatment, and 3 and a half years from the time of diagnosis, with no evidence of recurrent disease by history, physical exam, or recent PSA. His chance of cure at this point, while not 100%, is pretty high.  Recent diagnosis of a low-grade lymphoma (possible splenic MZL).  He has had excellent therapy with a nice resolution of his splenomegaly.   He has excellent follow-up with Dr. Burciaga who has been doing a wonderful job in management.             Plan:      No intervention by me  Continue follow-up with Dr. Burciaga for his lymphoma.  Return to clinic with us in 6 months with a PSA drawn just prior.  We will continue to see him at every 6 month intervals for a total of 5 years after his diagnosis.        Geraldo Eduardo MD, MSc  Associate Profess disease.   Or of Medicine  [employer]  [facility]  [address] [place] [address]  [place] [address]  [phone]     __________________________________________________________________     DIAGNOSES      cT2/3 high risk prostate cancer, diagnosed at prostate biopsy 12/21/20.  At the time of this diagnosis, pelvic adenopathy was seen on prostate MRI, with the assumption he had Stage IV prostate cancer. However, he was later diagnosed with a low grade lymphoma (see 2).  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  Jonathon had persistent neutropenia/thrombocytopenia, originally thought due to his prostate cancer treatment. 10/7/23 peripheral flow cytometry showed several populations of lambda-monotypic B-cells; however the predominant one was CD5/CD10 negative raising possibility of marginal zone lymphoma, lymphoplasmacytic, or rarely follicular lymphoma. BM biopsy showed  a 60% cellular marrow , with 40% involvement with a low grade lymphoma.  Surgical excisional biopsy was done 12/4. PET scan 10/18 showed significant splenomegaly (20 cm with SUVmax 6.7), mildly-avid lymphadenopathy above/below diaphragm, and diffuse bone marrow uptake.            History of Present Illness/therapy to date for prostate cancer:      Presented with PSA of 8.2, 10/2020 (was 7.25 a year earlier)  Prostate MRI 11/17/202 read out as PIRADS 5, with 2 suspicious lesions in the R mid and L apex, with capsular abutment for 15 mm. There was suspicious B obturator nodes, measuring up 19 mm, as well as B external iliac nodes.   Prostate biopsy 12/21/2020 showed a [place] 4+5 (Grade group 5) prostate cancer in 1 of 2 cores in the right mid, involving 75%. There was Keith 3+3 (Grade group 1) disease in the left apex, and left mid, and 3+4 (Grade group 2) in 1/2 cores in the right mid.  CT CAP 4/13/2021 shows axillary adenopathy. Bone scan showed no convincing evidence of metastatic disease.  Recommendation by Dr. Cisneros is ADT  x 2 years, RT  to the nodes and prostate, and RT to the prostate.  Radiotherapy completed 7/15/2021  ADT/abiraterone 1/25/2021-6/29/2023.  Observation in the interim. Most recent PSA was undetectable 6/19/2023        Interval history:      Feels better off of ADT.  Remains on rituximab. Having some infusion reactions.  Has more energy since Rituxan.  Noticed his PSA is now measurable.  Has more muscle mass.  Spleen has shrunk down. Can no longer feel it.  No night sweats.  No new adenopathy     Past Medical History:   I have reviewed this patient's past medical history   Past Medical History        Past Medical History:   Diagnosis Date    Anemia      Benign essential hypertension      Chronic kidney disease      Gout      Lymphoma (H)      Prostate cancer (H)      Thrombocytopenia (H24)                 Past Surgical History:    I have reviewed this patient's past surgical history         Social History:   Tobacco, ETOH, and rec drugs reviewed and as noted below with the following exceptions:  Jonathon grew up in [place] and graduated from high school in 1972 from [employer].  He then went to the [employer] for a year and a half before joining the [employer], where he spent the next 2 years.  After discharge he went back to the  where he got his degree in accounting, ultimately obtaining a masters in finance.  He spent much of his career as a  for a company called [employer].  He retired in 2016.  He currently lives in [place] on 9 acres with a horse named Stevo, and a dog named Mayi, a chocolate lab.  He likes to fish and hunt on his property.  He is close with his sister and has friends.  He is  and has no children.              Family History:      Family History         Family History   Problem Relation Age of Onset    Hypertension Mother      Gout Mother      Hypertension Father      Diabetes Father      Hypertension Sister      Hypertension Brother      Hypertension Maternal  Grandmother      Hypertension Maternal Grandfather      Diabetes Cousin      Deep Vein Thrombosis No family hx of      Anesthesia Reaction No family hx of                    Medications:      Current Outpatient Prescriptions          Current Outpatient Medications   Medication Sig Dispense Refill    allopurinol (ZYLOPRIM) 100 MG tablet Take 1 tablet (100 mg) by mouth daily (Patient taking differently: Take 100 mg by mouth daily (with lunch)) 90 tablet 0    cyanocobalamin (VITAMIN B-12) 1000 MCG SUBL sublingual tablet Place 1,000 mcg under the tongue daily (with lunch)        lisinopril-hydrochlorothiazide (ZESTORETIC) 20-12.5 MG tablet TAKE 2 TABLETS BY MOUTH  DAILY (Patient taking differently: Take 2 tablets by mouth every morning) 200 tablet 2    metoprolol succinate ER (TOPROL XL) 25 MG 24 hr tablet TAKE 1 TABLET BY MOUTH  DAILY WITH LUNCH (Patient taking differently: Take 25 mg by mouth daily (with lunch)) 100 tablet 2                     Physical Exam:   There were no vitals taken for this visit.    No exam could be done today because this was a virtual visit.  Data:          Latest Reference Range & Units 02/16/24 06:52 04/26/24 06:58   PSA Tumor Marker 0.00 - 6.50 ng/mL <0.01 0.02   Testosterone Total 240 - 950 ng/dL 257      CBC/CMP without concerning findings     No results found for this or any previous visit (from the past 24 hour(s)).     Other Data      Prostate biopsy 12/21/2020    FINAL DIAGNOSIS:  A. Prostate biopsy, target 1, right mid peripheral zone, 10 o'clock:  - Prostatic adenocarcinoma, acinar type, Keith score 7 (3+4), with  approximately 5% of pattern 4  - Grade group 2  - Involves 1 of 2 cores (9 mm, 80%)    B. Prostate biopsy, left apex, target 2, 12:30 o'clock:  - Prostatic adenocarcinoma, acinar type, [place] score 6 (3+3)  - Grade group 1  - Involves 2 of 2 cores (3 mm, 20%; 3 mm, 15%)    C. Prostate biopsy, left base:  - Benign prostatic tissue    D. Prostate biopsy, left mid:  -  Prostatic adenocarcinoma, acinar type, [place] score 6 (3+3)  - Grade group 1  - Involves 2 of 2 cores (4.5 mm, 35%; 2 mm, 15%)    E. Prostate biopsy, left apex:  - Prostatic adenocarcinoma, acinar type, Keith score 6 (3+3)  - Grade group 1  - Involves 2 of 2 cores (4 mm, 30%; 2 mm, 20%)    F. Prostate biopsy, right base:  - Benign prostatic tissue    G. Prostate biopsy, right mid:  - Prostatic adenocarcinoma, acinar type, [place] score 9 (4+5)  - Grade group 5  - Involves 1 of 2 cores (10 mm, 75%)    H. Prostate biopsy, right apex:  - Benign prostatic tissue    I. Prostate biopsy, left and right transitional zone, in same jar per RN.:  - Prostatic adenocarcinoma, acinar type, [place] score 7 (3+4), with  approximately 10% of pattern 4  - Grade group 2  - Involves 1 of 2 cores (5 mm, 40%)      PET SCAN 10/18/2023  IMPRESSION:     1. Findings suspicious for lymphoma involving lymph nodes above and below the diaphragm, the spleen, with additional possible diffuse marrow and bilateral lower cervical chain lymph node involvement.  2. Mildly FDG avid subsolid opacity in the infrahilar left lower lobe should be infectious/inflammatory. Consider short interval CT chest in 1-3 months to assess for resolution.  Labs, imaging and treatment plan reviewed with patient. All questions answered.        Final Diagnosis   Bone marrow, posterior iliac crest, right decalcified trephine biopsy and touch imprint; right direct and concentrated aspirate smears; and peripheral blood smear:      Low grade B cell lymphoma with the following features:  - Hypercellular marrow for age (overall 60%) with trilineage hematopoiesis, no overt dysplasia, no increase in blasts, and 40% involvement by B cell lymphoma with nodular and diffuse growth pattern   - Peripheral blood showing slight normochromic normocytic anemia, moderate thrombocytopenia, and leukocytosis with neutropenia, monocytosis, and atypical lymphocytosis            30 minutes spent  on the date of the encounter doing chart review, review of outside records, review of test results, interpretation of tests, patient visit, documentation, and discussion with other provider(s)

## 2024-06-03 NOTE — NURSING NOTE
Is the patient currently in the state of [place]? YES    Visit mode:VIDEO    If the visit is dropped, the patient can be reconnected by: VIDEO VISIT: Send to e-mail at: [email or web address]    Will anyone else be joining the visit? NO  (If patient encounters technical issues they should call [phone]    How would you like to obtain your AVS? MyChart    Are changes needed to the allergy or medication list? No    Are refills needed on medications prescribed by this physician? NO    Reason for visit: STALIN CHILDERS

## 2024-06-21 ENCOUNTER — INFUSION THERAPY VISIT (OUTPATIENT)
Dept: ONCOLOGY | Facility: CLINIC | Age: 69
End: 2024-06-21
Attending: INTERNAL MEDICINE
Payer: COMMERCIAL

## 2024-06-21 ENCOUNTER — APPOINTMENT (OUTPATIENT)
Dept: LAB | Facility: CLINIC | Age: 69
End: 2024-06-21
Attending: INTERNAL MEDICINE
Payer: COMMERCIAL

## 2024-06-21 VITALS
BODY MASS INDEX: 27.91 KG/M2 | HEIGHT: 73 IN | TEMPERATURE: 98 F | SYSTOLIC BLOOD PRESSURE: 142 MMHG | RESPIRATION RATE: 16 BRPM | HEART RATE: 79 BPM | OXYGEN SATURATION: 99 % | DIASTOLIC BLOOD PRESSURE: 85 MMHG

## 2024-06-21 VITALS
OXYGEN SATURATION: 98 % | HEART RATE: 83 BPM | DIASTOLIC BLOOD PRESSURE: 92 MMHG | BODY MASS INDEX: 27.04 KG/M2 | RESPIRATION RATE: 18 BRPM | WEIGHT: 210.6 LBS | SYSTOLIC BLOOD PRESSURE: 146 MMHG | TEMPERATURE: 98 F

## 2024-06-21 DIAGNOSIS — C83.07 SPLENIC MARGINAL ZONE B-CELL LYMPHOMA (H): Primary | ICD-10-CM

## 2024-06-21 DIAGNOSIS — C61 PROSTATE CANCER (H): ICD-10-CM

## 2024-06-21 LAB
ALBUMIN SERPL BCG-MCNC: 4.6 G/DL (ref 3.5–5.2)
ALP SERPL-CCNC: 66 U/L (ref 40–150)
ALT SERPL W P-5'-P-CCNC: 37 U/L (ref 0–70)
ANION GAP SERPL CALCULATED.3IONS-SCNC: 14 MMOL/L (ref 7–15)
AST SERPL W P-5'-P-CCNC: 44 U/L (ref 0–45)
BASOPHILS # BLD AUTO: 0.1 10E3/UL (ref 0–0.2)
BASOPHILS NFR BLD AUTO: 1 %
BILIRUB SERPL-MCNC: 1.6 MG/DL
BUN SERPL-MCNC: 25.6 MG/DL (ref 8–23)
CALCIUM SERPL-MCNC: 9.8 MG/DL (ref 8.8–10.2)
CHLORIDE SERPL-SCNC: 104 MMOL/L (ref 98–107)
CREAT SERPL-MCNC: 1.26 MG/DL (ref 0.67–1.17)
DEPRECATED HCO3 PLAS-SCNC: 21 MMOL/L (ref 22–29)
EGFRCR SERPLBLD CKD-EPI 2021: 61 ML/MIN/1.73M2
EOSINOPHIL # BLD AUTO: 0.2 10E3/UL (ref 0–0.7)
EOSINOPHIL NFR BLD AUTO: 3 %
ERYTHROCYTE [DISTWIDTH] IN BLOOD BY AUTOMATED COUNT: 13.1 % (ref 10–15)
GLUCOSE SERPL-MCNC: 138 MG/DL (ref 70–99)
HCT VFR BLD AUTO: 41.9 % (ref 40–53)
HGB BLD-MCNC: 14.7 G/DL (ref 13.3–17.7)
IMM GRANULOCYTES # BLD: 0 10E3/UL
IMM GRANULOCYTES NFR BLD: 0 %
LDH SERPL L TO P-CCNC: 188 U/L (ref 0–250)
LYMPHOCYTES # BLD AUTO: 1.1 10E3/UL (ref 0.8–5.3)
LYMPHOCYTES NFR BLD AUTO: 21 %
MCH RBC QN AUTO: 33 PG (ref 26.5–33)
MCHC RBC AUTO-ENTMCNC: 35.1 G/DL (ref 31.5–36.5)
MCV RBC AUTO: 94 FL (ref 78–100)
MONOCYTES # BLD AUTO: 0.5 10E3/UL (ref 0–1.3)
MONOCYTES NFR BLD AUTO: 9 %
NEUTROPHILS # BLD AUTO: 3.4 10E3/UL (ref 1.6–8.3)
NEUTROPHILS NFR BLD AUTO: 66 %
NRBC # BLD AUTO: 0 10E3/UL
NRBC BLD AUTO-RTO: 0 /100
PLATELET # BLD AUTO: 85 10E3/UL (ref 150–450)
POTASSIUM SERPL-SCNC: 3.8 MMOL/L (ref 3.4–5.3)
PROT SERPL-MCNC: 7.2 G/DL (ref 6.4–8.3)
PSA SERPL DL<=0.01 NG/ML-MCNC: 0.04 NG/ML (ref 0–6.5)
RBC # BLD AUTO: 4.46 10E6/UL (ref 4.4–5.9)
SODIUM SERPL-SCNC: 139 MMOL/L (ref 135–145)
URATE SERPL-MCNC: 6.6 MG/DL (ref 3.4–7)
WBC # BLD AUTO: 5.1 10E3/UL (ref 4–11)

## 2024-06-21 PROCEDURE — 250N000011 HC RX IP 250 OP 636: Mod: JZ | Performed by: PHYSICIAN ASSISTANT

## 2024-06-21 PROCEDURE — 258N000003 HC RX IP 258 OP 636: Mod: JZ | Performed by: PHYSICIAN ASSISTANT

## 2024-06-21 PROCEDURE — 36415 COLL VENOUS BLD VENIPUNCTURE: CPT | Performed by: INTERNAL MEDICINE

## 2024-06-21 PROCEDURE — 250N000013 HC RX MED GY IP 250 OP 250 PS 637: Performed by: PHYSICIAN ASSISTANT

## 2024-06-21 PROCEDURE — 96375 TX/PRO/DX INJ NEW DRUG ADDON: CPT

## 2024-06-21 PROCEDURE — 83615 LACTATE (LD) (LDH) ENZYME: CPT

## 2024-06-21 PROCEDURE — 85025 COMPLETE CBC W/AUTO DIFF WBC: CPT | Performed by: INTERNAL MEDICINE

## 2024-06-21 PROCEDURE — 84550 ASSAY OF BLOOD/URIC ACID: CPT

## 2024-06-21 PROCEDURE — G0463 HOSPITAL OUTPT CLINIC VISIT: HCPCS | Performed by: PHYSICIAN ASSISTANT

## 2024-06-21 PROCEDURE — 99213 OFFICE O/P EST LOW 20 MIN: CPT | Performed by: PHYSICIAN ASSISTANT

## 2024-06-21 PROCEDURE — 96413 CHEMO IV INFUSION 1 HR: CPT

## 2024-06-21 PROCEDURE — 84403 ASSAY OF TOTAL TESTOSTERONE: CPT

## 2024-06-21 PROCEDURE — 80053 COMPREHEN METABOLIC PANEL: CPT

## 2024-06-21 PROCEDURE — G2211 COMPLEX E/M VISIT ADD ON: HCPCS | Performed by: PHYSICIAN ASSISTANT

## 2024-06-21 PROCEDURE — 84153 ASSAY OF PSA TOTAL: CPT

## 2024-06-21 PROCEDURE — 96415 CHEMO IV INFUSION ADDL HR: CPT

## 2024-06-21 RX ORDER — ALBUTEROL SULFATE 0.83 MG/ML
2.5 SOLUTION RESPIRATORY (INHALATION)
Status: CANCELLED | OUTPATIENT
Start: 2024-06-21

## 2024-06-21 RX ORDER — LORAZEPAM 2 MG/ML
0.5 INJECTION INTRAMUSCULAR EVERY 4 HOURS PRN
Status: CANCELLED | OUTPATIENT
Start: 2024-06-21

## 2024-06-21 RX ORDER — EPINEPHRINE 1 MG/ML
0.3 INJECTION, SOLUTION INTRAMUSCULAR; SUBCUTANEOUS EVERY 5 MIN PRN
Status: CANCELLED | OUTPATIENT
Start: 2024-06-21

## 2024-06-21 RX ORDER — HEPARIN SODIUM (PORCINE) LOCK FLUSH IV SOLN 100 UNIT/ML 100 UNIT/ML
5 SOLUTION INTRAVENOUS
Status: CANCELLED | OUTPATIENT
Start: 2024-06-21

## 2024-06-21 RX ORDER — MEPERIDINE HYDROCHLORIDE 25 MG/ML
25 INJECTION INTRAMUSCULAR; INTRAVENOUS; SUBCUTANEOUS EVERY 30 MIN PRN
Status: CANCELLED | OUTPATIENT
Start: 2024-06-21

## 2024-06-21 RX ORDER — DIPHENHYDRAMINE HYDROCHLORIDE 50 MG/ML
50 INJECTION INTRAMUSCULAR; INTRAVENOUS
Status: CANCELLED | OUTPATIENT
Start: 2024-06-21 | End: 2024-06-21

## 2024-06-21 RX ORDER — ACETAMINOPHEN 325 MG/1
650 TABLET ORAL
Status: CANCELLED | OUTPATIENT
Start: 2024-06-21

## 2024-06-21 RX ORDER — HEPARIN SODIUM,PORCINE 10 UNIT/ML
5-20 VIAL (ML) INTRAVENOUS DAILY PRN
Status: CANCELLED | OUTPATIENT
Start: 2024-06-21

## 2024-06-21 RX ORDER — METHYLPREDNISOLONE SODIUM SUCCINATE 125 MG/2ML
125 INJECTION, POWDER, LYOPHILIZED, FOR SOLUTION INTRAMUSCULAR; INTRAVENOUS
Status: CANCELLED | OUTPATIENT
Start: 2024-06-21

## 2024-06-21 RX ORDER — DIPHENHYDRAMINE HCL 25 MG
50 CAPSULE ORAL ONCE
Status: COMPLETED | OUTPATIENT
Start: 2024-06-21 | End: 2024-06-21

## 2024-06-21 RX ORDER — DIPHENHYDRAMINE HCL 25 MG
25 CAPSULE ORAL ONCE
Status: CANCELLED
Start: 2024-06-21 | End: 2024-06-21

## 2024-06-21 RX ORDER — ALBUTEROL SULFATE 90 UG/1
1-2 AEROSOL, METERED RESPIRATORY (INHALATION)
Status: CANCELLED
Start: 2024-06-21

## 2024-06-21 RX ORDER — DIPHENHYDRAMINE HCL 25 MG
25 CAPSULE ORAL ONCE
Status: COMPLETED | OUTPATIENT
Start: 2024-06-21 | End: 2024-06-21

## 2024-06-21 RX ORDER — METHYLPREDNISOLONE SODIUM SUCCINATE 125 MG/2ML
125 INJECTION, POWDER, LYOPHILIZED, FOR SOLUTION INTRAMUSCULAR; INTRAVENOUS
Status: CANCELLED
Start: 2024-06-21

## 2024-06-21 RX ORDER — DIPHENHYDRAMINE HCL 25 MG
50 CAPSULE ORAL ONCE
Status: CANCELLED | OUTPATIENT
Start: 2024-06-21

## 2024-06-21 RX ORDER — MEPERIDINE HYDROCHLORIDE 25 MG/ML
25 INJECTION INTRAMUSCULAR; INTRAVENOUS; SUBCUTANEOUS
Status: CANCELLED | OUTPATIENT
Start: 2024-06-21

## 2024-06-21 RX ORDER — ACETAMINOPHEN 325 MG/1
650 TABLET ORAL
Status: COMPLETED | OUTPATIENT
Start: 2024-06-21 | End: 2024-06-21

## 2024-06-21 RX ADMIN — FAMOTIDINE 20 MG: 10 INJECTION INTRAVENOUS at 09:24

## 2024-06-21 RX ADMIN — DIPHENHYDRAMINE HYDROCHLORIDE 25 MG: 25 CAPSULE ORAL at 11:58

## 2024-06-21 RX ADMIN — RITUXIMAB-ABBS 800 MG: 10 INJECTION, SOLUTION INTRAVENOUS at 10:04

## 2024-06-21 RX ADMIN — ACETAMINOPHEN 650 MG: 325 TABLET ORAL at 11:58

## 2024-06-21 RX ADMIN — DIPHENHYDRAMINE HYDROCHLORIDE 50 MG: 25 CAPSULE ORAL at 09:23

## 2024-06-21 RX ADMIN — SODIUM CHLORIDE 500 ML: 9 INJECTION, SOLUTION INTRAVENOUS at 10:04

## 2024-06-21 RX ADMIN — ACETAMINOPHEN 650 MG: 325 TABLET ORAL at 09:23

## 2024-06-21 ASSESSMENT — PAIN SCALES - GENERAL: PAINLEVEL: NO PAIN (0)

## 2024-06-21 NOTE — PROGRESS NOTES
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 Peripheral flow and subsequent bone marrow biopsy 10/27/23 showed several populations of clonal B-cells but the predominant one is CD5/T10-fwqvwpax low-grade B-cell lymphoma comprising about 40% of the bone marrow, but excisional LN biopsy recommended for further subclassification. There was another small CD5+ population compatible with monoclonal B-cell lymphocytosis. NGS negative for MYD88 mutation. Axillary LN biopsy 12/4/23 most consistent with CD5+ marginal zone lymphoma, splenic vs monik. Staging PET showed significant splenomegaly (20 cm with SUVmax 6.7), mildly-avid lymphadenopathy above/below diaphragm, and diffuse bone marrow uptake. .     Treatment history:  1/25/24 to 2/16/2024: Rituximab 375 mg/m2 IV weekly x 4 doses (indication for starting treatment was thrombocytopenia and early satiety). PET with CMR  4/26/24- current: rituximab q2 months for 4 more doses for residual splenomegaly and thrombocytopenia    INTERVAL HISTORY:  Jonathon is here today for follow-up prior to his second of 4 every 2 months Rituxan infusions. He has been feeling well overall; tolerating infusions. Appetite is good. Notes he craves more salty foods lately than sugary foods. Still having loose stool every 48 hours which has been stable; has mild bowel urgency prior to bowel movements. He has Metamucil at home, which he will try. No abdominal pain. Has some tenderness at his left gum which has been stable for some time, he has an appointment with his dentist but worries he will need a root canal. Not taking acyclovir. Does not want to increase pill burden if possible. Denies fever, chills, night sweats, recent  infections, shortness of breath, chest pain, or new lymphadenopathy.    PAST MEDICAL HISTORY:  Locally advanced prostate cancer ([place] 4+5=9) dx 12/2020 s/p radiation 5/2021-7/2021 and 2 years of abiraterone/prednisone ending 6/2023  Hypertension  Hepatic steatosis   Peripheral neuropathy, possibly 2/2 B12 deficiency  Gout     FAMILY HISTORY:  Mother had lymphoma and lung cancer.      SOCIAL HISTORY:  Never smoker. Drinks 2-3 cans of beer daily. Smokes marijuana daily.   Lives in [place], [place] by himself.   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   Has a dog and a horse    I have reviewed and updated the following:  Past Medical History:   Diagnosis Date    Anemia     Benign essential hypertension     Chronic kidney disease     Gout     Lymphoma (H)     Prostate cancer (H)     Thrombocytopenia (H24)       No Known Allergies    Current Outpatient Medications:     allopurinol (ZYLOPRIM) 100 MG tablet, Take 100 mg by mouth daily, Disp: , Rfl:     cyanocobalamin (VITAMIN B-12) 1000 MCG SUBL sublingual tablet, Place 1,000 mcg under the tongue daily (with lunch), Disp: , Rfl:     lisinopril-hydrochlorothiazide (ZESTORETIC) 20-12.5 MG tablet, Take 2 tablets by mouth daily, Disp: 200 tablet, Rfl: 0    metoprolol succinate ER (TOPROL XL) 25 MG 24 hr tablet, TAKE 1 TABLET BY MOUTH DAILY  WITH LUNCH, Disp: 100 tablet, Rfl: 2  No current facility-administered medications for this visit.    Facility-Administered Medications Ordered in Other Visits:     famotidine (PEPCID) injection 20 mg, 20 mg, Intravenous, Q12H, Belkys Todd PA-C, 20 mg at 06/21/24 0924    REVIEW OF SYSTEMS:  10-point ROS reviewed and negative other than that mentioned in HPI.     Objective   VITALS:  BP (!) 146/92 (BP Location: Right arm, Patient Position: Sitting, Cuff Size: Adult Regular)   Pulse 83   Temp 98  F (36.7  C) (Oral)   Resp 18   Wt 95.5 kg (210 lb 9.6 oz)   SpO2 98%   BMI 27.04 kg/m       ECOG PS: 0      PHYSICAL EXAM:  General: Awake, alert, in no acute distress.   HEENT: Normocephalic, atraumatic. No scleral icterus.   Lymph: No palpable lymph nodes.  CV: Regular rate and rhythm. No murmurs, rubs, or gallops appreciated.  Resp: Good inspiratory effort, lungs clear to auscultation bilaterally.  GI: Abdomen soft, nontender, nondistended. No palpable spleen.  Ext: No peripheral edema bilaterally.  Neuro: CN II-XII grossly intact. No focal deficits appreciated.  Skin: No rash, unusual bruising or prominent lesions.  Psych: Pleasant, normal affect.     Most Recent 3 CBC's:  Recent Labs   Lab Test 06/21/24  0731 04/26/24  0658 03/22/24  1302   WBC 5.1 5.1 3.8*   HGB 14.7 14.5 12.8*   MCV 94 91 92   PLT 85* 75* 82*   ANEUTAUTO 3.4 3.5 2.6     Most Recent 3 BMP's:  Recent Labs   Lab Test 06/21/24  0731 04/26/24  0658 03/22/24  1302    140 135   POTASSIUM 3.8 4.3 4.0   CHLORIDE 104 104 100   CO2 21* 21* 24   BUN 25.6* 24.3* 16.9   CR 1.26* 1.08 1.12   ANIONGAP 14 15 11   MISTY 9.8 9.4 9.6   * 112* 121*   PROTTOTAL 7.2 7.4 7.1   ALBUMIN 4.6 4.7 4.6    Most Recent 3 LFT's:  Recent Labs   Lab Test 06/21/24  0731 04/26/24  0658 03/22/24  1302   AST 44 53* 28   ALT 37 31 25   ALKPHOS 66 70 66   BILITOTAL 1.6* 1.6* 1.4*    Most Recent 2 TSH and T4:  Recent Labs   Lab Test 06/15/22  1119   TSH 2.39   I reviewed the above labs today.      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 Peripheral flow cytometry and subsequent bone marrow biopsy showed several populations of clonal B-cells but the predominant one was CD5/W35-lkmkhgnd low-grade B-cell lymphoma comprising about 40% of the bone marrow. There is another small CD5+ population compatible with monoclonal B-cell lymphocytosis. MYD88 NGS is negative. His PET showed significant splenomegaly (20 cm with SUVmax 6.7), mildly-avid lymphadenopathy  above/below diaphragm, and diffuse bone marrow uptake. Given that Hemepath was unable to narrow the subtype of lymphoma from the bone marrow biopsy, we pursued excisional biopsy of the R axillary lymph node, which showed partially CD5+ marginal zone lymphoma, with splenic subtype favored given his whole clinical presentation. Treatment was indicated due to his degree of thrombocytopenia and symptoms of LUQ discomfort/early satiety.   Recommended first-line treatment with rituximab monotherapy - weekly x 4 weeks with potential for consolidation every 2 months x 4 more doses, with expected response rates ~60-70%.   Splenectomy would also be a later line option but would not address the lymph node or bone marrow disease.   Completed 4 weekly doses of rituximab 1/25/24 to 2/16/24. Tolerated fairly well other than rituximab infusion reaction first dose (SOB/chest tightness) and nausea second dose.   3/14/24 PET shows CMR (Deauville 3, only one R axillary node remaining) and decreased splenomegaly (20.8 -> 16.3 cm). Clinically responding as well with Hgb up to 12.8 and platelets up to . However his spleen was still quite enlarged with thrombocytopenia. Plan to continue rituximab q2 months for 4 more doses (starting 4/26/24) to see if we can shrink the spleen further and improve blood counts.   Anemia and leukopenia have resolved. Thrombocytopenia is generally stable.   Tolerated dose 1 rituximab well overall. Labs and exam appropriate to proceed with dose 2 rituximab today as planned.     Hx of prostate cancer  Dx 12/2020, locally advanced ([place] 4+5=9) disease s/p radiation 5/2021-7/2021 and 2 years of abiraterone/prednisone ending 6/2023.  Transferred care from Dr. Cisneros to Dr. Eduardo.   On surveillance. PSA today is 0.04.    Hyperuricemia  Gout History  Continue allopurinol. No acute concerns.     Cancer related prophylaxis  Not taking acyclovir for HSV ppx at this time; he does not want to take more pills. No  signs of herpetic infection today. Will continue to monitor.  Up to date on flu and pneumonia shots. Could use Shingrix series when he completes rituximab.     PLAN:  - Consolidative rituximab q2 months for 4 doses (dose 2 today 6/21/2024)    The longitudinal plan of care for the diagnosis(es)/condition(s) as documented were addressed during this visit. Due to the added complexity in care, I will continue to support Jonathon in the subsequent management and with ongoing continuity of care.    JOSE Bautista    Patient was seen and examined by me, as noted above. JOSE Bautista acted as a scribe. I have reviewed and edited the above note.     Belkys Todd PA-C  [facility]  [address], [place] [id number] [phone]

## 2024-06-21 NOTE — NURSING NOTE
"Oncology Rooming Note    June 21, 2024 7:45 AM   Jonathon Santos is a 69 year old male who presents for:    Chief Complaint   Patient presents with    Blood Draw     Labs drawn with PIV start by RN. Pt tolerated well. Vitals taken. Patient checked into next appointment.      Oncology Clinic Visit     Splenic marginal zone b-cell lymphoma      Initial Vitals: BP (!) 146/92 (BP Location: Right arm, Patient Position: Sitting, Cuff Size: Adult Regular)   Pulse 83   Temp 98  F (36.7  C) (Oral)   Resp 18   Wt 95.5 kg (210 lb 9.6 oz)   SpO2 98%   BMI 27.04 kg/m   Estimated body mass index is 27.04 kg/m  as calculated from the following:    Height as of 6/3/24: 1.88 m (6' 2\").    Weight as of this encounter: 95.5 kg (210 lb 9.6 oz). Body surface area is 2.23 meters squared.  No Pain (0) Comment: Data Unavailable   No LMP for male patient.  Allergies reviewed: Yes  Medications reviewed: Yes    Medications: Medication refills not needed today.  Pharmacy name entered into [employer]: [facility] - [address]    Frailty Screening:   Is the patient here for a new oncology consult visit in cancer care? 2. No      Clinical concerns: no other complaints      Richard Abdullahi"

## 2024-06-21 NOTE — PROGRESS NOTES
Infusion Nursing Note:  Jonathon Santos presents today for Day 1 Cycle 3 Rituxan  Patient seen by provider today: Yes: Belkys SOLORZANO   present during visit today: Not Applicable.    Note: Patient presents to infusion feeling ok. Pt denies new acute discomfort and states no acute complaints or concerns not addressed by AGATHA today.     Pt has hx of Rituxan reaction. Messaged Belkys SOLORZANO via secure chat to clarify pre-med plan and infusion titration.  Per secure chat. 6/21/24. 0916. Belkys SOLORZANO. Reji Hare RN. OK to give IV Pepcid as pre-med. Titrate Rituxan per previous infusion rate titration.    Rituxan given per the following  400mls/hour x14 mls to flush saline out of line  50mls/hour x30 minutes  100mls/hour x30 minutes  150mls/hour x30 minutes  200mls/hour x30 minutes  250mls/hour x30 minutes  300mls/hour x30 minutes  350mls/hour x30 minutes  400mls/hour x remainder of infusion.    Patient tolerated Rituxan infusion per MAR without reaction.     Intravenous Access:  Peripheral IV placed.    Treatment Conditions:  Lab Results   Component Value Date    HGB 14.7 06/21/2024    WBC 5.1 06/21/2024    ANEU 1.0 (L) 02/16/2024    ANEUTAUTO 3.4 06/21/2024    PLT 85 (L) 06/21/2024        Lab Results   Component Value Date     06/21/2024    POTASSIUM 3.8 06/21/2024    CR 1.26 (H) 06/21/2024    MISTY 9.8 06/21/2024    BILITOTAL 1.6 (H) 06/21/2024    ALBUMIN 4.6 06/21/2024    ALT 37 06/21/2024    AST 44 06/21/2024       Results reviewed, labs MET treatment parameters, ok to proceed with treatment.      Post Infusion Assessment:  Patient tolerated infusion without incident.  Blood return noted pre and post infusion.  Site patent and intact, free from redness, edema or discomfort.  No evidence of extravasations.  Access discontinued per protocol.       Discharge Plan:   Patient declined prescription refills.  Discharge instructions reviewed with: Patient.  Patient and/or family verbalized  understanding of discharge instructions and all questions answered.  AVS to patient via [employer].  Patient will return 8/16 for next appointment.   Patient discharged in stable condition accompanied by: self.  Departure Mode: Ambulatory.      Reji Hare RN

## 2024-06-21 NOTE — LETTER
6/21/2024      Jonathon Santos  [address] [address] [place] [id number]      Dear Colleague,    Thank you for referring your patient, Jonathon Santos, to the [facility]. Please see a copy of my visit note below.    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 Peripheral flow and subsequent bone marrow biopsy 10/27/23 showed several populations of clonal B-cells but the predominant one is CD5/U81-wbyhqopw low-grade B-cell lymphoma comprising about 40% of the bone marrow, but excisional LN biopsy recommended for further subclassification. There was another small CD5+ population compatible with monoclonal B-cell lymphocytosis. NGS negative for MYD88 mutation. Axillary LN biopsy 12/4/23 most consistent with CD5+ marginal zone lymphoma, splenic vs monik. Staging PET showed significant splenomegaly (20 cm with SUVmax 6.7), mildly-avid lymphadenopathy above/below diaphragm, and diffuse bone marrow uptake. .     Treatment history:  1/25/24 to 2/16/2024: Rituximab 375 mg/m2 IV weekly x 4 doses (indication for starting treatment was thrombocytopenia and early satiety). PET with CMR  4/26/24- current: rituximab q2 months for 4 more doses for residual splenomegaly and thrombocytopenia    INTERVAL HISTORY:  Jonathon is here today for follow-up prior to his second of 4 every 2 months Rituxan infusions. He has been feeling well overall; tolerating infusions. Appetite is good. Notes he craves more salty foods lately than sugary foods. Still having loose stool every 48 hours which has been stable; has mild bowel urgency prior to bowel movements. He has Metamucil at home, which he will try. No abdominal pain. Has some tenderness at his left  gum which has been stable for some time, he has an appointment with his dentist but worries he will need a root canal. Not taking acyclovir. Does not want to increase pill burden if possible. Denies fever, chills, night sweats, recent infections, shortness of breath, chest pain, or new lymphadenopathy.    PAST MEDICAL HISTORY:  Locally advanced prostate cancer ([place]ith 4+5=9) dx 12/2020 s/p radiation 5/2021-7/2021 and 2 years of abiraterone/prednisone ending 6/2023  Hypertension  Hepatic steatosis   Peripheral neuropathy, possibly 2/2 B12 deficiency  Gout     FAMILY HISTORY:  Mother had lymphoma and lung cancer.      SOCIAL HISTORY:  Never smoker. Drinks 2-3 cans of beer daily. Smokes marijuana daily.   Lives in [place], [place] by himself.   Retired, previously worked for [employer]s office.   Has a dog and a horse    I have reviewed and updated the following:  Past Medical History:   Diagnosis Date     Anemia      Benign essential hypertension      Chronic kidney disease      Gout      Lymphoma (H)      Prostate cancer (H)      Thrombocytopenia (H24)       No Known Allergies    Current Outpatient Medications:      allopurinol (ZYLOPRIM) 100 MG tablet, Take 100 mg by mouth daily, Disp: , Rfl:      cyanocobalamin (VITAMIN B-12) 1000 MCG SUBL sublingual tablet, Place 1,000 mcg under the tongue daily (with lunch), Disp: , Rfl:      lisinopril-hydrochlorothiazide (ZESTORETIC) 20-12.5 MG tablet, Take 2 tablets by mouth daily, Disp: 200 tablet, Rfl: 0     metoprolol succinate ER (TOPROL XL) 25 MG 24 hr tablet, TAKE 1 TABLET BY MOUTH DAILY  WITH LUNCH, Disp: 100 tablet, Rfl: 2  No current facility-administered medications for this visit.    Facility-Administered Medications Ordered in Other Visits:      famotidine (PEPCID) injection 20 mg, 20 mg, Intravenous, Q12H, Belkys Todd PA-C, 20 mg at 06/21/24 0924    REVIEW OF SYSTEMS:  10-point ROS reviewed and negative other than that mentioned in HPI.      Objective  VITALS:  BP (!) 146/92 (BP Location: Right arm, Patient Position: Sitting, Cuff Size: Adult Regular)   Pulse 83   Temp 98  F (36.7  C) (Oral)   Resp 18   Wt 95.5 kg (210 lb 9.6 oz)   SpO2 98%   BMI 27.04 kg/m       ECOG PS: 0     PHYSICAL EXAM:  General: Awake, alert, in no acute distress.   HEENT: Normocephalic, atraumatic. No scleral icterus.   Lymph: No palpable lymph nodes.  CV: Regular rate and rhythm. No murmurs, rubs, or gallops appreciated.  Resp: Good inspiratory effort, lungs clear to auscultation bilaterally.  GI: Abdomen soft, nontender, nondistended. No palpable spleen.  Ext: No peripheral edema bilaterally.  Neuro: CN II-XII grossly intact. No focal deficits appreciated.  Skin: No rash, unusual bruising or prominent lesions.  Psych: Pleasant, normal affect.     Most Recent 3 CBC's:  Recent Labs   Lab Test 06/21/24  0731 04/26/24  0658 03/22/24  1302   WBC 5.1 5.1 3.8*   HGB 14.7 14.5 12.8*   MCV 94 91 92   PLT 85* 75* 82*   ANEUTAUTO 3.4 3.5 2.6     Most Recent 3 BMP's:  Recent Labs   Lab Test 06/21/24  0731 04/26/24  0658 03/22/24  1302    140 135   POTASSIUM 3.8 4.3 4.0   CHLORIDE 104 104 100   CO2 21* 21* 24   BUN 25.6* 24.3* 16.9   CR 1.26* 1.08 1.12   ANIONGAP 14 15 11   MISTY 9.8 9.4 9.6   * 112* 121*   PROTTOTAL 7.2 7.4 7.1   ALBUMIN 4.6 4.7 4.6    Most Recent 3 LFT's:  Recent Labs   Lab Test 06/21/24  0731 04/26/24  0658 03/22/24  1302   AST 44 53* 28   ALT 37 31 25   ALKPHOS 66 70 66   BILITOTAL 1.6* 1.6* 1.4*    Most Recent 2 TSH and T4:  Recent Labs   Lab Test 06/15/22  1119   TSH 2.39   I reviewed the above labs today.      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 Peripheral flow cytometry and subsequent bone marrow biopsy showed several populations of clonal B-cells but the predominant one was CD5/W43-euuupabj low-grade B-cell  lymphoma comprising about 40% of the bone marrow. There is another small CD5+ population compatible with monoclonal B-cell lymphocytosis. MYD88 NGS is negative. His PET showed significant splenomegaly (20 cm with SUVmax 6.7), mildly-avid lymphadenopathy above/below diaphragm, and diffuse bone marrow uptake. Given that Hemepath was unable to narrow the subtype of lymphoma from the bone marrow biopsy, we pursued excisional biopsy of the R axillary lymph node, which showed partially CD5+ marginal zone lymphoma, with splenic subtype favored given his whole clinical presentation. Treatment was indicated due to his degree of thrombocytopenia and symptoms of LUQ discomfort/early satiety.   Recommended first-line treatment with rituximab monotherapy - weekly x 4 weeks with potential for consolidation every 2 months x 4 more doses, with expected response rates ~60-70%.   Splenectomy would also be a later line option but would not address the lymph node or bone marrow disease.   Completed 4 weekly doses of rituximab 1/25/24 to 2/16/24. Tolerated fairly well other than rituximab infusion reaction first dose (SOB/chest tightness) and nausea second dose.   3/14/24 PET shows CMR (Deauville 3, only one R axillary node remaining) and decreased splenomegaly (20.8 -> 16.3 cm). Clinically responding as well with Hgb up to 12.8 and platelets up to . However his spleen was still quite enlarged with thrombocytopenia. Plan to continue rituximab q2 months for 4 more doses (starting 4/26/24) to see if we can shrink the spleen further and improve blood counts.   Anemia and leukopenia have resolved. Thrombocytopenia is generally stable.   Tolerated dose 1 rituximab well overall. Labs and exam appropriate to proceed with dose 2 rituximab today as planned.     Hx of prostate cancer  Dx 12/2020, locally advanced ([place] 4+5=9) disease s/p radiation 5/2021-7/2021 and 2 years of abiraterone/prednisone ending 6/2023.  Transferred care from  Dr. Cisneros to Dr. Eduardo.   On surveillance. PSA today is 0.04.    Hyperuricemia  Gout History  Continue allopurinol. No acute concerns.     Cancer related prophylaxis  Not taking acyclovir for HSV ppx at this time; he does not want to take more pills. No signs of herpetic infection today. Will continue to monitor.  Up to date on flu and pneumonia shots. Could use Shingrix series when he completes rituximab.     PLAN:  - Consolidative rituximab q2 months for 4 doses (dose 2 today 6/21/2024)    The longitudinal plan of care for the diagnosis(es)/condition(s) as documented were addressed during this visit. Due to the added complexity in care, I will continue to support Jonathon in the subsequent management and with ongoing continuity of care.    JOSE Bautista    Patient was seen and examined by me, as noted above. JOSE Bautista acted as a scribe. I have reviewed and edited the above note.     Belkys Todd PA-C  [facility] [place] [id number] [place], [place] [address] [phone]        Again, thank you for allowing me to participate in the care of your patient.        Sincerely,        Belkys Todd PA-C

## 2024-06-21 NOTE — PATIENT INSTRUCTIONS
[facility] Triage and after hours / weekends / holidays:  [phone]    Please call the triage or after hours line if you experience a temperature greater than or equal to 100.4, shaking chills, have uncontrolled nausea, vomiting and/or diarrhea, dizziness, shortness of breath, chest pain, bleeding, unexplained bruising, or if you have any other new/concerning symptoms, questions or concerns.      If you are having any concerning symptoms or wish to speak to a provider before your next infusion visit, please call triage to notify them so we can adequately serve you.     If you need a refill on a narcotic prescription or other medication, please call before your infusion appointment.                 June 2024 Sunday Monday Tuesday Wednesday Thursday Friday Saturday                                 1       2     3    RETURN CCSL  11:15 AM   (30 min.)   Geraldo Eduardo MD   [facility] 4     5     6     7     8       9     10     11     12     13     14     15       16     17     18     19     20     21    LAB PERIPHERAL   7:30 AM   (15 min.)   [facility] DRAW   [facility]    RETURN CCSL   7:45 AM   (45 min.)   Belkys Todd PA-C   [facility]    ONC INFUSION 2 HR (120 MIN)   8:00 AM   (120 min.)    ONC INFUSION NURSE   [facility] 22 23     24     25     26     27     28     29       30                                               July 2024 Sunday Monday Tuesday Wednesday Thursday Friday Saturday        1     2     3     4     5     6       7     8     9     10     11     12     13       14     15     16     17     18     19     20       21     22     23     24     25     26     27       28     29     30     31                                    Lab Results:  Recent Results (from the past 12 hour(s))   PSA tumor marker    Collection Time: 06/21/24  7:31 AM   Result Value  Ref Range    PSA Tumor Marker 0.04 0.00 - 6.50 ng/mL   Comprehensive metabolic panel    Collection Time: 06/21/24  7:31 AM   Result Value Ref Range    Sodium 139 135 - 145 mmol/L    Potassium 3.8 3.4 - 5.3 mmol/L    Carbon Dioxide (CO2) 21 (L) 22 - 29 mmol/L    Anion Gap 14 7 - 15 mmol/L    Urea Nitrogen 25.6 (H) 8.0 - 23.0 mg/dL    Creatinine 1.26 (H) 0.67 - 1.17 mg/dL    GFR Estimate 61 >60 mL/min/1.73m2    Calcium 9.8 8.8 - 10.2 mg/dL    Chloride 104 98 - 107 mmol/L    Glucose 138 (H) 70 - 99 mg/dL    Alkaline Phosphatase 66 40 - 150 U/L    AST 44 0 - 45 U/L    ALT 37 0 - 70 U/L    Protein Total 7.2 6.4 - 8.3 g/dL    Albumin 4.6 3.5 - 5.2 g/dL    Bilirubin Total 1.6 (H) <=1.2 mg/dL   Lactate Dehydrogenase    Collection Time: 06/21/24  7:31 AM   Result Value Ref Range    Lactate Dehydrogenase 188 0 - 250 U/L   Uric acid    Collection Time: 06/21/24  7:31 AM   Result Value Ref Range    Uric Acid 6.6 3.4 - 7.0 mg/dL   CBC with platelets and differential    Collection Time: 06/21/24  7:31 AM   Result Value Ref Range    WBC Count 5.1 4.0 - 11.0 10e3/uL    RBC Count 4.46 4.40 - 5.90 10e6/uL    Hemoglobin 14.7 13.3 - 17.7 g/dL    Hematocrit 41.9 40.0 - 53.0 %    MCV 94 78 - 100 fL    MCH 33.0 26.5 - 33.0 pg    MCHC 35.1 31.5 - 36.5 g/dL    RDW 13.1 10.0 - 15.0 %    Platelet Count 85 (L) 150 - 450 10e3/uL    % Neutrophils 66 %    % Lymphocytes 21 %    % Monocytes 9 %    % Eosinophils 3 %    % Basophils 1 %    % Immature Granulocytes 0 %    NRBCs per 100 WBC 0 <1 /100    Absolute Neutrophils 3.4 1.6 - 8.3 10e3/uL    Absolute Lymphocytes 1.1 0.8 - 5.3 10e3/uL    Absolute Monocytes 0.5 0.0 - 1.3 10e3/uL    Absolute Eosinophils 0.2 0.0 - 0.7 10e3/uL    Absolute Basophils 0.1 0.0 - 0.2 10e3/uL    Absolute Immature Granulocytes 0.0 <=0.4 10e3/uL    Absolute NRBCs 0.0 10e3/uL

## 2024-06-24 DIAGNOSIS — C83.07 SPLENIC MARGINAL ZONE B-CELL LYMPHOMA (H): Primary | ICD-10-CM

## 2024-06-25 LAB — TESTOST SERPL-MCNC: 243 NG/DL (ref 240–950)

## 2024-06-30 ENCOUNTER — MYC MEDICAL ADVICE (OUTPATIENT)
Dept: RHEUMATOLOGY | Facility: CLINIC | Age: 69
End: 2024-06-30
Payer: COMMERCIAL

## 2024-07-17 DIAGNOSIS — I10 UNCONTROLLED HYPERTENSION: ICD-10-CM

## 2024-07-18 RX ORDER — LISINOPRIL AND HYDROCHLOROTHIAZIDE 12.5; 2 MG/1; MG/1
2 TABLET ORAL DAILY
Qty: 200 TABLET | Refills: 2 | Status: SHIPPED | OUTPATIENT
Start: 2024-07-18

## 2024-08-04 ENCOUNTER — HEALTH MAINTENANCE LETTER (OUTPATIENT)
Age: 69
End: 2024-08-04

## 2024-08-15 ENCOUNTER — MYC MEDICAL ADVICE (OUTPATIENT)
Dept: ONCOLOGY | Facility: CLINIC | Age: 69
End: 2024-08-15
Payer: COMMERCIAL

## 2024-08-16 ENCOUNTER — APPOINTMENT (OUTPATIENT)
Dept: LAB | Facility: CLINIC | Age: 69
End: 2024-08-16
Attending: INTERNAL MEDICINE
Payer: COMMERCIAL

## 2024-08-16 ENCOUNTER — ONCOLOGY VISIT (OUTPATIENT)
Dept: ONCOLOGY | Facility: CLINIC | Age: 69
End: 2024-08-16
Attending: REGISTERED NURSE
Payer: COMMERCIAL

## 2024-08-16 ENCOUNTER — INFUSION THERAPY VISIT (OUTPATIENT)
Dept: INFUSION THERAPY | Facility: CLINIC | Age: 69
End: 2024-08-16
Attending: REGISTERED NURSE
Payer: COMMERCIAL

## 2024-08-16 VITALS
SYSTOLIC BLOOD PRESSURE: 154 MMHG | RESPIRATION RATE: 16 BRPM | HEART RATE: 70 BPM | DIASTOLIC BLOOD PRESSURE: 92 MMHG | OXYGEN SATURATION: 99 % | TEMPERATURE: 98.5 F

## 2024-08-16 VITALS
DIASTOLIC BLOOD PRESSURE: 90 MMHG | HEART RATE: 77 BPM | WEIGHT: 213.7 LBS | SYSTOLIC BLOOD PRESSURE: 135 MMHG | BODY MASS INDEX: 28.32 KG/M2 | OXYGEN SATURATION: 97 % | TEMPERATURE: 98.8 F | RESPIRATION RATE: 16 BRPM

## 2024-08-16 DIAGNOSIS — D69.6 THROMBOCYTOPENIA (H): ICD-10-CM

## 2024-08-16 DIAGNOSIS — C83.07 SPLENIC MARGINAL ZONE B-CELL LYMPHOMA (H): Primary | ICD-10-CM

## 2024-08-16 LAB
ALBUMIN SERPL BCG-MCNC: 4.5 G/DL (ref 3.5–5.2)
ALP SERPL-CCNC: 58 U/L (ref 40–150)
ALT SERPL W P-5'-P-CCNC: 48 U/L (ref 0–70)
ANION GAP SERPL CALCULATED.3IONS-SCNC: 13 MMOL/L (ref 7–15)
AST SERPL W P-5'-P-CCNC: 42 U/L (ref 0–45)
BILIRUB SERPL-MCNC: 1.7 MG/DL
BUN SERPL-MCNC: 23.6 MG/DL (ref 8–23)
CALCIUM SERPL-MCNC: 9.7 MG/DL (ref 8.8–10.4)
CHLORIDE SERPL-SCNC: 104 MMOL/L (ref 98–107)
CREAT SERPL-MCNC: 1.13 MG/DL (ref 0.67–1.17)
EGFRCR SERPLBLD CKD-EPI 2021: 70 ML/MIN/1.73M2
GLUCOSE SERPL-MCNC: 122 MG/DL (ref 70–99)
HCO3 SERPL-SCNC: 21 MMOL/L (ref 22–29)
POTASSIUM SERPL-SCNC: 3.7 MMOL/L (ref 3.4–5.3)
PROT SERPL-MCNC: 7 G/DL (ref 6.4–8.3)
SODIUM SERPL-SCNC: 138 MMOL/L (ref 135–145)

## 2024-08-16 PROCEDURE — 99215 OFFICE O/P EST HI 40 MIN: CPT | Performed by: INTERNAL MEDICINE

## 2024-08-16 PROCEDURE — 96375 TX/PRO/DX INJ NEW DRUG ADDON: CPT

## 2024-08-16 PROCEDURE — 96413 CHEMO IV INFUSION 1 HR: CPT

## 2024-08-16 PROCEDURE — 258N000003 HC RX IP 258 OP 636: Performed by: INTERNAL MEDICINE

## 2024-08-16 PROCEDURE — 96415 CHEMO IV INFUSION ADDL HR: CPT

## 2024-08-16 PROCEDURE — 250N000013 HC RX MED GY IP 250 OP 250 PS 637: Performed by: INTERNAL MEDICINE

## 2024-08-16 PROCEDURE — G2211 COMPLEX E/M VISIT ADD ON: HCPCS | Performed by: INTERNAL MEDICINE

## 2024-08-16 PROCEDURE — 80053 COMPREHEN METABOLIC PANEL: CPT | Performed by: INTERNAL MEDICINE

## 2024-08-16 PROCEDURE — 250N000011 HC RX IP 250 OP 636: Mod: JZ | Performed by: INTERNAL MEDICINE

## 2024-08-16 PROCEDURE — G0463 HOSPITAL OUTPT CLINIC VISIT: HCPCS | Mod: 25 | Performed by: INTERNAL MEDICINE

## 2024-08-16 RX ORDER — DIPHENHYDRAMINE HCL 25 MG
25 CAPSULE ORAL ONCE
Status: COMPLETED | OUTPATIENT
Start: 2024-08-16 | End: 2024-08-16

## 2024-08-16 RX ORDER — DIPHENHYDRAMINE HCL 25 MG
50 CAPSULE ORAL ONCE
Status: COMPLETED | OUTPATIENT
Start: 2024-08-16 | End: 2024-08-16

## 2024-08-16 RX ORDER — HEPARIN SODIUM,PORCINE 10 UNIT/ML
5-20 VIAL (ML) INTRAVENOUS DAILY PRN
Status: CANCELLED | OUTPATIENT
Start: 2024-08-16

## 2024-08-16 RX ORDER — METHYLPREDNISOLONE SODIUM SUCCINATE 125 MG/2ML
125 INJECTION, POWDER, LYOPHILIZED, FOR SOLUTION INTRAMUSCULAR; INTRAVENOUS
Status: CANCELLED | OUTPATIENT
Start: 2024-08-16

## 2024-08-16 RX ORDER — ALBUTEROL SULFATE 90 UG/1
1-2 AEROSOL, METERED RESPIRATORY (INHALATION)
Status: CANCELLED
Start: 2024-08-16

## 2024-08-16 RX ORDER — LORAZEPAM 2 MG/ML
0.5 INJECTION INTRAMUSCULAR EVERY 4 HOURS PRN
Status: CANCELLED | OUTPATIENT
Start: 2024-08-16

## 2024-08-16 RX ORDER — ACETAMINOPHEN 325 MG/1
650 TABLET ORAL
Status: COMPLETED | OUTPATIENT
Start: 2024-08-16 | End: 2024-08-16

## 2024-08-16 RX ORDER — DIPHENHYDRAMINE HYDROCHLORIDE 50 MG/ML
50 INJECTION INTRAMUSCULAR; INTRAVENOUS
Status: CANCELLED | OUTPATIENT
Start: 2024-08-16 | End: 2024-08-16

## 2024-08-16 RX ORDER — MEPERIDINE HYDROCHLORIDE 25 MG/ML
25 INJECTION INTRAMUSCULAR; INTRAVENOUS; SUBCUTANEOUS
Status: CANCELLED | OUTPATIENT
Start: 2024-08-16

## 2024-08-16 RX ORDER — ALBUTEROL SULFATE 0.83 MG/ML
2.5 SOLUTION RESPIRATORY (INHALATION)
Status: CANCELLED | OUTPATIENT
Start: 2024-08-16

## 2024-08-16 RX ORDER — MEPERIDINE HYDROCHLORIDE 25 MG/ML
25 INJECTION INTRAMUSCULAR; INTRAVENOUS; SUBCUTANEOUS EVERY 30 MIN PRN
Status: CANCELLED | OUTPATIENT
Start: 2024-08-16

## 2024-08-16 RX ORDER — HEPARIN SODIUM (PORCINE) LOCK FLUSH IV SOLN 100 UNIT/ML 100 UNIT/ML
5 SOLUTION INTRAVENOUS
Status: CANCELLED | OUTPATIENT
Start: 2024-08-16

## 2024-08-16 RX ORDER — METHYLPREDNISOLONE SODIUM SUCCINATE 125 MG/2ML
125 INJECTION, POWDER, LYOPHILIZED, FOR SOLUTION INTRAMUSCULAR; INTRAVENOUS
Status: CANCELLED
Start: 2024-08-16

## 2024-08-16 RX ORDER — EPINEPHRINE 1 MG/ML
0.3 INJECTION, SOLUTION INTRAMUSCULAR; SUBCUTANEOUS EVERY 5 MIN PRN
Status: CANCELLED | OUTPATIENT
Start: 2024-08-16

## 2024-08-16 RX ORDER — DIPHENHYDRAMINE HCL 25 MG
50 CAPSULE ORAL ONCE
Status: CANCELLED | OUTPATIENT
Start: 2024-08-16

## 2024-08-16 RX ORDER — ACETAMINOPHEN 325 MG/1
650 TABLET ORAL
Status: CANCELLED | OUTPATIENT
Start: 2024-08-16

## 2024-08-16 RX ORDER — DIPHENHYDRAMINE HCL 25 MG
25 CAPSULE ORAL ONCE
Status: CANCELLED
Start: 2024-08-16 | End: 2024-08-16

## 2024-08-16 RX ADMIN — DIPHENHYDRAMINE HYDROCHLORIDE 25 MG: 25 CAPSULE ORAL at 15:04

## 2024-08-16 RX ADMIN — DIPHENHYDRAMINE HYDROCHLORIDE 50 MG: 25 CAPSULE ORAL at 12:02

## 2024-08-16 RX ADMIN — ACETAMINOPHEN 650 MG: 325 TABLET ORAL at 12:02

## 2024-08-16 RX ADMIN — ACETAMINOPHEN 650 MG: 325 TABLET ORAL at 15:04

## 2024-08-16 RX ADMIN — SODIUM CHLORIDE 250 ML: 9 INJECTION, SOLUTION INTRAVENOUS at 12:04

## 2024-08-16 RX ADMIN — RITUXIMAB-ABBS 800 MG: 10 INJECTION, SOLUTION INTRAVENOUS at 12:34

## 2024-08-16 RX ADMIN — FAMOTIDINE 20 MG: 10 INJECTION INTRAVENOUS at 12:03

## 2024-08-16 ASSESSMENT — PAIN SCALES - GENERAL: PAINLEVEL: NO PAIN (0)

## 2024-08-16 NOTE — NURSING NOTE
"Oncology Rooming Note    August 16, 2024 11:27 AM   Jonathon Santos is a 69 year old male who presents for:    Chief Complaint   Patient presents with    Blood Draw     IV placement with blood draw by lab RN    Oncology Clinic Visit     Splenic marginal zone b-cell lymphoma      Initial Vitals: BP (!) 135/90   Pulse 77   Temp 98.8  F (37.1  C) (Oral)   Resp 16   Wt 96.9 kg (213 lb 11.2 oz)   SpO2 97%   BMI 28.32 kg/m   Estimated body mass index is 28.32 kg/m  as calculated from the following:    Height as of 6/21/24: 1.85 m (6' 0.84\").    Weight as of this encounter: 96.9 kg (213 lb 11.2 oz). Body surface area is 2.23 meters squared.  No Pain (0) Comment: Data Unavailable   No LMP for male patient.  Allergies reviewed: Yes  Medications reviewed: Yes      Frailty Screening:   Is the patient here for a new oncology consult visit in cancer care? 2. No      Clinical concerns: None       aJnis Tejeda RN              "

## 2024-08-16 NOTE — LETTER
8/16/2024      Jonathon Santos  [id number] [address] [place] [id number]      Dear Colleague,    Thank you for referring your patient, Jonathon Santos, to the [facility]. Please see a copy of my visit note below.    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 Peripheral flow and subsequent bone marrow biopsy 10/27/23 showed several populations of clonal B-cells but the predominant one is CD5/[id number] low-grade B-cell lymphoma comprising about 40% of the bone marrow, but excisional LN biopsy recommended for further subclassification. There was another small CD5+ population compatible with monoclonal B-cell lymphocytosis. NGS negative for MYD88 mutation. Axillary LN biopsy 12/4/23 most consistent with CD5+ marginal zone lymphoma, splenic vs monik. FISH with loss of 17p and MYBL2, NGS with TP53 mutation. Staging PET showed significant splenomegaly (20 cm with SUVmax 6.7), mildly-avid lymphadenopathy above/below diaphragm, and diffuse bone marrow uptake. .     Treatment history:  1/25/24 to 2/16/2024: Rituximab 375 mg/m2 IV weekly x 4 doses (indication for starting treatment was thrombocytopenia and early satiety). PET with CMR  4/26/24 to present: rituximab q2 months for 4 more doses for residual splenomegaly and thrombocytopenia    INTERVAL HISTORY:  Jonathon is seen in infusion today for 3rd consolidative rituximab dose. Doing great overall. Has some occasional loose stools, going to try Metamucil. Otherwise no fevers, night sweats, recent infections, N/V, bleeding. Energy and appetite are good, doing all normal activities.     PAST MEDICAL HISTORY:  Locally advanced prostate cancer (Keith 4+5=9) dx 12/2020 s/p  radiation 5/2021-7/2021 and 2 years of abiraterone/prednisone ending 6/2023  Hypertension  Hepatic steatosis   Peripheral neuropathy, possibly 2/2 B12 deficiency  Gout     FAMILY HISTORY:  Mother had lymphoma and lung cancer.      SOCIAL HISTORY:  Never smoker. Drinks 2-3 cans of beer daily. Smokes marijuana daily.   Lives in [place], [place] by himself.   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   Has a dog and a horse    I have reviewed and updated the following:  Past Medical History:   Diagnosis Date     Anemia      Benign essential hypertension      Chronic kidney disease      Gout      Lymphoma (H)      Prostate cancer (H)      Thrombocytopenia (H24)       No Known Allergies    Current Outpatient Medications:      allopurinol (ZYLOPRIM) 100 MG tablet, Take 100 mg by mouth daily, Disp: , Rfl:      cyanocobalamin (VITAMIN B-12) 1000 MCG SUBL sublingual tablet, Place 1,000 mcg under the tongue daily (with lunch), Disp: , Rfl:      lisinopril-hydrochlorothiazide (ZESTORETIC) 20-12.5 MG tablet, TAKE 2 TABLETS BY MOUTH DAILY, Disp: 200 tablet, Rfl: 2     metoprolol succinate ER (TOPROL XL) 25 MG 24 hr tablet, TAKE 1 TABLET BY MOUTH DAILY  WITH LUNCH, Disp: 100 tablet, Rfl: 2    REVIEW OF SYSTEMS:  10-point ROS reviewed and negative other than that mentioned in HPI.     Objective  VITALS:  BP (!) 135/90   Pulse 77   Temp 98.8  F (37.1  C) (Oral)   Resp 16   Wt 96.9 kg (213 lb 11.2 oz)   SpO2 97%   BMI 28.32 kg/m       ECOG PS: 0     PHYSICAL EXAM:  General: Awake, alert, in no acute distress.   HEENT: Normocephalic, atraumatic. No scleral icterus.   Lymph: No cervical or axillary lymph nodes appreciated.   CV: Regular rate and rhythm. No murmurs, rubs, or gallops appreciated.  Resp: Good inspiratory effort, lungs clear to auscultation bilaterally.  GI: Abdomen soft, nontender, nondistended. No palpable splenomegaly.   Ext: No peripheral edema bilaterally.  Neuro: CN II-XII grossly intact. No  focal deficits appreciated.  Skin: No rash, unusual bruising or prominent lesions.  Psych: Pleasant, normal affect.     Most Recent 3 CBC's:  Recent Labs   Lab Test 06/21/24  0731 04/26/24  0658 03/22/24  1302   WBC 5.1 5.1 3.8*   HGB 14.7 14.5 12.8*   MCV 94 91 92   PLT 85* 75* 82*   ANEUTAUTO 3.4 3.5 2.6     Most Recent 3 BMP's:  Recent Labs   Lab Test 06/21/24  0731 04/26/24  0658 03/22/24  1302    140 135   POTASSIUM 3.8 4.3 4.0   CHLORIDE 104 104 100   CO2 21* 21* 24   BUN 25.6* 24.3* 16.9   CR 1.26* 1.08 1.12   ANIONGAP 14 15 11   MISTY 9.8 9.4 9.6   * 112* 121*   PROTTOTAL 7.2 7.4 7.1   ALBUMIN 4.6 4.7 4.6    Most Recent 3 LFT's:  Recent Labs   Lab Test 06/21/24  0731 04/26/24  0658 03/22/24  1302   AST 44 53* 28   ALT 37 31 25   ALKPHOS 66 70 66   BILITOTAL 1.6* 1.6* 1.4*    Most Recent 2 TSH and T4:  Recent Labs   Lab Test 06/15/22  1119   TSH 2.39   I reviewed the above labs today.      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 Peripheral flow cytometry and subsequent bone marrow biopsy showed several populations of clonal B-cells but the predominant one was CD5/[id number] low-grade B-cell lymphoma comprising about 40% of the bone marrow. There is another small CD5+ population compatible with monoclonal B-cell lymphocytosis. MYD88 NGS is negative. His PET showed significant splenomegaly (20 cm with SUVmax 6.7), mildly-avid lymphadenopathy above/below diaphragm, and diffuse bone marrow uptake. Given that Hemepath was unable to narrow the subtype of lymphoma from the bone marrow biopsy, we pursued excisional biopsy of the R axillary lymph node, which showed partially CD5+ marginal zone lymphoma, with splenic subtype favored given his whole clinical presentation. FISH with loss of 17p and MYBL2, NGS with TP53 mutation. Treatment was indicated due to his degree of  thrombocytopenia and symptoms of LUQ discomfort/early satiety.   Recommended first-line treatment with rituximab monotherapy - weekly x 4 weeks with potential for consolidation every 2 months x 4 more doses, with expected response rates ~60-70%.   Splenectomy would also be a later line option but would not address the lymph node or bone marrow disease.   Completed 4 weekly doses of rituximab 1/25/24 to 2/16/24. Tolerated fairly well other than rituximab infusion reaction first dose (SOB/chest tightness) and nausea second dose.   3/14/24 PET showed CMR (Deauville 3, only one R axillary node remaining) and decreased splenomegaly (20.8 -> 16.3 cm). Clinically responding as well with Hgb up to 12.8 and platelets up to . However his spleen was still quite enlarged with thrombocytopenia. Plan to continue rituximab q2 months for 4 more doses (starting 4/26/24) to see if we can shrink the spleen further and improve blood counts.   Anemia and leukopenia have resolved. Thrombocytopenia is improving slowly.   Labs and exam appropriate to proceed with dose 3 consolidative rituximab today as planned.   Next PET 1 month after dose 4 consolidative rituximab.     Hx of prostate cancer  Dx 12/2020, locally advanced (Keith 4+5=9) disease s/p radiation 5/2021-7/2021 and 2 years of abiraterone/prednisone ending 6/2023.  Transferred care from Dr. Cisneros to Dr. Eduardo.   On surveillance. Last PSA 0.04 on 6/21/24.     Hyperuricemia  Gout History  Continue allopurinol. No acute concerns.   Uric acid stable at 6.4.     Cancer related prophylaxis  Not taking acyclovir for HSV ppx at this time; he does not want to take more pills. No signs of herpetic infection today. Will continue to monitor.  Up to date on flu and pneumonia shots. Could use Shingrix series when he completes rituximab.     PLAN:  Dose 3 consolidative rituximab today  AGATHA visit and dose 4 rituximab 10/11/24  PET and follow up with me 11/15/24    Total of 30 minutes on  patient visit, reviewing records, interpreting test results, placing orders, and documentation on the day of service.    The longitudinal plan of care for the diagnosis(es)/condition(s) as documented were addressed during this visit. Due to the added complexity in care, I will continue to support Jonathon in the subsequent management and with ongoing continuity of care.     Vannesa Burciaga MD  Attending Physician, [facility]       Again, thank you for allowing me to participate in the care of your patient.        Sincerely,        Vannesa Burciaga MD

## 2024-08-16 NOTE — PATIENT INSTRUCTIONS
EDUCATION POST BIOLOGICAL/CHEMOTHERAPY INFUSION  Call the triage nurse at your clinic or seek medical attention if you have chills and/or temperature greater than or equal to 100.5, uncontrolled nausea/vomiting, diarrhea, constipation, dizziness, shortness of breath, chest pain, heart palpitations, weakness or any other new or concerning symptoms, questions or concerns.  You can not have any live virus vaccines prior to or during treatment or up to 6 months post infusion.  If you have an upcoming surgery, medical procedure or dental procedure during treatment, this should be discussed with your ordering physician and your surgeon/dentist.  If you are having any concerning symptom, if you are unsure if you should get your next infusion or wish to speak to a provider before your next infusion, please call your care coordinator or triage nurse at your clinic to notify them so we can adequately serve you.     August 2024 Sunday Monday Tuesday Wednesday Thursday Friday Saturday                       1     2     3       4     5     6     7     8     9     10       11     12     13     14     15     16    LAB PERIPHERAL  10:30 AM   (15 min.)   [facility]   [facility]    RETURN CCSL  11:00 AM   (30 min.)   Vannesa Burciaga MD   [facility]    SPEC INFUSION 4 HR (240 MIN)  11:00 AM   (240 min.)   [facility] INFUSION NURSE   [facility] 17       18     19     20     21     22     23     24       25     26     27     28     29     30     31                 September 2024 Sunday Monday Tuesday Wednesday Thursday Friday Saturday   1     2     3     4     5     6     7       8     9     10     11     12     13     14       15     16     17     18    VIDEO VISIT RETURN   3:45 PM   (30 min.)   Jennifer Mars PA-C   [facility] 19     20     21       22     23     24     25     26     27      28       29     30                                              Lab Results:  Recent Results (from the past 12 hour(s))   Lactate Dehydrogenase    Collection Time: 08/16/24 11:01 AM   Result Value Ref Range    Lactate Dehydrogenase 153 0 - 250 U/L   Uric acid    Collection Time: 08/16/24 11:01 AM   Result Value Ref Range    Uric Acid 6.4 3.4 - 7.0 mg/dL   Comprehensive metabolic panel    Collection Time: 08/16/24 11:01 AM   Result Value Ref Range    Sodium 138 135 - 145 mmol/L    Potassium 3.7 3.4 - 5.3 mmol/L    Carbon Dioxide (CO2) 21 (L) 22 - 29 mmol/L    Anion Gap 13 7 - 15 mmol/L    Urea Nitrogen 23.6 (H) 8.0 - 23.0 mg/dL    Creatinine 1.13 0.67 - 1.17 mg/dL    GFR Estimate 70 >60 mL/min/1.73m2    Calcium 9.7 8.8 - 10.4 mg/dL    Chloride 104 98 - 107 mmol/L    Glucose 122 (H) 70 - 99 mg/dL    Alkaline Phosphatase 58 40 - 150 U/L    AST 42 0 - 45 U/L    ALT 48 0 - 70 U/L    Protein Total 7.0 6.4 - 8.3 g/dL    Albumin 4.5 3.5 - 5.2 g/dL    Bilirubin Total 1.7 (H) <=1.2 mg/dL   CBC with platelets and differential    Collection Time: 08/16/24 11:01 AM   Result Value Ref Range    WBC Count 5.3 4.0 - 11.0 10e3/uL    RBC Count 4.45 4.40 - 5.90 10e6/uL    Hemoglobin 14.9 13.3 - 17.7 g/dL    Hematocrit 40.8 40.0 - 53.0 %    MCV 92 78 - 100 fL    MCH 33.5 (H) 26.5 - 33.0 pg    MCHC 36.5 31.5 - 36.5 g/dL    RDW 12.6 10.0 - 15.0 %    Platelet Count 92 (L) 150 - 450 10e3/uL    % Neutrophils 72 %    % Lymphocytes 15 %    % Monocytes 7 %    % Eosinophils 4 %    % Basophils 1 %    % Immature Granulocytes 1 %    NRBCs per 100 WBC 0 <1 /100    Absolute Neutrophils 3.9 1.6 - 8.3 10e3/uL    Absolute Lymphocytes 0.8 0.8 - 5.3 10e3/uL    Absolute Monocytes 0.4 0.0 - 1.3 10e3/uL    Absolute Eosinophils 0.2 0.0 - 0.7 10e3/uL    Absolute Basophils 0.1 0.0 - 0.2 10e3/uL    Absolute Immature Granulocytes 0.0 <=0.4 10e3/uL    Absolute NRBCs 0.0 10e3/uL      [facility] Triage and after hours / weekends / holidays:  [phone]    Please call  the [facility] Triage line if you experience a temperature greater than or equal to 100.4, shaking chills, have uncontrolled nausea, vomiting and/or diarrhea, dizziness, shortness of breath, chest pain, bleeding, unexplained bruising, or if you have any other new/concerning symptoms, questions or concerns.     If you wish to speak to a provider before your next infusion visit, please call your care coordinator to notify them so we can adequately serve you.    If you need a refill on a narcotic prescription or other medication, please call before your infusion appointment.

## 2024-08-16 NOTE — PROGRESS NOTES
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 Peripheral flow and subsequent bone marrow biopsy 10/27/23 showed several populations of clonal B-cells but the predominant one is CD5/X72-[id number] low-grade B-cell lymphoma comprising about 40% of the bone marrow, but excisional LN biopsy recommended for further subclassification. There was another small CD5+ population compatible with monoclonal B-cell lymphocytosis. NGS negative for MYD88 mutation. Axillary LN biopsy 12/4/23 most consistent with CD5+ marginal zone lymphoma, splenic vs monik. FISH with loss of 17p and MYBL2, NGS with TP53 mutation. Staging PET showed significant splenomegaly (20 cm with SUVmax 6.7), mildly-avid lymphadenopathy above/below diaphragm, and diffuse bone marrow uptake. .     Treatment history:  1/25/24 to 2/16/2024: Rituximab 375 mg/m2 IV weekly x 4 doses (indication for starting treatment was thrombocytopenia and early satiety). PET with CMR  4/26/24 to present: rituximab q2 months for 4 more doses for residual splenomegaly and thrombocytopenia    INTERVAL HISTORY:  Jonathon is seen in infusion today for 3rd consolidative rituximab dose. Doing great overall. Has some occasional loose stools, going to try Metamucil. Otherwise no fevers, night sweats, recent infections, N/V, bleeding. Energy and appetite are good, doing all normal activities.     PAST MEDICAL HISTORY:  Locally advanced prostate cancer ([place] 4+5=9) dx 12/2020 s/p radiation 5/2021-7/2021 and 2 years of abiraterone/prednisone ending 6/2023  Hypertension  Hepatic steatosis   Peripheral neuropathy, possibly 2/2 B12 deficiency  Gout     FAMILY HISTORY:  Mother had lymphoma and lung cancer.      SOCIAL  HISTORY:  Never smoker. Drinks 2-3 cans of beer daily. Smokes marijuana daily.   Lives in [place], [place] by himself.   Retired, previously worked for [employer]s office.   Has a dog and a horse    I have reviewed and updated the following:  Past Medical History:   Diagnosis Date    Anemia     Benign essential hypertension     Chronic kidney disease     Gout     Lymphoma (H)     Prostate cancer (H)     Thrombocytopenia (H24)       No Known Allergies    Current Outpatient Medications:     allopurinol (ZYLOPRIM) 100 MG tablet, Take 100 mg by mouth daily, Disp: , Rfl:     cyanocobalamin (VITAMIN B-12) 1000 MCG SUBL sublingual tablet, Place 1,000 mcg under the tongue daily (with lunch), Disp: , Rfl:     lisinopril-hydrochlorothiazide (ZESTORETIC) 20-12.5 MG tablet, TAKE 2 TABLETS BY MOUTH DAILY, Disp: 200 tablet, Rfl: 2    metoprolol succinate ER (TOPROL XL) 25 MG 24 hr tablet, TAKE 1 TABLET BY MOUTH DAILY  WITH LUNCH, Disp: 100 tablet, Rfl: 2    REVIEW OF SYSTEMS:  10-point ROS reviewed and negative other than that mentioned in HPI.     Objective   VITALS:  BP (!) 135/90   Pulse 77   Temp 98.8  F (37.1  C) (Oral)   Resp 16   Wt 96.9 kg (213 lb 11.2 oz)   SpO2 97%   BMI 28.32 kg/m       ECOG PS: 0     PHYSICAL EXAM:  General: Awake, alert, in no acute distress.   HEENT: Normocephalic, atraumatic. No scleral icterus.   Lymph: No cervical or axillary lymph nodes appreciated.   CV: Regular rate and rhythm. No murmurs, rubs, or gallops appreciated.  Resp: Good inspiratory effort, lungs clear to auscultation bilaterally.  GI: Abdomen soft, nontender, nondistended. No palpable splenomegaly.   Ext: No peripheral edema bilaterally.  Neuro: CN II-XII grossly intact. No focal deficits appreciated.  Skin: No rash, unusual bruising or prominent lesions.  Psych: Pleasant, normal affect.     Most Recent 3 CBC's:  Recent Labs   Lab Test 06/21/24  0731 04/26/24  0658 03/22/24  1302   WBC 5.1 5.1 3.8*   HGB 14.7 14.5  12.8*   MCV 94 91 92   PLT 85* 75* 82*   ANEUTAUTO 3.4 3.5 2.6     Most Recent 3 BMP's:  Recent Labs   Lab Test 06/21/24  0731 04/26/24  0658 03/22/24  1302    140 135   POTASSIUM 3.8 4.3 4.0   CHLORIDE 104 104 100   CO2 21* 21* 24   BUN 25.6* 24.3* 16.9   CR 1.26* 1.08 1.12   ANIONGAP 14 15 11   MISTY 9.8 9.4 9.6   * 112* 121*   PROTTOTAL 7.2 7.4 7.1   ALBUMIN 4.6 4.7 4.6    Most Recent 3 LFT's:  Recent Labs   Lab Test 06/21/24  0731 04/26/24  0658 03/22/24  1302   AST 44 53* 28   ALT 37 31 25   ALKPHOS 66 70 66   BILITOTAL 1.6* 1.6* 1.4*    Most Recent 2 TSH and T4:  Recent Labs   Lab Test 06/15/22  1119   TSH 2.39   I reviewed the above labs today.      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 Peripheral flow cytometry and subsequent bone marrow biopsy showed several populations of clonal B-cells but the predominant one was CD5/J59-[id number] low-grade B-cell lymphoma comprising about 40% of the bone marrow. There is another small CD5+ population compatible with monoclonal B-cell lymphocytosis. MYD88 NGS is negative. His PET showed significant splenomegaly (20 cm with SUVmax 6.7), mildly-avid lymphadenopathy above/below diaphragm, and diffuse bone marrow uptake. Given that Hemepath was unable to narrow the subtype of lymphoma from the bone marrow biopsy, we pursued excisional biopsy of the R axillary lymph node, which showed partially CD5+ marginal zone lymphoma, with splenic subtype favored given his whole clinical presentation. FISH with loss of 17p and MYBL2, NGS with TP53 mutation. Treatment was indicated due to his degree of thrombocytopenia and symptoms of LUQ discomfort/early satiety.   Recommended first-line treatment with rituximab monotherapy - weekly x 4 weeks with potential for consolidation every 2 months x 4 more doses, with expected response rates ~60-70%.    Splenectomy would also be a later line option but would not address the lymph node or bone marrow disease.   Completed 4 weekly doses of rituximab 1/25/24 to 2/16/24. Tolerated fairly well other than rituximab infusion reaction first dose (SOB/chest tightness) and nausea second dose.   3/14/24 PET showed CMR ([place] 3, only one R axillary node remaining) and decreased splenomegaly (20.8 -> 16.3 cm). Clinically responding as well with Hgb up to 12.8 and platelets up to . However his spleen was still quite enlarged with thrombocytopenia. Plan to continue rituximab q2 months for 4 more doses (starting 4/26/24) to see if we can shrink the spleen further and improve blood counts.   Anemia and leukopenia have resolved. Thrombocytopenia is improving slowly.   Labs and exam appropriate to proceed with dose 3 consolidative rituximab today as planned.   Next PET 1 month after dose 4 consolidative rituximab.     Hx of prostate cancer  Dx 12/2020, locally advanced ([place] 4+5=9) disease s/p radiation 5/2021-7/2021 and 2 years of abiraterone/prednisone ending 6/2023.  Transferred care from Dr. Cisneros to Dr. Eduardo.   On surveillance. Last PSA 0.04 on 6/21/24.     Hyperuricemia  Gout History  Continue allopurinol. No acute concerns.   Uric acid stable at 6.4.     Cancer related prophylaxis  Not taking acyclovir for HSV ppx at this time; he does not want to take more pills. No signs of herpetic infection today. Will continue to monitor.  Up to date on flu and pneumonia shots. Could use Shingrix series when he completes rituximab.     PLAN:  Dose 3 consolidative rituximab today  AGATHA visit and dose 4 rituximab 10/11/24  PET and follow up with me 11/15/24    Total of 30 minutes on patient visit, reviewing records, interpreting test results, placing orders, and documentation on the day of service.    The longitudinal plan of care for the diagnosis(es)/condition(s) as documented were addressed during this visit. Due to the  added complexity in care, I will continue to support Jonathon in the subsequent management and with ongoing continuity of care.     Vannesa Burciaga MD  Attending Physician, [facility]

## 2024-08-16 NOTE — PROGRESS NOTES
Infusion Nursing Note:  Jonathon Santos presents today for C4D1 Rituximab.    Patient seen by provider today: Yes: Dr. Burciaga   present during visit today: Not Applicable.    Note: Pt arrived to infusion today feeling well, denies recent fever/chills, cough/sore throat/SOB/CP/palpitations, N/V, reports an occasional episode of diarrhea, no constipation, eating and drinking well, no skin breakdown or rashes, no urinary symptoms or other s/s infection noted.    Pt received tylenol 650 mg po, benadryl 50 mg po and Pepcid 20 mg IV as pre-medications and an additional 650 mg tylenol and 25 mg po benadryl long term through the infusion.      Due to previous reactions, Rituximab titrated from 50 ml/hr to a max rate of 400 ml/hr.  Pt tolerated without incident.    Intravenous Access:  Peripheral IV placed.    Treatment Conditions:  ~~~ NOTE: If the patient answers yes to any of the questions below, hold the infusion and contact ordering provider or on-call provider.    Do you currently have any signs of illness or infection or are you on any antibiotics? No  Have you recently had an elevated temperature, fever, chills, productive cough, coughing for 3 weeks or longer or hemoptysis, abnormal vital signs, night sweats, chest pain or have you noticed a decrease in your appetite, unexplained weight loss or fatigue? No  Have you had any new, sudden, or worsening abdominal pain? No  Do you have any open wounds or new incisions? (exclude for patients with hidradenitis suppurativa) No  Have you recently been diagnosed with any new nervous system diseases (ie. Multiple sclerosis, Guillain [place], seizures, neurological changes) or cancer diagnosis? Are you on any form of radiation or chemotherapy? No  Have there been any other new onset medical symptoms? No  Are you pregnant or breast feeding or do you have plans of pregnancy in the future? No; N/A  Do you have any upcoming hospitalizations or surgeries? Does not include  esophagogastroduodenoscopy, colonoscopy, endoscopic retrograde cholangiopancreatography (ERCP), endoscopic ultrasound (EUS), dental procedures (including cleanings, fillings, implants, extractions)  or joint aspiration/steroid injections No  Have you or anyone in your household received a live vaccination in the past 4 weeks? Please note: No live vaccines while on biologic/chemotherapy until 6 months after the last treatment. Patient can receive the flu vaccine (shot only).  It is optimal for the patient to get it mid cycle, but it can be given at any time as long as it is not on the day of the infusion. No  If applicable to prescribed medication, confirm negative PPD or quantiferon gold MTB. If positive, verify has negative chest x-ray or the patient is at least 4 weeks post initiation of INH/B6 therapy and have clearance from provider before infusion ([id number])  If applicable to prescribed medication, confirm negative hepatitis B surface antigen or hepatitis C. If positive, clearance from provider before infusion. Negative 01/26/2024  Rheumatology patients receiving tocilizumab (Actemra): If labs were drawn within the past week, hold dosing until cleared to infuse If AST/ALT > 2 X upper limit normal; ANC < 1.0. ; N/A  Patients receiving belimumab (Benlysta): Have you been having any signs of worsening depression or suicidal ideations? N/A      Post Infusion Assessment:  Patient tolerated infusion without incident.  Blood return noted pre and post infusion.  Site patent and intact, free from redness, edema or discomfort.  No evidence of extravasations.  Access discontinued per protocol.       Discharge Plan:   Patient declined prescription refills.  Discharge instructions reviewed with: Patient.  Patient and/or family verbalized understanding of discharge instructions and all questions answered.  AVS to patient via [employer].  Patient will return 10/11 for next appointment.   Patient discharged in stable condition  accompanied by: self.  Departure Mode: Ambulatory.    Administrations This Visit       acetaminophen (TYLENOL) tablet 650 mg       Admin Date  08/16/2024 Action  $Given Dose  650 mg Route  Oral Documented By  Janis Tejeda RN               Admin Date  08/16/2024 Action  $Given Dose  650 mg Route  Oral Documented By  Janis Tejeda RN              diphenhydrAMINE (BENADRYL) capsule 25 mg       Admin Date  08/16/2024 Action  $Given Dose  25 mg Route  Oral Documented By  Janis Tejeda RN              diphenhydrAMINE (BENADRYL) capsule 50 mg       Admin Date  08/16/2024 Action  $Given Dose  50 mg Route  Oral Documented By  Janis Tejeda RN              famotidine (PEPCID) injection 20 mg       Admin Date  08/16/2024 Action  $Given Dose  20 mg Route  Intravenous Documented By  Janis Tejeda RN              riTUXimab-abbs (TRUXIMA) 800 mg in sodium chloride 0.9 % 800 mL infusion       Admin Date  08/16/2024 Action  $New Bag Dose  800 mg Route  Intravenous Documented By  Janis Tejeda RN              sodium chloride 0.9% BOLUS 250 mL       Admin Date  08/16/2024 Action  $New Bag Dose  250 mL Route  Intravenous Documented By  Janis Tejeda RN Carissa L. LIZETH Tejeda

## 2024-08-16 NOTE — NURSING NOTE
Chief Complaint   Patient presents with    Blood Draw     IV placement with blood draw by lab RN       Labs drawn via IV placed by lab RN. Vitals taken and appointment arrived.    Belkys Corley RN

## 2024-09-11 ENCOUNTER — VIRTUAL VISIT (OUTPATIENT)
Dept: NEUROLOGY | Facility: CLINIC | Age: 69
End: 2024-09-11
Payer: COMMERCIAL

## 2024-09-11 VITALS — BODY MASS INDEX: 27.83 KG/M2 | WEIGHT: 210 LBS | HEIGHT: 73 IN

## 2024-09-11 DIAGNOSIS — C61 PROSTATE CANCER (H): ICD-10-CM

## 2024-09-11 DIAGNOSIS — E53.8 VITAMIN B12 DEFICIENCY (NON ANEMIC): ICD-10-CM

## 2024-09-11 DIAGNOSIS — Z79.899 ENCOUNTER FOR LONG-TERM (CURRENT) USE OF MEDICATIONS: ICD-10-CM

## 2024-09-11 DIAGNOSIS — G62.89 AXONAL SENSORIMOTOR NEUROPATHY: Primary | ICD-10-CM

## 2024-09-11 PROCEDURE — 99213 OFFICE O/P EST LOW 20 MIN: CPT | Mod: 95 | Performed by: PSYCHIATRY & NEUROLOGY

## 2024-09-11 ASSESSMENT — PAIN SCALES - GENERAL: PAINLEVEL: NO PAIN (0)

## 2024-09-11 NOTE — PROGRESS NOTES
ESTABLISHED PATIENT NEUROLOGY NOTE - Virtual visit    DATE OF VISIT: 9/11/2024  MRN: [id number]  PATIENT NAME: Jonathon Santos  YOB: 1955    Chief Complaint   Patient presents with    RECHECK     SUBJECTIVE:                                                      HISTORY OF PRESENT ILLNESS:  Jonathon is here for follow up regarding  peripheral neuropathy.      History as previously documented by me (4.2.24):  Jonathon has significant history of prostate cancer, s/p chemotherapy and radiation.  Previous EMG revealed length-dependent axonal sensorimotor neuropathy.  He has history of low B12 which we have been supplementing.  The rest of his labs have been largely unremarkable.  1 other variable has been alcohol intake which we have discussed to be cut back.  When we met about 6 months ago he felt like maybe he was having some progression further proximally into the feet.  Plan was to continue to monitor the B12 and follow-up again in 6 months.  B12 level was 913 in January of this year.     Jonathon says that he feels like he actually might be getting a little bit more feeling in the feet lately.  He continues to take 1000mcg B12 supplement daily.      He is now doing Rituximab infusions for lymphoma. He also is on allopurinol for gout.     He says he has adapted to the balance issues.  No recent falls.     He asks about what to expect.  He also asks if there could be some tingling and pain involved if the nerves are improving.  He denies weakness.     Our plan was to continue his B12 and follow-up in 6 months.    I spoke with Jonathon via video conference today.  He says that his symptoms are pretty much same.  He is noticing some tingling which he wonders if maybe is a sign of improvement.  Not enough discomfort to warrant any treatment.  He asks if we need to recheck his B12, he is still taking his supplement.  No problems with weakness or new balance issues.    CURRENT MEDICATIONS:   Current Outpatient Medications  "  Medication Sig Dispense Refill    allopurinol (ZYLOPRIM) 100 MG tablet Take 100 mg by mouth daily      cyanocobalamin (VITAMIN B-12) 1000 MCG SUBL sublingual tablet Place 1,000 mcg under the tongue daily (with lunch)      lisinopril-hydrochlorothiazide (ZESTORETIC) 20-12.5 MG tablet TAKE 2 TABLETS BY MOUTH DAILY 200 tablet 2    metoprolol succinate ER (TOPROL XL) 25 MG 24 hr tablet TAKE 1 TABLET BY MOUTH DAILY  WITH LUNCH 100 tablet 2     No current facility-administered medications for this visit.       RECENT DIAGNOSTIC STUDIES:   Labs: No results found for any visits on 09/11/24.    REVIEW OF SYSTEMS:                                                      10-point review of systems is negative except as mentioned above in HPI.    EXAM:                                                      Physical Exam:   Vitals: Ht 1.854 m (6' 1\")   Wt 95.3 kg (210 lb)   BMI 27.71 kg/m    BMI= Body mass index is 27.71 kg/m .  GENERAL: NAD.  HEENT: NC/AT.  PULM: Non-labored breathing.   Neurologic:  Attentive, interactive.  Face is symmetric, EOM full.  Adequate fund of knowledge.  No focal weakness upon observation.  Limited exam due to this being a video visit.    ASSESSMENT and PLAN:                                                      Assessment:    ICD-10-CM    1. Axonal sensorimotor neuropathy  G62.89 Vitamin B12      2. Vitamin B12 deficiency (non anemic)  E53.8 Vitamin B12      3. Encounter for long-term (current) use of medications  Z79.899       4. Prostate cancer (H)  C61           Mr. Santos is a pleasant 69-year-old man following up today regarding peripheral neuropathy, possibly related to cancer treatments.  It sounds like symptoms are stable for the time being so we will not add anything.  I will check a B12 level and advise Jonathon if any changes are needed from a supplement standpoint.  Otherwise we will plan to follow-up again in 6 months.  He expressed understanding and agreement with the plan.    Jonathon did ask " about what to expect going forward and I reassured him that given the lack of appreciable change in symptoms over time, I think he will remain stable for quite some time.    Plan:  -- Continue the B12 supplement.    -- We will check a level next time you have blood work done.  Order is in.  -- Follow-up in neurology clinic in 6 months.  Please let us know if your symptoms worsen in the meantime.    Total Time: 20 minutes were spent with the patient and in chart review/documentation (as described above in Assessment and Plan)/coordinating the care on date of service.  Phone call duration: 9 minutes.    Pinky Thompson MD  Neurology    Dragon software used in the dictation of this note.

## 2024-09-11 NOTE — PROGRESS NOTES
Virtual Visit Details    Type of service:  Video Visit   Video call  duration: 8 minutes    Originating Location (pt. Location): Home    Distant Location (provider location):  On-site  Platform used for Video Visit: Tara

## 2024-09-11 NOTE — PATIENT INSTRUCTIONS
Plan:  -- Continue the B12 supplement.    -- We will check a level next time you have blood work done.  Order is in.  -- Follow-up in neurology clinic in 6 months.  Please let us know if your symptoms worsen in the meantime.

## 2024-09-11 NOTE — NURSING NOTE
Current patient location: [address] [place] [id number]    Is the patient currently in the state of [place]? YES    Visit mode:VIDEO    If the visit is dropped, the patient can be reconnected by:  Mychart    Will anyone else be joining the visit? NO  (If patient encounters technical issues they should call [phone]    How would you like to obtain your AVS? MyChart    Are changes needed to the allergy or medication list? Pt stated no changes to allergies and Pt stated no med changes    Are refills needed on medications prescribed by this physician? NO    Rooming Documentation:  Not applicable      Reason for visit: STALIN Quinones VVF

## 2024-09-11 NOTE — LETTER
9/11/2024      Jonathon Santos  [address] [address] [place] [phone]      Dear Colleague,    Thank you for referring your patient, Jonathon Santos, to the [facility] [place]. Please see a copy of my visit note below.    ESTABLISHED PATIENT NEUROLOGY NOTE - Virtual visit    DATE OF VISIT: 9/11/2024  MRN: [id number]  PATIENT NAME: Jonathon Santos  YOB: 1955    Chief Complaint   Patient presents with     RECHECK     SUBJECTIVE:                                                      HISTORY OF PRESENT ILLNESS:  Jonathon is here for follow up regarding  peripheral neuropathy.      History as previously documented by me (4.2.24):  Jonathon has significant history of prostate cancer, s/p chemotherapy and radiation.  Previous EMG revealed length-dependent axonal sensorimotor neuropathy.  He has history of low B12 which we have been supplementing.  The rest of his labs have been largely unremarkable.  1 other variable has been alcohol intake which we have discussed to be cut back.  When we met about 6 months ago he felt like maybe he was having some progression further proximally into the feet.  Plan was to continue to monitor the B12 and follow-up again in 6 months.  B12 level was 913 in January of this year.     Jonathon says that he feels like he actually might be getting a little bit more feeling in the feet lately.  He continues to take 1000mcg B12 supplement daily.      He is now doing Rituximab infusions for lymphoma. He also is on allopurinol for gout.     He says he has adapted to the balance issues.  No recent falls.     He asks about what to expect.  He also asks if there could be some tingling and pain involved if the nerves are improving.  He denies weakness.     Our plan was to continue his B12 and follow-up in 6 months.    I spoke with Jonathon via video conference today.  He says that his symptoms are pretty much same.  He is noticing some tingling which he wonders if maybe is a sign of  "improvement.  Not enough discomfort to warrant any treatment.  He asks if we need to recheck his B12, he is still taking his supplement.  No problems with weakness or new balance issues.    CURRENT MEDICATIONS:   Current Outpatient Medications   Medication Sig Dispense Refill     allopurinol (ZYLOPRIM) 100 MG tablet Take 100 mg by mouth daily       cyanocobalamin (VITAMIN B-12) 1000 MCG SUBL sublingual tablet Place 1,000 mcg under the tongue daily (with lunch)       lisinopril-hydrochlorothiazide (ZESTORETIC) 20-12.5 MG tablet TAKE 2 TABLETS BY MOUTH DAILY 200 tablet 2     metoprolol succinate ER (TOPROL XL) 25 MG 24 hr tablet TAKE 1 TABLET BY MOUTH DAILY  WITH LUNCH 100 tablet 2     No current facility-administered medications for this visit.       RECENT DIAGNOSTIC STUDIES:   Labs: No results found for any visits on 09/11/24.    REVIEW OF SYSTEMS:                                                      10-point review of systems is negative except as mentioned above in HPI.    EXAM:                                                      Physical Exam:   Vitals: Ht 1.854 m (6' 1\")   Wt 95.3 kg (210 lb)   BMI 27.71 kg/m    BMI= Body mass index is 27.71 kg/m .  GENERAL: NAD.  HEENT: NC/AT.  PULM: Non-labored breathing.   Neurologic:  Attentive, interactive.  Face is symmetric, EOM full.  Adequate fund of knowledge.  No focal weakness upon observation.  Limited exam due to this being a video visit.    ASSESSMENT and PLAN:                                                      Assessment:    ICD-10-CM    1. Axonal sensorimotor neuropathy  G62.89 Vitamin B12      2. Vitamin B12 deficiency (non anemic)  E53.8 Vitamin B12      3. Encounter for long-term (current) use of medications  Z79.899       4. Prostate cancer (H)  C61           Mr. Santos is a pleasant 69-year-old man following up today regarding peripheral neuropathy, possibly related to cancer treatments.  It sounds like symptoms are stable for the time being so we " will not add anything.  I will check a B12 level and advise Jonathon if any changes are needed from a supplement standpoint.  Otherwise we will plan to follow-up again in 6 months.  He expressed understanding and agreement with the plan.    Jonathon did ask about what to expect going forward and I reassured him that given the lack of appreciable change in symptoms over time, I think he will remain stable for quite some time.    Plan:  -- Continue the B12 supplement.    -- We will check a level next time you have blood work done.  Order is in.  -- Follow-up in neurology clinic in 6 months.  Please let us know if your symptoms worsen in the meantime.    Total Time: 20 minutes were spent with the patient and in chart review/documentation (as described above in Assessment and Plan)/coordinating the care on date of service.  Phone call duration: 9 minutes.    Pinky Thompson MD  Neurology    Dragon software used in the dictation of this note.                    Virtual Visit Details    Type of service:  Video Visit   Video call  duration: 8 minutes    Originating Location (pt. Location): Home    Distant Location (provider location):  On-site  Platform used for Video Visit: Tara      Again, thank you for allowing me to participate in the care of your patient.        Sincerely,        Pinky Thompson MD

## 2024-09-16 NOTE — PROGRESS NOTES
Jonathon Santos is a 69 year old who is being evaluated via a billable video visit.      How would you like to obtain your AVS? Joeyharberto  Will anyone else be joining your video visit? No  Will you be in [place] for the visit?  Yes  How would you like to enter the visit?  Ifrah      Video-Visit Details  Visit start time: 3:56pm  Visit end time: 4:01pm     Type of service:  Video Visit   Originating Location (pt. Location): Home  Distant Location (provider location):  On-site  Platform used for Video Visit: [facility]     Rheumatology Clinic Visit  [facility]  [facility]     Jonathon Santos MRN# [id number]   YOB: 1955 Age: 69 year old   Date of Visit: 9/18/2024  Primary care provider: Jonathon Gonzales          Assessment and Plan:     1.  Idiopathic chronic gout  2.  On allopurinol    Patient presents today for follow-up regarding his chronic idiopathic gout with tophi.  He states that he has done quite well.  He has not had any flares.  He states that he is not always consistent with taking the 300 mg daily.  He states sometimes he cuts his dose in half.  His last uric acid was checked in August and was at 6.4.  Ideally we would get him below 5 and I would really recommend getting him below 6 given his joint damage from the gout.  At this time he would prefer to stay at his current dose as his uric acid is normal.  Will keep him at the 300 mg daily and check his uric acid every 3 months.  If it appears that his uric acid is starting to increase we will increase his dose of allopurinol.  He verbalized his understanding.  To follow-up with me in 6 months, sooner if needed.      The longitudinal plan of care for the diagnosis(es)/condition(s) as documented were addressed during this visit. Due to the added complexity in care, I will continue to support Jonathon in the subsequent management and with ongoing continuity of care.       Plan:     Schedule follow-up with Jennifer Mars PA-C in 6  months   Labs: uric acid every 3 months  Medication recommendations:   Continue Allopurinol 300mg daily    [facility]  Rheumatology         History of Present Illness:   Jonathon Santos presents for evaluation of gout.     Interval history September 18, 2024:  Doing well. No gout flares. Does not always take his full dose of Allopurinol. No new tophi.    Interval history March 5, 2024:  He's been on 300mg daily of Allopurinol. He has been on this dose for 3 weeks and is tolerating it well. He has not had any flares since his last visit.     Interval history March 24, 2023:  He reports that he has not had any flares with his gout. He is taking Allopurinol 150mg daily. He states that he has not been diligent about taking it. He states that he has been better about taking it more daily since his uric acid was elevated.     HPI from consult on 9/20/2022:  Patient saw Dr. Mcpherson on April 26, 2021.  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  He does have a history of prostate cancer.  Reports of significant side effects from allopurinol.  Had been on Uloric..  Plan was to continue on 40 mg daily.    He was initially diagnosed with RA. He then saw Dr. Wang and was told he does not have RA and was diagnosed with gout. This was 3-4 years ago. He is currently on Zytiga and was getting reactions. So he switched to Uloric from Allopurinol. This did not help the reactions. He would like to go back on Allopurinol. He has not had any flares. He is not interested in taking more medication. He does have peripheral neuropathy likely from Chemo. He will be done with it at the end of December.     His middle PIPs have been affected by the gout.          Review of Systems:     Constitutional: negative  Skin: negative  Eyes: negative  Ears/Nose/Throat: negative  Respiratory: No shortness of breath, dyspnea on exertion, cough, or hemoptysis  Cardiovascular:  negative  Gastrointestinal: negative  Genitourinary: negative  Musculoskeletal: as above  Neurologic: negative  Psychiatric: negative  Hematologic/Lymphatic/Immunologic: negative  Endocrine: negative         Active Problem List:     Patient Active Problem List    Diagnosis Date Noted    Hyperuricemia 01/24/2024     Priority: Medium    Thrombocytopenia (H24) 01/11/2024     Priority: Medium    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     Priority: Medium    Non-Hodgkin lymphoma of lymph nodes of multiple regions, unspecified non-Hodgkin lymphoma type (H) 11/09/2023     Priority: Medium    Other pancytopenia (H) 06/08/2023     Priority: Medium    Rheumatoid arthritis involving multiple sites with positive rheumatoid factor (H) 06/08/2023     Priority: Medium    Regional lymph node metastasis present (H) 01/18/2021     Priority: Medium    Prostate cancer (H) 12/21/2020     Priority: Medium    Mixed hyperlipidemia 09/24/2019     Priority: Medium    Uncontrolled hypertension 09/24/2019     Priority: Medium    Inflammatory arthritis 09/17/2019     Priority: Medium     June '12, +CRP and sed rate.  serologies neg.  Knee tap pos. for rheumatoid   factor.      Tubular adenoma of colon 12/07/2016     Priority: Medium     Formatting of this note is different from the original.   December 2016.  Tubular adenoma ×2 without dysplasia  December 2016.  Tubular adenoma ×2 without dysplasia   Repeat 2021      Renal insufficiency syndrome 08/05/2016     Priority: Medium     Creatinine 1.4 in 2012      Gout 07/30/2012     Priority: Medium     Uric acid normal after HCTZ stopped.  Knee tap pos for uric acid crystals June '12.    Uric acid normal after HCTZ stopped.  Knee tap pos for uric acid crystals June '12.              Past Medical History:     Past Medical History:   Diagnosis Date    Anemia     Benign essential hypertension     Chronic kidney disease     Gout     Lymphoma (H)     Prostate cancer (H)      Thrombocytopenia (H24)      Past Surgical History:   Procedure Laterality Date    BIOPSY LYMPH NODE AXILLA Right 12/4/2023    Procedure: BIOPSY, LYMPH NODE, AXILLARY RIGHT;  Surgeon: Manjinder Bright MD;  Location: [facility] OR    COLONOSCOPY      COLONOSCOPY N/A 7/29/2022    Procedure: COLONOSCOPY, WITH POLYPECTOMY AND BIOPSY;  Surgeon: Ward Bearden DO;  Location: [facility]    FOOT SURGERY      HC REVISE MEDIAN N/CARPAL TUNNEL SURG      Description: Neuroplasty Decompression Median Nerve At Carpal Tunnel;  Recorded: 05/20/2013;    INSERT SEED MARKER / MATRIX N/A 4/30/2021    Procedure: Transrectal ultrasound guided placement of fiducials and SpaceOar;  Surgeon: Ferdinand Caban MD;  Location: [facility]    PROSTATE BIOPSY, NEEDLE, SATURATION SAMPLING      SPINE SURGERY      unspecified low back surgery            Social History:     Social History     Socioeconomic History    Marital status: Single     Spouse name: Not on file    Number of children: 2    Years of education: Not on file    Highest education level: Not on file   Occupational History    Occupation: retired    Tobacco Use    Smoking status: Never     Passive exposure: Never    Smokeless tobacco: Former     Types: Chew     Quit date: 2011   Substance and Sexual Activity    Alcohol use: Yes     Alcohol/week: 7.0 - 14.0 standard drinks of alcohol     Types: 7 - 14 Cans of beer per week     Comment: 1-2 beers daily    Drug use: Never    Sexual activity: Not Currently   Other Topics Concern    Not on file   Social History Narrative    Not on file     Social Determinants of Health     Financial Resource Strain: High Risk (1/1/2022)    Received from [facility], [facility]    Financial Resource Strain     Difficulty of Paying Living Expenses: Not on file     Difficulty of Paying Living Expenses: Not on file   Food Insecurity: Not on file   Transportation Needs: Not on  "file   Physical Activity: Not on file   Stress: Not on file   Social Connections: Unknown (1/1/2022)    Received from [facility], [facility]    Social Connections     Frequency of Communication with Friends and Family: Not on file   Interpersonal Safety: Unknown (9/8/2023)    Humiliation, Afraid, Rape, and Kick questionnaire     Fear of Current or Ex-Partner: No     Emotionally Abused: No     Physically Abused: Not on file     Sexually Abused: Not on file   Housing Stability: Not on file          Family History:     Family History   Problem Relation Age of Onset    Hypertension Mother     Gout Mother     Hypertension Father     Diabetes Father     Hypertension Sister     Hypertension Brother     Hypertension Maternal Grandmother     Hypertension Maternal Grandfather     Diabetes Cousin     Deep Vein Thrombosis No family hx of     Anesthesia Reaction No family hx of             Allergies:   No Known Allergies         Medications:     Current Outpatient Medications   Medication Sig Dispense Refill    allopurinol (ZYLOPRIM) 100 MG tablet Take 100 mg by mouth daily      cyanocobalamin (VITAMIN B-12) 1000 MCG SUBL sublingual tablet Place 1,000 mcg under the tongue daily (with lunch)      lisinopril-hydrochlorothiazide (ZESTORETIC) 20-12.5 MG tablet TAKE 2 TABLETS BY MOUTH DAILY 200 tablet 2    metoprolol succinate ER (TOPROL XL) 25 MG 24 hr tablet TAKE 1 TABLET BY MOUTH DAILY  WITH LUNCH 100 tablet 2            Physical Exam:   Height 1.85 m (6' 0.84\"), weight 96.9 kg (213 lb 10 oz).  Wt Readings from Last 6 Encounters:   09/11/24 95.3 kg (210 lb)   08/16/24 96.9 kg (213 lb 11.2 oz)   06/21/24 95.5 kg (210 lb 9.6 oz)   06/03/24 95.3 kg (210 lb)   04/26/24 94.9 kg (209 lb 3.2 oz)   04/02/24 97.1 kg (214 lb)     Constitutional: well-developed, appearing stated age; cooperative  Eyes: nl  conjunctiva, sclera  ENT: nl external ears, nose, hearing, lips, "   Resp: No shortness of breath with normal conversation  Psych: nl judgement, orientation, memory, affect.           Data:   Imagin2017 xray hands    FINDINGS: No evidence of fracture or contusion in either hand or wrist.   Moderate degenerative osteoarthritic changes at the left first CMC joint.   Scattered mild degenerative osteoarthritic changes at the PIP and DIP   joints. No evidence of erosive changes.    2017 xray foot  FINDINGS: Periarticular erosive changes involving the medial aspect of the   left first metatarsal head likely secondary to gout. There are also some   periarticular erosive changes involving the left and right fifth metatarsal    heads with associated soft tissue swelling.      Laboratory:  2012  Moderate intracellular crystals present consistent with uric acid of the left knee synovial fluid    2022  Uric acid 7.3    2022  Creatinine 1.16, GFR 69  Albumin 4.6  ALT 32, AST 28  Vitamin B12 338  White blood cell count 4.6, hemoglobin 12.7, platelet count 111    2022  Creatinine 1.00, GFR 82  ALT 32, AST 25  Albumin 4.4  Uric acid 5.3  White blood cell count 4.8, hemoglobin 12.6, platelet count 102    2023  Uric acid 9.1    2024  Uric acid 7.8    2024  Uric acid 6.9  White blood cell count 3.3, hemoglobin 10.4, platelet count 70  Creatinine 1.22, GFR 64  Albumin 4.4  ALT 35, AST 26    2024  Creatinine 1.13, GFR  Albumin 4.5  ALT 48, AST 42  Uric Acid 6.4  WBC 5.3, Hgb 14.9, Plt 92

## 2024-09-18 ENCOUNTER — VIRTUAL VISIT (OUTPATIENT)
Dept: RHEUMATOLOGY | Facility: CLINIC | Age: 69
End: 2024-09-18
Payer: COMMERCIAL

## 2024-09-18 VITALS — BODY MASS INDEX: 28.31 KG/M2 | HEIGHT: 73 IN | WEIGHT: 213.63 LBS

## 2024-09-18 DIAGNOSIS — Z79.899 ON ALLOPURINOL THERAPY: ICD-10-CM

## 2024-09-18 DIAGNOSIS — M1A.09X1 IDIOPATHIC CHRONIC GOUT, MULTIPLE SITES, WITH TOPHUS (TOPHI): Primary | ICD-10-CM

## 2024-09-18 PROCEDURE — 99213 OFFICE O/P EST LOW 20 MIN: CPT | Mod: 95 | Performed by: PHYSICIAN ASSISTANT

## 2024-09-18 PROCEDURE — G2211 COMPLEX E/M VISIT ADD ON: HCPCS | Mod: 95 | Performed by: PHYSICIAN ASSISTANT

## 2024-09-18 ASSESSMENT — PAIN SCALES - GENERAL: PAINLEVEL: NO PAIN (0)

## 2024-09-18 NOTE — PATIENT INSTRUCTIONS
After Visit Instructions:     Thank you for coming to [facility] for your care. It is my goal to partner with you to help you reach your optimal state of health.       Plan:     Schedule follow-up with Jennifer Mars PA-C in 6 months   Labs: uric acid every 3 months  Medication recommendations:   Continue Allopurinol 300mg daily      Jennifer Mars PA-C  [facility]  [facility]    Contact information: [facility]  Clinic Number:  [phone]  Please call or send a Superplayer message with any questions about your care

## 2024-09-19 ENCOUNTER — MYC MEDICAL ADVICE (OUTPATIENT)
Dept: RHEUMATOLOGY | Facility: CLINIC | Age: 69
End: 2024-09-19
Payer: COMMERCIAL

## 2024-09-19 DIAGNOSIS — M10.00 IDIOPATHIC GOUT, UNSPECIFIED CHRONICITY, UNSPECIFIED SITE: Primary | ICD-10-CM

## 2024-09-19 RX ORDER — ALLOPURINOL 100 MG/1
300 TABLET ORAL DAILY
Qty: 270 TABLET | Refills: 1 | Status: SHIPPED | OUTPATIENT
Start: 2024-09-19

## 2024-10-11 ENCOUNTER — INFUSION THERAPY VISIT (OUTPATIENT)
Dept: ONCOLOGY | Facility: CLINIC | Age: 69
End: 2024-10-11
Attending: REGISTERED NURSE
Payer: COMMERCIAL

## 2024-10-11 ENCOUNTER — APPOINTMENT (OUTPATIENT)
Dept: LAB | Facility: CLINIC | Age: 69
End: 2024-10-11
Attending: INTERNAL MEDICINE
Payer: COMMERCIAL

## 2024-10-11 ENCOUNTER — ONCOLOGY VISIT (OUTPATIENT)
Dept: ONCOLOGY | Facility: CLINIC | Age: 69
End: 2024-10-11
Attending: INTERNAL MEDICINE
Payer: COMMERCIAL

## 2024-10-11 VITALS
DIASTOLIC BLOOD PRESSURE: 89 MMHG | OXYGEN SATURATION: 99 % | RESPIRATION RATE: 16 BRPM | SYSTOLIC BLOOD PRESSURE: 133 MMHG | HEART RATE: 78 BPM | TEMPERATURE: 97.9 F | WEIGHT: 214.7 LBS | BODY MASS INDEX: 28.45 KG/M2

## 2024-10-11 DIAGNOSIS — C83.07 SPLENIC MARGINAL ZONE B-CELL LYMPHOMA (H): Primary | ICD-10-CM

## 2024-10-11 DIAGNOSIS — C83.07 SPLENIC MARGINAL ZONE B-CELL LYMPHOMA (H): ICD-10-CM

## 2024-10-11 DIAGNOSIS — C61 PROSTATE CANCER (H): ICD-10-CM

## 2024-10-11 LAB
ALBUMIN SERPL BCG-MCNC: 4.6 G/DL (ref 3.5–5.2)
ALP SERPL-CCNC: 62 U/L (ref 40–150)
ALT SERPL W P-5'-P-CCNC: 47 U/L (ref 0–70)
ANION GAP SERPL CALCULATED.3IONS-SCNC: 15 MMOL/L (ref 7–15)
AST SERPL W P-5'-P-CCNC: 46 U/L (ref 0–45)
BASOPHILS # BLD AUTO: 0 10E3/UL (ref 0–0.2)
BASOPHILS NFR BLD AUTO: 0 %
BILIRUB SERPL-MCNC: 1.6 MG/DL
BUN SERPL-MCNC: 16.4 MG/DL (ref 8–23)
CALCIUM SERPL-MCNC: 9.7 MG/DL (ref 8.8–10.4)
CHLORIDE SERPL-SCNC: 103 MMOL/L (ref 98–107)
CREAT SERPL-MCNC: 1.15 MG/DL (ref 0.67–1.17)
EGFRCR SERPLBLD CKD-EPI 2021: 68 ML/MIN/1.73M2
EOSINOPHIL # BLD AUTO: 0.2 10E3/UL (ref 0–0.7)
EOSINOPHIL NFR BLD AUTO: 4 %
ERYTHROCYTE [DISTWIDTH] IN BLOOD BY AUTOMATED COUNT: 12.8 % (ref 10–15)
GLUCOSE SERPL-MCNC: 134 MG/DL (ref 70–99)
HCO3 SERPL-SCNC: 21 MMOL/L (ref 22–29)
HCT VFR BLD AUTO: 41.1 % (ref 40–53)
HGB BLD-MCNC: 14.6 G/DL (ref 13.3–17.7)
IMM GRANULOCYTES # BLD: 0 10E3/UL
IMM GRANULOCYTES NFR BLD: 0 %
LDH SERPL L TO P-CCNC: 170 U/L (ref 0–250)
LYMPHOCYTES # BLD AUTO: 0.8 10E3/UL (ref 0.8–5.3)
LYMPHOCYTES NFR BLD AUTO: 15 %
MCH RBC QN AUTO: 33.6 PG (ref 26.5–33)
MCHC RBC AUTO-ENTMCNC: 35.5 G/DL (ref 31.5–36.5)
MCV RBC AUTO: 95 FL (ref 78–100)
MONOCYTES # BLD AUTO: 0.5 10E3/UL (ref 0–1.3)
MONOCYTES NFR BLD AUTO: 9 %
NEUTROPHILS # BLD AUTO: 3.9 10E3/UL (ref 1.6–8.3)
NEUTROPHILS NFR BLD AUTO: 72 %
NRBC # BLD AUTO: 0 10E3/UL
NRBC BLD AUTO-RTO: 0 /100
PLATELET # BLD AUTO: 93 10E3/UL (ref 150–450)
POTASSIUM SERPL-SCNC: 3.7 MMOL/L (ref 3.4–5.3)
PROT SERPL-MCNC: 7.2 G/DL (ref 6.4–8.3)
PSA SERPL DL<=0.01 NG/ML-MCNC: 0.04 NG/ML (ref 0–6.5)
RBC # BLD AUTO: 4.35 10E6/UL (ref 4.4–5.9)
SODIUM SERPL-SCNC: 139 MMOL/L (ref 135–145)
URATE SERPL-MCNC: 5.2 MG/DL (ref 3.4–7)
WBC # BLD AUTO: 5.4 10E3/UL (ref 4–11)

## 2024-10-11 PROCEDURE — G2211 COMPLEX E/M VISIT ADD ON: HCPCS | Performed by: REGISTERED NURSE

## 2024-10-11 PROCEDURE — 84153 ASSAY OF PSA TOTAL: CPT | Performed by: REGISTERED NURSE

## 2024-10-11 PROCEDURE — 250N000011 HC RX IP 250 OP 636: Performed by: REGISTERED NURSE

## 2024-10-11 PROCEDURE — 84550 ASSAY OF BLOOD/URIC ACID: CPT | Performed by: REGISTERED NURSE

## 2024-10-11 PROCEDURE — 85025 COMPLETE CBC W/AUTO DIFF WBC: CPT | Performed by: REGISTERED NURSE

## 2024-10-11 PROCEDURE — 96415 CHEMO IV INFUSION ADDL HR: CPT

## 2024-10-11 PROCEDURE — 250N000013 HC RX MED GY IP 250 OP 250 PS 637: Performed by: REGISTERED NURSE

## 2024-10-11 PROCEDURE — 36415 COLL VENOUS BLD VENIPUNCTURE: CPT | Performed by: REGISTERED NURSE

## 2024-10-11 PROCEDURE — 258N000003 HC RX IP 258 OP 636: Performed by: REGISTERED NURSE

## 2024-10-11 PROCEDURE — 96375 TX/PRO/DX INJ NEW DRUG ADDON: CPT

## 2024-10-11 PROCEDURE — 84403 ASSAY OF TOTAL TESTOSTERONE: CPT | Performed by: REGISTERED NURSE

## 2024-10-11 PROCEDURE — G0463 HOSPITAL OUTPT CLINIC VISIT: HCPCS | Performed by: REGISTERED NURSE

## 2024-10-11 PROCEDURE — 96413 CHEMO IV INFUSION 1 HR: CPT

## 2024-10-11 PROCEDURE — 99213 OFFICE O/P EST LOW 20 MIN: CPT | Performed by: REGISTERED NURSE

## 2024-10-11 PROCEDURE — 83615 LACTATE (LD) (LDH) ENZYME: CPT | Performed by: REGISTERED NURSE

## 2024-10-11 PROCEDURE — 80053 COMPREHEN METABOLIC PANEL: CPT | Performed by: REGISTERED NURSE

## 2024-10-11 RX ORDER — HEPARIN SODIUM,PORCINE 10 UNIT/ML
5-20 VIAL (ML) INTRAVENOUS DAILY PRN
Status: CANCELLED | OUTPATIENT
Start: 2024-10-11

## 2024-10-11 RX ORDER — LORAZEPAM 2 MG/ML
0.5 INJECTION INTRAMUSCULAR EVERY 4 HOURS PRN
Status: CANCELLED | OUTPATIENT
Start: 2024-10-11

## 2024-10-11 RX ORDER — MEPERIDINE HYDROCHLORIDE 25 MG/ML
25 INJECTION INTRAMUSCULAR; INTRAVENOUS; SUBCUTANEOUS
Status: CANCELLED | OUTPATIENT
Start: 2024-10-11

## 2024-10-11 RX ORDER — MEPERIDINE HYDROCHLORIDE 25 MG/ML
25 INJECTION INTRAMUSCULAR; INTRAVENOUS; SUBCUTANEOUS EVERY 30 MIN PRN
Status: CANCELLED | OUTPATIENT
Start: 2024-10-11

## 2024-10-11 RX ORDER — DIPHENHYDRAMINE HCL 25 MG
50 CAPSULE ORAL ONCE
Status: CANCELLED | OUTPATIENT
Start: 2024-10-11

## 2024-10-11 RX ORDER — ALBUTEROL SULFATE 0.83 MG/ML
2.5 SOLUTION RESPIRATORY (INHALATION)
Status: CANCELLED | OUTPATIENT
Start: 2024-10-11

## 2024-10-11 RX ORDER — HEPARIN SODIUM (PORCINE) LOCK FLUSH IV SOLN 100 UNIT/ML 100 UNIT/ML
5 SOLUTION INTRAVENOUS
Status: CANCELLED | OUTPATIENT
Start: 2024-10-11

## 2024-10-11 RX ORDER — METHYLPREDNISOLONE SODIUM SUCCINATE 125 MG/2ML
125 INJECTION INTRAMUSCULAR; INTRAVENOUS
Status: CANCELLED
Start: 2024-10-11

## 2024-10-11 RX ORDER — ALBUTEROL SULFATE 90 UG/1
1-2 INHALANT RESPIRATORY (INHALATION)
Status: CANCELLED
Start: 2024-10-11

## 2024-10-11 RX ORDER — DIPHENHYDRAMINE HYDROCHLORIDE 50 MG/ML
50 INJECTION INTRAMUSCULAR; INTRAVENOUS
Status: CANCELLED | OUTPATIENT
Start: 2024-10-11 | End: 2024-10-11

## 2024-10-11 RX ORDER — EPINEPHRINE 1 MG/ML
0.3 INJECTION, SOLUTION INTRAMUSCULAR; SUBCUTANEOUS EVERY 5 MIN PRN
Status: CANCELLED | OUTPATIENT
Start: 2024-10-11

## 2024-10-11 RX ORDER — ACETAMINOPHEN 325 MG/1
650 TABLET ORAL
Status: COMPLETED | OUTPATIENT
Start: 2024-10-11 | End: 2024-10-11

## 2024-10-11 RX ORDER — METHYLPREDNISOLONE SODIUM SUCCINATE 125 MG/2ML
125 INJECTION INTRAMUSCULAR; INTRAVENOUS
Status: CANCELLED | OUTPATIENT
Start: 2024-10-11

## 2024-10-11 RX ORDER — ACETAMINOPHEN 325 MG/1
650 TABLET ORAL
Status: CANCELLED | OUTPATIENT
Start: 2024-10-11

## 2024-10-11 RX ORDER — DIPHENHYDRAMINE HCL 25 MG
25 CAPSULE ORAL ONCE
Status: COMPLETED | OUTPATIENT
Start: 2024-10-11 | End: 2024-10-11

## 2024-10-11 RX ORDER — DIPHENHYDRAMINE HCL 25 MG
25 CAPSULE ORAL ONCE
Status: CANCELLED
Start: 2024-10-11 | End: 2024-10-11

## 2024-10-11 RX ORDER — DIPHENHYDRAMINE HCL 25 MG
50 CAPSULE ORAL ONCE
Status: COMPLETED | OUTPATIENT
Start: 2024-10-11 | End: 2024-10-11

## 2024-10-11 RX ADMIN — FAMOTIDINE 20 MG: 10 INJECTION INTRAVENOUS at 07:58

## 2024-10-11 RX ADMIN — DIPHENHYDRAMINE HYDROCHLORIDE 50 MG: 25 CAPSULE ORAL at 07:56

## 2024-10-11 RX ADMIN — RITUXIMAB-ABBS 800 MG: 10 INJECTION, SOLUTION INTRAVENOUS at 08:52

## 2024-10-11 RX ADMIN — ACETAMINOPHEN 650 MG: 325 TABLET ORAL at 11:13

## 2024-10-11 RX ADMIN — ACETAMINOPHEN 650 MG: 325 TABLET ORAL at 07:55

## 2024-10-11 RX ADMIN — DIPHENHYDRAMINE HYDROCHLORIDE 25 MG: 25 CAPSULE ORAL at 11:13

## 2024-10-11 ASSESSMENT — PAIN SCALES - GENERAL: PAINLEVEL: NO PAIN (0)

## 2024-10-11 NOTE — PROGRESS NOTES
Infusion Nursing Note:  Jonathon Santos presents today for Cycle 5 Day 1 Rituxan.    Patient seen by provider today: Yes: Patito Merino NP saw patient prior to infusion   present during visit today: Not Applicable.    Note:   Patient has no additional concerns or questions after his visit with Patito Merino NP.  Patient confirms that he wishes to proceed with treatment today.    Rituxan initiated at 50 mL/hr and increased by 50 mL/hr every 30 minutes to a maximum rate of 400 mL/hr.    Intravenous Access:  Peripheral IV placed.    Treatment Conditions:   Latest Reference Range & Units 10/11/24 06:53   Sodium 135 - 145 mmol/L 139   Potassium 3.4 - 5.3 mmol/L 3.7   Chloride 98 - 107 mmol/L 103   Carbon Dioxide (CO2) 22 - 29 mmol/L 21 (L)   Urea Nitrogen 8.0 - 23.0 mg/dL 16.4   Creatinine 0.67 - 1.17 mg/dL 1.15   GFR Estimate >60 mL/min/1.73m2 68   Calcium 8.8 - 10.4 mg/dL 9.7   Anion Gap 7 - 15 mmol/L 15   Albumin 3.5 - 5.2 g/dL 4.6   Protein Total 6.4 - 8.3 g/dL 7.2   Alkaline Phosphatase 40 - 150 U/L 62   ALT 0 - 70 U/L 47   AST 0 - 45 U/L 46 (H)   Bilirubin Total <=1.2 mg/dL 1.6 (H)   Glucose 70 - 99 mg/dL 134 (H)   Lactate Dehydrogenase 0 - 250 U/L 170   Uric Acid 3.4 - 7.0 mg/dL 5.2   WBC 4.0 - 11.0 10e3/uL 5.4   Hemoglobin 13.3 - 17.7 g/dL 14.6   Hematocrit 40.0 - 53.0 % 41.1   Platelet Count 150 - 450 10e3/uL 93 (L)   RBC Count 4.40 - 5.90 10e6/uL 4.35 (L)   MCV 78 - 100 fL 95   MCH 26.5 - 33.0 pg 33.6 (H)   MCHC 31.5 - 36.5 g/dL 35.5   RDW 10.0 - 15.0 % 12.8   % Neutrophils % 72   % Lymphocytes % 15   % Monocytes % 9   % Eosinophils % 4   % Basophils % 0   Absolute Basophils 0.0 - 0.2 10e3/uL 0.0   Absolute Eosinophils 0.0 - 0.7 10e3/uL 0.2   Absolute Immature Granulocytes <=0.4 10e3/uL 0.0   Absolute Lymphocytes 0.8 - 5.3 10e3/uL 0.8   Absolute Monocytes 0.0 - 1.3 10e3/uL 0.5   % Immature Granulocytes % 0   Absolute Neutrophils 1.6 - 8.3 10e3/uL 3.9   Absolute NRBCs 10e3/uL 0.0   NRBCs per 100 WBC  <1 /100 0     Results reviewed, labs MET treatment parameters, ok to proceed with treatment.      Post Infusion Assessment:  Patient tolerated infusion without incident.  Blood return noted pre and post infusion.  Site patent and intact, free from redness, edema or discomfort.  No evidence of extravasations.  Access discontinued per protocol.       Discharge Plan:   Patient declined prescription refills.  Discharge instructions reviewed with: Patient.  Patient and/or family verbalized understanding of discharge instructions and all questions answered.  Copy of AVS reviewed with patient and/or family.  Patient will return 11/15/24 for next appointment with Dr. Burciaga.  Patient discharged in stable condition accompanied by: self.  Departure Mode: Ambulatory.      Vita Patino RN

## 2024-10-11 NOTE — PATIENT INSTRUCTIONS
Contact Numbers  [facility]: [phone] (for symptom and scheduling needs)    Please call the [facility] Triage line if you experience a temperature greater than or equal to 100.4, shaking chills, have uncontrolled nausea, vomiting and/or diarrhea, dizziness, shortness of breath, chest pain, bleeding, unexplained bruising, or if you have any other new/concerning symptoms, questions or concerns.     If you are having any concerning symptoms or wish to speak to a provider before your next infusion visit, please call your care triage to notify them so we can adequately serve you.     If you need a refill on a narcotic prescription or other medication, please call triage before your infusion appointment.

## 2024-10-11 NOTE — NURSING NOTE
"Oncology Rooming Note    October 11, 2024 7:02 AM   Jonathon Santos is a 69 year old male who presents for:    Chief Complaint   Patient presents with    Blood Draw     Labs drawn via PIV by RN in lab.  VS taken    Oncology Clinic Visit     Splenic marginal zone b-cell lymphoma     Initial Vitals: /89   Pulse 78   Temp 97.9  F (36.6  C) (Oral)   Resp 16   Wt 97.4 kg (214 lb 11.2 oz)   SpO2 99%   BMI 28.45 kg/m   Estimated body mass index is 28.45 kg/m  as calculated from the following:    Height as of 9/18/24: 1.85 m (6' 0.84\").    Weight as of this encounter: 97.4 kg (214 lb 11.2 oz). Body surface area is 2.24 meters squared.  No Pain (0) Comment: Data Unavailable   No LMP for male patient.  Allergies reviewed: Yes  Medications reviewed: Yes    Medications: Medication refills not needed today.  Pharmacy name entered into [email or web address]: [facility] - [address]    Frailty Screening:   Is the patient here for a new oncology consult visit in cancer care? 2. No      Clinical concerns:       Brynn Maher CMA              "

## 2024-10-11 NOTE — PROGRESS NOTES
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 Peripheral flow and subsequent bone marrow biopsy 10/27/23 showed several populations of clonal B-cells but the predominant one is CD5/X44-cqctbwsp low-grade B-cell lymphoma comprising about 40% of the bone marrow, but excisional LN biopsy recommended for further subclassification. There was another small CD5+ population compatible with monoclonal B-cell lymphocytosis. NGS negative for MYD88 mutation. Axillary LN biopsy 12/4/23 most consistent with CD5+ marginal zone lymphoma, splenic vs monik. FISH with loss of 17p and MYBL2, NGS with TP53 mutation. Staging PET showed significant splenomegaly (20 cm with SUVmax 6.7), mildly-avid lymphadenopathy above/below diaphragm, and diffuse bone marrow uptake. .     Treatment history:  1/25/24 to 2/16/2024: Rituximab 375 mg/m2 IV weekly x 4 doses (indication for starting treatment was thrombocytopenia and early satiety). PET with CMR  4/26/24 to present: rituximab q2 months for 4 more doses for residual splenomegaly and thrombocytopenia    INTERVAL HISTORY:  Jonathon is seen today prior to consolidative rituximab.  Doing great overall. Has some occasional loose stools that he thinks are secondary to post-nasal drainage overnight. Otherwise no fevers, night sweats, recent infections, N/V, bleeding. Energy and appetite are good, doing all normal activities.     PAST MEDICAL HISTORY:  Locally advanced prostate cancer (Keith 4+5=9) dx 12/2020 s/p radiation 5/2021-7/2021 and 2 years of abiraterone/prednisone ending 6/2023  Hypertension  Hepatic steatosis   Peripheral neuropathy, possibly 2/2 B12 deficiency  Gout     FAMILY HISTORY:  Mother had lymphoma and lung  cancer.      SOCIAL HISTORY:  Never smoker. Drinks 2-3 cans of beer daily. Smokes marijuana daily.   Lives in [place], [place] by himself.   Retired, previously worked for [employer]s office.   Has a dog and a horse    I have reviewed and updated the following:  Past Medical History:   Diagnosis Date    Anemia     Benign essential hypertension     Chronic kidney disease     Gout     Lymphoma (H)     Prostate cancer (H)     Thrombocytopenia (H)       No Known Allergies    Current Outpatient Medications:     allopurinol (ZYLOPRIM) 100 MG tablet, Take 3 tablets (300 mg) by mouth daily., Disp: 270 tablet, Rfl: 1    cyanocobalamin (VITAMIN B-12) 1000 MCG SUBL sublingual tablet, Place 1,000 mcg under the tongue daily (with lunch), Disp: , Rfl:     lisinopril-hydrochlorothiazide (ZESTORETIC) 20-12.5 MG tablet, TAKE 2 TABLETS BY MOUTH DAILY, Disp: 200 tablet, Rfl: 2    metoprolol succinate ER (TOPROL XL) 25 MG 24 hr tablet, TAKE 1 TABLET BY MOUTH DAILY  WITH LUNCH, Disp: 100 tablet, Rfl: 2    REVIEW OF SYSTEMS:  10-point ROS reviewed and negative other than that mentioned in HPI.     Objective   VITALS:  /89   Pulse 78   Temp 97.9  F (36.6  C) (Oral)   Resp 16   Wt 97.4 kg (214 lb 11.2 oz)   SpO2 99%   BMI 28.45 kg/m       ECOG PS: 0     PHYSICAL EXAM:  General: Awake, alert, in no acute distress.   HEENT: Normocephalic, atraumatic. No scleral icterus.   Lymph: No cervical or axillary lymph nodes appreciated.   CV: Regular rate and rhythm. No murmurs, rubs, or gallops appreciated.  Resp: Good inspiratory effort, lungs clear to auscultation bilaterally.  GI: Abdomen soft, nontender, nondistended. No palpable splenomegaly.   Ext: No peripheral edema bilaterally.  Neuro: CN II-XII grossly intact. No focal deficits appreciated.  Skin: No rash, unusual bruising or prominent lesions.  Psych: Pleasant, normal affect.     Most Recent 3 CBC's:  Recent Labs   Lab Test 10/11/24  0653 08/16/24  1101 06/21/24  0731    WBC 5.4 5.3 5.1   HGB 14.6 14.9 14.7   MCV 95 92 94   PLT 93* 92* 85*   ANEUTAUTO 3.9 3.9 3.4     Most Recent 3 BMP's:  Recent Labs   Lab Test 08/16/24  1101 06/21/24  0731 04/26/24  0658    139 140   POTASSIUM 3.7 3.8 4.3   CHLORIDE 104 104 104   CO2 21* 21* 21*   BUN 23.6* 25.6* 24.3*   CR 1.13 1.26* 1.08   ANIONGAP 13 14 15   MISTY 9.7 9.8 9.4   * 138* 112*   PROTTOTAL 7.0 7.2 7.4   ALBUMIN 4.5 4.6 4.7    Most Recent 3 LFT's:  Recent Labs   Lab Test 08/16/24  1101 06/21/24  0731 04/26/24  0658   AST 42 44 53*   ALT 48 37 31   ALKPHOS 58 66 70   BILITOTAL 1.7* 1.6* 1.6*    Most Recent 2 TSH and T4:  Recent Labs   Lab Test 06/15/22  1119   TSH 2.39   I reviewed the above labs today.      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 Peripheral flow cytometry and subsequent bone marrow biopsy showed several populations of clonal B-cells but the predominant one was CD5/V48-mwlixdyy low-grade B-cell lymphoma comprising about 40% of the bone marrow. There is another small CD5+ population compatible with monoclonal B-cell lymphocytosis. MYD88 NGS is negative. His PET showed significant splenomegaly (20 cm with SUVmax 6.7), mildly-avid lymphadenopathy above/below diaphragm, and diffuse bone marrow uptake. Given that Hemepath was unable to narrow the subtype of lymphoma from the bone marrow biopsy, we pursued excisional biopsy of the R axillary lymph node, which showed partially CD5+ marginal zone lymphoma, with splenic subtype favored given his whole clinical presentation. FISH with loss of 17p and MYBL2, NGS with TP53 mutation. Treatment was indicated due to his degree of thrombocytopenia and symptoms of LUQ discomfort/early satiety.   Recommended first-line treatment with rituximab monotherapy - weekly x 4 weeks with potential for consolidation every 2 months x 4 more doses, with  expected response rates ~60-70%.   Splenectomy would also be a later line option but would not address the lymph node or bone marrow disease.   Completed 4 weekly doses of rituximab 1/25/24 to 2/16/24. Tolerated fairly well other than rituximab infusion reaction first dose (SOB/chest tightness) and nausea second dose.   3/14/24 PET showed CMR (Deauville 3, only one R axillary node remaining) and decreased splenomegaly (20.8 -> 16.3 cm). Clinically responding as well with Hgb up to 12.8 and platelets up to . However his spleen was still quite enlarged with thrombocytopenia. Plan to continue rituximab q2 months for 4 total doses (starting 4/26/24) to see if we can shrink the spleen further and improve blood counts.   Anemia and leukopenia have resolved. Thrombocytopenia is improving slowly.   Labs and exam appropriate to proceed with 4th dose of consolidative rituximab today as planned.   Next PET in 1 month       Hx of prostate cancer  Dx 12/2020, locally advanced (Keith 4+5=9) disease s/p radiation 5/2021-7/2021 and 2 years of abiraterone/prednisone ending 6/2023.  Transferred care from Dr. Cisneros to Dr. Eduardo.   On surveillance. Last PSA 0.04 on 6/21/24.     Hyperuricemia  Gout History  Continue allopurinol. No acute concerns.   Uric acid stable at 6.4.     Cancer related prophylaxis  Not taking acyclovir for HSV ppx at this time; he does not want to take more pills. No signs of herpetic infection today. Will continue to monitor.  Could use Shingrix series when he completes rituximab.   Declined COVID and influenza vaccines 10/11/24     PLAN:  Dose 4 consolidative rituximab today  PET and follow up with Dr. Burciaga 11/15/24    20 minutes spent on the date of the encounter doing chart review, review of test results, interpretation of tests, patient visit, and documentation     The longitudinal plan of care for the diagnosis(es)/condition(s) as documented were addressed during this visit. Due to the added complexity  in care, I will continue to support Jonathon in the subsequent management and with ongoing continuity of care.    RADHA Napoles [facility] [address] [id number] [phone]

## 2024-10-11 NOTE — LETTER
10/11/2024      Jonathon Santos  [address] [address] [place] [id number]      Dear Colleague,    Thank you for referring your patient, Jonathon Santos, to the [facility]. Please see a copy of my visit note below.    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 Peripheral flow and subsequent bone marrow biopsy 10/27/23 showed several populations of clonal B-cells but the predominant one is CD5/F05-ajezgdyp low-grade B-cell lymphoma comprising about 40% of the bone marrow, but excisional LN biopsy recommended for further subclassification. There was another small CD5+ population compatible with monoclonal B-cell lymphocytosis. NGS negative for MYD88 mutation. Axillary LN biopsy 12/4/23 most consistent with CD5+ marginal zone lymphoma, splenic vs monik. FISH with loss of 17p and MYBL2, NGS with TP53 mutation. Staging PET showed significant splenomegaly (20 cm with SUVmax 6.7), mildly-avid lymphadenopathy above/below diaphragm, and diffuse bone marrow uptake. .     Treatment history:  1/25/24 to 2/16/2024: Rituximab 375 mg/m2 IV weekly x 4 doses (indication for starting treatment was thrombocytopenia and early satiety). PET with CMR  4/26/24 to present: rituximab q2 months for 4 more doses for residual splenomegaly and thrombocytopenia    INTERVAL HISTORY:  Jonathon is seen today prior to consolidative rituximab.  Doing great overall. Has some occasional loose stools that he thinks are secondary to post-nasal drainage overnight. Otherwise no fevers, night sweats, recent infections, N/V, bleeding. Energy and appetite are good, doing all normal activities.     PAST MEDICAL HISTORY:  Locally advanced prostate cancer (Keith  4+5=9) dx 12/2020 s/p radiation 5/2021-7/2021 and 2 years of abiraterone/prednisone ending 6/2023  Hypertension  Hepatic steatosis   Peripheral neuropathy, possibly 2/2 B12 deficiency  Gout     FAMILY HISTORY:  Mother had lymphoma and lung cancer.      SOCIAL HISTORY:  Never smoker. Drinks 2-3 cans of beer daily. Smokes marijuana daily.   Lives in [place], [place] by himself.   Retired, previously worked for [employer]s office.   Has a dog and a horse    I have reviewed and updated the following:  Past Medical History:   Diagnosis Date     Anemia      Benign essential hypertension      Chronic kidney disease      Gout      Lymphoma (H)      Prostate cancer (H)      Thrombocytopenia (H)       No Known Allergies    Current Outpatient Medications:      allopurinol (ZYLOPRIM) 100 MG tablet, Take 3 tablets (300 mg) by mouth daily., Disp: 270 tablet, Rfl: 1     cyanocobalamin (VITAMIN B-12) 1000 MCG SUBL sublingual tablet, Place 1,000 mcg under the tongue daily (with lunch), Disp: , Rfl:      lisinopril-hydrochlorothiazide (ZESTORETIC) 20-12.5 MG tablet, TAKE 2 TABLETS BY MOUTH DAILY, Disp: 200 tablet, Rfl: 2     metoprolol succinate ER (TOPROL XL) 25 MG 24 hr tablet, TAKE 1 TABLET BY MOUTH DAILY  WITH LUNCH, Disp: 100 tablet, Rfl: 2    REVIEW OF SYSTEMS:  10-point ROS reviewed and negative other than that mentioned in HPI.     Objective  VITALS:  /89   Pulse 78   Temp 97.9  F (36.6  C) (Oral)   Resp 16   Wt 97.4 kg (214 lb 11.2 oz)   SpO2 99%   BMI 28.45 kg/m       ECOG PS: 0     PHYSICAL EXAM:  General: Awake, alert, in no acute distress.   HEENT: Normocephalic, atraumatic. No scleral icterus.   Lymph: No cervical or axillary lymph nodes appreciated.   CV: Regular rate and rhythm. No murmurs, rubs, or gallops appreciated.  Resp: Good inspiratory effort, lungs clear to auscultation bilaterally.  GI: Abdomen soft, nontender, nondistended. No palpable splenomegaly.   Ext: No peripheral edema  bilaterally.  Neuro: CN II-XII grossly intact. No focal deficits appreciated.  Skin: No rash, unusual bruising or prominent lesions.  Psych: Pleasant, normal affect.     Most Recent 3 CBC's:  Recent Labs   Lab Test 10/11/24  0653 08/16/24  1101 06/21/24  0731   WBC 5.4 5.3 5.1   HGB 14.6 14.9 14.7   MCV 95 92 94   PLT 93* 92* 85*   ANEUTAUTO 3.9 3.9 3.4     Most Recent 3 BMP's:  Recent Labs   Lab Test 08/16/24  1101 06/21/24  0731 04/26/24  0658    139 140   POTASSIUM 3.7 3.8 4.3   CHLORIDE 104 104 104   CO2 21* 21* 21*   BUN 23.6* 25.6* 24.3*   CR 1.13 1.26* 1.08   ANIONGAP 13 14 15   MISTY 9.7 9.8 9.4   * 138* 112*   PROTTOTAL 7.0 7.2 7.4   ALBUMIN 4.5 4.6 4.7    Most Recent 3 LFT's:  Recent Labs   Lab Test 08/16/24  1101 06/21/24  0731 04/26/24  0658   AST 42 44 53*   ALT 48 37 31   ALKPHOS 58 66 70   BILITOTAL 1.7* 1.6* 1.6*    Most Recent 2 TSH and T4:  Recent Labs   Lab Test 06/15/22  1119   TSH 2.39   I reviewed the above labs today.      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 Peripheral flow cytometry and subsequent bone marrow biopsy showed several populations of clonal B-cells but the predominant one was CD5/V93-awtnswei low-grade B-cell lymphoma comprising about 40% of the bone marrow. There is another small CD5+ population compatible with monoclonal B-cell lymphocytosis. MYD88 NGS is negative. His PET showed significant splenomegaly (20 cm with SUVmax 6.7), mildly-avid lymphadenopathy above/below diaphragm, and diffuse bone marrow uptake. Given that Hemepath was unable to narrow the subtype of lymphoma from the bone marrow biopsy, we pursued excisional biopsy of the R axillary lymph node, which showed partially CD5+ marginal zone lymphoma, with splenic subtype favored given his whole clinical presentation. FISH with loss of 17p and MYBL2, NGS with TP53 mutation.  Treatment was indicated due to his degree of thrombocytopenia and symptoms of LUQ discomfort/early satiety.   Recommended first-line treatment with rituximab monotherapy - weekly x 4 weeks with potential for consolidation every 2 months x 4 more doses, with expected response rates ~60-70%.   Splenectomy would also be a later line option but would not address the lymph node or bone marrow disease.   Completed 4 weekly doses of rituximab 1/25/24 to 2/16/24. Tolerated fairly well other than rituximab infusion reaction first dose (SOB/chest tightness) and nausea second dose.   3/14/24 PET showed CMR (Deauville 3, only one R axillary node remaining) and decreased splenomegaly (20.8 -> 16.3 cm). Clinically responding as well with Hgb up to 12.8 and platelets up to . However his spleen was still quite enlarged with thrombocytopenia. Plan to continue rituximab q2 months for 4 total doses (starting 4/26/24) to see if we can shrink the spleen further and improve blood counts.   Anemia and leukopenia have resolved. Thrombocytopenia is improving slowly.   Labs and exam appropriate to proceed with 4th dose of consolidative rituximab today as planned.   Next PET in 1 month       Hx of prostate cancer  Dx 12/2020, locally advanced (Keith 4+5=9) disease s/p radiation 5/2021-7/2021 and 2 years of abiraterone/prednisone ending 6/2023.  Transferred care from Dr. Cisneros to Dr. Eduardo.   On surveillance. Last PSA 0.04 on 6/21/24.     Hyperuricemia  Gout History  Continue allopurinol. No acute concerns.   Uric acid stable at 6.4.     Cancer related prophylaxis  Not taking acyclovir for HSV ppx at this time; he does not want to take more pills. No signs of herpetic infection today. Will continue to monitor.  Could use Shingrix series when he completes rituximab.   Declined COVID and influenza vaccines 10/11/24     PLAN:  Dose 4 consolidative rituximab today  PET and follow up with Dr. Burciaga 11/15/24    20 minutes spent on the date of  the encounter doing chart review, review of test results, interpretation of tests, patient visit, and documentation     The longitudinal plan of care for the diagnosis(es)/condition(s) as documented were addressed during this visit. Due to the added complexity in care, I will continue to support Jonathon in the subsequent management and with ongoing continuity of care.    RADHA Napoles CNP  [facility] [address] [address]  [phone]      Again, thank you for allowing me to participate in the care of your patient.        Sincerely,        RADHA Coffey CNP

## 2024-10-15 LAB — TESTOST SERPL-MCNC: 253 NG/DL (ref 240–950)

## 2024-11-01 SDOH — HEALTH STABILITY: PHYSICAL HEALTH: ON AVERAGE, HOW MANY MINUTES DO YOU ENGAGE IN EXERCISE AT THIS LEVEL?: 30 MIN

## 2024-11-01 SDOH — HEALTH STABILITY: PHYSICAL HEALTH: ON AVERAGE, HOW MANY DAYS PER WEEK DO YOU ENGAGE IN MODERATE TO STRENUOUS EXERCISE (LIKE A BRISK WALK)?: 3 DAYS

## 2024-11-01 ASSESSMENT — SOCIAL DETERMINANTS OF HEALTH (SDOH): HOW OFTEN DO YOU GET TOGETHER WITH FRIENDS OR RELATIVES?: PATIENT DECLINED

## 2024-11-06 ENCOUNTER — OFFICE VISIT (OUTPATIENT)
Dept: FAMILY MEDICINE | Facility: CLINIC | Age: 69
End: 2024-11-06
Payer: COMMERCIAL

## 2024-11-06 VITALS
DIASTOLIC BLOOD PRESSURE: 84 MMHG | RESPIRATION RATE: 16 BRPM | BODY MASS INDEX: 28.24 KG/M2 | OXYGEN SATURATION: 97 % | SYSTOLIC BLOOD PRESSURE: 132 MMHG | TEMPERATURE: 97.2 F | HEIGHT: 73 IN | WEIGHT: 213.1 LBS | HEART RATE: 81 BPM

## 2024-11-06 DIAGNOSIS — D61.818 OTHER PANCYTOPENIA (H): ICD-10-CM

## 2024-11-06 DIAGNOSIS — I10 BENIGN ESSENTIAL HYPERTENSION: ICD-10-CM

## 2024-11-06 DIAGNOSIS — M05.79 RHEUMATOID ARTHRITIS INVOLVING MULTIPLE SITES WITH POSITIVE RHEUMATOID FACTOR (H): ICD-10-CM

## 2024-11-06 DIAGNOSIS — C85.98 NON-HODGKIN LYMPHOMA OF LYMPH NODES OF MULTIPLE REGIONS, UNSPECIFIED NON-HODGKIN LYMPHOMA TYPE (H): ICD-10-CM

## 2024-11-06 DIAGNOSIS — R73.09 ELEVATED GLUCOSE: ICD-10-CM

## 2024-11-06 DIAGNOSIS — M1A.3790 CHRONIC GOUT DUE TO RENAL IMPAIRMENT INVOLVING TOE WITHOUT TOPHUS, UNSPECIFIED LATERALITY: ICD-10-CM

## 2024-11-06 DIAGNOSIS — C77.9 REGIONAL LYMPH NODE METASTASIS PRESENT (H): ICD-10-CM

## 2024-11-06 DIAGNOSIS — Z00.00 ENCOUNTER FOR MEDICARE ANNUAL WELLNESS EXAM: Primary | ICD-10-CM

## 2024-11-06 PROCEDURE — G0439 PPPS, SUBSEQ VISIT: HCPCS | Performed by: FAMILY MEDICINE

## 2024-11-06 ASSESSMENT — PAIN SCALES - GENERAL: PAINLEVEL_OUTOF10: NO PAIN (0)

## 2024-11-06 NOTE — PATIENT INSTRUCTIONS
(I10) Benign essential hypertension    If his uric acid goes up again, he may need to stop the hydrochlorothiazide.  He does also have an occasional ticklely cough  so may need to a switch, off lisinopril to amlodipine,   as amlodipine is a fairly strong antihypertensive.   He feels good and side effects are minimal.     Patient Education   Preventive Care Advice   This is general advice given by our system to help you stay healthy. However, your care team may have specific advice just for you. Please talk to your care team about your preventive care needs.  Nutrition  Eat 5 or more servings of fruits and vegetables each day.  Try wheat bread, brown rice and whole grain pasta (instead of white bread, rice, and pasta).  Get enough calcium and vitamin D. Check the label on foods and aim for 100% of the RDA (recommended daily allowance).  Lifestyle  Exercise at least 150 minutes each week  (30 minutes a day, 5 days a week).  Do muscle strengthening activities 2 days a week. These help control your weight and prevent disease.  No smoking.  Wear sunscreen to prevent skin cancer.  Have a dental exam and cleaning every 6 months.  Yearly exams  See your health care team every year to talk about:  Any changes in your health.  Any medicines your care team has prescribed.  Preventive care, family planning, and ways to prevent chronic diseases.  Shots (vaccines)   HPV shots (up to age 26), if you've never had them before.  Hepatitis B shots (up to age 59), if you've never had them before.  COVID-19 shot: Get this shot when it's due.  Flu shot: Get a flu shot every year.  Tetanus shot: Get a tetanus shot every 10 years.  Pneumococcal, hepatitis A, and RSV shots: Ask your care team if you need these based on your risk.  Shingles shot (for age 50 and up)  General health tests  Diabetes screening:  Starting at age 35, Get screened for diabetes at least every 3 years.  If you are younger than age 35, ask your care team if you  should be screened for diabetes.  Cholesterol test: At age 39, start having a cholesterol test every 5 years, or more often if advised.  Bone density scan (DEXA): At age 50, ask your care team if you should have this scan for osteoporosis (brittle bones).  Hepatitis C: Get tested at least once in your life.  STIs (sexually transmitted infections)  Before age 24: Ask your care team if you should be screened for STIs.  After age 24: Get screened for STIs if you're at risk. You are at risk for STIs (including HIV) if:  You are sexually active with more than one person.  You don't use condoms every time.  You or a partner was diagnosed with a sexually transmitted infection.  If you are at risk for HIV, ask about PrEP medicine to prevent HIV.  Get tested for HIV at least once in your life, whether you are at risk for HIV or not.  Cancer screening tests  Cervical cancer screening: If you have a cervix, begin getting regular cervical cancer screening tests starting at age 21.  Breast cancer scan (mammogram): If you've ever had breasts, begin having regular mammograms starting at age 40. This is a scan to check for breast cancer.  Colon cancer screening: It is important to start screening for colon cancer at age 45.  Have a colonoscopy test every 10 years (or more often if you're at risk) Or, ask your provider about stool tests like a FIT test every year or Cologuard test every 3 years.  To learn more about your testing options, visit:   .  For help making a decision, visit:   https://bit.ly/cs28676.  Prostate cancer screening test: If you have a prostate, ask your care team if a prostate cancer screening test (PSA) at age 55 is right for you.  Lung cancer screening: If you are a current or former smoker ages 50 to 80, ask your care team if ongoing lung cancer screenings are right for you.  For informational purposes only. Not to replace the advice of your health care provider. Copyright   2023 [employer]. All  rights reserved. Clinically reviewed by the [employer] [facility] Program. [employer] 290249 - REV 01/24.  Learning About Activities of Daily Living  What are activities of daily living?     Activities of daily living (ADLs) are the basic self-care tasks you do every day. These include eating, bathing, dressing, and moving around.  As you age, and if you have health problems, you may find that it's harder to do some of these tasks. If so, your doctor can suggest ideas that may help.  To measure what kind of help you may need, your doctor will ask how well you are able to do ADLs. Let your doctor know if there are any tasks that you are having trouble doing. This is an important first step to getting help. And when you have the help you need, you can stay as independent as possible.  How will a doctor assess your ADLs?  Asking about ADLs is part of a routine health checkup your doctor will likely do as you age. Your health check might be done in a doctor's office, in your home, or at a hospital. The goal is to find out if you are having any problems that could make it hard to care for yourself or that make it unsafe for you to be on your own.  To measure your ADLs, your doctor will ask how hard it is for you to do routine tasks. Your doctor may also want to know if you have changed the way you do a task because of a health problem. Your doctor may watch how you:  Walk back and forth.  Keep your balance while you stand or walk.  Move from sitting to standing or from a bed to a chair.  Button or unbutton a shirt or sweater.  Remove and put on your shoes.  It's common to feel a little worried or anxious if you find you can't do all the things you used to be able to do. Talking with your doctor about ADLs is a way to make sure you're as safe as possible and able to care for yourself as well as you can. You may want to bring a caregiver, friend, or family member to your checkup. They can help you talk to your  doctor.  Follow-up care is a key part of your treatment and safety. Be sure to make and go to all appointments, and call your doctor if you are having problems. It's also a good idea to know your test results and keep a list of the medicines you take.  Current as of: October 24, 2023  Content Version: 14.2 2024 [employer].   Care instructions adapted under license by your healthcare professional. If you have questions about a medical condition or this instruction, always ask your healthcare professional. [employer] disclaims any warranty or liability for your use of this information.    Hearing Loss: Care Instructions  Overview     Hearing loss is a sudden or slow decrease in how well you hear. It can range from slight to profound. Permanent hearing loss can occur with aging. It also can happen when you are exposed long-term to loud noise. Examples include listening to loud music, riding motorcycles, or being around other loud machines.  Hearing loss can affect your work and home life. It can make you feel lonely or depressed. You may feel that you have lost your independence. But hearing aids and other devices can help you hear better and feel connected to others.  Follow-up care is a key part of your treatment and safety. Be sure to make and go to all appointments, and call your doctor if you are having problems. It's also a good idea to know your test results and keep a list of the medicines you take.  How can you care for yourself at home?  Avoid loud noises whenever possible. This helps keep your hearing from getting worse.  Always wear hearing protection around loud noises.  Wear a hearing aid as directed.  A professional can help you pick a hearing aid that will work best for you.  You can also get hearing aids over the counter for mild to moderate hearing loss.  Have hearing tests as your doctor suggests. They can show whether your hearing has changed. Your hearing aid may need  "to be adjusted.  Use other devices as needed. These may include:  Telephone amplifiers and hearing aids that can connect to a television, stereo, radio, or microphone.  Devices that use lights or vibrations. These alert you to the doorbell, a ringing telephone, or a baby monitor.  Television closed-captioning. This shows the words at the bottom of the screen. Most new TVs can do this.  TTY (text telephone). This lets you type messages back and forth on the telephone instead of talking or listening. These devices are also called TDD. When messages are typed on the keyboard, they are sent over the phone line to a receiving TTY. The message is shown on a monitor.  Use text messaging, social media, and email if it is hard for you to communicate by telephone.  Try to learn a listening technique called speechreading. It is not lipreading. You pay attention to people's gestures, expressions, posture, and tone of voice. These clues can help you understand what a person is saying. Face the person you are talking to, and have them face you. Make sure the lighting is good. You need to see the other person's face clearly.  Think about counseling if you need help to adjust to your hearing loss.  When should you call for help?  Watch closely for changes in your health, and be sure to contact your doctor if:    You think your hearing is getting worse.     You have new symptoms, such as dizziness or nausea.   Where can you learn more?  Go to https://www.[employer].net/patiented  Enter R798 in the search box to learn more about \"Hearing Loss: Care Instructions.\"  Current as of: September 27, 2023  Content Version: 14.2 2024 [employer] [employer].   Care instructions adapted under license by your healthcare professional. If you have questions about a medical condition or this instruction, always ask your healthcare professional. Healthwise[employer] Incorporated disclaims any warranty or liability for your use of this information.       "

## 2024-11-06 NOTE — PROGRESS NOTES
Preventive Care Visit  [facility] SHELBIE Gonzales MD, Family Practice  Nov 6, 2024      Assessment & Plan     Encounter for Medicare annual wellness exam    (Z00.00) Encounter for Medicare annual wellness exam  (primary encounter diagnosis)  Wt Readings from Last 10 Encounters:   11/06/24 96.7 kg (213 lb 1.6 oz)   10/11/24 97.4 kg (214 lb 11.2 oz)   09/18/24 96.9 kg (213 lb 10 oz)   09/11/24 95.3 kg (210 lb)   08/16/24 96.9 kg (213 lb 11.2 oz)   06/21/24 95.5 kg (210 lb 9.6 oz)   06/03/24 95.3 kg (210 lb)   04/26/24 94.9 kg (209 lb 3.2 oz)   04/02/24 97.1 kg (214 lb)   03/22/24 97.2 kg (214 lb 4.8 oz)     (M1A.3790) 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  He does also have an occasional ticklely cough  so may need to a switch to amlodipine.  He has no specific reason he was started on metoprolol for his bp so to reduce medications he could stop that one also if amlodipine is effective, as amlodipine is a fairly strong antihypertensive   He feels good and side effects are minimal.       (C85.98) Non-Hodgkin lymphoma of lymph nodes of multiple regions, unspecified non-Hodgkin lymphoma type (H)  From oncology .  To have PET scsan tomorrow.   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 Peripheral flow cytometry and subsequent bone marrow biopsy showed several populations of clonal B-cells but the predominant one was CD5/K44-cryvetyc low-grade B-cell lymphoma comprising about 40% of the bone marrow. There is another small CD5+ population compatible with monoclonal B-cell lymphocytosis. MYD88 NGS is negative. His PET showed significant splenomegaly (20 cm with SUVmax 6.7), mildly-avid lymphadenopathy above/below diaphragm, and  "diffuse bone marrow uptake. Given that Hemepath was unable to narrow the subtype of lymphoma from the bone marrow biopsy, we pursued excisional biopsy of the R axillary lymph node, which showed partially CD5+ marginal zone lymphoma, with splenic subtype favored given his whole clinical presentation. FISH with loss of 17p and MYBL2, NGS with TP53 mutation. Treatment was indicated due to his degree of thrombocytopenia and symptoms of LUQ discomfort/early satiety.   Recommended first-line treatment with rituximab monotherapy - weekly x 4 weeks with potential for consolidation every 2 months x 4 more doses, with expected response rates ~60-70%.   Splenectomy would also be a later line option but would not address the lymph node or bone marrow disease.   Completed 4 weekly doses of rituximab 1/25/24 to 2/16/24. Tolerated fairly well other than rituximab infusion reaction first dose (SOB/chest tightness) and nausea second dose.   3/14/24 PET showed CMR (Deauville 3, only one R axillary node remaining) and decreased splenomegaly (20.8 -> 16.3 cm). Clinically responding as well with Hgb up to 12.8 and platelets up to . However his spleen was still quite enlarged with thrombocytopenia. Plan to continue rituximab q2 months for 4 total doses (starting 4/26/24) to see if we can shrink the spleen further and improve blood counts.   Anemia and leukopenia have resolved. Thrombocytopenia is improving slowly.   Labs and exam appropriate to proceed with 4th dose of consolidative rituximab today as planned.   Next PET in 1 month   Regional lymph node metastasis present (H)  Other pancytopenia (H)      Rheumatoid arthritis involving multiple sites with positive rheumatoid factor (H)      Patient has been advised of split billing requirements and indicates understanding: Yes        BMI  Estimated body mass index is 28.27 kg/m  as calculated from the following:    Height as of this encounter: 1.849 m (6' 0.8\").    Weight as of this " encounter: 96.7 kg (213 lb 1.6 oz).   Weight management plan: Discussed healthy diet and exercise guidelines    Counseling  Appropriate preventive services were addressed with this patient via screening, questionnaire, or discussion as appropriate for fall prevention, nutrition, physical activity, Tobacco-use cessation, social engagement, weight loss and cognition.  Checklist reviewing preventive services available has been given to the patient.  Reviewed patient's diet, addressing concerns and/or questions.   He is at risk for lack of exercise and has been provided with information to increase physical activity for the benefit of his well-being.   He is at risk for psychosocial distress and has been provided with information to reduce risk.   Patient reported safety concerns were addressed today.The patient was provided with written information regarding signs of hearing loss.           Blessing Holt is a 69 year old, presenting for the following:  Medicare Visit        11/6/2024    10:41 AM   Additional Questions   Roomed by Thea Rivas CMA   Accompanied by Self           HPI        Health Care Directive  Patient does not have a Health Care Directive: Discussed advance care planning with patient; however, patient declined at this time.        11/1/2024   General Health   How would you rate your overall physical health? (!) FAIR   Feel stress (tense, anxious, or unable to sleep) Only a little      (!) STRESS CONCERN      11/1/2024   Nutrition   Diet: Regular (no restrictions)            11/1/2024   Exercise   Days per week of moderate/strenous exercise 3 days   Average minutes spent exercising at this level 30 min            11/1/2024   Social Factors   Frequency of gathering with friends or relatives Patient declined   Worry food won't last until get money to buy more No   Food not last or not have enough money for food? No   Do you have housing? (Housing is defined as stable permanent housing and does not  include staying ouside in a car, in a tent, in an abandoned building, in an overnight shelter, or couch-surfing.) Yes   Are you worried about losing your housing? No   Lack of transportation? No   Unable to get utilities (heat,electricity)? No            11/1/2024   Fall Risk   Fallen 2 or more times in the past year? No     No    Trouble with walking or balance? No     Yes        Patient-reported    Multiple values from one day are sorted in reverse-chronological order          11/1/2024   Activities of Daily Living- Home Safety   Needs help with the following daily activites None of the above   Safety concerns in the home No grab bars in the bathroom            11/1/2024   Dental   Dentist two times every year? Yes            11/1/2024   Hearing Screening   Hearing concerns? (!) IT'S HARDER TO UNDERSTAND WOMEN'S VOICES THAN MEN'S VOICES.            11/1/2024   Driving Risk Screening   Patient/family members have concerns about driving No            11/1/2024   General Alertness/Fatigue Screening   Have you been more tired than usual lately? (!) DECLINE            11/1/2024   Urinary Incontinence Screening   Bothered by leaking urine in past 6 months No          11/1/2024   TB Screening   Were you born outside of the [place]? No      Today's PHQ-2 Score:       11/6/2024    10:33 AM   PHQ-2 ( 1999 [employer])   Q1: Little interest or pleasure in doing things 0    Q2: Feeling down, depressed or hopeless 0    PHQ-2 Score 0    Q1: Little interest or pleasure in doing things Not at all   Q2: Feeling down, depressed or hopeless Not at all   PHQ-2 Score 0       Patient-reported           11/1/2024   Substance Use   Alcohol more than 3/day or more than 7/wk No   Do you have a current opioid prescription? No   How severe/bad is pain from 1 to 10? 0/10 (No Pain)   Do you use any other substances recreationally? (!) DECLINE      Social History     Tobacco Use    Smoking status: Never     Passive exposure: Never    Smokeless tobacco:  Former     Types: Chew     Quit date: 2011   Substance Use Topics    Alcohol use: Yes     Alcohol/week: 7.0 - 14.0 standard drinks of alcohol     Types: 7 - 14 Cans of beer per week     Comment: 1-2 beers daily    Drug use: Never           11/1/2024   AAA Screening   Family history of Abdominal Aortic Aneurysm (AAA)? No      ASCVD Risk   The 10-year ASCVD risk score (Niru PATE, et al., 2019) is: 22.4%    Values used to calculate the score:      Age: 69 years      Sex: Male      Is Non- : No      Diabetic: No      Tobacco smoker: No      Systolic Blood Pressure: 132 mmHg      Is BP treated: Yes      HDL Cholesterol: 37 mg/dL      Total Cholesterol: 160 mg/dL        Reviewed and updated as needed this visit by Provider                      Current providers sharing in care for this patient include:  Patient Care Team:  Jonathon Gonzales MD as PCP - General (Family Practice)  Davion Francois MD as MD (Radiation Oncology)  Pinky Nick MD as Assigned Neuroscience Provider  Jonathon Gonzales MD as Assigned PCP  Jennifer Mars PA-C as Assigned Rheumatology Provider  Vannesa Burciaga MD as MD (Hematology & Oncology)  Kasey Watson, LIZETH as Specialty Care Coordinator (Hematology & Oncology)  Manjinder Bright MD as Assigned Surgical Provider  Stacy Mariscal, RN as Specialty Care Coordinator (Hematology & Oncology)  Vannesa Burciaga MD as Assigned Cancer Care Provider  Monae Seals, RN as Specialty Care Coordinator (Hematology & Oncology)    The following health maintenance items are reviewed in Epic and correct as of today:  Health Maintenance   Topic Date Due    ZOSTER IMMUNIZATION (1 of 2) Never done    DTAP/TDAP/TD IMMUNIZATION (1 - Tdap) Never done    RSV VACCINE (1 - Risk 60-74 years 1-dose series) Never done    INFLUENZA VACCINE (1) 09/01/2024    COVID-19 Vaccine (4 - 2024-25 season) 09/01/2024    LIPID  02/26/2025    BMP  10/11/2025    MEDICARE ANNUAL WELLNESS VISIT   "11/06/2025    ANNUAL REVIEW OF HM ORDERS  11/06/2025    FALL RISK ASSESSMENT  11/06/2025    COLORECTAL CANCER SCREENING  07/29/2027    GLUCOSE  10/11/2027    ADVANCE CARE PLANNING  11/06/2029    HEPATITIS C SCREENING  Completed    PHQ-2 (once per calendar year)  Completed    Pneumococcal Vaccine: 65+ Years  Completed    HPV IMMUNIZATION  Aged Out    MENINGITIS IMMUNIZATION  Aged Out    RSV MONOCLONAL ANTIBODY  Aged Out     Review of Systems  Constitutional, HEENT, cardiovascular, pulmonary, gi and gu systems are negative, except as otherwise noted.     Objective    Exam  /84 (BP Location: Right arm, Patient Position: Sitting, Cuff Size: Adult Regular)   Pulse 81   Temp 97.2  F (36.2  C) (Tympanic)   Resp 16   Ht 1.849 m (6' 0.8\")   Wt 96.7 kg (213 lb 1.6 oz)   SpO2 97%   BMI 28.27 kg/m     Estimated body mass index is 28.27 kg/m  as calculated from the following:    Height as of this encounter: 1.849 m (6' 0.8\").    Weight as of this encounter: 96.7 kg (213 lb 1.6 oz).    Physical Exam  GENERAL: alert and no distress  NECK: no adenopathy, no asymmetry, masses, or scars  RESP: lungs clear to auscultation - no rales, rhonchi or wheezes  CV: regular rate and rhythm, normal S1 S2, no S3 or S4, no murmur, click or rub, no peripheral edema  ABDOMEN: soft, nontender, no hepatosplenomegaly, no masses and bowel sounds normal  MS: no gross musculoskeletal defects noted, no edema         11/6/2024   Mini Cog   Clock Draw Score 2 Normal   3 Item Recall 3 objects recalled   Mini Cog Total Score 5               Signed Electronically by: Jonathon Gonzales MD    "

## 2024-11-07 ENCOUNTER — HOSPITAL ENCOUNTER (OUTPATIENT)
Dept: PET IMAGING | Facility: CLINIC | Age: 69
Discharge: HOME | End: 2024-11-07
Attending: INTERNAL MEDICINE
Payer: COMMERCIAL

## 2024-11-07 ENCOUNTER — LAB (OUTPATIENT)
Dept: LAB | Facility: CLINIC | Age: 69
End: 2024-11-07
Payer: COMMERCIAL

## 2024-11-07 DIAGNOSIS — C83.07 SPLENIC MARGINAL ZONE B-CELL LYMPHOMA (H): ICD-10-CM

## 2024-11-07 DIAGNOSIS — C61 PROSTATE CANCER (H): Primary | ICD-10-CM

## 2024-11-07 DIAGNOSIS — G62.89 AXONAL SENSORIMOTOR NEUROPATHY: ICD-10-CM

## 2024-11-07 DIAGNOSIS — R73.09 ELEVATED GLUCOSE: ICD-10-CM

## 2024-11-07 DIAGNOSIS — E53.8 VITAMIN B12 DEFICIENCY (NON ANEMIC): ICD-10-CM

## 2024-11-07 DIAGNOSIS — C61 PROSTATE CANCER (H): ICD-10-CM

## 2024-11-07 LAB
ALBUMIN SERPL BCG-MCNC: 4.4 G/DL (ref 3.5–5.2)
ALP SERPL-CCNC: 55 U/L (ref 40–150)
ALT SERPL W P-5'-P-CCNC: 55 U/L (ref 0–70)
ANION GAP SERPL CALCULATED.3IONS-SCNC: 11 MMOL/L (ref 7–15)
AST SERPL W P-5'-P-CCNC: 46 U/L (ref 0–45)
BASOPHILS # BLD AUTO: 0.1 10E3/UL (ref 0–0.2)
BASOPHILS NFR BLD AUTO: 1 %
BILIRUB SERPL-MCNC: 1.1 MG/DL
BUN SERPL-MCNC: 17.9 MG/DL (ref 8–23)
CALCIUM SERPL-MCNC: 9.6 MG/DL (ref 8.8–10.4)
CHLORIDE SERPL-SCNC: 100 MMOL/L (ref 98–107)
CREAT SERPL-MCNC: 1.1 MG/DL (ref 0.67–1.17)
EGFRCR SERPLBLD CKD-EPI 2021: 72 ML/MIN/1.73M2
EOSINOPHIL # BLD AUTO: 0.2 10E3/UL (ref 0–0.7)
EOSINOPHIL NFR BLD AUTO: 5 %
ERYTHROCYTE [DISTWIDTH] IN BLOOD BY AUTOMATED COUNT: 12.4 % (ref 10–15)
EST. AVERAGE GLUCOSE BLD GHB EST-MCNC: 94 MG/DL
GLUCOSE SERPL-MCNC: 97 MG/DL (ref 70–99)
HBA1C MFR BLD: 4.9 % (ref 0–5.6)
HCO3 SERPL-SCNC: 24 MMOL/L (ref 22–29)
HCT VFR BLD AUTO: 41.4 % (ref 40–53)
HGB BLD-MCNC: 14.9 G/DL (ref 13.3–17.7)
IMM GRANULOCYTES # BLD: 0 10E3/UL
IMM GRANULOCYTES NFR BLD: 1 %
LDH SERPL L TO P-CCNC: 161 U/L (ref 0–250)
LYMPHOCYTES # BLD AUTO: 0.8 10E3/UL (ref 0.8–5.3)
LYMPHOCYTES NFR BLD AUTO: 17 %
MCH RBC QN AUTO: 34.1 PG (ref 26.5–33)
MCHC RBC AUTO-ENTMCNC: 36 G/DL (ref 31.5–36.5)
MCV RBC AUTO: 95 FL (ref 78–100)
MONOCYTES # BLD AUTO: 0.4 10E3/UL (ref 0–1.3)
MONOCYTES NFR BLD AUTO: 9 %
NEUTROPHILS # BLD AUTO: 3 10E3/UL (ref 1.6–8.3)
NEUTROPHILS NFR BLD AUTO: 67 %
NRBC # BLD AUTO: 0 10E3/UL
NRBC BLD AUTO-RTO: 0 /100
PLATELET # BLD AUTO: 99 10E3/UL (ref 150–450)
POTASSIUM SERPL-SCNC: 4 MMOL/L (ref 3.4–5.3)
PROT SERPL-MCNC: 7 G/DL (ref 6.4–8.3)
PSA SERPL DL<=0.01 NG/ML-MCNC: 0.04 NG/ML (ref 0–6.5)
RBC # BLD AUTO: 4.37 10E6/UL (ref 4.4–5.9)
SODIUM SERPL-SCNC: 135 MMOL/L (ref 135–145)
URATE SERPL-MCNC: 5.9 MG/DL (ref 3.4–7)
VIT B12 SERPL-MCNC: 1860 PG/ML (ref 232–1245)
WBC # BLD AUTO: 4.4 10E3/UL (ref 4–11)

## 2024-11-07 PROCEDURE — 85025 COMPLETE CBC W/AUTO DIFF WBC: CPT | Performed by: INTERNAL MEDICINE

## 2024-11-07 PROCEDURE — 83036 HEMOGLOBIN GLYCOSYLATED A1C: CPT

## 2024-11-07 PROCEDURE — 84550 ASSAY OF BLOOD/URIC ACID: CPT | Performed by: INTERNAL MEDICINE

## 2024-11-07 PROCEDURE — A9552 F18 FDG: HCPCS | Performed by: INTERNAL MEDICINE

## 2024-11-07 PROCEDURE — 83615 LACTATE (LD) (LDH) ENZYME: CPT | Performed by: INTERNAL MEDICINE

## 2024-11-07 PROCEDURE — 70491 CT SOFT TISSUE NECK W/DYE: CPT

## 2024-11-07 PROCEDURE — 74177 CT ABD & PELVIS W/CONTRAST: CPT

## 2024-11-07 PROCEDURE — 84153 ASSAY OF PSA TOTAL: CPT

## 2024-11-07 PROCEDURE — 250N000011 HC RX IP 250 OP 636: Performed by: INTERNAL MEDICINE

## 2024-11-07 PROCEDURE — 343N000001 HC RX 343 MED OP 636: Performed by: INTERNAL MEDICINE

## 2024-11-07 PROCEDURE — 36415 COLL VENOUS BLD VENIPUNCTURE: CPT | Performed by: INTERNAL MEDICINE

## 2024-11-07 PROCEDURE — 78816 PET IMAGE W/CT FULL BODY: CPT | Mod: PS

## 2024-11-07 PROCEDURE — 80053 COMPREHEN METABOLIC PANEL: CPT | Performed by: INTERNAL MEDICINE

## 2024-11-07 PROCEDURE — 82607 VITAMIN B-12: CPT

## 2024-11-07 RX ORDER — FLUDEOXYGLUCOSE F 18 200 MCI/ML
10-18 INJECTION, SOLUTION INTRAVENOUS ONCE
Status: COMPLETED | OUTPATIENT
Start: 2024-11-07 | End: 2024-11-07

## 2024-11-07 RX ORDER — IOPAMIDOL 755 MG/ML
10-135 INJECTION, SOLUTION INTRAVASCULAR ONCE
Status: COMPLETED | OUTPATIENT
Start: 2024-11-07 | End: 2024-11-07

## 2024-11-07 RX ADMIN — FLUDEOXYGLUCOSE F 18 11.43 MILLICURIE: 200 INJECTION, SOLUTION INTRAVENOUS at 10:37

## 2024-11-07 RX ADMIN — IOPAMIDOL 131 ML: 755 INJECTION, SOLUTION INTRAVENOUS at 10:38

## 2024-11-15 ENCOUNTER — ONCOLOGY VISIT (OUTPATIENT)
Dept: ONCOLOGY | Facility: CLINIC | Age: 69
End: 2024-11-15
Attending: INTERNAL MEDICINE
Payer: COMMERCIAL

## 2024-11-15 VITALS
RESPIRATION RATE: 16 BRPM | DIASTOLIC BLOOD PRESSURE: 86 MMHG | OXYGEN SATURATION: 98 % | TEMPERATURE: 97.9 F | BODY MASS INDEX: 28.47 KG/M2 | SYSTOLIC BLOOD PRESSURE: 143 MMHG | HEART RATE: 68 BPM | WEIGHT: 214.6 LBS

## 2024-11-15 DIAGNOSIS — C83.07 SPLENIC MARGINAL ZONE B-CELL LYMPHOMA (H): Primary | ICD-10-CM

## 2024-11-15 DIAGNOSIS — C61 PROSTATE CANCER (H): ICD-10-CM

## 2024-11-15 PROCEDURE — G2211 COMPLEX E/M VISIT ADD ON: HCPCS | Performed by: INTERNAL MEDICINE

## 2024-11-15 PROCEDURE — 99214 OFFICE O/P EST MOD 30 MIN: CPT | Performed by: INTERNAL MEDICINE

## 2024-11-15 PROCEDURE — G0463 HOSPITAL OUTPT CLINIC VISIT: HCPCS | Performed by: INTERNAL MEDICINE

## 2024-11-15 ASSESSMENT — PAIN SCALES - GENERAL: PAINLEVEL_OUTOF10: NO PAIN (0)

## 2024-11-15 NOTE — LETTER
11/15/2024      Jonathon Santos  [address] [address] [place] [phone]      Dear Colleague,    Thank you for referring your patient, Jonathon Santos, to the [facility]. Please see a copy of my visit note below.    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 Peripheral flow and subsequent bone marrow biopsy 10/27/23 showed several populations of clonal B-cells but the predominant one is CD5/B27-hvfknsrf low-grade B-cell lymphoma comprising about 40% of the bone marrow, but excisional LN biopsy recommended for further subclassification. There was another small CD5+ population compatible with monoclonal B-cell lymphocytosis. NGS negative for MYD88 mutation. Axillary LN biopsy 12/4/23 most consistent with CD5+ marginal zone lymphoma, splenic vs monik. FISH with loss of 17p and MYBL2, NGS with TP53 mutation. Staging PET showed significant splenomegaly (20 cm with SUVmax 6.7), mildly-avid lymphadenopathy above/below diaphragm, and diffuse bone marrow uptake. .     Treatment history:  1/25/24 to 2/16/2024: Rituximab 375 mg/m2 IV weekly x 4 doses (indication for starting treatment was thrombocytopenia and early satiety). PET with CMR  4/26/24 to present: rituximab q2 months for 4 more doses for residual splenomegaly and thrombocytopenia. EOT PET with ongoing CMR    INTERVAL HISTORY:  Jonathon is here today for EOT follow-up. Doing well overall, tolerated last few rituximab infusions fine. Denies any fever/chills, night sweats, recent infections, lumps/bumps, SOB, chest pain, N/V, diarrhea, bleeding. Energy and appetite are good.     PAST MEDICAL HISTORY:  Locally advanced prostate cancer ([place] 4+5=9) dx 12/2020 s/p radiation  5/2021-7/2021 and 2 years of abiraterone/prednisone ending 6/2023  Hypertension  Hepatic steatosis   Peripheral neuropathy, possibly 2/2 B12 deficiency  Gout     FAMILY HISTORY:  Mother had lymphoma and lung cancer.      SOCIAL HISTORY:  Never smoker. Drinks 2-3 cans of beer daily. Smokes marijuana daily.   Lives in [place], [place] by himself.   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   Has a dog and a horse    I have reviewed and updated the following:  Past Medical History:   Diagnosis Date     Anemia      Benign essential hypertension      Chronic kidney disease      Gout      Lymphoma (H)      Prostate cancer (H)      Thrombocytopenia (H)       No Known Allergies    Current Outpatient Medications:      allopurinol (ZYLOPRIM) 100 MG tablet, Take 3 tablets (300 mg) by mouth daily., Disp: 270 tablet, Rfl: 1     cyanocobalamin (VITAMIN B-12) 1000 MCG SUBL sublingual tablet, Place 1,000 mcg under the tongue daily (with lunch), Disp: , Rfl:      lisinopril-hydrochlorothiazide (ZESTORETIC) 20-12.5 MG tablet, TAKE 2 TABLETS BY MOUTH DAILY, Disp: 200 tablet, Rfl: 2     metoprolol succinate ER (TOPROL XL) 25 MG 24 hr tablet, TAKE 1 TABLET BY MOUTH DAILY  WITH LUNCH, Disp: 100 tablet, Rfl: 2    REVIEW OF SYSTEMS:  10-point ROS reviewed and negative other than that mentioned in HPI.     Objective  VITALS:  BP (!) 143/86 (BP Location: Right arm, Patient Position: Sitting, Cuff Size: Adult Regular)   Pulse 68   Temp 97.9  F (36.6  C) (Oral)   Resp 16   Wt 97.3 kg (214 lb 9.6 oz)   SpO2 98%   BMI 28.47 kg/m       ECOG PS: 0     PHYSICAL EXAM:  General: Awake, alert, in no acute distress.   HEENT: Normocephalic, atraumatic. No scleral icterus.   Lymph: No cervical, supraclavicular, or axillary lymph nodes appreciated.   CV: Regular rate and rhythm. No murmurs, rubs, or gallops appreciated.  Resp: Good inspiratory effort, lungs clear to auscultation bilaterally.  GI: Abdomen soft, nontender,  nondistended. No palpable splenomegaly.   Ext: No peripheral edema bilaterally.  Neuro: CN II-XII grossly intact. No focal deficits appreciated.  Skin: No rash, unusual bruising or prominent lesions.  Psych: Pleasant, normal affect.     Most Recent 3 CBC's:  Recent Labs   Lab Test 11/07/24  1019 10/11/24  0653 08/16/24  1101   WBC 4.4 5.4 5.3   HGB 14.9 14.6 14.9   MCV 95 95 92   PLT 99* 93* 92*   ANEUTAUTO 3.0 3.9 3.9     Most Recent 3 BMP's:  Recent Labs   Lab Test 11/07/24  1019 10/11/24  0653 08/16/24  1101    139 138   POTASSIUM 4.0 3.7 3.7   CHLORIDE 100 103 104   CO2 24 21* 21*   BUN 17.9 16.4 23.6*   CR 1.10 1.15 1.13   ANIONGAP 11 15 13   MISTY 9.6 9.7 9.7   GLC 97 134* 122*   PROTTOTAL 7.0 7.2 7.0   ALBUMIN 4.4 4.6 4.5    Most Recent 3 LFT's:  Recent Labs   Lab Test 11/07/24  1019 10/11/24  0653 08/16/24  1101   AST 46* 46* 42   ALT 55 47 48   ALKPHOS 55 62 58   BILITOTAL 1.1 1.6* 1.7*    Most Recent 2 TSH and T4:  Recent Labs   Lab Test 06/15/22  1119   TSH 2.39   I reviewed the above labs today.    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 Peripheral flow cytometry and subsequent bone marrow biopsy showed several populations of clonal B-cells but the predominant one was CD5/D26-jsqoubmb low-grade B-cell lymphoma comprising about 40% of the bone marrow. There is another small CD5+ population compatible with monoclonal B-cell lymphocytosis. MYD88 NGS is negative. His PET showed significant splenomegaly (20 cm with SUVmax 6.7), mildly-avid lymphadenopathy above/below diaphragm, and diffuse bone marrow uptake. Given that Hemepath was unable to narrow the subtype of lymphoma from the bone marrow biopsy, we pursued  excisional biopsy of the R axillary lymph node, which showed partially CD5+ marginal zone lymphoma, with splenic subtype favored given his whole clinical presentation. FISH with loss of 17p and MYBL2, NGS with TP53 mutation. Treatment was indicated due to his degree of thrombocytopenia and symptoms of LUQ discomfort/early satiety.   Recommended first-line treatment with rituximab monotherapy - weekly x 4 weeks with potential for consolidation every 2 months x 4 more doses, with expected response rates ~60-70%.   Splenectomy would also be a later line option but would not address the lymph node or bone marrow disease.   Completed 4 weekly doses of rituximab 1/25/24 to 2/16/24. Tolerated fairly well other than rituximab infusion reaction first dose (SOB/chest tightness) and nausea second dose.   3/14/24 PET showed CMR (Deauville 3, only one R axillary node remaining) and decreased splenomegaly (20.8 -> 16.3 cm). Clinically responding as well with Hgb up to 12.8 and platelets up to . However his spleen was still quite enlarged with thrombocytopenia. Thus decided to continue rituximab q2 months for 4 total doses (starting 4/26/24) to see if we can shrink the spleen further and improve blood counts.   Anemia and leukopenia have resolved. Thrombocytopenia is improving slowly.   Finished last dose of consolidative rituximab 10/11/24. EOT PET with ongoing CMR (Deauville 3), spleen also slightly smaller per my measurements.  Will now move to surveillance with H&P/labs every 3-6 mo for 2 years. Imaging every 6 months for first 2 years only.      Hx of prostate cancer  Dx 12/2020, locally advanced ([place] 4+5=9) disease s/p radiation 5/2021-7/2021 and 2 years of abiraterone/prednisone ending 6/2023.  Transferred care from Dr. Cisneros to Dr. Eduardo.   On surveillance. Last PSA 0.04 on 10/7/24.      Hyperuricemia  Gout History  Continue allopurinol. No acute concerns.   Uric acid stable at 5.9.     Ppx  ID: Stopped acyclovir  d/t pill burden.  Vaccines: up to date on pneumonia shot. He declined flu/COVID shot. Recommend Shingrix series ~6 months after last rituximab.       PLAN:  AGATHA visit in 3 months  RTC to see me in 6 months with CT    Total of 30 minutes on patient visit, reviewing records, interpreting test results, placing orders, and documentation on the day of service.    The longitudinal plan of care for the diagnosis(es)/condition(s) as documented were addressed during this visit. Due to the added complexity in care, I will continue to support Jonathon in the subsequent management and with ongoing continuity of care.     Vannesa Burciaga MD  Attending Physician, [facility]       Again, thank you for allowing me to participate in the care of your patient.        Sincerely,        Vannesa Burciaga MD

## 2024-11-15 NOTE — NURSING NOTE
"Oncology Rooming Note    November 15, 2024 10:40 AM   Jonathon Santos is a 69 year old male who presents for:    Chief Complaint   Patient presents with    Oncology Clinic Visit     Neutropenia     Initial Vitals: BP (!) 143/86 (BP Location: Right arm, Patient Position: Sitting, Cuff Size: Adult Regular)   Pulse 68   Temp 97.9  F (36.6  C) (Oral)   Resp 16   Wt 97.3 kg (214 lb 9.6 oz)   SpO2 98%   BMI 28.47 kg/m   Estimated body mass index is 28.47 kg/m  as calculated from the following:    Height as of 11/6/24: 1.849 m (6' 0.8\").    Weight as of this encounter: 97.3 kg (214 lb 9.6 oz). Body surface area is 2.24 meters squared.  No Pain (0) Comment: Data Unavailable   No LMP for male patient.  Allergies reviewed: Yes  Medications reviewed: Yes    Medications: Medication refills not needed today.  Pharmacy name entered into [facility]: [facility] - [address]    Frailty Screening:   Is the patient here for a new oncology consult visit in cancer care? 2. No      Clinical concerns:  Pt would like to know the reasoning that the genetic testing was conducted. They also received a letter in the mail that insurance denied coverage for the testing.       Lorraine Arellano              "

## 2024-11-15 NOTE — PROGRESS NOTES
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 Peripheral flow and subsequent bone marrow biopsy 10/27/23 showed several populations of clonal B-cells but the predominant one is CD5/N50-aqinjixo low-grade B-cell lymphoma comprising about 40% of the bone marrow, but excisional LN biopsy recommended for further subclassification. There was another small CD5+ population compatible with monoclonal B-cell lymphocytosis. NGS negative for MYD88 mutation. Axillary LN biopsy 12/4/23 most consistent with CD5+ marginal zone lymphoma, splenic vs monik. FISH with loss of 17p and MYBL2, NGS with TP53 mutation. Staging PET showed significant splenomegaly (20 cm with SUVmax 6.7), mildly-avid lymphadenopathy above/below diaphragm, and diffuse bone marrow uptake. .     Treatment history:  1/25/24 to 2/16/2024: Rituximab 375 mg/m2 IV weekly x 4 doses (indication for starting treatment was thrombocytopenia and early satiety). PET with CMR  4/26/24 to present: rituximab q2 months for 4 more doses for residual splenomegaly and thrombocytopenia. EOT PET with ongoing CMR    INTERVAL HISTORY:  Jonathon is here today for EOT follow-up. Doing well overall, tolerated last few rituximab infusions fine. Denies any fever/chills, night sweats, recent infections, lumps/bumps, SOB, chest pain, N/V, diarrhea, bleeding. Energy and appetite are good.     PAST MEDICAL HISTORY:  Locally advanced prostate cancer (Keith 4+5=9) dx 12/2020 s/p radiation 5/2021-7/2021 and 2 years of abiraterone/prednisone ending 6/2023  Hypertension  Hepatic steatosis   Peripheral neuropathy, possibly 2/2 B12 deficiency  Gout     FAMILY HISTORY:  Mother had lymphoma and lung cancer.      SOCIAL  HISTORY:  Never smoker. Drinks 2-3 cans of beer daily. Smokes marijuana daily.   Lives in [place], [place] by himself.   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   Has a dog and a horse    I have reviewed and updated the following:  Past Medical History:   Diagnosis Date    Anemia     Benign essential hypertension     Chronic kidney disease     Gout     Lymphoma (H)     Prostate cancer (H)     Thrombocytopenia (H)       No Known Allergies    Current Outpatient Medications:     allopurinol (ZYLOPRIM) 100 MG tablet, Take 3 tablets (300 mg) by mouth daily., Disp: 270 tablet, Rfl: 1    cyanocobalamin (VITAMIN B-12) 1000 MCG SUBL sublingual tablet, Place 1,000 mcg under the tongue daily (with lunch), Disp: , Rfl:     lisinopril-hydrochlorothiazide (ZESTORETIC) 20-12.5 MG tablet, TAKE 2 TABLETS BY MOUTH DAILY, Disp: 200 tablet, Rfl: 2    metoprolol succinate ER (TOPROL XL) 25 MG 24 hr tablet, TAKE 1 TABLET BY MOUTH DAILY  WITH LUNCH, Disp: 100 tablet, Rfl: 2    REVIEW OF SYSTEMS:  10-point ROS reviewed and negative other than that mentioned in HPI.     Objective   VITALS:  BP (!) 143/86 (BP Location: Right arm, Patient Position: Sitting, Cuff Size: Adult Regular)   Pulse 68   Temp 97.9  F (36.6  C) (Oral)   Resp 16   Wt 97.3 kg (214 lb 9.6 oz)   SpO2 98%   BMI 28.47 kg/m       ECOG PS: 0     PHYSICAL EXAM:  General: Awake, alert, in no acute distress.   HEENT: Normocephalic, atraumatic. No scleral icterus.   Lymph: No cervical, supraclavicular, or axillary lymph nodes appreciated.   CV: Regular rate and rhythm. No murmurs, rubs, or gallops appreciated.  Resp: Good inspiratory effort, lungs clear to auscultation bilaterally.  GI: Abdomen soft, nontender, nondistended. No palpable splenomegaly.   Ext: No peripheral edema bilaterally.  Neuro: CN II-XII grossly intact. No focal deficits appreciated.  Skin: No rash, unusual bruising or prominent lesions.  Psych: Pleasant, normal affect.     Most Recent  3 CBC's:  Recent Labs   Lab Test 11/07/24  1019 10/11/24  0653 08/16/24  1101   WBC 4.4 5.4 5.3   HGB 14.9 14.6 14.9   MCV 95 95 92   PLT 99* 93* 92*   ANEUTAUTO 3.0 3.9 3.9     Most Recent 3 BMP's:  Recent Labs   Lab Test 11/07/24  1019 10/11/24  0653 08/16/24  1101    139 138   POTASSIUM 4.0 3.7 3.7   CHLORIDE 100 103 104   CO2 24 21* 21*   BUN 17.9 16.4 23.6*   CR 1.10 1.15 1.13   ANIONGAP 11 15 13   MISTY 9.6 9.7 9.7   GLC 97 134* 122*   PROTTOTAL 7.0 7.2 7.0   ALBUMIN 4.4 4.6 4.5    Most Recent 3 LFT's:  Recent Labs   Lab Test 11/07/24  1019 10/11/24  0653 08/16/24  1101   AST 46* 46* 42   ALT 55 47 48   ALKPHOS 55 62 58   BILITOTAL 1.1 1.6* 1.7*    Most Recent 2 TSH and T4:  Recent Labs   Lab Test 06/15/22  1119   TSH 2.39   I reviewed the above labs today.    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 Peripheral flow cytometry and subsequent bone marrow biopsy showed several populations of clonal B-cells but the predominant one was CD5/Q99-jrkfrgnn low-grade B-cell lymphoma comprising about 40% of the bone marrow. There is another small CD5+ population compatible with monoclonal B-cell lymphocytosis. MYD88 NGS is negative. His PET showed significant splenomegaly (20 cm with SUVmax 6.7), mildly-avid lymphadenopathy above/below diaphragm, and diffuse bone marrow uptake. Given that Hemepath was unable to narrow the subtype of lymphoma from the bone marrow biopsy, we pursued excisional biopsy of the R axillary lymph node, which showed partially CD5+ marginal zone lymphoma, with splenic subtype favored given his whole clinical presentation. FISH with loss of 17p and MYBL2, NGS with TP53 mutation. Treatment was indicated  due to his degree of thrombocytopenia and symptoms of LUQ discomfort/early satiety.   Recommended first-line treatment with rituximab monotherapy - weekly x 4 weeks with potential for consolidation every 2 months x 4 more doses, with expected response rates ~60-70%.   Splenectomy would also be a later line option but would not address the lymph node or bone marrow disease.   Completed 4 weekly doses of rituximab 1/25/24 to 2/16/24. Tolerated fairly well other than rituximab infusion reaction first dose (SOB/chest tightness) and nausea second dose.   3/14/24 PET showed CMR (Deauville 3, only one R axillary node remaining) and decreased splenomegaly (20.8 -> 16.3 cm). Clinically responding as well with Hgb up to 12.8 and platelets up to . However his spleen was still quite enlarged with thrombocytopenia. Thus decided to continue rituximab q2 months for 4 total doses (starting 4/26/24) to see if we can shrink the spleen further and improve blood counts.   Anemia and leukopenia have resolved. Thrombocytopenia is improving slowly.   Finished last dose of consolidative rituximab 10/11/24. EOT PET with ongoing CMR (Deauville 3), spleen also slightly smaller per my measurements.  Will now move to surveillance with H&P/labs every 3-6 mo for 2 years. Imaging every 6 months for first 2 years only.      Hx of prostate cancer  Dx 12/2020, locally advanced ([place] 4+5=9) disease s/p radiation 5/2021-7/2021 and 2 years of abiraterone/prednisone ending 6/2023.  Transferred care from Dr. Cisneros to Dr. Eduardo.   On surveillance. Last PSA 0.04 on 10/7/24.      Hyperuricemia  Gout History  Continue allopurinol. No acute concerns.   Uric acid stable at 5.9.     Ppx  ID: Stopped acyclovir d/t pill burden.  Vaccines: up to date on pneumonia shot. He declined flu/COVID shot. Recommend Shingrix series ~6 months after last rituximab.       PLAN:  AGATHA visit in 3 months  RTC to see me in 6 months with CT    Total of 30 minutes on patient  visit, reviewing records, interpreting test results, placing orders, and documentation on the day of service.    The longitudinal plan of care for the diagnosis(es)/condition(s) as documented were addressed during this visit. Due to the added complexity in care, I will continue to support Jonathon in the subsequent management and with ongoing continuity of care.     Vannesa Burciaga MD  Attending Physician, [facility]

## 2024-11-27 ENCOUNTER — TELEPHONE (OUTPATIENT)
Dept: RHEUMATOLOGY | Facility: CLINIC | Age: 69
End: 2024-11-27
Payer: COMMERCIAL

## 2024-11-27 DIAGNOSIS — M10.00 IDIOPATHIC GOUT, UNSPECIFIED CHRONICITY, UNSPECIFIED SITE: ICD-10-CM

## 2024-11-27 RX ORDER — ALLOPURINOL 100 MG/1
300 TABLET ORAL DAILY
Qty: 270 TABLET | Refills: 1 | OUTPATIENT
Start: 2024-11-27

## 2025-01-23 DIAGNOSIS — I10 UNCONTROLLED HYPERTENSION: ICD-10-CM

## 2025-01-23 RX ORDER — LISINOPRIL AND HYDROCHLOROTHIAZIDE 12.5; 2 MG/1; MG/1
2 TABLET ORAL DAILY
Qty: 180 TABLET | Refills: 0 | Status: SHIPPED | OUTPATIENT
Start: 2025-01-23

## 2025-01-27 ENCOUNTER — TELEPHONE (OUTPATIENT)
Dept: RHEUMATOLOGY | Facility: CLINIC | Age: 70
End: 2025-01-27
Payer: COMMERCIAL

## 2025-01-27 DIAGNOSIS — M10.00 IDIOPATHIC GOUT, UNSPECIFIED CHRONICITY, UNSPECIFIED SITE: ICD-10-CM

## 2025-01-29 RX ORDER — ALLOPURINOL 100 MG/1
300 TABLET ORAL DAILY
Qty: 180 TABLET | Refills: 0 | Status: SHIPPED | OUTPATIENT
Start: 2025-01-29

## 2025-01-29 NOTE — TELEPHONE ENCOUNTER
Per Jennifer's last ofv note 9/2024 patient was to follow up in 6 months. Also, the telephone encounter from 11/27/24 states schedule a follow up in March. Please confirm that patient does need a sooner appointment.

## 2025-02-06 NOTE — PROGRESS NOTES
Jonathon Chatterjee has an upcoming lab appointment with us. His appointment note states for HU. The orders for you  in 2024. He will need a new set of labs put in.  Thank you,  [facility]

## 2025-02-13 ENCOUNTER — LAB (OUTPATIENT)
Dept: LAB | Facility: CLINIC | Age: 70
End: 2025-02-13
Payer: COMMERCIAL

## 2025-02-13 DIAGNOSIS — M1A.09X1 IDIOPATHIC CHRONIC GOUT, MULTIPLE SITES, WITH TOPHUS (TOPHI): ICD-10-CM

## 2025-02-13 DIAGNOSIS — Z79.899 ON ALLOPURINOL THERAPY: ICD-10-CM

## 2025-02-13 DIAGNOSIS — C83.07 SPLENIC MARGINAL ZONE B-CELL LYMPHOMA (H): ICD-10-CM

## 2025-02-13 DIAGNOSIS — C61 PROSTATE CANCER (H): ICD-10-CM

## 2025-02-13 DIAGNOSIS — E78.2 MIXED HYPERLIPIDEMIA: ICD-10-CM

## 2025-02-13 DIAGNOSIS — D69.6 THROMBOCYTOPENIA: ICD-10-CM

## 2025-02-13 LAB
ALBUMIN SERPL BCG-MCNC: 4.6 G/DL (ref 3.5–5.2)
ALP SERPL-CCNC: 62 U/L (ref 40–150)
ALT SERPL W P-5'-P-CCNC: 40 U/L (ref 0–70)
ANION GAP SERPL CALCULATED.3IONS-SCNC: 14 MMOL/L (ref 7–15)
AST SERPL W P-5'-P-CCNC: 36 U/L (ref 0–45)
BASOPHILS # BLD AUTO: 0 10E3/UL (ref 0–0.2)
BASOPHILS NFR BLD AUTO: 1 %
BILIRUB SERPL-MCNC: 1.7 MG/DL
BUN SERPL-MCNC: 19.1 MG/DL (ref 8–23)
CALCIUM SERPL-MCNC: 9.8 MG/DL (ref 8.8–10.4)
CHLORIDE SERPL-SCNC: 97 MMOL/L (ref 98–107)
CHOLEST SERPL-MCNC: 201 MG/DL
CREAT SERPL-MCNC: 1.32 MG/DL (ref 0.67–1.17)
EGFRCR SERPLBLD CKD-EPI 2021: 58 ML/MIN/1.73M2
EOSINOPHIL # BLD AUTO: 0.2 10E3/UL (ref 0–0.7)
EOSINOPHIL NFR BLD AUTO: 3 %
ERYTHROCYTE [DISTWIDTH] IN BLOOD BY AUTOMATED COUNT: 12.9 % (ref 10–15)
FASTING STATUS PATIENT QL REPORTED: YES
FASTING STATUS PATIENT QL REPORTED: YES
GLUCOSE SERPL-MCNC: 125 MG/DL (ref 70–99)
HCO3 SERPL-SCNC: 24 MMOL/L (ref 22–29)
HCT VFR BLD AUTO: 40.1 % (ref 40–53)
HDLC SERPL-MCNC: 53 MG/DL
HGB BLD-MCNC: 14.3 G/DL (ref 13.3–17.7)
IMM GRANULOCYTES # BLD: 0 10E3/UL
IMM GRANULOCYTES NFR BLD: 0 %
LDLC SERPL CALC-MCNC: 88 MG/DL
LYMPHOCYTES # BLD AUTO: 0.7 10E3/UL (ref 0.8–5.3)
LYMPHOCYTES NFR BLD AUTO: 12 %
MCH RBC QN AUTO: 33.7 PG (ref 26.5–33)
MCHC RBC AUTO-ENTMCNC: 35.7 G/DL (ref 31.5–36.5)
MCV RBC AUTO: 95 FL (ref 78–100)
MONOCYTES # BLD AUTO: 0.4 10E3/UL (ref 0–1.3)
MONOCYTES NFR BLD AUTO: 7 %
NEUTROPHILS # BLD AUTO: 4.8 10E3/UL (ref 1.6–8.3)
NEUTROPHILS NFR BLD AUTO: 78 %
NONHDLC SERPL-MCNC: 148 MG/DL
PLATELET # BLD AUTO: 134 10E3/UL (ref 150–450)
POTASSIUM SERPL-SCNC: 3.6 MMOL/L (ref 3.4–5.3)
PROT SERPL-MCNC: 7.3 G/DL (ref 6.4–8.3)
RBC # BLD AUTO: 4.24 10E6/UL (ref 4.4–5.9)
SODIUM SERPL-SCNC: 135 MMOL/L (ref 135–145)
TRIGL SERPL-MCNC: 299 MG/DL
URATE SERPL-MCNC: 6 MG/DL (ref 3.4–7)
WBC # BLD AUTO: 6.2 10E3/UL (ref 4–11)

## 2025-03-12 ENCOUNTER — VIRTUAL VISIT (OUTPATIENT)
Dept: NEUROLOGY | Facility: CLINIC | Age: 70
End: 2025-03-12
Payer: COMMERCIAL

## 2025-03-12 DIAGNOSIS — G62.89 AXONAL SENSORIMOTOR NEUROPATHY: Primary | ICD-10-CM

## 2025-03-12 PROCEDURE — 98005 SYNCH AUDIO-VIDEO EST LOW 20: CPT | Performed by: PSYCHIATRY & NEUROLOGY

## 2025-03-12 NOTE — LETTER
"3/12/2025      Jonathon Santos  [address] [address] [place] [phone]      Dear Colleague,    Thank you for referring your patient, Jonathon Santos, to the [facility]. Please see a copy of my visit note below.    Virtual Visit Details    Type of service:  Video Visit   Video Time: 11 minutes    Originating Location (pt. Location): Home    Distant Location (provider location):  On-site  Platform used for Video Visit: [facility] PATIENT NEUROLOGY NOTE - Virtual Visit    DATE OF VISIT: 3/12/2025  MRN: [id number]  PATIENT NAME: Jonathon Santos  YOB: 1955    Chief Complaint   Patient presents with     Video Visit     6 month follow-up      SUBJECTIVE:                                                      HISTORY OF PRESENT ILLNESS:  Jonathon is here for follow up regarding peripheral polyneuropathy.    B12 level was actually little bit high after his previous visit.    I spoke with Jontahon via video conference today.  Occasional tingling in the feet but overall he thinks that his symptoms are under control. No weakness. No new neurologic concerns.   B12 supplement dose is 1000mcg.    He is taking an \"Optimizer\" supplement.    CURRENT MEDICATIONS:   Current Outpatient Medications   Medication Sig Dispense Refill     allopurinol (ZYLOPRIM) 100 MG tablet Take 3 tablets (300 mg) by mouth daily. (Patient taking differently: Take 200 mg by mouth daily.) 180 tablet 0     cyanocobalamin (VITAMIN B-12) 1000 MCG SUBL sublingual tablet Place 1,000 mcg under the tongue daily (with lunch)       metoprolol succinate ER (TOPROL XL) 25 MG 24 hr tablet TAKE 1 TABLET BY MOUTH DAILY  WITH LUNCH 100 tablet 2     lisinopril-hydrochlorothiazide (ZESTORETIC) 20-12.5 MG tablet TAKE 2 TABLETS BY MOUTH DAILY 180 tablet 1     No current facility-administered medications for this visit.       RECENT DIAGNOSTIC STUDIES:   Labs: No results found for any visits on 03/12/25.    REVIEW OF SYSTEMS:         "                                              10-point review of systems is negative except as mentioned above in HPI.    EXAM:                                                      Physical Exam:   Vitals: There were no vitals taken for this visit.  BMI= There is no height or weight on file to calculate BMI.  GENERAL: NAD.  HEENT: NC/AT.  PULM: Non-labored breathing.   Neurologic:  Adequate fund of knowledge. Speech is fluent. No apparent weakness on brief exam. Coordination is fine.   Limited exam due to this being a video visit.    ASSESSMENT and PLAN:                                                      Assessment:    ICD-10-CM    1. Axonal sensorimotor neuropathy  G62.89 Vitamin B12     Methylmalonic Acid          Plan:  Continue current B12 supplementation for now: 1000 units daily.  We will recheck the level as well as the enzyme for B12 absorption when you have your labs done in May.  You could also consider adding alpha lipoic acid supplementation or Glyco Optimizer Dietary Supplement -neither which seem to be harmful and can be beneficial from a neuropathy standpoint.  Follow-up in neurology clinic in 6 months.  Virtual visit is okay.    Total Time: 25 minutes were spent with the patient and in chart review/documentation (as described above in Assessment and Plan)/coordinating the care on date of service.    Pinky Thompson MD  Neurology    [email or web address] software used in the dictation of this note.                    Again, thank you for allowing me to participate in the care of your patient.        Sincerely,        Pinky Thompson MD    Electronically signed

## 2025-03-12 NOTE — NURSING NOTE
Current patient location: [address] [place] [phone]    Is the patient currently in the state of [place]? YES    Visit mode: VIDEO    If the visit is dropped, the patient can be reconnected by:TELEPHONE VISIT: Phone number:   Telephone Information:   Mobile [phone]       Will anyone else be joining the visit? NO  (If patient encounters technical issues they should call [phone]    Are changes needed to the allergy or medication list? Pt stated no med changes    Are refills needed on medications prescribed by this physician? NO    Rooming Documentation:  Questionnaire(s) completed    Reason for visit: Video Visit (6 month follow-up )    Misty CHILDERS

## 2025-03-12 NOTE — PROGRESS NOTES
Virtual Visit Details    Type of service:  Video Visit   Video Time: 11 minutes    Originating Location (pt. Location): Home    Distant Location (provider location):  On-site  Platform used for Video Visit: Tara

## 2025-03-12 NOTE — PATIENT INSTRUCTIONS
PLAN:  Continue current B12 supplementation for now: 1000 units daily.  We will recheck the level as well as the enzyme for B12 absorption when you have your labs done in May.  You could also consider adding alpha lipoic acid supplementation or Glyco Optimizer Dietary Supplement -neither which seem to be harmful and can be beneficial from a neuropathy standpoint.  Follow-up in neurology clinic in 6 months.  Virtual visit is okay.

## 2025-03-12 NOTE — PROGRESS NOTES
"ESTABLISHED PATIENT NEUROLOGY NOTE - Virtual Visit    DATE OF VISIT: 3/12/2025  MRN: [id number]  PATIENT NAME: Jonathon Santos  YOB: 1955    Chief Complaint   Patient presents with    Video Visit     6 month follow-up      SUBJECTIVE:                                                      HISTORY OF PRESENT ILLNESS:  Jonathon is here for follow up regarding peripheral polyneuropathy.    B12 level was actually little bit high after his previous visit.    I spoke with Jonathon via video conference today.  Occasional tingling in the feet but overall he thinks that his symptoms are under control. No weakness. No new neurologic concerns.   B12 supplement dose is 1000mcg.    He is taking an \"Optimizer\" supplement.    CURRENT MEDICATIONS:   Current Outpatient Medications   Medication Sig Dispense Refill    allopurinol (ZYLOPRIM) 100 MG tablet Take 3 tablets (300 mg) by mouth daily. (Patient taking differently: Take 200 mg by mouth daily.) 180 tablet 0    cyanocobalamin (VITAMIN B-12) 1000 MCG SUBL sublingual tablet Place 1,000 mcg under the tongue daily (with lunch)      metoprolol succinate ER (TOPROL XL) 25 MG 24 hr tablet TAKE 1 TABLET BY MOUTH DAILY  WITH LUNCH 100 tablet 2    lisinopril-hydrochlorothiazide (ZESTORETIC) 20-12.5 MG tablet TAKE 2 TABLETS BY MOUTH DAILY 180 tablet 1     No current facility-administered medications for this visit.       RECENT DIAGNOSTIC STUDIES:   Labs: No results found for any visits on 03/12/25.    REVIEW OF SYSTEMS:                                                      10-point review of systems is negative except as mentioned above in HPI.    EXAM:                                                      Physical Exam:   Vitals: There were no vitals taken for this visit.  BMI= There is no height or weight on file to calculate BMI.  GENERAL: NAD.  HEENT: NC/AT.  PULM: Non-labored breathing.   Neurologic:  Adequate fund of knowledge. Speech is fluent. No apparent weakness on brief exam. " Coordination is fine.   Limited exam due to this being a video visit.    ASSESSMENT and PLAN:                                                      Assessment:    ICD-10-CM    1. Axonal sensorimotor neuropathy  G62.89 Vitamin B12     Methylmalonic Acid          Plan:  Continue current B12 supplementation for now: 1000 units daily.  We will recheck the level as well as the enzyme for B12 absorption when you have your labs done in May.  You could also consider adding alpha lipoic acid supplementation or Glyco Optimizer Dietary Supplement -neither which seem to be harmful and can be beneficial from a neuropathy standpoint.  Follow-up in neurology clinic in 6 months.  Virtual visit is okay.    Total Time: 25 minutes were spent with the patient and in chart review/documentation (as described above in Assessment and Plan)/coordinating the care on date of service.    Pinky Thompson MD  Neurology    [employer] software used in the dictation of this note.

## 2025-03-26 DIAGNOSIS — I10 UNCONTROLLED HYPERTENSION: ICD-10-CM

## 2025-03-26 RX ORDER — LISINOPRIL AND HYDROCHLOROTHIAZIDE 12.5; 2 MG/1; MG/1
2 TABLET ORAL DAILY
Qty: 180 TABLET | Refills: 1 | Status: SHIPPED | OUTPATIENT
Start: 2025-03-26

## 2025-04-20 DIAGNOSIS — I10 UNCONTROLLED HYPERTENSION: ICD-10-CM

## 2025-04-21 RX ORDER — METOPROLOL SUCCINATE 25 MG/1
TABLET, EXTENDED RELEASE ORAL
Qty: 90 TABLET | Refills: 1 | Status: SHIPPED | OUTPATIENT
Start: 2025-04-21

## 2025-05-14 ENCOUNTER — LAB (OUTPATIENT)
Dept: LAB | Facility: CLINIC | Age: 70
End: 2025-05-14
Payer: COMMERCIAL

## 2025-05-14 ENCOUNTER — HOSPITAL ENCOUNTER (OUTPATIENT)
Dept: CT IMAGING | Facility: CLINIC | Age: 70
Discharge: HOME | End: 2025-05-14
Attending: INTERNAL MEDICINE
Payer: COMMERCIAL

## 2025-05-14 DIAGNOSIS — D69.6 THROMBOCYTOPENIA: ICD-10-CM

## 2025-05-14 DIAGNOSIS — C61 PROSTATE CANCER (H): ICD-10-CM

## 2025-05-14 DIAGNOSIS — C83.07 SPLENIC MARGINAL ZONE B-CELL LYMPHOMA (H): ICD-10-CM

## 2025-05-14 DIAGNOSIS — M1A.09X1 IDIOPATHIC CHRONIC GOUT, MULTIPLE SITES, WITH TOPHUS (TOPHI): ICD-10-CM

## 2025-05-14 DIAGNOSIS — Z79.899 ON ALLOPURINOL THERAPY: ICD-10-CM

## 2025-05-14 LAB
ALBUMIN SERPL BCG-MCNC: 4.6 G/DL (ref 3.5–5.2)
ALP SERPL-CCNC: 73 U/L (ref 40–150)
ALT SERPL W P-5'-P-CCNC: 56 U/L (ref 0–70)
ANION GAP SERPL CALCULATED.3IONS-SCNC: 13 MMOL/L (ref 7–15)
AST SERPL W P-5'-P-CCNC: 74 U/L (ref 0–45)
BASOPHILS # BLD AUTO: 0 10E3/UL (ref 0–0.2)
BASOPHILS NFR BLD AUTO: 1 %
BILIRUB SERPL-MCNC: 1.9 MG/DL
BUN SERPL-MCNC: 30 MG/DL (ref 8–23)
CALCIUM SERPL-MCNC: 9.9 MG/DL (ref 8.8–10.4)
CHLORIDE SERPL-SCNC: 101 MMOL/L (ref 98–107)
CREAT BLD-MCNC: 1.6 MG/DL (ref 0.7–1.2)
CREAT SERPL-MCNC: 1.52 MG/DL (ref 0.67–1.17)
EGFRCR SERPLBLD CKD-EPI 2021: 46 ML/MIN/1.73M2
EGFRCR SERPLBLD CKD-EPI 2021: 49 ML/MIN/1.73M2
EOSINOPHIL # BLD AUTO: 0 10E3/UL (ref 0–0.7)
EOSINOPHIL NFR BLD AUTO: 1 %
ERYTHROCYTE [DISTWIDTH] IN BLOOD BY AUTOMATED COUNT: 12.5 % (ref 10–15)
GLUCOSE SERPL-MCNC: 131 MG/DL (ref 70–99)
HCO3 SERPL-SCNC: 25 MMOL/L (ref 22–29)
HCT VFR BLD AUTO: 42.4 % (ref 40–53)
HGB BLD-MCNC: 14.7 G/DL (ref 13.3–17.7)
IMM GRANULOCYTES # BLD: 0 10E3/UL
IMM GRANULOCYTES NFR BLD: 0 %
LYMPHOCYTES # BLD AUTO: 0.7 10E3/UL (ref 0.8–5.3)
LYMPHOCYTES NFR BLD AUTO: 13 %
MCH RBC QN AUTO: 33 PG (ref 26.5–33)
MCHC RBC AUTO-ENTMCNC: 34.7 G/DL (ref 31.5–36.5)
MCV RBC AUTO: 95 FL (ref 78–100)
MONOCYTES # BLD AUTO: 0.5 10E3/UL (ref 0–1.3)
MONOCYTES NFR BLD AUTO: 8 %
NEUTROPHILS # BLD AUTO: 4.5 10E3/UL (ref 1.6–8.3)
NEUTROPHILS NFR BLD AUTO: 78 %
PLATELET # BLD AUTO: 118 10E3/UL (ref 150–450)
POTASSIUM SERPL-SCNC: 3.8 MMOL/L (ref 3.4–5.3)
PROT SERPL-MCNC: 7.2 G/DL (ref 6.4–8.3)
PSA SERPL DL<=0.01 NG/ML-MCNC: 0.05 NG/ML (ref 0–6.5)
RBC # BLD AUTO: 4.46 10E6/UL (ref 4.4–5.9)
SODIUM SERPL-SCNC: 139 MMOL/L (ref 135–145)
URATE SERPL-MCNC: 5.6 MG/DL (ref 3.4–7)
WBC # BLD AUTO: 5.8 10E3/UL (ref 4–11)

## 2025-05-14 PROCEDURE — 82565 ASSAY OF CREATININE: CPT | Performed by: INTERNAL MEDICINE

## 2025-05-14 PROCEDURE — 84550 ASSAY OF BLOOD/URIC ACID: CPT

## 2025-05-14 PROCEDURE — 71260 CT THORAX DX C+: CPT

## 2025-05-14 PROCEDURE — 250N000011 HC RX IP 250 OP 636: Performed by: INTERNAL MEDICINE

## 2025-05-14 PROCEDURE — 82565 ASSAY OF CREATININE: CPT

## 2025-05-14 PROCEDURE — 70491 CT SOFT TISSUE NECK W/DYE: CPT

## 2025-05-14 PROCEDURE — 84153 ASSAY OF PSA TOTAL: CPT | Performed by: INTERNAL MEDICINE

## 2025-05-14 PROCEDURE — 36415 COLL VENOUS BLD VENIPUNCTURE: CPT | Performed by: INTERNAL MEDICINE

## 2025-05-14 PROCEDURE — 85025 COMPLETE CBC W/AUTO DIFF WBC: CPT | Performed by: INTERNAL MEDICINE

## 2025-05-14 PROCEDURE — 250N000009 HC RX 250: Performed by: INTERNAL MEDICINE

## 2025-05-14 RX ORDER — IOPAMIDOL 755 MG/ML
145 INJECTION, SOLUTION INTRAVASCULAR ONCE
Status: COMPLETED | OUTPATIENT
Start: 2025-05-14 | End: 2025-05-14

## 2025-05-14 RX ADMIN — SODIUM CHLORIDE 80 ML: 9 INJECTION, SOLUTION INTRAVENOUS at 12:07

## 2025-05-14 RX ADMIN — IOPAMIDOL 145 ML: 755 INJECTION, SOLUTION INTRAVENOUS at 12:07

## 2025-05-15 ENCOUNTER — RESULTS FOLLOW-UP (OUTPATIENT)
Dept: RHEUMATOLOGY | Facility: CLINIC | Age: 70
End: 2025-05-15

## 2025-06-08 NOTE — PROGRESS NOTES
Virtual Visit Details    Type of service:  Video Visit   Video Start Time: 11:13 AM  Video End Time:11:27 AM    Originating Location (pt. Location): Home    Distant Location (provider location):  On-site  Platform used for Video Visit: [facility] Medical Oncology Note  Date of visit: June 9, 2025  New Outpatient Clinic Note           Assessment:      High risk localized prostate cancer.  This is high risk because of the [place] Grade Group 5 disease. Originally at the time of diagnosis he had adenopathy concerning for stage IV prostate cancer, but looking through the retrospectoscope, these nodes were a manifestation of his low-grade lymphoma.   Status post maximal local therapy with maximal systemic therapy as well given adjuvantly.  Now, two years from the completion of his treatment, and 4 and a half years from the time of diagnosis, with no evidence of recurrent disease by history or recent PSA. His chance of cure at this point, while not 100%, is pretty high.  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  He has excellent follow-up with Dr. Burciaga who has been doing a wonderful job in management.  He had a nice reduction in his disease with Rituxan alone.  He still has some splenomegaly, but is essentially asymptomatic from his lymphoma.     Plan:      No intervention by me  Continue follow-up with Dr. Burciaga for his lymphoma.  Return to clinic with us in 6 months with a PSA drawn just prior.  We will continue to see him at every 6 month intervals for a total of 5 years after his diagnosis, then go to yearly after that..     The longitudinal plan of care for the diagnosis(es)/condition(s) as documented were addressed during this visit. Due to the added complexity in care, I will continue to support Jonathon in the subsequent management and with ongoing continuity of care.      Geraldo Eduardo MD, MSc  Associate Profess disease.   Or of Medicine  [facility]  [address] [place] [id number]  [phone]     __________________________________________________________________     DIAGNOSES      cT2/3 high risk prostate cancer, diagnosed at prostate biopsy 12/21/20.  At the time of this diagnosis, pelvic adenopathy was seen on prostate MRI, with the assumption he had Stage IV prostate cancer. However, he was later diagnosed with a low grade lymphoma (see 2).  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  Jonathon had persistent neutropenia/thrombocytopenia, originally thought due to his prostate cancer treatment. 10/7/23 peripheral flow cytometry showed several populations of lambda-monotypic B-cells; however the predominant one was CD5/CD10 negative raising possibility of marginal zone lymphoma, lymphoplasmacytic, or rarely follicular lymphoma. BM biopsy showed  a 60% cellular marrow , with 40% involvement with a low grade lymphoma.  Surgical excisional biopsy was done 12/4. PET scan 10/18 showed significant splenomegaly (20 cm with SUVmax 6.7), mildly-avid lymphadenopathy above/below diaphragm, and diffuse bone marrow uptake.  He received Rituxan weekly times 4 in early 2024, followed by q 2 month Rituxan since then. Most recent CT shows just minimally enlarged widespread adenopathy.           History of Present Illness/therapy to date for prostate cancer:      Presented with PSA of 8.2, 10/2020 (was 7.25 a year earlier)  Prostate MRI 11/17/202 read out as PIRADS 5, with 2 suspicious lesions in the R mid and L apex, with capsular abutment for 15 mm. There was suspicious B obturator nodes, measuring up 19 mm, as well as B external iliac nodes.   Prostate biopsy 12/21/2020 showed a Keith 4+5 (Grade group 5) prostate cancer in 1 of 2 cores in the right mid, involving 75%. There was Keith 3+3 (Grade group 1) disease in the left apex, and left mid, and 3+4 (Grade group 2) in 1/2 cores in the right mid.  CT  "CAP 2021 shows axillary adenopathy. Bone scan showed no convincing evidence of metastatic disease.  Recommendation by Dr. Cisneros is ADT  x 2 years, RT to the nodes and prostate, and RT to the prostate.  Radiotherapy completed 7/15/2021  ADT/abiraterone 2021-2023.  Observation in the interim. Most recent PSA is 0.05, 2025.        Interval history:      Doing well. No big concerns.  Off of Rituxan.  Still thinks of it as a miracle drug.  Has one more scan coming up for his lymphoma.  Happy about his PSA.  Has more muscle mass.  No night sweats.  No new adenopathy  No urinary complaints  No new concerns     Past Medical History:   I have reviewed this patient's past medical history   Past Medical History        Past Medical History:   Diagnosis Date    Anemia      Benign essential hypertension      Chronic kidney disease      Gout      Lymphoma (H)      Prostate cancer (H)      Thrombocytopenia (H24)                 Past Surgical History:    I have reviewed this patient's past surgical history         Social History:   Tobacco, ETOH, and rec drugs reviewed and as noted below with the following exceptions:  Jonathon grew up in [place] and graduated from high school in  from [employer].  He then went to the [employer] for a year and a half before joining the [employer], where he spent the next 2 years.  After discharge he went back to the  where he got his degree in accounting, ultimately obtaining a masters in finance.  He spent much of his career as a  for a company called [employer].  He retired in 2016.  He currently lives in [place] on 9 acres with a horse named  Possing my question? (Has a question mariano on her face),  and a dog named Mayi, a chocolate lab (who doesn't like discipline).  Still misses his dog Stevo, who  ten years ago (\"I guess we only get one great dog in your life.\") He likes to fish and hunt on his property.  He is close with his sister and " has friends.  He is  and has no children.              Family History:      Family History         Family History   Problem Relation Age of Onset    Hypertension Mother      Gout Mother      Hypertension Father      Diabetes Father      Hypertension Sister      Hypertension Brother      Hypertension Maternal Grandmother      Hypertension Maternal Grandfather      Diabetes Cousin      Deep Vein Thrombosis No family hx of      Anesthesia Reaction No family hx of                    Medications:      Current Outpatient Prescriptions          Current Outpatient Medications   Medication Sig Dispense Refill    allopurinol (ZYLOPRIM) 100 MG tablet Take 1 tablet (100 mg) by mouth daily (Patient taking differently: Take 100 mg by mouth daily (with lunch)) 90 tablet 0    cyanocobalamin (VITAMIN B-12) 1000 MCG SUBL sublingual tablet Place 1,000 mcg under the tongue daily (with lunch)        lisinopril-hydrochlorothiazide (ZESTORETIC) 20-12.5 MG tablet TAKE 2 TABLETS BY MOUTH  DAILY (Patient taking differently: Take 2 tablets by mouth every morning) 200 tablet 2    metoprolol succinate ER (TOPROL XL) 25 MG 24 hr tablet TAKE 1 TABLET BY MOUTH  DAILY WITH LUNCH (Patient taking differently: Take 25 mg by mouth daily (with lunch)) 100 tablet 2                     Physical Exam:   There were no vitals taken for this visit.    No exam could be done today because this was a virtual visit.  Data:      LABS 5/14/25    PSA 0.05 <== 0.04 <== 0.04  Test 270 <== 252      CBC/CMP without concerning findings (plts 118)     Other Data      Prostate biopsy 12/21/2020    FINAL DIAGNOSIS:  A. Prostate biopsy, target 1, right mid peripheral zone, 10 o'clock:  - Prostatic adenocarcinoma, acinar type, [place] score 7 (3+4), with  approximately 5% of pattern 4  - Grade group 2  - Involves 1 of 2 cores (9 mm, 80%)    B. Prostate biopsy, left apex, target 2, 12:30 o'clock:  - Prostatic adenocarcinoma, acinar type, [place] score 6 (3+3)  - Grade  group 1  - Involves 2 of 2 cores (3 mm, 20%; 3 mm, 15%)    C. Prostate biopsy, left base:  - Benign prostatic tissue    D. Prostate biopsy, left mid:  - Prostatic adenocarcinoma, acinar type, Keith score 6 (3+3)  - Grade group 1  - Involves 2 of 2 cores (4.5 mm, 35%; 2 mm, 15%)    E. Prostate biopsy, left apex:  - Prostatic adenocarcinoma, acinar type, Keith score 6 (3+3)  - Grade group 1  - Involves 2 of 2 cores (4 mm, 30%; 2 mm, 20%)    F. Prostate biopsy, right base:  - Benign prostatic tissue    G. Prostate biopsy, right mid:  - Prostatic adenocarcinoma, acinar type, Keith score 9 (4+5)  - Grade group 5  - Involves 1 of 2 cores (10 mm, 75%)    H. Prostate biopsy, right apex:  - Benign prostatic tissue    I. Prostate biopsy, left and right transitional zone, in same jar per RN.:  - Prostatic adenocarcinoma, acinar type, Keith score 7 (3+4), with  approximately 10% of pattern 4  - Grade group 2  - Involves 1 of 2 cores (5 mm, 40%)      PET SCAN 10/18/2023  IMPRESSION:     1. Findings suspicious for lymphoma involving lymph nodes above and below the diaphragm, the spleen, with additional possible diffuse marrow and bilateral lower cervical chain lymph node involvement.  2. Mildly FDG avid subsolid opacity in the infrahilar left lower lobe should be infectious/inflammatory. Consider short interval CT chest in 1-3 months to assess for resolution.  Labs, imaging and treatment plan reviewed with patient. All questions answered.        Final Diagnosis   Bone marrow, posterior iliac crest, right decalcified trephine biopsy and touch imprint; right direct and concentrated aspirate smears; and peripheral blood smear:      Low grade B cell lymphoma with the following features:  - Hypercellular marrow for age (overall 60%) with trilineage hematopoiesis, no overt dysplasia, no increase in blasts, and 40% involvement by B cell lymphoma with nodular and diffuse growth pattern   - Peripheral blood showing slight  normochromic normocytic anemia, moderate thrombocytopenia, and leukocytosis with neutropenia, monocytosis, and atypical lymphocytosis            30 minutes spent on the date of the encounter doing chart review, review of outside records, review of test results, interpretation of tests, patient visit, documentation, and discussion with other provider(s)

## 2025-06-09 ENCOUNTER — VIRTUAL VISIT (OUTPATIENT)
Dept: ONCOLOGY | Facility: CLINIC | Age: 70
End: 2025-06-09
Attending: INTERNAL MEDICINE
Payer: COMMERCIAL

## 2025-06-09 VITALS — WEIGHT: 212 LBS | BODY MASS INDEX: 28.1 KG/M2 | HEIGHT: 73 IN

## 2025-06-09 DIAGNOSIS — C61 PROSTATE CANCER (H): Primary | ICD-10-CM

## 2025-06-09 PROCEDURE — G2211 COMPLEX E/M VISIT ADD ON: HCPCS | Mod: 95 | Performed by: INTERNAL MEDICINE

## 2025-06-09 PROCEDURE — 1126F AMNT PAIN NOTED NONE PRSNT: CPT | Mod: 95 | Performed by: INTERNAL MEDICINE

## 2025-06-09 PROCEDURE — 98006 SYNCH AUDIO-VIDEO EST MOD 30: CPT | Performed by: INTERNAL MEDICINE

## 2025-06-09 ASSESSMENT — PAIN SCALES - GENERAL: PAINLEVEL_OUTOF10: NO PAIN (0)

## 2025-06-09 NOTE — NURSING NOTE
Is the patient currently in the state of [place]? YES    Visit mode: VIDEO    If the visit is dropped, the patient can be reconnected by:VIDEO VISIT: Send to e-mail at: [email or web address]    Will anyone else be joining the visit? NO  (If patient encounters technical issues they should call [phone]    Are changes needed to the allergy or medication list? No    Are refills needed on medications prescribed by this physician? NO    Rooming Documentation:  Questionnaire(s) completed    Reason for visit: Video Visit (Follow UP )    Amanda CHILDERS

## 2025-06-09 NOTE — LETTER
6/9/2025      Jonathon Santos  [address] [address] [place] [id number]      Dear Colleague,    Thank you for referring your patient, Jonathon Santos, to the [facility]. Please see a copy of my visit note below.    Virtual Visit Details    Type of service:  Video Visit   Video Start Time: 11:13 AM  Video End Time:11:27 AM    Originating Location (pt. Location): Home    Distant Location (provider location):  On-site  Platform used for Video Visit: [facility] Medical Oncology Note  Date of visit: June 9, 2025  New Outpatient Clinic Note           Assessment:      High risk localized prostate cancer.  This is high risk because of the Keith Grade Group 5 disease. Originally at the time of diagnosis he had adenopathy concerning for stage IV prostate cancer, but looking through the retrospectoscope, these nodes were a manifestation of his low-grade lymphoma.   Status post maximal local therapy with maximal systemic therapy as well given adjuvantly.  Now, two years from the completion of his treatment, and 4 and a half years from the time of diagnosis, with no evidence of recurrent disease by history or recent PSA. His chance of cure at this point, while not 100%, is pretty high.  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  He has excellent follow-up with Dr. Burciaga who has been doing a wonderful job in management.  He had a nice reduction in his disease with Rituxan alone.  He still has some splenomegaly, but is essentially asymptomatic from his lymphoma.     Plan:      No intervention by me  Continue follow-up with Dr. Burciaga for his lymphoma.  Return to clinic with us in 6 months with a PSA drawn just prior.  We will continue to see him at every 6 month intervals for a total of 5 years after his diagnosis, then go to yearly after that..     The longitudinal plan of care for the diagnosis(es)/condition(s) as documented were addressed during this visit. Due to the  added complexity in care, I will continue to support Jonathon in the subsequent management and with ongoing continuity of care.      Geraldo Eduardo MD, MSc  Associate Profess disease.   Or of Medicine  [employer] [employer]  [facility]  [address] [place] [id number]  [phone]     __________________________________________________________________     DIAGNOSES      cT2/3 high risk prostate cancer, diagnosed at prostate biopsy 12/21/20.  At the time of this diagnosis, pelvic adenopathy was seen on prostate MRI, with the assumption he had Stage IV prostate cancer. However, he was later diagnosed with a low grade lymphoma (see 2).  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  Jonathon had persistent neutropenia/thrombocytopenia, originally thought due to his prostate cancer treatment. 10/7/23 peripheral flow cytometry showed several populations of lambda-monotypic B-cells; however the predominant one was CD5/CD10 negative raising possibility of marginal zone lymphoma, lymphoplasmacytic, or rarely follicular lymphoma. BM biopsy showed  a 60% cellular marrow , with 40% involvement with a low grade lymphoma.  Surgical excisional biopsy was done 12/4. PET scan 10/18 showed significant splenomegaly (20 cm with SUVmax 6.7), mildly-avid lymphadenopathy above/below diaphragm, and diffuse bone marrow uptake.  He received Rituxan weekly times 4 in early 2024, followed by q 2 month Rituxan since then. Most recent CT shows just minimally enlarged widespread adenopathy.           History of Present Illness/therapy to date for prostate cancer:      Presented with PSA of 8.2, 10/2020 (was 7.25 a year earlier)  Prostate MRI 11/17/202 read out as PIRADS 5, with 2 suspicious lesions in the R mid and L apex, with capsular abutment for 15 mm. There was suspicious B obturator nodes, measuring up 19 mm, as well as B external iliac nodes.   Prostate biopsy 12/21/2020 showed a  Keith 4+5 (Grade group 5) prostate cancer in 1 of 2 cores in the right mid, involving 75%. There was [place] 3+3 (Grade group 1) disease in the left apex, and left mid, and 3+4 (Grade group 2) in 1/2 cores in the right mid.  CT CAP 4/13/2021 shows axillary adenopathy. Bone scan showed no convincing evidence of metastatic disease.  Recommendation by Dr. Cisneros is ADT  x 2 years, RT to the nodes and prostate, and RT to the prostate.  Radiotherapy completed 7/15/2021  ADT/abiraterone 1/25/2021-6/29/2023.  Observation in the interim. Most recent PSA is 0.05, 6/6/2025.        Interval history:      Doing well. No big concerns.  Off of Rituxan.  Still thinks of it as a miracle drug.  Has one more scan coming up for his lymphoma.  Happy about his PSA.  Has more muscle mass.  No night sweats.  No new adenopathy  No urinary complaints  No new concerns     Past Medical History:   I have reviewed this patient's past medical history   Past Medical History        Past Medical History:   Diagnosis Date     Anemia       Benign essential hypertension       Chronic kidney disease       Gout       Lymphoma (H)       Prostate cancer (H)       Thrombocytopenia (H24)                 Past Surgical History:    I have reviewed this patient's past surgical history         Social History:   Tobacco, ETOH, and rec drugs reviewed and as noted below with the following exceptions:  Jonathon grew up in [place] and graduated from high school in 1972 from [employer].  He then went to the [employer] for a year and a half before joining the [employer], where he spent the next 2 years.  After discharge he went back to the  where he got his degree in accounting, ultimately obtaining a masters in finance.  He spent much of his career as a  for a company called [employer].  He retired in 2016.  He currently lives in [place] 9 acres with a horse named  Possing my question? (Has a question mariano on her face),  and a dog  "named Mayi, a chocolate lab (who doesn't like discipline).  Still misses his dog Stevo, who  ten years ago (\"I guess we only get one great dog in your life.\") He likes to fish and hunt on his property.  He is close with his sister and has friends.  He is  and has no children.              Family History:      Family History         Family History   Problem Relation Age of Onset     Hypertension Mother       Gout Mother       Hypertension Father       Diabetes Father       Hypertension Sister       Hypertension Brother       Hypertension Maternal Grandmother       Hypertension Maternal Grandfather       Diabetes Cousin       Deep Vein Thrombosis No family hx of       Anesthesia Reaction No family hx of                    Medications:      Current Outpatient Prescriptions          Current Outpatient Medications   Medication Sig Dispense Refill     allopurinol (ZYLOPRIM) 100 MG tablet Take 1 tablet (100 mg) by mouth daily (Patient taking differently: Take 100 mg by mouth daily (with lunch)) 90 tablet 0     cyanocobalamin (VITAMIN B-12) 1000 MCG SUBL sublingual tablet Place 1,000 mcg under the tongue daily (with lunch)         lisinopril-hydrochlorothiazide (ZESTORETIC) 20-12.5 MG tablet TAKE 2 TABLETS BY MOUTH  DAILY (Patient taking differently: Take 2 tablets by mouth every morning) 200 tablet 2     metoprolol succinate ER (TOPROL XL) 25 MG 24 hr tablet TAKE 1 TABLET BY MOUTH  DAILY WITH LUNCH (Patient taking differently: Take 25 mg by mouth daily (with lunch)) 100 tablet 2                     Physical Exam:   There were no vitals taken for this visit.    No exam could be done today because this was a virtual visit.  Data:      LABS 25    PSA 0.05 <== 0.04 <== 0.04  Test 270 <== 252      CBC/CMP without concerning findings (plts 118)     Other Data      Prostate biopsy 2020    FINAL DIAGNOSIS:  A. Prostate biopsy, target 1, right mid peripheral zone, 10 o'clock:  - Prostatic adenocarcinoma, " acinar type, [place] score 7 (3+4), with  approximately 5% of pattern 4  - Grade group 2  - Involves 1 of 2 cores (9 mm, 80%)    B. Prostate biopsy, left apex, target 2, 12:30 o'clock:  - Prostatic adenocarcinoma, acinar type, Keith score 6 (3+3)  - Grade group 1  - Involves 2 of 2 cores (3 mm, 20%; 3 mm, 15%)    C. Prostate biopsy, left base:  - Benign prostatic tissue    D. Prostate biopsy, left mid:  - Prostatic adenocarcinoma, acinar type, [place] score 6 (3+3)  - Grade group 1  - Involves 2 of 2 cores (4.5 mm, 35%; 2 mm, 15%)    E. Prostate biopsy, left apex:  - Prostatic adenocarcinoma, acinar type, Keith score 6 (3+3)  - Grade group 1  - Involves 2 of 2 cores (4 mm, 30%; 2 mm, 20%)    F. Prostate biopsy, right base:  - Benign prostatic tissue    G. Prostate biopsy, right mid:  - Prostatic adenocarcinoma, acinar type, Keith score 9 (4+5)  - Grade group 5  - Involves 1 of 2 cores (10 mm, 75%)    H. Prostate biopsy, right apex:  - Benign prostatic tissue    I. Prostate biopsy, left and right transitional zone, in same jar per RN.:  - Prostatic adenocarcinoma, acinar type, Keith score 7 (3+4), with  approximately 10% of pattern 4  - Grade group 2  - Involves 1 of 2 cores (5 mm, 40%)      PET SCAN 10/18/2023  IMPRESSION:     1. Findings suspicious for lymphoma involving lymph nodes above and below the diaphragm, the spleen, with additional possible diffuse marrow and bilateral lower cervical chain lymph node involvement.  2. Mildly FDG avid subsolid opacity in the infrahilar left lower lobe should be infectious/inflammatory. Consider short interval CT chest in 1-3 months to assess for resolution.  Labs, imaging and treatment plan reviewed with patient. All questions answered.        Final Diagnosis   Bone marrow, posterior iliac crest, right decalcified trephine biopsy and touch imprint; right direct and concentrated aspirate smears; and peripheral blood smear:      Low grade B cell lymphoma with the  following features:  - Hypercellular marrow for age (overall 60%) with trilineage hematopoiesis, no overt dysplasia, no increase in blasts, and 40% involvement by B cell lymphoma with nodular and diffuse growth pattern   - Peripheral blood showing slight normochromic normocytic anemia, moderate thrombocytopenia, and leukocytosis with neutropenia, monocytosis, and atypical lymphocytosis            30 minutes spent on the date of the encounter doing chart review, review of outside records, review of test results, interpretation of tests, patient visit, documentation, and discussion with other provider(s)                 Again, thank you for allowing me to participate in the care of your patient.        Sincerely,        Geraldo Eduardo MD    Electronically signed

## 2025-08-28 DIAGNOSIS — I10 UNCONTROLLED HYPERTENSION: ICD-10-CM

## 2025-08-28 RX ORDER — LISINOPRIL AND HYDROCHLOROTHIAZIDE 12.5; 2 MG/1; MG/1
2 TABLET ORAL DAILY
Qty: 200 TABLET | Refills: 2 | Status: SHIPPED | OUTPATIENT
Start: 2025-08-28

## (undated) DEVICE — DECANTER BAG 2002S

## (undated) DEVICE — GLOVE PROTEXIS W/NEU-THERA 7.5  2D73TE75

## (undated) DEVICE — DRAPE UNDER BUTTOCK 8483

## (undated) DEVICE — PREP POVIDONE IODINE SOLUTION 10% 4OZ BOTTLE 29906-004

## (undated) DEVICE — LINEN DRAPE 54X72" 5467

## (undated) DEVICE — SUCTION MANIFOLD NEPTUNE 2 SYS 1 PORT 702-025-000

## (undated) DEVICE — COVER ULTRASOUND PROBE 1X9" LF 25040

## (undated) DEVICE — PEN MARKING SKIN W/LABELS 31145918

## (undated) DEVICE — GEL ULTRASOUND AQUASONIC 100 8OZ BOTTLE 01-08

## (undated) DEVICE — COVER EASY EQUIP BAG W/BAND LATEX FREE EZ-28

## (undated) DEVICE — PREP POVIDONE-IODINE 7.5% SCRUB 4OZ BOTTLE MDS093945

## (undated) DEVICE — SU VICRYL 3-0 SH 27" UND J416H

## (undated) DEVICE — JELLY LUBRICATING SURGILUBE 2OZ TUBE 0281-0205-02

## (undated) DEVICE — NDL 21GA 1.5"

## (undated) DEVICE — SOL NACL 0.9% IRRIG 500ML BOTTLE 2F7123

## (undated) DEVICE — ENDO FORCEP ENDOJAW BIOPSY 3.7MMX230CM FB-222U

## (undated) DEVICE — SOL NACL 0.9% INJ 250ML BAG 2B1322Q

## (undated) DEVICE — ESU ELEC BLADE 6" COATED/INSULATED E1455-6

## (undated) DEVICE — PREP CHLORAPREP 26ML TINTED ORANGE  260815

## (undated) DEVICE — GLOVE BIOGEL PI MICRO INDICATOR UNDERGLOVE SZ 7.0 48970

## (undated) DEVICE — ESU GROUND PAD ADULT W/CORD E7507

## (undated) DEVICE — PACK MINOR CUSTOM ASC

## (undated) DEVICE — GOWN IMPERVIOUS SPECIALTY XLG/XLONG 32474

## (undated) DEVICE — CLIP HORIZON SM RED WIDE SLOT 001201

## (undated) DEVICE — LINEN LEG DRAPE 5457

## (undated) DEVICE — PACK SET-UP STD 9102

## (undated) DEVICE — SYSTEM HYDROGEL SPACEOAR INJEC 10ML SO-2101

## (undated) DEVICE — PREP SKIN SCRUB TRAY 4461A

## (undated) DEVICE — DRAPE IOBAN INCISE 13X13" 6640EZ

## (undated) DEVICE — BLADE CLIPPER SGL USE 9680

## (undated) DEVICE — LINEN TOWEL PACK X5 5464

## (undated) DEVICE — LINEN GOWN X4 5410

## (undated) DEVICE — SU SILK 3-0 SH 30" K832H

## (undated) DEVICE — DRSG TEGADERM 4X4 3/4" 1626W

## (undated) DEVICE — DRAPE LAP PEDS DISP 29492

## (undated) DEVICE — PROBE COVER INTRAOPERATIVE 5"X96" PC1308

## (undated) DEVICE — SU DERMABOND ADVANCED .7ML DNX12

## (undated) DEVICE — DRSG GAUZE 4X8" NON21842

## (undated) DEVICE — SU PDS II 4-0 FS-2 27" Z422H

## (undated) DEVICE — DRAPE SHEET MED 44X70" 9355

## (undated) DEVICE — SOL WATER IRRIG 500ML BOTTLE 2F7113

## (undated) DEVICE — SYR 10ML LL W/O NDL 302995

## (undated) DEVICE — GLOVE PROTEXIS POWDER FREE SMT 8.0  2D72PT80X

## (undated) DEVICE — SU ETHILON 3-0 PS-1 18" 1663H

## (undated) DEVICE — GLOVE BIOGEL PI MICRO SZ 6.5 48565

## (undated) DEVICE — ESU ELEC BLADE 2.75" COATED/INSULATED E1455

## (undated) DEVICE — PAD CHUX UNDERPAD 30X36" P3036C

## (undated) DEVICE — DRAPE IOBAN INCISE 23X17" 6650EZ

## (undated) RX ORDER — LIDOCAINE HYDROCHLORIDE 20 MG/ML
INJECTION, SOLUTION EPIDURAL; INFILTRATION; INTRACAUDAL; PERINEURAL
Status: DISPENSED
Start: 2021-04-30

## (undated) RX ORDER — FENTANYL CITRATE 50 UG/ML
INJECTION, SOLUTION INTRAMUSCULAR; INTRAVENOUS
Status: DISPENSED
Start: 2023-12-04

## (undated) RX ORDER — LIDOCAINE HYDROCHLORIDE 10 MG/ML
INJECTION, SOLUTION EPIDURAL; INFILTRATION; INTRACAUDAL; PERINEURAL
Status: DISPENSED
Start: 2020-12-21

## (undated) RX ORDER — ONDANSETRON 2 MG/ML
INJECTION INTRAMUSCULAR; INTRAVENOUS
Status: DISPENSED
Start: 2021-04-30

## (undated) RX ORDER — FENTANYL CITRATE 50 UG/ML
INJECTION, SOLUTION INTRAMUSCULAR; INTRAVENOUS
Status: DISPENSED
Start: 2021-04-30

## (undated) RX ORDER — REGADENOSON 0.08 MG/ML
INJECTION, SOLUTION INTRAVENOUS
Status: DISPENSED
Start: 2021-04-19

## (undated) RX ORDER — ONDANSETRON 2 MG/ML
INJECTION INTRAMUSCULAR; INTRAVENOUS
Status: DISPENSED
Start: 2023-12-04

## (undated) RX ORDER — CEFAZOLIN SODIUM 2 G/50ML
SOLUTION INTRAVENOUS
Status: DISPENSED
Start: 2023-12-04

## (undated) RX ORDER — PROPOFOL 10 MG/ML
INJECTION, EMULSION INTRAVENOUS
Status: DISPENSED
Start: 2023-12-04

## (undated) RX ORDER — KETOROLAC TROMETHAMINE 30 MG/ML
INJECTION, SOLUTION INTRAMUSCULAR; INTRAVENOUS
Status: DISPENSED
Start: 2023-12-04

## (undated) RX ORDER — GENTAMICIN 40 MG/ML
INJECTION, SOLUTION INTRAMUSCULAR; INTRAVENOUS
Status: DISPENSED
Start: 2020-12-21

## (undated) RX ORDER — PROPOFOL 10 MG/ML
INJECTION, EMULSION INTRAVENOUS
Status: DISPENSED
Start: 2021-04-30

## (undated) RX ORDER — CEFAZOLIN SODIUM 2 G/100ML
INJECTION, SOLUTION INTRAVENOUS
Status: DISPENSED
Start: 2021-04-30

## (undated) RX ORDER — ACETAMINOPHEN 325 MG/1
TABLET ORAL
Status: DISPENSED
Start: 2023-12-04

## (undated) RX ORDER — GLYCOPYRROLATE 0.2 MG/ML
INJECTION INTRAMUSCULAR; INTRAVENOUS
Status: DISPENSED
Start: 2023-12-04

## (undated) RX ORDER — DEXAMETHASONE SODIUM PHOSPHATE 4 MG/ML
INJECTION, SOLUTION INTRA-ARTICULAR; INTRALESIONAL; INTRAMUSCULAR; INTRAVENOUS; SOFT TISSUE
Status: DISPENSED
Start: 2023-12-04